# Patient Record
Sex: FEMALE | Race: WHITE | Employment: OTHER | URBAN - NONMETROPOLITAN AREA
[De-identification: names, ages, dates, MRNs, and addresses within clinical notes are randomized per-mention and may not be internally consistent; named-entity substitution may affect disease eponyms.]

---

## 2017-04-05 RX ORDER — CITALOPRAM 20 MG/1
TABLET ORAL
Qty: 30 TABLET | Refills: 0 | OUTPATIENT
Start: 2017-04-05

## 2017-04-27 ENCOUNTER — HOSPITAL ENCOUNTER (OUTPATIENT)
Dept: GENERAL RADIOLOGY | Age: 62
Discharge: HOME | End: 2017-04-27
Payer: MEDICAID

## 2017-04-27 ENCOUNTER — OFFICE VISIT (OUTPATIENT)
Dept: PRIMARY CARE CLINIC | Age: 62
End: 2017-04-27
Payer: MEDICAID

## 2017-04-27 VITALS
WEIGHT: 178 LBS | HEART RATE: 99 BPM | BODY MASS INDEX: 28.61 KG/M2 | TEMPERATURE: 97.6 F | RESPIRATION RATE: 18 BRPM | DIASTOLIC BLOOD PRESSURE: 80 MMHG | SYSTOLIC BLOOD PRESSURE: 122 MMHG | HEIGHT: 66 IN | OXYGEN SATURATION: 99 %

## 2017-04-27 DIAGNOSIS — E78.5 HYPERLIPIDEMIA, UNSPECIFIED HYPERLIPIDEMIA TYPE: ICD-10-CM

## 2017-04-27 DIAGNOSIS — E55.9 VITAMIN D DEFICIENCY: ICD-10-CM

## 2017-04-27 DIAGNOSIS — M25.552 BILATERAL HIP PAIN: ICD-10-CM

## 2017-04-27 DIAGNOSIS — M25.551 BILATERAL HIP PAIN: ICD-10-CM

## 2017-04-27 DIAGNOSIS — Z13.1 ENCOUNTER FOR SCREENING FOR DIABETES MELLITUS: ICD-10-CM

## 2017-04-27 DIAGNOSIS — Z13.9 SCREENING: ICD-10-CM

## 2017-04-27 DIAGNOSIS — R53.83 OTHER FATIGUE: Primary | ICD-10-CM

## 2017-04-27 DIAGNOSIS — Z11.59 NEED FOR HEPATITIS C SCREENING TEST: ICD-10-CM

## 2017-04-27 LAB — HBA1C MFR BLD: 5.2 %

## 2017-04-27 PROCEDURE — 83036 HEMOGLOBIN GLYCOSYLATED A1C: CPT | Performed by: NURSE PRACTITIONER

## 2017-04-27 PROCEDURE — 73521 X-RAY EXAM HIPS BI 2 VIEWS: CPT

## 2017-04-27 PROCEDURE — 99214 OFFICE O/P EST MOD 30 MIN: CPT | Performed by: NURSE PRACTITIONER

## 2017-04-27 ASSESSMENT — ENCOUNTER SYMPTOMS
ALLERGIC/IMMUNOLOGIC NEGATIVE: 1
RESPIRATORY NEGATIVE: 1
EYES NEGATIVE: 1
GASTROINTESTINAL NEGATIVE: 1

## 2017-04-28 ENCOUNTER — TELEPHONE (OUTPATIENT)
Dept: PRIMARY CARE CLINIC | Age: 62
End: 2017-04-28

## 2017-05-08 ENCOUNTER — TELEPHONE (OUTPATIENT)
Dept: PRIMARY CARE CLINIC | Age: 62
End: 2017-05-08

## 2017-05-27 DIAGNOSIS — J30.2 SEASONAL ALLERGIC RHINITIS, UNSPECIFIED ALLERGIC RHINITIS TRIGGER: ICD-10-CM

## 2017-05-28 RX ORDER — CETIRIZINE HYDROCHLORIDE 10 MG/1
TABLET ORAL
Qty: 30 TABLET | Refills: 11 | Status: SHIPPED | OUTPATIENT
Start: 2017-05-28 | End: 2018-04-25 | Stop reason: SDUPTHER

## 2017-07-19 RX ORDER — FLUTICASONE PROPIONATE 50 MCG
SPRAY, SUSPENSION (ML) NASAL
Qty: 1 BOTTLE | Refills: 3 | Status: SHIPPED | OUTPATIENT
Start: 2017-07-19 | End: 2018-02-25 | Stop reason: SDUPTHER

## 2017-09-08 DIAGNOSIS — Z12.39 SCREENING FOR BREAST CANCER: Primary | ICD-10-CM

## 2017-09-08 DIAGNOSIS — Z12.39 SCREENING BREAST EXAMINATION: ICD-10-CM

## 2017-09-14 ENCOUNTER — HOSPITAL ENCOUNTER (OUTPATIENT)
Dept: WOMENS IMAGING | Age: 62
Discharge: HOME | End: 2017-09-14
Payer: MEDICAID

## 2017-09-14 DIAGNOSIS — Z12.39 SCREENING FOR BREAST CANCER: ICD-10-CM

## 2017-09-14 DIAGNOSIS — Z12.39 SCREENING BREAST EXAMINATION: ICD-10-CM

## 2017-09-14 PROCEDURE — 77063 BREAST TOMOSYNTHESIS BI: CPT

## 2017-09-15 ENCOUNTER — TELEPHONE (OUTPATIENT)
Dept: INTERNAL MEDICINE | Age: 62
End: 2017-09-15

## 2017-09-21 ENCOUNTER — TELEPHONE (OUTPATIENT)
Dept: PRIMARY CARE CLINIC | Age: 62
End: 2017-09-21

## 2017-09-27 ENCOUNTER — OFFICE VISIT (OUTPATIENT)
Dept: PRIMARY CARE CLINIC | Age: 62
End: 2017-09-27
Payer: MEDICAID

## 2017-09-27 VITALS
OXYGEN SATURATION: 98 % | SYSTOLIC BLOOD PRESSURE: 110 MMHG | WEIGHT: 179 LBS | DIASTOLIC BLOOD PRESSURE: 78 MMHG | RESPIRATION RATE: 18 BRPM | HEIGHT: 67 IN | TEMPERATURE: 98.5 F | BODY MASS INDEX: 28.09 KG/M2 | HEART RATE: 104 BPM

## 2017-09-27 DIAGNOSIS — Z13.79 GENETIC TESTING: ICD-10-CM

## 2017-09-27 DIAGNOSIS — F32.A ANXIETY AND DEPRESSION: ICD-10-CM

## 2017-09-27 DIAGNOSIS — F41.9 ANXIETY AND DEPRESSION: ICD-10-CM

## 2017-09-27 DIAGNOSIS — M19.90 ARTHRITIS: Primary | ICD-10-CM

## 2017-09-27 PROCEDURE — 99214 OFFICE O/P EST MOD 30 MIN: CPT | Performed by: NURSE PRACTITIONER

## 2017-09-27 RX ORDER — MELOXICAM 7.5 MG/1
7.5 TABLET ORAL DAILY
Qty: 30 TABLET | Refills: 3 | Status: SHIPPED | OUTPATIENT
Start: 2017-09-27 | End: 2018-04-25

## 2017-09-27 ASSESSMENT — ENCOUNTER SYMPTOMS
GASTROINTESTINAL NEGATIVE: 1
EYES NEGATIVE: 1
RESPIRATORY NEGATIVE: 1
BACK PAIN: 1

## 2017-10-10 ENCOUNTER — PATIENT MESSAGE (OUTPATIENT)
Dept: PRIMARY CARE CLINIC | Age: 62
End: 2017-10-10

## 2017-10-26 ENCOUNTER — OFFICE VISIT (OUTPATIENT)
Dept: PRIMARY CARE CLINIC | Age: 62
End: 2017-10-26
Payer: MEDICAID

## 2017-10-26 VITALS
WEIGHT: 178 LBS | HEART RATE: 98 BPM | BODY MASS INDEX: 27.94 KG/M2 | HEIGHT: 67 IN | DIASTOLIC BLOOD PRESSURE: 82 MMHG | OXYGEN SATURATION: 100 % | RESPIRATION RATE: 20 BRPM | SYSTOLIC BLOOD PRESSURE: 122 MMHG

## 2017-10-26 DIAGNOSIS — F41.9 ANXIETY AND DEPRESSION: ICD-10-CM

## 2017-10-26 DIAGNOSIS — G89.29 CHRONIC RIGHT-SIDED LOW BACK PAIN WITH RIGHT-SIDED SCIATICA: Primary | ICD-10-CM

## 2017-10-26 DIAGNOSIS — M54.41 CHRONIC RIGHT-SIDED LOW BACK PAIN WITH RIGHT-SIDED SCIATICA: Primary | ICD-10-CM

## 2017-10-26 DIAGNOSIS — F32.A ANXIETY AND DEPRESSION: ICD-10-CM

## 2017-10-26 DIAGNOSIS — E66.3 OVERWEIGHT: ICD-10-CM

## 2017-10-26 PROCEDURE — 1036F TOBACCO NON-USER: CPT | Performed by: NURSE PRACTITIONER

## 2017-10-26 PROCEDURE — G8417 CALC BMI ABV UP PARAM F/U: HCPCS | Performed by: NURSE PRACTITIONER

## 2017-10-26 PROCEDURE — G8427 DOCREV CUR MEDS BY ELIG CLIN: HCPCS | Performed by: NURSE PRACTITIONER

## 2017-10-26 PROCEDURE — 99214 OFFICE O/P EST MOD 30 MIN: CPT | Performed by: NURSE PRACTITIONER

## 2017-10-26 PROCEDURE — 3014F SCREEN MAMMO DOC REV: CPT | Performed by: NURSE PRACTITIONER

## 2017-10-26 PROCEDURE — G8484 FLU IMMUNIZE NO ADMIN: HCPCS | Performed by: NURSE PRACTITIONER

## 2017-10-26 PROCEDURE — 3017F COLORECTAL CA SCREEN DOC REV: CPT | Performed by: NURSE PRACTITIONER

## 2017-10-26 RX ORDER — METHYLPREDNISOLONE ACETATE 40 MG/ML
40 INJECTION, SUSPENSION INTRA-ARTICULAR; INTRALESIONAL; INTRAMUSCULAR; SOFT TISSUE ONCE
Qty: 1 ML | Refills: 0 | Status: SHIPPED | OUTPATIENT
Start: 2017-10-26 | End: 2017-10-26

## 2017-10-26 RX ORDER — METHYLPREDNISOLONE 4 MG/1
TABLET ORAL
Qty: 1 KIT | Refills: 0 | Status: SHIPPED | OUTPATIENT
Start: 2017-10-26 | End: 2017-11-01

## 2017-10-26 ASSESSMENT — ENCOUNTER SYMPTOMS
RESPIRATORY NEGATIVE: 1
BACK PAIN: 1
GASTROINTESTINAL NEGATIVE: 1
EYES NEGATIVE: 1

## 2017-10-26 NOTE — PROGRESS NOTES
[facility] PRIMARY CARE  [facility] [facility] [address] [facility] [address]  Dept: [phone]  Dept Fax: [phone]  Loc: [phone]    Myesha Corrales is a 57 y.o. female who presents today for her medical conditions/complaints as noted below. Myesha Corrales is c/o of 1 Month Follow-Up (genetic testing results); Discuss Medications (Mobic doesnt work ); and Fatigue      Chief Complaint   Patient presents with    1 Month Follow-Up     genetic testing results    Discuss Medications     Mobic doesnt work     Fatigue       HPI:     HPI  Patient is here for follow up on arthritic pain and recent genetic testing. Patient reports that the mobic was not helping and she was having side effects of dizziness and headaches with the medication. She stopped the Mobic and symptoms did resolve. Patient reports coming off of the Celexa and is doing ok without it. She is having a few episodes of increased weeping, but not significant. Past Medical History:   Diagnosis Date    Allergic rhinitis     Anxiety     GERD (gastroesophageal reflux disease)         Past Surgical History:   Procedure Laterality Date    BREAST SURGERY Bilateral     Dr Shonna David BI Bilateral        Social History   Substance Use Topics    Smoking status: Former Smoker     Quit date: 10/3/2014    Smokeless tobacco: Not on file    Alcohol use No        Current Outpatient Prescriptions   Medication Sig Dispense Refill    methylPREDNISolone acetate (DEPO-MEDROL) 40 MG/ML injection Inject 1 mL into the muscle once for 1 dose 1 mL 0    methylPREDNISolone (MEDROL DOSEPACK) 4 MG tablet Take by mouth.  1 kit 0    metFORMIN (GLUCOPHAGE) 500 MG tablet Take 1 tablet by mouth 2 times daily (with meals) 60 tablet 3    fluticasone (FLONASE) 50 MCG/ACT nasal spray USE ONE SPRAY(S) IN EACH NOSTRIL ONCE DAILY 1 Bottle 3    cetirizine (ZYRTEC) 10 MG tablet TAKE ONE TABLET BY MOUTH alert and oriented to person, place, and time. She has normal strength. Skin: Skin is warm, dry and intact. Psychiatric: She has a normal mood and affect. Her speech is normal and behavior is normal. Judgment and thought content normal. Cognition and memory are normal.   Nursing note and vitals reviewed. /82   Pulse 98   Resp 20   Ht 5' 7\" (1.702 m)   Wt 178 lb (80.7 kg)   SpO2 100%   Breastfeeding? No   BMI 27.88 kg/m²     Assessment:     1. Chronic right-sided low back pain with right-sided sciatica  methylPREDNISolone acetate (DEPO-MEDROL) 40 MG/ML injection    methylPREDNISolone (MEDROL DOSEPACK) 4 MG tablet   2. Overweight  metFORMIN (GLUCOPHAGE) 500 MG tablet   3. Anxiety and depression         No results found for this visit on 10/26/17. Plan:     Steroid injection for sciatic pain. Metformin for weight loss  Not going to start any antidepressants at this time. Patient is doing well without medication. Return in about 4 weeks (around 11/23/2017) for weight loss. No orders of the defined types were placed in this encounter. Orders Placed This Encounter   Medications    methylPREDNISolone acetate (DEPO-MEDROL) 40 MG/ML injection     Sig: Inject 1 mL into the muscle once for 1 dose     Dispense:  1 mL     Refill:  0    methylPREDNISolone (MEDROL DOSEPACK) 4 MG tablet     Sig: Take by mouth. Dispense:  1 kit     Refill:  0    metFORMIN (GLUCOPHAGE) 500 MG tablet     Sig: Take 1 tablet by mouth 2 times daily (with meals)     Dispense:  60 tablet     Refill:  3        Patient given educational materials - see patient instructions. Discussed use, benefit, and side effects of prescribed medications. All patient questions answered. Pt voiced understanding. Reviewed health maintenance. Instructed to continue current medications, diet and exercise. Patient agreed with treatment plan. Follow up as directed.            Electronically signed by ROBER Kim on 10/26/2017 at 2:05 PM

## 2018-02-26 RX ORDER — FLUTICASONE PROPIONATE 50 MCG
SPRAY, SUSPENSION (ML) NASAL
Qty: 1 BOTTLE | Refills: 3 | Status: SHIPPED | OUTPATIENT
Start: 2018-02-26 | End: 2018-04-25 | Stop reason: SDUPTHER

## 2018-03-01 ENCOUNTER — PATIENT MESSAGE (OUTPATIENT)
Dept: PRIMARY CARE CLINIC | Age: 63
End: 2018-03-01

## 2018-03-01 NOTE — TELEPHONE ENCOUNTER
From: Kyaw Avalos  To: JoseROBER Medina  Sent: 3/1/2018 11:39 AM CST  Subject: Non-Urgent Medical Question    I saw on my records here that I was due for shingles shot. I don't know how often I should have one but I got a Zostavax [id number] INJ at [facility] on 7/21/2015. Just to let you know so you can put in my records. Thank you. Becky Aldana

## 2018-04-25 ENCOUNTER — PATIENT MESSAGE (OUTPATIENT)
Dept: PRIMARY CARE CLINIC | Age: 63
End: 2018-04-25

## 2018-04-25 RX ORDER — CITALOPRAM 20 MG/1
20 TABLET ORAL DAILY
Qty: 30 TABLET | Refills: 2 | Status: SHIPPED | OUTPATIENT
Start: 2018-04-25 | End: 2018-07-31 | Stop reason: SDUPTHER

## 2018-04-25 RX ORDER — FLUTICASONE PROPIONATE 50 MCG
1 SPRAY, SUSPENSION (ML) NASAL DAILY
Qty: 1 BOTTLE | Refills: 3 | Status: SHIPPED | OUTPATIENT
Start: 2018-04-25 | End: 2019-04-14 | Stop reason: SDUPTHER

## 2018-04-25 RX ORDER — CITALOPRAM 20 MG/1
TABLET ORAL
COMMUNITY
Start: 2018-04-03 | End: 2018-04-25 | Stop reason: SDUPTHER

## 2018-04-25 RX ORDER — CETIRIZINE HYDROCHLORIDE 10 MG/1
10 TABLET ORAL DAILY
Qty: 30 TABLET | Refills: 11 | Status: SHIPPED | OUTPATIENT
Start: 2018-04-25 | End: 2018-08-24 | Stop reason: SDUPTHER

## 2018-07-23 RX ORDER — CITALOPRAM 20 MG/1
TABLET ORAL
Qty: 30 TABLET | Refills: 2 | OUTPATIENT
Start: 2018-07-23

## 2018-07-31 ENCOUNTER — PATIENT MESSAGE (OUTPATIENT)
Dept: PRIMARY CARE CLINIC | Age: 63
End: 2018-07-31

## 2018-07-31 RX ORDER — CITALOPRAM 20 MG/1
20 TABLET ORAL DAILY
Qty: 30 TABLET | Refills: 0 | Status: SHIPPED | OUTPATIENT
Start: 2018-07-31 | End: 2018-08-24 | Stop reason: SDUPTHER

## 2018-07-31 NOTE — TELEPHONE ENCOUNTER
From: Olga Mccallum  To: ROBER Saini  Sent: 7/31/2018 1:35 PM CDT  Subject: Prescription Question    [facility] has not received a refill from your office for my [id number] mg.  Thank you  Brayan Rogers  [phone]

## 2018-08-24 ENCOUNTER — OFFICE VISIT (OUTPATIENT)
Dept: PRIMARY CARE CLINIC | Age: 63
End: 2018-08-24
Payer: MEDICARE

## 2018-08-24 VITALS
SYSTOLIC BLOOD PRESSURE: 124 MMHG | DIASTOLIC BLOOD PRESSURE: 72 MMHG | WEIGHT: 169.6 LBS | HEIGHT: 66 IN | BODY MASS INDEX: 27.26 KG/M2 | TEMPERATURE: 97.9 F | OXYGEN SATURATION: 97 % | HEART RATE: 98 BPM

## 2018-08-24 DIAGNOSIS — Z12.31 BREAST CANCER SCREENING BY MAMMOGRAM: ICD-10-CM

## 2018-08-24 DIAGNOSIS — E55.9 VITAMIN D DEFICIENCY: ICD-10-CM

## 2018-08-24 DIAGNOSIS — F41.9 ANXIETY AND DEPRESSION: ICD-10-CM

## 2018-08-24 DIAGNOSIS — Z13.1 DIABETES MELLITUS SCREENING: ICD-10-CM

## 2018-08-24 DIAGNOSIS — Z23 NEED FOR PROPHYLACTIC VACCINATION AND INOCULATION AGAINST VARICELLA: ICD-10-CM

## 2018-08-24 DIAGNOSIS — J01.40 ACUTE NON-RECURRENT PANSINUSITIS: ICD-10-CM

## 2018-08-24 DIAGNOSIS — F32.A ANXIETY AND DEPRESSION: ICD-10-CM

## 2018-08-24 DIAGNOSIS — Z12.11 COLON CANCER SCREENING: ICD-10-CM

## 2018-08-24 DIAGNOSIS — Z23 NEED FOR PROPHYLACTIC VACCINATION AGAINST STREPTOCOCCUS PNEUMONIAE (PNEUMOCOCCUS): ICD-10-CM

## 2018-08-24 DIAGNOSIS — K64.9 ACUTE HEMORRHOID: Primary | ICD-10-CM

## 2018-08-24 LAB
ALBUMIN SERPL-MCNC: 4.4 G/DL (ref 3.5–5.2)
ALP BLD-CCNC: 56 U/L (ref 35–104)
ALT SERPL-CCNC: 13 U/L (ref 5–33)
ANION GAP SERPL CALCULATED.3IONS-SCNC: 14 MMOL/L (ref 7–19)
AST SERPL-CCNC: 16 U/L (ref 5–32)
BILIRUB SERPL-MCNC: <0.2 MG/DL (ref 0.2–1.2)
BUN BLDV-MCNC: 15 MG/DL (ref 8–23)
CALCIUM SERPL-MCNC: 9.5 MG/DL (ref 8.8–10.2)
CHLORIDE BLD-SCNC: 101 MMOL/L (ref 98–111)
CO2: 25 MMOL/L (ref 22–29)
CREAT SERPL-MCNC: 0.9 MG/DL (ref 0.5–0.9)
GFR NON-AFRICAN AMERICAN: >60
GLUCOSE BLD-MCNC: 88 MG/DL (ref 74–109)
HBA1C MFR BLD: 5.3 % (ref 4–6)
HCT VFR BLD CALC: 40.9 % (ref 37–47)
HEMOGLOBIN: 12.6 G/DL (ref 12–16)
MCH RBC QN AUTO: 29.4 PG (ref 27–31)
MCHC RBC AUTO-ENTMCNC: 30.8 G/DL (ref 33–37)
MCV RBC AUTO: 95.3 FL (ref 81–99)
PDW BLD-RTO: 13 % (ref 11.5–14.5)
PLATELET # BLD: 257 K/UL (ref 130–400)
PMV BLD AUTO: 10 FL (ref 9.4–12.3)
POTASSIUM SERPL-SCNC: 4 MMOL/L (ref 3.5–5)
RBC # BLD: 4.29 M/UL (ref 4.2–5.4)
SODIUM BLD-SCNC: 140 MMOL/L (ref 136–145)
TOTAL PROTEIN: 7.3 G/DL (ref 6.6–8.7)
TSH SERPL DL<=0.05 MIU/L-ACNC: 2.72 UIU/ML (ref 0.27–4.2)
VITAMIN D 25-HYDROXY: 25.9 NG/ML
WBC # BLD: 6.2 K/UL (ref 4.8–10.8)

## 2018-08-24 PROCEDURE — 1036F TOBACCO NON-USER: CPT | Performed by: NURSE PRACTITIONER

## 2018-08-24 PROCEDURE — 4040F PNEUMOC VAC/ADMIN/RCVD: CPT | Performed by: NURSE PRACTITIONER

## 2018-08-24 PROCEDURE — G8417 CALC BMI ABV UP PARAM F/U: HCPCS | Performed by: NURSE PRACTITIONER

## 2018-08-24 PROCEDURE — 90670 PCV13 VACCINE IM: CPT | Performed by: NURSE PRACTITIONER

## 2018-08-24 PROCEDURE — 1090F PRES/ABSN URINE INCON ASSESS: CPT | Performed by: NURSE PRACTITIONER

## 2018-08-24 PROCEDURE — 3017F COLORECTAL CA SCREEN DOC REV: CPT | Performed by: NURSE PRACTITIONER

## 2018-08-24 PROCEDURE — G8427 DOCREV CUR MEDS BY ELIG CLIN: HCPCS | Performed by: NURSE PRACTITIONER

## 2018-08-24 PROCEDURE — 1101F PT FALLS ASSESS-DOCD LE1/YR: CPT | Performed by: NURSE PRACTITIONER

## 2018-08-24 PROCEDURE — G8400 PT W/DXA NO RESULTS DOC: HCPCS | Performed by: NURSE PRACTITIONER

## 2018-08-24 PROCEDURE — G0009 ADMIN PNEUMOCOCCAL VACCINE: HCPCS | Performed by: NURSE PRACTITIONER

## 2018-08-24 PROCEDURE — 1123F ACP DISCUSS/DSCN MKR DOCD: CPT | Performed by: NURSE PRACTITIONER

## 2018-08-24 PROCEDURE — 99214 OFFICE O/P EST MOD 30 MIN: CPT | Performed by: NURSE PRACTITIONER

## 2018-08-24 RX ORDER — AMOXICILLIN 500 MG/1
500 CAPSULE ORAL 2 TIMES DAILY
Qty: 20 CAPSULE | Refills: 0 | Status: SHIPPED | OUTPATIENT
Start: 2018-08-24 | End: 2018-09-03

## 2018-08-24 RX ORDER — CITALOPRAM 20 MG/1
20 TABLET ORAL DAILY
Qty: 30 TABLET | Refills: 2 | Status: SHIPPED | OUTPATIENT
Start: 2018-08-24 | End: 2018-11-21 | Stop reason: SDUPTHER

## 2018-08-24 RX ORDER — CETIRIZINE HYDROCHLORIDE 10 MG/1
10 TABLET ORAL DAILY
Qty: 30 TABLET | Refills: 11 | Status: SHIPPED | OUTPATIENT
Start: 2018-08-24 | End: 2019-07-11 | Stop reason: SDUPTHER

## 2018-08-24 ASSESSMENT — PATIENT HEALTH QUESTIONNAIRE - PHQ9
1. LITTLE INTEREST OR PLEASURE IN DOING THINGS: 0
SUM OF ALL RESPONSES TO PHQ QUESTIONS 1-9: 0
SUM OF ALL RESPONSES TO PHQ QUESTIONS 1-9: 0
SUM OF ALL RESPONSES TO PHQ9 QUESTIONS 1 & 2: 0
2. FEELING DOWN, DEPRESSED OR HOPELESS: 0

## 2018-08-24 ASSESSMENT — ENCOUNTER SYMPTOMS
EYES NEGATIVE: 1
RECTAL PAIN: 1
RESPIRATORY NEGATIVE: 1

## 2018-08-24 NOTE — PROGRESS NOTES
spray 1 spray by Nasal route daily 1 Bottle 3    ranitidine (ZANTAC) 150 MG capsule Take 1 capsule by mouth daily 30 capsule 6     No current facility-administered medications for this visit. No Known Allergies    History reviewed. No pertinent family history. Subjective:      Review of Systems   Constitutional: Negative. HENT: Negative. Eyes: Negative. Respiratory: Negative. Cardiovascular: Negative. Gastrointestinal: Positive for rectal pain (hemorrhoid). Endocrine: Negative. Genitourinary: Negative. Musculoskeletal: Negative. Skin: Negative. Neurological: Negative. Hematological: Negative. Psychiatric/Behavioral: Negative. Objective:     Physical Exam   Constitutional: She is oriented to person, place, and time. Vital signs are normal. She appears well-developed and well-nourished. HENT:   Head: Normocephalic and atraumatic. Right Ear: Hearing, external ear and ear canal normal. A middle ear effusion is present. Left Ear: Hearing, tympanic membrane, external ear and ear canal normal.   Nose: Nose normal.   Mouth/Throat: Uvula is midline, oropharynx is clear and moist and mucous membranes are normal.   Eyes: Pupils are equal, round, and reactive to light. Conjunctivae, EOM and lids are normal.   Neck: Trachea normal and normal range of motion. Neck supple. No thyroid mass and no thyromegaly present. Cardiovascular: Normal rate, regular rhythm, normal heart sounds and normal pulses. Pulmonary/Chest: Effort normal and breath sounds normal.   Abdominal: Soft. Normal appearance and bowel sounds are normal.   Genitourinary:   Genitourinary Comments: Deferred   Musculoskeletal: Normal range of motion. Cervical back: Normal. She exhibits normal range of motion and no tenderness. Thoracic back: Normal. She exhibits normal range of motion and no tenderness. Lumbar back: Normal. She exhibits normal range of motion and no tenderness. Neurological: She is alert and oriented to person, place, and time. She has normal strength. Skin: Skin is warm, dry and intact. Psychiatric: She has a normal mood and affect. Her speech is normal and behavior is normal. Judgment and thought content normal. Cognition and memory are normal.   Nursing note and vitals reviewed. /72   Pulse 98   Temp 97.9 °F (36.6 °C)   Ht 5' 6\" (1.676 m)   Wt 169 lb 9.6 oz (76.9 kg)   SpO2 97%   BMI 27.37 kg/m²     Assessment:      Diagnosis Orders   1. Acute hemorrhoid  hydrocortisone (ANUSOL-HC) 2.5 % rectal cream   2. Need for prophylactic vaccination against Streptococcus pneumoniae (pneumococcus)  Pneumococcal conjugate vaccine 13-valent PCV13   3. Need for prophylactic vaccination and inoculation against varicella  zoster recombinant adjuvanted vaccine (SHINGRIX) 50 MCG SUSR injection   4. Colon cancer screening     5. Anxiety and depression  citalopram (CELEXA) 20 MG tablet    CBC    Comprehensive Metabolic Panel    TSH without Reflex   6. Diabetes mellitus screening  Hemoglobin A1C   7. Vitamin D deficiency  Vitamin D 25 Hydroxy   8. Breast cancer screening by mammogram  OZZIE Screening Bilateral   9. Acute non-recurrent pansinusitis  amoxicillin (AMOXIL) 500 MG capsule       No results found for this visit on 08/24/18. Plan:     Medications into pharmacy for acute hemorrhoid. She is to use as directed. I am rechecking labs we'll call her with these results. I'm also having her schedule mammogram again we will call with this result as well. Fit test given in office she is to return once completed. Pneumonia vaccine given in office today. abx for sinusitis. Return in about 4 weeks (around 9/21/2018) for Medicare AWV.     Orders Placed This Encounter   Procedures    OZZIE Screening Bilateral     Standing Status:   Future     Standing Expiration Date:   10/24/2019     Order Specific Question:   Reason for exam:     Answer:   screening    Pneumococcal

## 2018-08-28 ENCOUNTER — TELEPHONE (OUTPATIENT)
Dept: PRIMARY CARE CLINIC | Age: 63
End: 2018-08-28

## 2018-08-28 RX ORDER — ERGOCALCIFEROL (VITAMIN D2) 1250 MCG
50000 CAPSULE ORAL WEEKLY
Qty: 4 CAPSULE | Refills: 3 | Status: SHIPPED | OUTPATIENT
Start: 2018-08-28 | End: 2018-09-28 | Stop reason: SDUPTHER

## 2018-09-24 ENCOUNTER — TELEPHONE (OUTPATIENT)
Dept: PRIMARY CARE CLINIC | Age: 63
End: 2018-09-24

## 2018-09-24 DIAGNOSIS — R19.5 POSITIVE FIT (FECAL IMMUNOCHEMICAL TEST): Primary | ICD-10-CM

## 2018-09-24 LAB
CONTROL: POSITIVE
HEMOCCULT STL QL: POSITIVE

## 2018-09-24 NOTE — TELEPHONE ENCOUNTER
Received Fit Test in the mail it was tested and returned with positive results. Pt was notified of positive results by phone. Pt will be referred to GI for colonoscopy. Pt is to expect a call with an appointment time and date. Any questions please feel free to call [facility].

## 2018-09-25 DIAGNOSIS — R19.5 POSITIVE FIT (FECAL IMMUNOCHEMICAL TEST): Primary | ICD-10-CM

## 2018-09-28 ENCOUNTER — OFFICE VISIT (OUTPATIENT)
Dept: PRIMARY CARE CLINIC | Age: 63
End: 2018-09-28
Payer: MEDICARE

## 2018-09-28 VITALS
WEIGHT: 169.2 LBS | SYSTOLIC BLOOD PRESSURE: 128 MMHG | DIASTOLIC BLOOD PRESSURE: 82 MMHG | HEIGHT: 66 IN | OXYGEN SATURATION: 96 % | TEMPERATURE: 97.3 F | BODY MASS INDEX: 27.19 KG/M2 | HEART RATE: 90 BPM

## 2018-09-28 DIAGNOSIS — F41.9 ANXIETY AND DEPRESSION: ICD-10-CM

## 2018-09-28 DIAGNOSIS — Z13.6 SCREENING FOR CARDIOVASCULAR CONDITION: ICD-10-CM

## 2018-09-28 DIAGNOSIS — Z78.0 ASYMPTOMATIC MENOPAUSAL STATE: ICD-10-CM

## 2018-09-28 DIAGNOSIS — Z00.00 ROUTINE GENERAL MEDICAL EXAMINATION AT A HEALTH CARE FACILITY: Primary | ICD-10-CM

## 2018-09-28 DIAGNOSIS — F32.A ANXIETY AND DEPRESSION: ICD-10-CM

## 2018-09-28 DIAGNOSIS — E55.9 VITAMIN D DEFICIENCY: ICD-10-CM

## 2018-09-28 PROCEDURE — 4040F PNEUMOC VAC/ADMIN/RCVD: CPT | Performed by: NURSE PRACTITIONER

## 2018-09-28 PROCEDURE — G0446 INTENS BEHAVE THER CARDIO DX: HCPCS | Performed by: NURSE PRACTITIONER

## 2018-09-28 PROCEDURE — G0402 INITIAL PREVENTIVE EXAM: HCPCS | Performed by: NURSE PRACTITIONER

## 2018-09-28 RX ORDER — ERGOCALCIFEROL (VITAMIN D2) 1250 MCG
50000 CAPSULE ORAL WEEKLY
Qty: 4 CAPSULE | Refills: 3 | Status: SHIPPED | OUTPATIENT
Start: 2018-09-28 | End: 2019-07-17

## 2018-09-28 ASSESSMENT — ANXIETY QUESTIONNAIRES: GAD7 TOTAL SCORE: 2

## 2018-09-28 ASSESSMENT — PATIENT HEALTH QUESTIONNAIRE - PHQ9
SUM OF ALL RESPONSES TO PHQ QUESTIONS 1-9: 0
SUM OF ALL RESPONSES TO PHQ QUESTIONS 1-9: 0

## 2018-09-28 NOTE — PROGRESS NOTES
Medicare Annual Wellness Visit  Name: Jen Mccauley Date: 2018   MRN: [id number] Sex: Female   Age: 70 y.o. Ethnicity: Non-/Non    : 1955 Race: Vinny Steele is here for Medicare AWV    Screenings for behavioral, psychosocial and functional/safety risks, and cognitive dysfunction are all negative except as indicated below. These results, as well as other patient data [facility] [address] [facility], are documented in Flowsheets linked to this Encounter. No Known Allergies    Prior to Visit Medications    Medication Sig Taking? Authorizing Provider   ergocalciferol (ERGOCALCIFEROL) 17676 units capsule Take 1 capsule by mouth once a week Yes ROBER Pleitez   citalopram (CELEXA) 20 MG tablet Take 1 tablet by mouth daily Yes ROBER Pleitez   cetirizine (ZYRTEC) 10 MG tablet Take 1 tablet by mouth daily Yes ROBER Pleitez   hydrocortisone (ANUSOL-HC) 2.5 % rectal cream Place rectally 2 times daily. Yes ROBER Pleitez   fluticasone (FLONASE) 50 MCG/ACT nasal spray 1 spray by Nasal route daily Yes Nicole Patten MD   ranitidine (ZANTAC) 150 MG capsule Take 1 capsule by mouth daily Yes ROBER Andersen       Past Medical History:   Diagnosis Date    Allergic rhinitis     Anxiety     GERD (gastroesophageal reflux disease)      Past Surgical History:   Procedure Laterality Date    BREAST SURGERY Bilateral     Dr Chester Choi BI Bilateral      History reviewed. No pertinent family history.     CareTeam (Including outside providers/suppliers regularly involved in providing care):   Patient Care Team:  Nicole Patten MD as PCP - General (Family Medicine)    Wt Readings from Last 3 Encounters:   18 169 lb 3.2 oz (76.7 kg)   18 169 lb 9.6 oz (76.9 kg)   10/26/17 178 lb (80.7 kg)     Vitals:    18 1340   BP: 128/82   Pulse: 90   Temp: 97.3 °F (36.3 °C)   SpO2: 96%   Weight: 169 lb 3.2 oz (76.7 kg)   Height: 5' 6\" (1.676 m)   General Appearance: alert and oriented to person, place and time, well developed and well- nourished, in no acute distress  Skin: warm and dry, no rash or erythema  Head: normocephalic and atraumatic  Eyes: pupils equal, round, and reactive to light, extraocular eye movements intact, conjunctivae normal  ENT: tympanic membrane, external ear and ear canal normal bilaterally, nose without deformity, nasal mucosa and turbinates normal without polyps  Neck: supple and non-tender without mass, no thyromegaly or thyroid nodules, no cervical lymphadenopathy  Pulmonary/Chest: clear to auscultation bilaterally- no wheezes, rales or rhonchi, normal air movement, no respiratory distress  Cardiovascular: normal rate, regular rhythm, normal S1 and S2, no murmurs, rubs, clicks, or gallops, distal pulses intact, no carotid bruits  Abdomen: soft, non-tender, non-distended, normal bowel sounds, no masses or organomegaly  Extremities: no cyanosis, clubbing or edema  Musculoskeletal: normal range of motion, no joint swelling, deformity or tenderness  Neurologic: reflexes normal and symmetric, no cranial nerve deficit, gait, coordination and speech normal  Body mass index is 27.31 kg/m². Patient's complete Health Risk Assessment and screening values have been reviewed and are found in Flowsheets. The following problems were reviewed today and where indicated follow up appointments were made and/or referrals ordered.     Positive Risk Factor Screenings with Interventions:     General Health:  General  In the past 7 days, have you experienced any of the following?: (P) None of These  Do you get the social and emotional support that you need?: (P) Yes  Do you have a Living Will?: (!) (P) No  General Health Risk Interventions:  · None indicated    Health Habits/Nutrition:  Health Habits/Nutrition  Do you exercise for at least 20 minutes 2-3 times per week?: (!) (P) No  Have you lost any weight

## 2018-10-25 ENCOUNTER — OFFICE VISIT (OUTPATIENT)
Dept: GASTROENTEROLOGY | Facility: CLINIC | Age: 63
End: 2018-10-25

## 2018-10-25 VITALS
HEART RATE: 96 BPM | SYSTOLIC BLOOD PRESSURE: 120 MMHG | WEIGHT: 168 LBS | DIASTOLIC BLOOD PRESSURE: 80 MMHG | OXYGEN SATURATION: 96 % | HEIGHT: 66 IN | BODY MASS INDEX: 27 KG/M2

## 2018-10-25 DIAGNOSIS — R19.5 POSITIVE FIT (FECAL IMMUNOCHEMICAL TEST): Primary | ICD-10-CM

## 2018-10-25 DIAGNOSIS — K59.01 SLOW TRANSIT CONSTIPATION: ICD-10-CM

## 2018-10-25 DIAGNOSIS — K62.5 BRBPR (BRIGHT RED BLOOD PER RECTUM): ICD-10-CM

## 2018-10-25 DIAGNOSIS — K64.9 HEMORRHOIDS, UNSPECIFIED HEMORRHOID TYPE: ICD-10-CM

## 2018-10-25 DIAGNOSIS — R19.4 CHANGE IN BOWEL HABITS: ICD-10-CM

## 2018-10-25 PROCEDURE — 99204 OFFICE O/P NEW MOD 45 MIN: CPT | Performed by: CLINICAL NURSE SPECIALIST

## 2018-10-25 RX ORDER — ERGOCALCIFEROL 1.25 MG/1
CAPSULE ORAL
COMMUNITY
Start: 2018-09-28 | End: 2019-10-16 | Stop reason: ALTCHOICE

## 2018-10-25 RX ORDER — CITALOPRAM 20 MG/1
40 TABLET ORAL DAILY
COMMUNITY
Start: 2018-08-24 | End: 2022-01-31 | Stop reason: ALTCHOICE

## 2018-10-25 RX ORDER — CETIRIZINE HYDROCHLORIDE 10 MG/1
10 TABLET ORAL DAILY
COMMUNITY
Start: 2018-08-24

## 2018-10-25 RX ORDER — RANITIDINE 150 MG/1
150 CAPSULE ORAL
COMMUNITY
Start: 2015-10-12 | End: 2019-10-16 | Stop reason: ALTCHOICE

## 2018-10-25 NOTE — PROGRESS NOTES
Damairs Hu  1955    10/25/2018  Chief Complaint   Patient presents with   • GI Problem     New patient ref by Dr. Bowers for positive Fit test     Subjective   HPI  Damaris Hu is a 63 y.o. female who presents with a complaint of positive FIT testing with her PCP incidental findings. She has a complaint of constipation as well that has been progressive ongoing for approx 6 months ongoing. It is moderate to severe at times. No certain triggers. No alleviating. She is going up to up to a few days without a BM unless she takes something or eats increased fiber. She has associated hemorrhoids that do bleed on occasion. This is a small amount on the tissue when she wipes. She has been provided Anusol HC suppositories and this did help her. She denies any associated abdominal pain. She does have some bloating and distention with her constipation. She has not ever had a colonoscopy that she recalls but may have years ago. Her mother had colon polyps no family hx for colon cancer.     Past Medical History:   Diagnosis Date   • Depression    • GERD (gastroesophageal reflux disease)    • Vitamin D deficiency      Past Surgical History:   Procedure Laterality Date   • BREAST SURGERY      Cyst removal   • HYSTERECTOMY       Outpatient Prescriptions Marked as Taking for the 10/25/18 encounter (Office Visit) with Millicent Stvee APRN   Medication Sig Dispense Refill   • cetirizine (zyrTEC) 10 MG tablet Take 10 mg by mouth.     • citalopram (CeleXA) 20 MG tablet Take 20 mg by mouth.     • ergocalciferol (ERGOCALCIFEROL) 41449 units capsule Take  by mouth.     • hydrocortisone (ANUSOL-HC) 2.5 % rectal cream Place rectally 2 times daily.     • ranitidine (ZANTAC) 150 MG capsule Take 150 mg by mouth.       No Known Allergies  Social History     Social History   • Marital status: Unknown     Spouse name: N/A   • Number of children: N/A   • Years of education: N/A     Occupational History   • Not on file.     Social History  Main Topics   • Smoking status: Former Smoker   • Smokeless tobacco: Never Used      Comment: Quit 4 years ago   • Alcohol use No   • Drug use: Unknown   • Sexual activity: Not on file     Other Topics Concern   • Not on file     Social History Narrative   • No narrative on file     Family History   Problem Relation Age of Onset   • Colon polyps Mother    • Colon cancer Neg Hx      Health Maintenance   Topic Date Due   • ANNUAL PHYSICAL  08/22/1958   • ZOSTER VACCINE (2 of 3) 09/15/2015   • INFLUENZA VACCINE  08/01/2018   • HEPATITIS C SCREENING  10/02/2018   • MAMMOGRAM  10/02/2018   • COLONOSCOPY  10/02/2018   • PNEUMOCOCCAL VACCINES (65+ LOW/MEDIUM RISK) (2 of 2 - PPSV23) 08/24/2019   • TDAP/TD VACCINES (2 - Td) 05/05/2025     Review of Systems   Constitutional: Negative for activity change, appetite change, chills, diaphoresis, fatigue, fever and unexpected weight change.   HENT: Negative for ear pain, hearing loss, mouth sores, sore throat, trouble swallowing and voice change.    Eyes: Negative.    Respiratory: Negative for cough, choking, shortness of breath and wheezing.    Cardiovascular: Negative for chest pain and palpitations.   Gastrointestinal: Negative for abdominal pain, blood in stool, constipation, diarrhea, nausea and vomiting.        Hemorrhoid   Endocrine: Negative for cold intolerance and heat intolerance.   Genitourinary: Negative for decreased urine volume, dysuria, frequency, hematuria and urgency.   Musculoskeletal: Negative for back pain, gait problem and myalgias.   Skin: Negative for color change, pallor and rash.   Allergic/Immunologic: Negative for food allergies and immunocompromised state.   Neurological: Negative for dizziness, tremors, seizures, syncope, weakness, light-headedness, numbness and headaches.   Hematological: Negative for adenopathy. Does not bruise/bleed easily.   Psychiatric/Behavioral: Negative for agitation and confusion. The patient is not nervous/anxious.    All  "other systems reviewed and are negative.    Objective   Vitals:    10/25/18 1309   BP: 120/80   Pulse: 96   SpO2: 96%   Weight: 76.2 kg (168 lb)   Height: 167.6 cm (66\")     Body mass index is 27.12 kg/m².  Physical Exam   Constitutional: She is oriented to person, place, and time. She appears well-developed and well-nourished.   HENT:   Head: Normocephalic and atraumatic.   Eyes: Pupils are equal, round, and reactive to light.   Neck: Normal range of motion. Neck supple. No tracheal deviation present.   Cardiovascular: Normal rate, regular rhythm and normal heart sounds.  Exam reveals no gallop and no friction rub.    No murmur heard.  Pulmonary/Chest: Effort normal and breath sounds normal. No respiratory distress. She has no wheezes. She has no rales. She exhibits no tenderness.   Abdominal: Soft. Bowel sounds are normal. She exhibits no distension. There is no hepatosplenomegaly. There is no tenderness. There is no rigidity, no rebound and no guarding.   Genitourinary:   Genitourinary Comments: Very small external hemorrhoid noted no internal masses appreciated.    Musculoskeletal: Normal range of motion. She exhibits no edema, tenderness or deformity.   Neurological: She is alert and oriented to person, place, and time. She has normal reflexes.   Skin: Skin is warm and dry. No rash noted. No pallor.   Psychiatric: She has a normal mood and affect. Her behavior is normal. Judgment and thought content normal.     Assessment/Plan   Damaris was seen today for gi problem.    Diagnoses and all orders for this visit:    Positive FIT (fecal immunochemical test)  -     Case Request; Standing  -     Follow Anesthesia Guidelines / Standing Orders; Future  -     Implement Anesthesia Orders Day of Procedure; Standing  -     Obtain Informed Consent; Standing  -     Verify bowel prep was successful; Standing  -     Case Request  -     polyethylene glycol (GoLYTELY) 236 g solution; Take as directed by office " instructions.    Change in bowel habits    Slow transit constipation    Hemorrhoids, unspecified hemorrhoid type    BRBPR (bright red blood per rectum)      Long discussion regarding her symptoms I have suggested Miralax 17 grams up to twice per day as needed. Increase water intake and fiber regimen. For her hemorrhoids to continue her Anusol HC suppositories, warm bath and manage her constipation. Her hemorrhoids on exam today are very small and nonbleeding.     COLONOSCOPY WITH ANESTHESIA (N/A)  EMR Dragon/transcription disclaimer: Much of this encounter note is electronic transcription/translation of spoken language to printed text. The electronic translation of spoken language may be erroneous, or at times, nonsensical words or phrases may be inadvertently transcribed. Although I have reviewed the note for such errors, some may still exist.  Body mass index is 27.12 kg/m².  No Follow-up on file.    Patient's Body mass index is 27.12 kg/m². BMI is above normal parameters. Recommendations include: nutrition counseling.      All risks, benefits, alternatives, and indications of colonoscopy and/or Endoscopy procedure have been discussed with the patient. Risks to include perforation of the colon requiring possible surgery or colostomy, risk of bleeding from biopsies or removal of colon tissue, possibility of missing a colon polyp or cancer, or adverse drug reaction.  Benefits to include the diagnosis and management of disease of the colon and rectum. Alternatives to include barium enema, radiographic evaluation, lab testing or no intervention. Pt verbalizes understanding and agrees.     Millicent Steve, APRN  10/25/2018  1:47 PM      Obesity, Adult  Obesity is the condition of having too much total body fat. Being overweight or obese means that your weight is greater than what is considered healthy for your body size. Obesity is determined by a measurement called BMI. BMI is an estimate of body fat and is  calculated from height and weight. For adults, a BMI of 30 or higher is considered obese.  Obesity can eventually lead to other health concerns and major illnesses, including:  · Stroke.  · Coronary artery disease (CAD).  · Type 2 diabetes.  · Some types of cancer, including cancers of the colon, breast, uterus, and gallbladder.  · Osteoarthritis.  · High blood pressure (hypertension).  · High cholesterol.  · Sleep apnea.  · Gallbladder stones.  · Infertility problems.  What are the causes?  The main cause of obesity is taking in (consuming) more calories than your body uses for energy. Other factors that contribute to this condition may include:  · Being born with genes that make you more likely to become obese.  · Having a medical condition that causes obesity. These conditions include:  ¨ Hypothyroidism.  ¨ Polycystic ovarian syndrome (PCOS).  ¨ Binge-eating disorder.  ¨ Cushing syndrome.  · Taking certain medicines, such as steroids, antidepressants, and seizure medicines.  · Not being physically active (sedentary lifestyle).  · Living where there are limited places to exercise safely or buy healthy foods.  · Not getting enough sleep.  What increases the risk?  The following factors may increase your risk of this condition:  · Having a family history of obesity.  · Being a woman of -American descent.  · Being a man of  descent.  What are the signs or symptoms?  Having excessive body fat is the main symptom of this condition.  How is this diagnosed?  This condition may be diagnosed based on:  · Your symptoms.  · Your medical history.  · A physical exam. Your health care provider may measure:  ¨ Your BMI. If you are an adult with a BMI between 25 and less than 30, you are considered overweight. If you are an adult with a BMI of 30 or higher, you are considered obese.  ¨ The distances around your hips and your waist (circumferences). These may be compared to each other to help diagnose your  condition.  ¨ Your skinfold thickness. Your health care provider may gently pinch a fold of your skin and measure it.  How is this treated?  Treatment for this condition often includes changing your lifestyle. Treatment may include some or all of the following:  · Dietary changes. Work with your health care provider and a dietitian to set a weight-loss goal that is healthy and reasonable for you. Dietary changes may include eating:  ¨ Smaller portions. A portion size is the amount of a particular food that is healthy for you to eat at one time. This varies from person to person.  ¨ Low-calorie or low-fat options.  ¨ More whole grains, fruits, and vegetables.  · Regular physical activity. This may include aerobic activity (cardio) and strength training.  · Medicine to help you lose weight. Your health care provider may prescribe medicine if you are unable to lose 1 pound a week after 6 weeks of eating more healthily and doing more physical activity.  · Surgery. Surgical options may include gastric banding and gastric bypass. Surgery may be done if:  ¨ Other treatments have not helped to improve your condition.  ¨ You have a BMI of 40 or higher.  ¨ You have life-threatening health problems related to obesity.  Follow these instructions at home:     Eating and drinking     · Follow recommendations from your health care provider about what you eat and drink. Your health care provider may advise you to:  ¨ Limit fast foods, sweets, and processed snack foods.  ¨ Choose low-fat options, such as low-fat milk instead of whole milk.  ¨ Eat 5 or more servings of fruits or vegetables every day.  ¨ Eat at home more often. This gives you more control over what you eat.  ¨ Choose healthy foods when you eat out.  ¨ Learn what a healthy portion size is.  ¨ Keep low-fat snacks on hand.  ¨ Avoid sugary drinks, such as soda, fruit juice, iced tea sweetened with sugar, and flavored milk.  ¨ Eat a healthy breakfast.  · Drink enough water  to keep your urine clear or pale yellow.  · Do not go without eating for long periods of time (do not fast) or follow a fad diet. Fasting and fad diets can be unhealthy and even dangerous.  Physical Activity   · Exercise regularly, as told by your health care provider. Ask your health care provider what types of exercise are safe for you and how often you should exercise.  · Warm up and stretch before being active.  · Cool down and stretch after being active.  · Rest between periods of activity.  Lifestyle   · Limit the time that you spend in front of your TV, computer, or video game system.  · Find ways to reward yourself that do not involve food.  · Limit alcohol intake to no more than 1 drink a day for nonpregnant women and 2 drinks a day for men. One drink equals 12 oz of beer, 5 oz of wine, or 1½ oz of hard liquor.  General instructions   · Keep a weight loss journal to keep track of the food you eat and how much you exercise you get.  · Take over-the-counter and prescription medicines only as told by your health care provider.  · Take vitamins and supplements only as told by your health care provider.  · Consider joining a support group. Your health care provider may be able to recommend a support group.  · Keep all follow-up visits as told by your health care provider. This is important.  Contact a health care provider if:  · You are unable to meet your weight loss goal after 6 weeks of dietary and lifestyle changes.  This information is not intended to replace advice given to you by your health care provider. Make sure you discuss any questions you have with your health care provider.  Document Released: 01/25/2006 Document Revised: 05/22/2017 Document Reviewed: 10/05/2016  [employer] Interactive Patient Education © 2017 [employer]      If you smoke or use tobacco, 4 minutes reading provided  Steps to Quit Smoking  Smoking tobacco can be harmful to your health and can affect almost every organ in your body.  Smoking puts you, and those around you, at risk for developing many serious chronic diseases. Quitting smoking is difficult, but it is one of the best things that you can do for your health. It is never too late to quit.  What are the benefits of quitting smoking?  When you quit smoking, you lower your risk of developing serious diseases and conditions, such as:  · Lung cancer or lung disease, such as COPD.  · Heart disease.  · Stroke.  · Heart attack.  · Infertility.  · Osteoporosis and bone fractures.  Additionally, symptoms such as coughing, wheezing, and shortness of breath may get better when you quit. You may also find that you get sick less often because your body is stronger at fighting off colds and infections. If you are pregnant, quitting smoking can help to reduce your chances of having a baby of low birth weight.  How do I get ready to quit?  When you decide to quit smoking, create a plan to make sure that you are successful. Before you quit:  · Pick a date to quit. Set a date within the next two weeks to give you time to prepare.  · Write down the reasons why you are quitting. Keep this list in places where you will see it often, such as on your bathroom mirror or in your car or wallet.  · Identify the people, places, things, and activities that make you want to smoke (triggers) and avoid them. Make sure to take these actions:  ¨ Throw away all cigarettes at home, at work, and in your car.  ¨ Throw away smoking accessories, such as ashtrays and lighters.  ¨ Clean your car and make sure to empty the ashtray.  ¨ Clean your home, including curtains and carpets.  · Tell your family, friends, and coworkers that you are quitting. Support from your loved ones can make quitting easier.  · Talk with your health care provider about your options for quitting smoking.  · Find out what treatment options are covered by your health insurance.  What strategies can I use to quit smoking?  Talk with your healthcare  provider about different strategies to quit smoking. Some strategies include:  · Quitting smoking altogether instead of gradually lessening how much you smoke over a period of time. Research shows that quitting “cold turkey” is more successful than gradually quitting.  · Attending in-person counseling to help you build problem-solving skills. You are more likely to have success in quitting if you attend several counseling sessions. Even short sessions of 10 minutes can be effective.  · Finding resources and support systems that can help you to quit smoking and remain smoke-free after you quit. These resources are most helpful when you use them often. They can include:  ¨ Online chats with a counselor.  ¨ Telephone quitlines.  ¨ Printed self-help materials.  ¨ Support groups or group counseling.  ¨ Text messaging programs.  ¨ Mobile phone applications.  · Taking medicines to help you quit smoking. (If you are pregnant or breastfeeding, talk with your health care provider first.) Some medicines contain nicotine and some do not. Both types of medicines help with cravings, but the medicines that include nicotine help to relieve withdrawal symptoms. Your health care provider may recommend:  ¨ Nicotine patches, gum, or lozenges.  ¨ Nicotine inhalers or sprays.  ¨ Non-nicotine medicine that is taken by mouth.  Talk with your health care provider about combining strategies, such as taking medicines while you are also receiving in-person counseling. Using these two strategies together makes you more likely to succeed in quitting than if you used either strategy on its own.  If you are pregnant or breastfeeding, talk with your health care provider about finding counseling or other support strategies to quit smoking. Do not take medicine to help you quit smoking unless told to do so by your health care provider.  What things can I do to make it easier to quit?  Quitting smoking might feel overwhelming at first, but there is a  lot that you can do to make it easier. Take these important actions:  · Reach out to your family and friends and ask that they support and encourage you during this time. Call telephone quitlines, reach out to support groups, or work with a counselor for support.  · Ask people who smoke to avoid smoking around you.  · Avoid places that trigger you to smoke, such as bars, parties, or smoke-break areas at work.  · Spend time around people who do not smoke.  · Lessen stress in your life, because stress can be a smoking trigger for some people. To lessen stress, try:  ¨ Exercising regularly.  ¨ Deep-breathing exercises.  ¨ Yoga.  ¨ Meditating.  ¨ Performing a body scan. This involves closing your eyes, scanning your body from head to toe, and noticing which parts of your body are particularly tense. Purposefully relax the muscles in those areas.  · Download or purchase mobile phone or tablet apps (applications) that can help you stick to your quit plan by providing reminders, tips, and encouragement. There are many free apps, such as QuitGuide from the CDC (Centers for Disease Control and Prevention). You can find other support for quitting smoking (smoking cessation) through smokefree.gov and other websites.  How will I feel when I quit smoking?  Within the first 24 hours of quitting smoking, you may start to feel some withdrawal symptoms. These symptoms are usually most noticeable 2-3 days after quitting, but they usually do not last beyond 2-3 weeks. Changes or symptoms that you might experience include:  · Mood swings.  · Restlessness, anxiety, or irritation.  · Difficulty concentrating.  · Dizziness.  · Strong cravings for sugary foods in addition to nicotine.  · Mild weight gain.  · Constipation.  · Nausea.  · Coughing or a sore throat.  · Changes in how your medicines work in your body.  · A depressed mood.  · Difficulty sleeping (insomnia).  After the first 2-3 weeks of quitting, you may start to notice more  positive results, such as:  · Improved sense of smell and taste.  · Decreased coughing and sore throat.  · Slower heart rate.  · Lower blood pressure.  · Clearer skin.  · The ability to breathe more easily.  · Fewer sick days.  Quitting smoking is very challenging for most people. Do not get discouraged if you are not successful the first time. Some people need to make many attempts to quit before they achieve long-term success. Do your best to stick to your quit plan, and talk with your health care provider if you have any questions or concerns.  This information is not intended to replace advice given to you by your health care provider. Make sure you discuss any questions you have with your health care provider.  Document Released: 12/12/2002 Document Revised: 08/15/2017 Document Reviewed: 05/03/2016  [employer] Interactive Patient Education © 2017 [employer]

## 2018-11-21 DIAGNOSIS — F41.9 ANXIETY AND DEPRESSION: ICD-10-CM

## 2018-11-21 DIAGNOSIS — F32.A ANXIETY AND DEPRESSION: ICD-10-CM

## 2018-11-23 RX ORDER — CITALOPRAM 20 MG/1
20 TABLET ORAL DAILY
Qty: 30 TABLET | Refills: 5 | Status: SHIPPED | OUTPATIENT
Start: 2018-11-23 | End: 2019-05-10 | Stop reason: SDUPTHER

## 2018-12-03 ENCOUNTER — HOSPITAL ENCOUNTER (OUTPATIENT)
Facility: HOSPITAL | Age: 63
Setting detail: HOSPITAL OUTPATIENT SURGERY
Discharge: HOME | End: 2018-12-03
Attending: INTERNAL MEDICINE | Admitting: INTERNAL MEDICINE

## 2018-12-03 ENCOUNTER — ANESTHESIA EVENT (OUTPATIENT)
Dept: GASTROENTEROLOGY | Facility: HOSPITAL | Age: 63
End: 2018-12-03

## 2018-12-03 ENCOUNTER — ANESTHESIA (OUTPATIENT)
Dept: GASTROENTEROLOGY | Facility: HOSPITAL | Age: 63
End: 2018-12-03

## 2018-12-03 VITALS
WEIGHT: 166 LBS | HEIGHT: 66 IN | BODY MASS INDEX: 26.68 KG/M2 | DIASTOLIC BLOOD PRESSURE: 70 MMHG | TEMPERATURE: 97.1 F | HEART RATE: 71 BPM | OXYGEN SATURATION: 100 % | RESPIRATION RATE: 15 BRPM | SYSTOLIC BLOOD PRESSURE: 112 MMHG

## 2018-12-03 DIAGNOSIS — R19.5 POSITIVE FIT (FECAL IMMUNOCHEMICAL TEST): ICD-10-CM

## 2018-12-03 PROCEDURE — 45385 COLONOSCOPY W/LESION REMOVAL: CPT | Performed by: INTERNAL MEDICINE

## 2018-12-03 PROCEDURE — 88305 TISSUE EXAM BY PATHOLOGIST: CPT | Performed by: INTERNAL MEDICINE

## 2018-12-03 PROCEDURE — 25010000002 PROPOFOL 10 MG/ML EMULSION: Performed by: NURSE ANESTHETIST, CERTIFIED REGISTERED

## 2018-12-03 DEVICE — DEV CLIP ENDO RESOLUTION360 CONTRL ROT 235CM: Type: IMPLANTABLE DEVICE | Site: TRANSVERSE COLON | Status: FUNCTIONAL

## 2018-12-03 RX ORDER — SODIUM CHLORIDE 9 MG/ML
100 INJECTION, SOLUTION INTRAVENOUS CONTINUOUS
Status: CANCELLED | OUTPATIENT
Start: 2018-12-03

## 2018-12-03 RX ORDER — SODIUM CHLORIDE 0.9 % (FLUSH) 0.9 %
3 SYRINGE (ML) INJECTION AS NEEDED
Status: DISCONTINUED | OUTPATIENT
Start: 2018-12-03 | End: 2018-12-03 | Stop reason: HOSPADM

## 2018-12-03 RX ORDER — SODIUM CHLORIDE 0.9 % (FLUSH) 0.9 %
3-10 SYRINGE (ML) INJECTION AS NEEDED
Status: CANCELLED | OUTPATIENT
Start: 2018-12-03

## 2018-12-03 RX ORDER — SODIUM CHLORIDE 9 MG/ML
500 INJECTION, SOLUTION INTRAVENOUS CONTINUOUS PRN
Status: DISCONTINUED | OUTPATIENT
Start: 2018-12-03 | End: 2018-12-03 | Stop reason: HOSPADM

## 2018-12-03 RX ORDER — LIDOCAINE HYDROCHLORIDE 20 MG/ML
INJECTION, SOLUTION INFILTRATION; PERINEURAL AS NEEDED
Status: DISCONTINUED | OUTPATIENT
Start: 2018-12-03 | End: 2018-12-03 | Stop reason: SURG

## 2018-12-03 RX ORDER — SODIUM CHLORIDE 0.9 % (FLUSH) 0.9 %
3 SYRINGE (ML) INJECTION EVERY 12 HOURS SCHEDULED
Status: CANCELLED | OUTPATIENT
Start: 2018-12-03

## 2018-12-03 RX ORDER — PROPOFOL 10 MG/ML
VIAL (ML) INTRAVENOUS AS NEEDED
Status: DISCONTINUED | OUTPATIENT
Start: 2018-12-03 | End: 2018-12-03 | Stop reason: SURG

## 2018-12-03 RX ADMIN — SODIUM CHLORIDE 500 ML: 9 INJECTION, SOLUTION INTRAVENOUS at 09:58

## 2018-12-03 RX ADMIN — PROPOFOL 50 MG: 10 INJECTION, EMULSION INTRAVENOUS at 11:21

## 2018-12-03 RX ADMIN — LIDOCAINE HYDROCHLORIDE 0.5 ML: 10 INJECTION, SOLUTION EPIDURAL; INFILTRATION; INTRACAUDAL; PERINEURAL at 09:58

## 2018-12-03 RX ADMIN — PROPOFOL 50 MG: 10 INJECTION, EMULSION INTRAVENOUS at 11:16

## 2018-12-03 RX ADMIN — PROPOFOL 50 MG: 10 INJECTION, EMULSION INTRAVENOUS at 11:25

## 2018-12-03 RX ADMIN — LIDOCAINE HYDROCHLORIDE 50 MG: 20 INJECTION, SOLUTION INFILTRATION; PERINEURAL at 11:02

## 2018-12-03 RX ADMIN — PROPOFOL 50 MG: 10 INJECTION, EMULSION INTRAVENOUS at 11:31

## 2018-12-03 RX ADMIN — PROPOFOL 50 MG: 10 INJECTION, EMULSION INTRAVENOUS at 11:06

## 2018-12-03 RX ADMIN — PROPOFOL 80 MG: 10 INJECTION, EMULSION INTRAVENOUS at 11:12

## 2018-12-03 RX ADMIN — PROPOFOL 70 MG: 10 INJECTION, EMULSION INTRAVENOUS at 11:03

## 2018-12-03 NOTE — ANESTHESIA POSTPROCEDURE EVALUATION
Patient: Damaris Hu    Procedure Summary     Date:  12/03/18 Room / Location:  [facility] ENDOSCOPY 4 / [facility]    Anesthesia Start:  1058 Anesthesia Stop:  1141    Procedure:  COLONOSCOPY WITH ANESTHESIA (N/A ) Diagnosis:       Positive FIT (fecal immunochemical test)      (Positive FIT (fecal immunochemical test) [R19.5])    Surgeon:  Myah Pool MD Provider:  Kvng Lofton CRNA    Anesthesia Type:  general ASA Status:  1          Anesthesia Type: general  Last vitals  BP   120/75 (12/03/18 0940)   Temp   97.1 °F (36.2 °C) (12/03/18 0940)   Pulse   89 (12/03/18 0940)   Resp   20 (12/03/18 0940)     SpO2   95 % (12/03/18 0940)     Post Anesthesia Care and Evaluation    Patient location during evaluation: PHASE II  Patient participation: complete - patient participated  Level of consciousness: awake  Pain score: 1  Pain management: adequate  Airway patency: patent  Anesthetic complications: No anesthetic complications  PONV Status: none  Cardiovascular status: acceptable  Respiratory status: acceptable  Hydration status: acceptable

## 2018-12-03 NOTE — ANESTHESIA PREPROCEDURE EVALUATION
Anesthesia Evaluation     no history of anesthetic complications:  NPO Solid Status: > 8 hours  NPO Liquid Status: > 2 hours           Airway   Mallampati: I  TM distance: >3 FB  Neck ROM: full  Dental - normal exam     Pulmonary - normal exam    breath sounds clear to auscultation  (+) a smoker (quit 4 yrs ago) Former,   (-) COPD, asthma, recent URI, sleep apnea  Cardiovascular - normal exam  Exercise tolerance: good (4-7 METS)    Rhythm: regular  Rate: normal    (-) pacemaker, hypertension, past MI, angina, cardiac stents, CABG      Neuro/Psych  (-) seizures, TIA, CVA  GI/Hepatic/Renal/Endo    (+)  GERD,    (-) liver disease, no renal disease, diabetes, hypothyroidism, hyperthyroidism    Musculoskeletal     Abdominal    Substance History      OB/GYN          Other                        Anesthesia Plan    ASA 1     general   total IV anesthesia  intravenous induction   Anesthetic plan, all risks, benefits, and alternatives have been provided, discussed and informed consent has been obtained with: patient.

## 2018-12-03 NOTE — H&P
"    Chief Complaint:   Positive FIT    Subjective     HPI:   She presents with sit positive stool.  She has noted bright red blood per rectum which she has attributed to hemorrhoids.  She has had no prior colorectal evaluations.  Mother had colon polyps less than 60 years old.    Past Medical History:   Past Medical History:   Diagnosis Date   • Depression    • GERD (gastroesophageal reflux disease)    • Vitamin D deficiency        Past Surgical History:  Past Surgical History:   Procedure Laterality Date   • BREAST SURGERY      Cyst removal   • HYSTERECTOMY          Family History:  Family History   Problem Relation Age of Onset   • Colon polyps Mother         < 60 years old   • Colon cancer Neg Hx        Social History:   reports that she has quit smoking. she has never used smokeless tobacco. She reports that she does not drink alcohol or use drugs.    Medications:   Medications Prior to Admission   Medication Sig Dispense Refill Last Dose   • cetirizine (zyrTEC) 10 MG tablet Take 10 mg by mouth.   12/2/2018 at Unknown time   • citalopram (CeleXA) 20 MG tablet Take 20 mg by mouth.   12/2/2018 at Unknown time   • hydrocortisone (ANUSOL-HC) 2.5 % rectal cream Place rectally 2 times daily.   12/2/2018 at Unknown time   • ranitidine (ZANTAC) 150 MG capsule Take 150 mg by mouth.   12/2/2018 at Unknown time   • ergocalciferol (ERGOCALCIFEROL) 46140 units capsule Take  by mouth.   11/29/2018       Allergies:  Patient has no known allergies.    ROS:    General: Weight stable  Resp: No SOA  Cardiovascular: No CP      Objective     /75 (BP Location: Right arm, Patient Position: Sitting)   Pulse 89   Temp 97.1 °F (36.2 °C) (Temporal)   Resp 20   Ht 167.6 cm (66\")   Wt 75.3 kg (166 lb)   SpO2 95%   BMI 26.79 kg/m²     Physical Exam   Constitutional: Pt is oriented to person, place, and in no distress.  Eyes: No icterus  ENMT: Unremarkable   Cardiovascular: Normal rate, regular rhythm.    Pulmonary/Chest: No " distress.  No audible wheezes  Abdominal: Soft.   Skin: Skin is warm and dry.   Psychiatric: Mood, memory, affect and judgment appear normal.     Assessment/Plan     Diagnosis:  Fit positive stool  Family history colon polyps in first-degree relative less than 60 years old    Anticipated Surgical Procedure:  Colonoscopy    The risks, benefits, and alternatives of colonoscopy were reviewed with the patient today.  Risks including perforation of the colon possibly requiring surgery or colostomy.  Additional risks include risk of bleeding from biopsies or removal of colon tissue.  There is also the risk of a drug reaction or problems with anesthesia.  This will be discussed with the further by the anesthesia team on the day of the procedure.  Lastly there is a possibility of missing a colon polyp or cancer.  The benefits include the diagnosis and management of disease of the colon and rectum.  Alternatives to colonoscopy include barium enema, laboratory testing, radiographic evaluation, or no intervention.  The patient verbalizes understanding and agrees.    Much of this encounter note is an electronic transcription/translation of spoken language to printed text. The electronic translation of spoken language may permit erroneous, or at times, nonsensical words or phrases to be inadvertently transcribed; although I have reviewed the note for such errors, some may still exist.

## 2018-12-05 LAB
CYTO UR: NORMAL
LAB AP CASE REPORT: NORMAL
PATH REPORT.FINAL DX SPEC: NORMAL
PATH REPORT.GROSS SPEC: NORMAL

## 2018-12-06 ENCOUNTER — TELEPHONE (OUTPATIENT)
Dept: GASTROENTEROLOGY | Facility: CLINIC | Age: 63
End: 2018-12-06

## 2018-12-06 NOTE — TELEPHONE ENCOUNTER
----- Message from Myah Pool MD sent at 12/6/2018  8:05 AM CST -----  I am writing to inform you that the polyps removed from the colon did not have any cancer. They no longer pose a threat to you since they were completely removed.  However, in the future, it is possible that additional polyps might grow.  For this reason, we will repeat colonoscopy in one year as planned.    If you have any questions, please call our office.    Sincerely,        Myah Pool MD

## 2018-12-31 DIAGNOSIS — F32.A ANXIETY AND DEPRESSION: ICD-10-CM

## 2018-12-31 DIAGNOSIS — E55.9 VITAMIN D DEFICIENCY: ICD-10-CM

## 2018-12-31 DIAGNOSIS — F41.9 ANXIETY AND DEPRESSION: ICD-10-CM

## 2018-12-31 LAB
ALBUMIN SERPL-MCNC: 4.1 G/DL (ref 3.5–5.2)
ALP BLD-CCNC: 52 U/L (ref 35–104)
ALT SERPL-CCNC: 11 U/L (ref 5–33)
ANION GAP SERPL CALCULATED.3IONS-SCNC: 16 MMOL/L (ref 7–19)
AST SERPL-CCNC: 13 U/L (ref 5–32)
BILIRUB SERPL-MCNC: <0.2 MG/DL (ref 0.2–1.2)
BUN BLDV-MCNC: 12 MG/DL (ref 8–23)
CALCIUM SERPL-MCNC: 9.3 MG/DL (ref 8.8–10.2)
CHLORIDE BLD-SCNC: 104 MMOL/L (ref 98–111)
CO2: 23 MMOL/L (ref 22–29)
CREAT SERPL-MCNC: 0.8 MG/DL (ref 0.5–0.9)
GFR NON-AFRICAN AMERICAN: >60
GLUCOSE BLD-MCNC: 93 MG/DL (ref 74–109)
HCT VFR BLD CALC: 38.5 % (ref 37–47)
HEMOGLOBIN: 11.5 G/DL (ref 12–16)
MCH RBC QN AUTO: 28.8 PG (ref 27–31)
MCHC RBC AUTO-ENTMCNC: 29.9 G/DL (ref 33–37)
MCV RBC AUTO: 96.5 FL (ref 81–99)
PDW BLD-RTO: 13 % (ref 11.5–14.5)
PLATELET # BLD: 245 K/UL (ref 130–400)
PMV BLD AUTO: 10.1 FL (ref 9.4–12.3)
POTASSIUM SERPL-SCNC: 3.9 MMOL/L (ref 3.5–5)
RBC # BLD: 3.99 M/UL (ref 4.2–5.4)
SODIUM BLD-SCNC: 143 MMOL/L (ref 136–145)
TOTAL PROTEIN: 7 G/DL (ref 6.6–8.7)
VITAMIN D 25-HYDROXY: 42.5 NG/ML
WBC # BLD: 5.9 K/UL (ref 4.8–10.8)

## 2019-01-02 ENCOUNTER — TELEPHONE (OUTPATIENT)
Dept: PRIMARY CARE CLINIC | Age: 64
End: 2019-01-02

## 2019-01-09 ENCOUNTER — OFFICE VISIT (OUTPATIENT)
Dept: PRIMARY CARE CLINIC | Age: 64
End: 2019-01-09
Payer: MEDICARE

## 2019-01-09 VITALS
HEIGHT: 66 IN | SYSTOLIC BLOOD PRESSURE: 126 MMHG | WEIGHT: 165.4 LBS | OXYGEN SATURATION: 98 % | DIASTOLIC BLOOD PRESSURE: 83 MMHG | BODY MASS INDEX: 26.58 KG/M2 | TEMPERATURE: 97.8 F | HEART RATE: 80 BPM | RESPIRATION RATE: 18 BRPM

## 2019-01-09 DIAGNOSIS — Z12.31 BREAST CANCER SCREENING BY MAMMOGRAM: ICD-10-CM

## 2019-01-09 DIAGNOSIS — F32.A ANXIETY AND DEPRESSION: Primary | ICD-10-CM

## 2019-01-09 DIAGNOSIS — Z13.820 OSTEOPOROSIS SCREENING: ICD-10-CM

## 2019-01-09 DIAGNOSIS — E78.5 HYPERLIPIDEMIA, UNSPECIFIED HYPERLIPIDEMIA TYPE: ICD-10-CM

## 2019-01-09 DIAGNOSIS — F41.9 ANXIETY AND DEPRESSION: Primary | ICD-10-CM

## 2019-01-09 PROCEDURE — 99213 OFFICE O/P EST LOW 20 MIN: CPT | Performed by: NURSE PRACTITIONER

## 2019-01-10 ASSESSMENT — ENCOUNTER SYMPTOMS
EYES NEGATIVE: 1
GASTROINTESTINAL NEGATIVE: 1
RESPIRATORY NEGATIVE: 1

## 2019-02-05 ENCOUNTER — HOSPITAL ENCOUNTER (OUTPATIENT)
Dept: WOMENS IMAGING | Age: 64
Discharge: HOME | End: 2019-02-05
Payer: MEDICARE

## 2019-02-05 DIAGNOSIS — Z12.31 BREAST CANCER SCREENING BY MAMMOGRAM: ICD-10-CM

## 2019-02-05 DIAGNOSIS — Z78.0 ASYMPTOMATIC MENOPAUSAL STATE: ICD-10-CM

## 2019-02-05 PROCEDURE — 77080 DXA BONE DENSITY AXIAL: CPT

## 2019-02-05 PROCEDURE — 77063 BREAST TOMOSYNTHESIS BI: CPT

## 2019-02-06 ENCOUNTER — TELEPHONE (OUTPATIENT)
Dept: PRIMARY CARE CLINIC | Age: 64
End: 2019-02-06

## 2019-02-06 DIAGNOSIS — M81.0 AGE-RELATED OSTEOPOROSIS WITHOUT CURRENT PATHOLOGICAL FRACTURE: Primary | ICD-10-CM

## 2019-02-06 RX ORDER — ALENDRONATE SODIUM 70 MG/1
70 TABLET ORAL
Qty: 4 TABLET | Refills: 3 | Status: SHIPPED | OUTPATIENT
Start: 2019-02-06 | End: 2019-07-17

## 2019-02-21 ENCOUNTER — OFFICE VISIT (OUTPATIENT)
Dept: RETAIL CLINIC | Facility: CLINIC | Age: 64
End: 2019-02-21

## 2019-02-21 VITALS
BODY MASS INDEX: 25.71 KG/M2 | OXYGEN SATURATION: 96 % | SYSTOLIC BLOOD PRESSURE: 102 MMHG | HEIGHT: 66 IN | HEART RATE: 94 BPM | DIASTOLIC BLOOD PRESSURE: 64 MMHG | RESPIRATION RATE: 16 BRPM | WEIGHT: 160 LBS | TEMPERATURE: 98.8 F

## 2019-02-21 DIAGNOSIS — J30.9 ALLERGIC RHINITIS, UNSPECIFIED SEASONALITY, UNSPECIFIED TRIGGER: ICD-10-CM

## 2019-02-21 DIAGNOSIS — R21 RASH: Primary | ICD-10-CM

## 2019-02-21 PROCEDURE — 99203 OFFICE O/P NEW LOW 30 MIN: CPT | Performed by: NURSE PRACTITIONER

## 2019-02-21 RX ORDER — METHYLPREDNISOLONE 4 MG/1
TABLET ORAL
Qty: 1 EACH | Refills: 0 | OUTPATIENT
Start: 2019-02-21 | End: 2019-10-28 | Stop reason: HOSPADM

## 2019-02-21 RX ORDER — CLOTRIMAZOLE AND BETAMETHASONE DIPROPIONATE 10; .64 MG/G; MG/G
CREAM TOPICAL 2 TIMES DAILY
Qty: 15 G | Refills: 0 | Status: SHIPPED | OUTPATIENT
Start: 2019-02-21 | End: 2019-11-15

## 2019-02-21 NOTE — PATIENT INSTRUCTIONS
Risks and benefits of steroids discussed. Take only as prescribed.   In summary:   · Steroids can increase blood pressure and blood sugar.  · Steroids can cause increased irritability, increased energy & appetite, and may disrupt sleep.   · They may cause sun sensitivity. You should avoid prolonged sun exposure and use sunscreen if outdoors.   · Do not take additional NSAIDS such as ibuprofen, Motrin, Advil, Aleve, Naproxen while taking this medicine.  It is okay to take Tylenol while taking steroids.  · There are additional long term risks of steroids if used long term/frequently.    Keep skin clean and dry.  Apply ointment as prescribed until rash resolves.  Only use on the areas that are most bothersome to you.  Monitor for signs/symptoms of infection as discussed.     Try Eucerin lotion to moisturize skin.     If symptoms are not improving a week or if they worsen please see your primary care provider.        Rash  A rash is a change in the color of the skin. A rash can also change the way your skin feels. There are many different conditions and factors that can cause a rash.  Follow these instructions at home:  Pay attention to any changes in your symptoms. Follow these instructions to help with your condition:  Medicine  Take or apply over-the-counter and prescription medicines only as told by your health care provider. These may include:  · Corticosteroid cream.  · Anti-itch lotions.  · Oral antihistamines.    Skin Care  · Apply cool compresses to the affected areas.  · Try taking a bath with:  ? Epsom salts. Follow the instructions on the packaging. You can get these at your local pharmacy or grocery store.  ? Baking soda. Pour a small amount into the bath as told by your health care provider.  ? Colloidal oatmeal. Follow the instructions on the packaging. You can get this at your local pharmacy or grocery store.  · Try applying baking soda paste to your skin. Stir water into baking soda until it reaches a  paste-like consistency.  · Do not scratch or rub your skin.  · Avoid covering the rash. Make sure the rash is exposed to air as much as possible.  General instructions  · Avoid hot showers or baths, which can make itching worse. A cold shower may help.  · Avoid scented soaps, detergents, and perfumes. Use gentle soaps, detergents, perfumes, and other cosmetic products.  · Avoid any substance that causes your rash. Keep a journal to help track what causes your rash. Write down:  ? What you eat.  ? What cosmetic products you use.  ? What you drink.  ? What you wear. This includes jewelry.  · Keep all follow-up visits as told by your health care provider. This is important.  Contact a health care provider if:  · You sweat at night.  · You lose weight.  · You urinate more than normal.  · You feel weak.  · You vomit.  · Your skin or the whites of your eyes look yellow (jaundice).  · Your skin:  ? Tingles.  ? Is numb.  · Your rash:  ? Does not go away after several days.  ? Gets worse.  · You are:  ? Unusually thirsty.  ? More tired than normal.  · You have:  ? New symptoms.  ? Pain in your abdomen.  ? A fever.  ? Diarrhea.  Get help right away if:  · You develop a rash that covers all or most of your body. The rash may or may not be painful.  · You develop blisters that:  ? Are on top of the rash.  ? Grow larger or grow together.  ? Are painful.  ? Are inside your nose or mouth.  · You develop a rash that:  ? Looks like purple pinprick-sized spots all over your body.  ? Has a “bull's eye” or looks like a target.  ? Is not related to sun exposure, is red and painful, and causes your skin to peel.  This information is not intended to replace advice given to you by your health care provider. Make sure you discuss any questions you have with your health care provider.  Document Released: 12/08/2003 Document Revised: 05/23/2017 Document Reviewed: 05/04/2016  Elsevier Interactive Patient Education © 2018 [employer]    Allergic Rhinitis, Adult  Allergic rhinitis is an allergic reaction that affects the mucous membrane inside the nose. It causes sneezing, a runny or stuffy nose, and the feeling of mucus going down the back of the throat (postnasal drip). Allergic rhinitis can be mild to severe.  There are two types of allergic rhinitis:  · Seasonal. This type is also called hay fever. It happens only during certain seasons.  · Perennial. This type can happen at any time of the year.    What are the causes?  This condition happens when the body's defense system (immune system) responds to certain harmless substances called allergens as though they were germs.   Seasonal allergic rhinitis is triggered by pollen, which can come from grasses, trees, and weeds. Perennial allergic rhinitis may be caused by:  · House dust mites.  · Pet dander.  · Mold spores.    What are the signs or symptoms?  Symptoms of this condition include:  · Sneezing.  · Runny or stuffy nose (nasal congestion).  · Postnasal drip.  · Itchy nose.  · Tearing of the eyes.  · Trouble sleeping.  · Daytime sleepiness.    How is this diagnosed?  This condition may be diagnosed based on:  · Your medical history.  · A physical exam.  · Tests to check for related conditions, such as:  ? Asthma.  ? Pink eye.  ? Ear infection.  ? Upper respiratory infection.  · Tests to find out which allergens trigger your symptoms. These may include skin or blood tests.    How is this treated?  There is no cure for this condition, but treatment can help control symptoms. Treatment may include:  · Taking medicines that block allergy symptoms, such as antihistamines. Medicine may be given as a shot, nasal spray, or pill.  · Avoiding the allergen.  · Desensitization. This treatment involves getting ongoing shots until your body becomes less sensitive to the allergen. This treatment may be done if other treatments do not help.  · If taking medicine and avoiding the allergen does not  work, new, stronger medicines may be prescribed.    Follow these instructions at home:  · Find out what you are allergic to. Common allergens include smoke, dust, and pollen.  · Avoid the things you are allergic to. These are some things you can do to help avoid allergens:  ? Replace carpet with wood, tile, or vinyl tori. Carpet can trap dander and dust.  ? Do not smoke. Do not allow smoking in your home.  ? Change your heating and air conditioning filter at least once a month.  ? During allergy season:  § Keep windows closed as much as possible.  § Plan outdoor activities when pollen counts are lowest. This is usually during the evening hours.  § When coming indoors, change clothing and shower before sitting on furniture or bedding.  · Take over-the-counter and prescription medicines only as told by your health care provider.  · Keep all follow-up visits as told by your health care provider. This is important.  Contact a health care provider if:  · You have a fever.  · You develop a persistent cough.  · You make whistling sounds when you breathe (you wheeze).  · Your symptoms interfere with your normal daily activities.  Get help right away if:  · You have shortness of breath.  Summary  · This condition can be managed by taking medicines as directed and avoiding allergens.  · Contact your health care provider if you develop a persistent cough or fever.  · During allergy season, keep windows closed as much as possible.  This information is not intended to replace advice given to you by your health care provider. Make sure you discuss any questions you have with your health care provider.  Document Released: 09/12/2002 Document Revised: 01/25/2018 Document Reviewed: 01/25/2018  Elsevier Interactive Patient Education © 2018 [employer]vier Inc.

## 2019-02-22 NOTE — PROGRESS NOTES
"  Chief Complaint   Patient presents with   • Rash   • Sinus Problem     Subjective   Damaris Hu is a 63 y.o. female who presents to the clinic today with complaints of rash. She also complains of allergy sinus symptoms. She requests a steroid pack. She thinks the rash may be due to where she has contact with a dog's paws.  Rash   This is a new problem. Episode onset: 1-2 weeks ago. The affected locations include the left arm, left lower leg, left upper leg, right arm, right upper leg and right lower leg. Rash characteristics: red, some dryness, mild itching. Associated with: has been taking care of her daughters dog, denies new clothes, soaps, lotions, detergents, foods, meds, plant contact.  No one has similar rash, no recent overnight stays away from home. Pertinent negatives include no cough, fatigue, fever, shortness of breath or sore throat. Past treatments include nothing. Her past medical history is significant for allergies.   Sinus Problem   This is a new problem. Episode onset: \"off and on for years\" Progression since onset: worse the last week or so. There has been no fever. Associated symptoms include sinus pressure. Pertinent negatives include no chills, coughing, ear pain, headaches, shortness of breath, sneezing or sore throat. Treatments tried: antihistamine.     Current Outpatient Medications:   •  cetirizine (zyrTEC) 10 MG tablet, Take 10 mg by mouth., Disp: , Rfl:   •  citalopram (CeleXA) 20 MG tablet, Take 20 mg by mouth., Disp: , Rfl:   •  ranitidine (ZANTAC) 150 MG capsule, Take 150 mg by mouth., Disp: , Rfl:     Allergies:  Patient has no known allergies.    Past Medical History:   Diagnosis Date   • Depression    • GERD (gastroesophageal reflux disease)    • Vitamin D deficiency      Past Surgical History:   Procedure Laterality Date   • BREAST SURGERY      Cyst removal   • COLONOSCOPY N/A 12/3/2018    Procedure: COLONOSCOPY WITH ANESTHESIA;  Surgeon: Myah Pool MD;  Location: [facility]" "ENDOSCOPY;  Service: Gastroenterology   • HYSTERECTOMY       Family History   Problem Relation Age of Onset   • Colon polyps Mother         < 60 years old   • Colon cancer Neg Hx      Social History     Tobacco Use   • Smoking status: Former Smoker   • Smokeless tobacco: Never Used   • Tobacco comment: Quit 4 years ago   Substance Use Topics   • Alcohol use: No   • Drug use: No       Review of Systems  Review of Systems   Constitutional: Negative for chills, fatigue and fever.   HENT: Positive for postnasal drip and sinus pressure. Negative for ear pain, sinus pain, sneezing, sore throat and trouble swallowing.    Respiratory: Negative for cough, shortness of breath and wheezing.    Skin: Positive for rash.   Allergic/Immunologic: Positive for environmental allergies.   Neurological: Negative for headaches.       Objective   /64   Pulse 94   Temp 98.8 °F (37.1 °C)   Resp 16   Ht 167.6 cm (66\")   Wt 72.6 kg (160 lb)   SpO2 96%   BMI 25.82 kg/m²       Physical Exam   Constitutional: She is oriented to person, place, and time. She appears well-developed and well-nourished. She is cooperative.  Non-toxic appearance. No distress.   HENT:   Head: Normocephalic and atraumatic.   Right Ear: Tympanic membrane, external ear and ear canal normal.   Left Ear: Tympanic membrane, external ear and ear canal normal.   Nose: Nose normal. No sinus tenderness.   Mouth/Throat: Uvula is midline, oropharynx is clear and moist and mucous membranes are normal.   Mild cobblestoning, clear PND posterior wall   Eyes: Conjunctivae, EOM and lids are normal. Pupils are equal, round, and reactive to light.   Neck: Trachea normal and normal range of motion. Neck supple.   Cardiovascular: Normal rate, regular rhythm, S1 normal, S2 normal and normal heart sounds.   Pulmonary/Chest: Effort normal and breath sounds normal. She has no wheezes. She has no rhonchi. She has no rales.   Lymphadenopathy:     She has no cervical adenopathy. "   Neurological: She is alert and oriented to person, place, and time. Coordination and gait normal.   Skin: Skin is warm, dry and intact.   Scattered erythemic approx 3-5mm lesions some flat some slightly raised noted to both forearms (mostly dorsal) and both legs (mostly anterior).  Some lesions are almost healed, while others appear new, no evidence of infection.  Skin is dry.   Psychiatric: She has a normal mood and affect. Her speech is normal and behavior is normal.   Vitals reviewed.      Assessment/Plan     Damaris was seen today for rash and sinus problem.    Diagnoses and all orders for this visit:    Rash    Allergic rhinitis, unspecified seasonality, unspecified trigger    Other orders  -     methylPREDNISolone (MEDROL, KAYLAH,) 4 MG tablet; Take as directed on package instructions.  -     clotrimazole-betamethasone (LOTRISONE) 1-0.05 % cream; Apply  topically to the appropriate area as directed 2 (Two) Times a Day.    We discussed the unclear etiology of her rash/skin lesions.  She declined treatment for possible scabies/mites.     Keep skin clean and dry.  Apply ointment as prescribed until rash resolves.  Only use on the areas that are most bothersome to you.  Monitor for signs/symptoms of infection as discussed.     Try Eucerin lotion to moisturize skin.     If symptoms are not improving a week or if they worsen please see your primary care provider.

## 2019-03-12 ENCOUNTER — OFFICE VISIT (OUTPATIENT)
Dept: PRIMARY CARE CLINIC | Age: 64
End: 2019-03-12
Payer: MEDICARE

## 2019-03-12 VITALS
SYSTOLIC BLOOD PRESSURE: 134 MMHG | DIASTOLIC BLOOD PRESSURE: 76 MMHG | WEIGHT: 163.4 LBS | TEMPERATURE: 97 F | OXYGEN SATURATION: 95 % | HEIGHT: 66 IN | BODY MASS INDEX: 26.26 KG/M2 | HEART RATE: 89 BPM

## 2019-03-12 DIAGNOSIS — R21 RASH: Primary | ICD-10-CM

## 2019-03-12 DIAGNOSIS — R21 RASH: ICD-10-CM

## 2019-03-12 LAB
C-REACTIVE PROTEIN: 0.57 MG/DL (ref 0–0.5)
HCT VFR BLD CALC: 41.8 % (ref 37–47)
HEMOGLOBIN: 12.8 G/DL (ref 12–16)
MCH RBC QN AUTO: 29.6 PG (ref 27–31)
MCHC RBC AUTO-ENTMCNC: 30.6 G/DL (ref 33–37)
MCV RBC AUTO: 96.8 FL (ref 81–99)
PDW BLD-RTO: 13.3 % (ref 11.5–14.5)
PLATELET # BLD: 270 K/UL (ref 130–400)
PMV BLD AUTO: 10.8 FL (ref 9.4–12.3)
RBC # BLD: 4.32 M/UL (ref 4.2–5.4)
SEDIMENTATION RATE, ERYTHROCYTE: 14 MM/HR (ref 0–25)
WBC # BLD: 7.7 K/UL (ref 4.8–10.8)

## 2019-03-12 PROCEDURE — 99213 OFFICE O/P EST LOW 20 MIN: CPT | Performed by: NURSE PRACTITIONER

## 2019-03-12 ASSESSMENT — PATIENT HEALTH QUESTIONNAIRE - PHQ9
2. FEELING DOWN, DEPRESSED OR HOPELESS: 0
SUM OF ALL RESPONSES TO PHQ QUESTIONS 1-9: 0
SUM OF ALL RESPONSES TO PHQ QUESTIONS 1-9: 0
SUM OF ALL RESPONSES TO PHQ9 QUESTIONS 1 & 2: 0
1. LITTLE INTEREST OR PLEASURE IN DOING THINGS: 0

## 2019-03-15 ENCOUNTER — TELEPHONE (OUTPATIENT)
Dept: PRIMARY CARE CLINIC | Age: 64
End: 2019-03-15

## 2019-03-26 ENCOUNTER — TELEPHONE (OUTPATIENT)
Dept: PRIMARY CARE CLINIC | Age: 64
End: 2019-03-26

## 2019-03-26 RX ORDER — ZOLMITRIPTAN 2.5 MG/1
2.5 TABLET, FILM COATED ORAL PRN
Qty: 6 TABLET | Refills: 3 | Status: SHIPPED | OUTPATIENT
Start: 2019-03-26 | End: 2019-05-28 | Stop reason: SDUPTHER

## 2019-04-03 PROBLEM — R19.5 POSITIVE FIT (FECAL IMMUNOCHEMICAL TEST): Status: ACTIVE | Noted: 2018-10-25

## 2019-04-03 PROBLEM — R19.4 CHANGE IN BOWEL HABITS: Status: ACTIVE | Noted: 2018-10-25

## 2019-04-03 PROBLEM — K59.01 SLOW TRANSIT CONSTIPATION: Status: ACTIVE | Noted: 2018-10-25

## 2019-04-03 PROBLEM — K64.9 HEMORRHOIDS: Status: ACTIVE | Noted: 2018-10-25

## 2019-04-05 ENCOUNTER — TELEPHONE (OUTPATIENT)
Dept: PRIMARY CARE CLINIC | Age: 64
End: 2019-04-05

## 2019-04-13 LAB
ANA IGG, ELISA: DETECTED
DOUBLE STRANDED DNA AB, IGG: NORMAL
HISTONE ANTIBODY IGG: 0.4 UNITS (ref 0–0.9)

## 2019-04-18 LAB
ENA TO SSB (LA) ANTIBODY: 9 AU/ML (ref 0–40)
MISCELLANEOUS LAB TEST ORDER: NORMAL
SCLERODERMA (SCL-70) AB: 0 AU/ML (ref 0–40)
SSA 52 (RO) (ENA) AB, IGG: 66 AU/ML (ref 0–40)
SSA 60 (RO) (ENA) AB, IGG: 79 AU/ML (ref 0–40)

## 2019-05-02 LAB — ENA TO RNP ANTIBODY: NORMAL AU/ML (ref 0–40)

## 2019-05-10 DIAGNOSIS — F41.9 ANXIETY AND DEPRESSION: ICD-10-CM

## 2019-05-10 DIAGNOSIS — F32.A ANXIETY AND DEPRESSION: ICD-10-CM

## 2019-05-10 RX ORDER — CITALOPRAM 20 MG/1
TABLET ORAL
Qty: 30 TABLET | Refills: 2 | Status: SHIPPED | OUTPATIENT
Start: 2019-05-10 | End: 2019-08-13 | Stop reason: SDUPTHER

## 2019-05-23 ENCOUNTER — PATIENT MESSAGE (OUTPATIENT)
Dept: PRIMARY CARE CLINIC | Age: 64
End: 2019-05-23

## 2019-05-28 RX ORDER — ZOLMITRIPTAN 2.5 MG/1
2.5 TABLET, FILM COATED ORAL PRN
Qty: 6 TABLET | Refills: 3 | Status: SHIPPED | OUTPATIENT
Start: 2019-05-28 | End: 2020-05-08

## 2019-05-28 RX ORDER — SUMATRIPTAN 100 MG/1
100 TABLET, FILM COATED ORAL
Qty: 9 TABLET | Refills: 5 | Status: SHIPPED | OUTPATIENT
Start: 2019-05-28 | End: 2020-02-06

## 2019-07-11 ENCOUNTER — OFFICE VISIT (OUTPATIENT)
Dept: PRIMARY CARE CLINIC | Age: 64
End: 2019-07-11
Payer: MEDICARE

## 2019-07-11 VITALS
SYSTOLIC BLOOD PRESSURE: 120 MMHG | OXYGEN SATURATION: 98 % | BODY MASS INDEX: 26.36 KG/M2 | HEIGHT: 66 IN | TEMPERATURE: 97.4 F | HEART RATE: 101 BPM | WEIGHT: 164 LBS | DIASTOLIC BLOOD PRESSURE: 78 MMHG

## 2019-07-11 DIAGNOSIS — R89.9 ABNORMAL LABORATORY TEST: Primary | ICD-10-CM

## 2019-07-11 DIAGNOSIS — J30.2 SEASONAL ALLERGIES: ICD-10-CM

## 2019-07-11 DIAGNOSIS — F32.A ANXIETY AND DEPRESSION: ICD-10-CM

## 2019-07-11 DIAGNOSIS — R76.8 POSITIVE ANA (ANTINUCLEAR ANTIBODY): ICD-10-CM

## 2019-07-11 DIAGNOSIS — F41.9 ANXIETY AND DEPRESSION: ICD-10-CM

## 2019-07-11 PROCEDURE — 99214 OFFICE O/P EST MOD 30 MIN: CPT | Performed by: NURSE PRACTITIONER

## 2019-07-11 RX ORDER — PREDNISONE 2.5 MG
2.5 TABLET ORAL DAILY
Qty: 7 TABLET | Refills: 0 | Status: SHIPPED | OUTPATIENT
Start: 2019-07-11 | End: 2020-02-06

## 2019-07-11 RX ORDER — CETIRIZINE HYDROCHLORIDE 10 MG/1
10 TABLET ORAL DAILY
Qty: 30 TABLET | Refills: 11 | Status: SHIPPED | OUTPATIENT
Start: 2019-07-11 | End: 2020-06-16 | Stop reason: SDUPTHER

## 2019-07-11 NOTE — PROGRESS NOTES
[facility]  [address]  [address] [facility] [address]  Dept: [phone]  Dept Fax: [phone]  Loc: [phone]    Jesenia Purvis is a 70 y.o. female who presents today for her medical conditions/complaints as noted below. Jesenia Purvis is c/o of Anxiety (6 month follow up -no concerns); Depression (6 month follow up -no concerns ); and Results (She would like to discuss some test Bloxrs ran on her with you. )      Chief Complaint   Patient presents with    Anxiety     6 month follow up -no concerns    Depression     6 month follow up -no concerns     Results     She would like to discuss some test Bloxrs ran on her with you. HPI:     HPI  Patient here for follow up on chronic conditions including anxiety and depression. She reports doing well without any concerns on current medication regimen. Patient reports having derm issues. She was diagnosed with pityrasis lichens. She also had labs completed and had a positive MARIAM an was diagnosed with possible sjogrens.      Past Medical History:   Diagnosis Date    Allergic rhinitis     Anxiety     GERD (gastroesophageal reflux disease)         Past Surgical History:   Procedure Laterality Date    BREAST SURGERY Bilateral     Dr Dominica Dakins Bilateral     SKIN BIOPSY      2 mole removed by [facility]        Social History     Tobacco Use    Smoking status: Former Smoker     Packs/day: 1.00     Years: 20.00     Pack years: 20.00     Last attempt to quit: 10/3/2014     Years since quittin.7    Smokeless tobacco: Never Used   Substance Use Topics    Alcohol use: No        Current Outpatient Medications   Medication Sig Dispense Refill    predniSONE (DELTASONE) 2.5 MG tablet Take 1 tablet by mouth daily 7 tablet 0    cetirizine (ZYRTEC) 10 MG tablet Take 1 tablet by mouth daily 30 tablet 11    ZOLMitriptan (ZOMIG) 2.5 MG tablet Take 1 range of motion and no tenderness. Thoracic back: Normal. She exhibits normal range of motion and no tenderness. Lumbar back: Normal. She exhibits normal range of motion and no tenderness. Neurological: She is alert and oriented to person, place, and time. She has normal strength. Skin: Skin is warm, dry and intact. Psychiatric: She has a normal mood and affect. Her speech is normal and behavior is normal. Judgment and thought content normal. Cognition and memory are normal.   Nursing note and vitals reviewed. /78   Pulse 101   Temp 97.4 °F (36.3 °C) (Temporal)   Ht 5' 6\" (1.676 m)   Wt 164 lb (74.4 kg)   SpO2 98%   BMI 26.47 kg/m²     Assessment:      Diagnosis Orders   1. Abnormal laboratory test     2. Positive MARIAM (antinuclear antibody)  predniSONE (DELTASONE) 2.5 MG tablet   3. Seasonal allergies  cetirizine (ZYRTEC) 10 MG tablet   4. Anxiety and depression         No results found for this visit on 07/11/19. Plan:     Patient was taking prednisone at home from a friend. Patient is wanting to wean off the prednisone, but has been taking for the past month. She reports since taking that med it has helped overall. I am sending in 2.5 mg prednisone and educated on how to wean off. Patient is to keep appointment with Dr Cordero in regards to positive MARIAM. Return in about 2 months (around 9/11/2019) for positive MARIAM. No orders of the defined types were placed in this encounter. Orders Placed This Encounter   Medications    predniSONE (DELTASONE) 2.5 MG tablet     Sig: Take 1 tablet by mouth daily     Dispense:  7 tablet     Refill:  0    cetirizine (ZYRTEC) 10 MG tablet     Sig: Take 1 tablet by mouth daily     Dispense:  30 tablet     Refill:  11        Patient offered educational handouts and has had all questions answered. Patient voices understanding and agrees to plans along with risks and benefits of plan.  Patient is instructed to continue prior meds, diet, and exercise plans as instructed. Patient agrees to follow up as instructed and sooner if needed. Patient agrees to go to ER if condition becomes emergent. EMR Dragon/transcription disclaimer: Some of this encounter note is an electronic transcription/translation of spoken language to printed text. The electronic translation of spoken language may permit erroneous, or at times, nonsensical words or phrases to be inadvertently transcribed.  Although I have reviewed the note for such errors, some may still exist.    Electronically signed by ROBER Ferguson Mai on 7/17/2019 at 4:49 PM

## 2019-07-17 ASSESSMENT — ENCOUNTER SYMPTOMS
GASTROINTESTINAL NEGATIVE: 1
EYES NEGATIVE: 1
RESPIRATORY NEGATIVE: 1

## 2019-08-13 DIAGNOSIS — F41.9 ANXIETY AND DEPRESSION: ICD-10-CM

## 2019-08-13 DIAGNOSIS — F32.A ANXIETY AND DEPRESSION: ICD-10-CM

## 2019-08-13 RX ORDER — CITALOPRAM 20 MG/1
TABLET ORAL
Qty: 30 TABLET | Refills: 5 | Status: SHIPPED | OUTPATIENT
Start: 2019-08-13 | End: 2020-03-18

## 2019-09-23 ENCOUNTER — PATIENT MESSAGE (OUTPATIENT)
Dept: PRIMARY CARE CLINIC | Age: 64
End: 2019-09-23

## 2019-09-24 RX ORDER — OMEPRAZOLE 20 MG/1
20 CAPSULE, DELAYED RELEASE ORAL
Qty: 30 CAPSULE | Refills: 0 | Status: SHIPPED | OUTPATIENT
Start: 2019-09-24 | End: 2020-03-17 | Stop reason: SDUPTHER

## 2019-09-24 NOTE — TELEPHONE ENCOUNTER
From: Liza Gaines  To: ROBER Morales  Sent: 9/23/2019 2:54 PM CDT  Subject: Prescription Question    Luisana been taking OTC Ranitidine once a day for awhile. Kalyan Felt something about cancer deal. Jaylene Schwartzgarrett rather switch to something else. Whats otc that I could take once in the AM & is about the same without cancer warnings. Thank you.  Bossman Crouch

## 2019-10-15 ENCOUNTER — PATIENT MESSAGE (OUTPATIENT)
Dept: PRIMARY CARE CLINIC | Age: 64
End: 2019-10-15

## 2019-10-16 ENCOUNTER — OFFICE VISIT (OUTPATIENT)
Dept: RETAIL CLINIC | Facility: CLINIC | Age: 64
End: 2019-10-16

## 2019-10-16 VITALS
SYSTOLIC BLOOD PRESSURE: 125 MMHG | RESPIRATION RATE: 18 BRPM | TEMPERATURE: 98.5 F | OXYGEN SATURATION: 97 % | BODY MASS INDEX: 25.82 KG/M2 | DIASTOLIC BLOOD PRESSURE: 91 MMHG | WEIGHT: 160 LBS | HEART RATE: 94 BPM

## 2019-10-16 DIAGNOSIS — N39.0 URINARY TRACT INFECTION WITHOUT HEMATURIA, SITE UNSPECIFIED: ICD-10-CM

## 2019-10-16 DIAGNOSIS — R30.0 DYSURIA: Primary | ICD-10-CM

## 2019-10-16 LAB
BILIRUB BLD-MCNC: ABNORMAL MG/DL
CLARITY, POC: CLEAR
COLOR UR: ABNORMAL
GLUCOSE UR STRIP-MCNC: ABNORMAL MG/DL
KETONES UR QL: ABNORMAL
LEUKOCYTE EST, POC: NEGATIVE
NITRITE UR-MCNC: POSITIVE MG/ML
PH UR: 5 [PH] (ref 5–8)
PROT UR STRIP-MCNC: ABNORMAL MG/DL
RBC # UR STRIP: NEGATIVE /UL
SP GR UR: 1.02 (ref 1–1.03)
UROBILINOGEN UR QL: NORMAL

## 2019-10-16 PROCEDURE — 99213 OFFICE O/P EST LOW 20 MIN: CPT | Performed by: NURSE PRACTITIONER

## 2019-10-16 RX ORDER — SULFAMETHOXAZOLE AND TRIMETHOPRIM 800; 160 MG/1; MG/1
1 TABLET ORAL 2 TIMES DAILY
Qty: 10 TABLET | Refills: 0 | Status: SHIPPED | OUTPATIENT
Start: 2019-10-16 | End: 2019-10-21

## 2019-10-16 RX ORDER — PREDNISONE 1 MG/1
TABLET ORAL
Refills: 2 | COMMUNITY
Start: 2019-09-27 | End: 2019-11-19

## 2019-10-16 RX ORDER — FLUTICASONE PROPIONATE 50 MCG
1 SPRAY, SUSPENSION (ML) NASAL DAILY PRN
Refills: 5 | COMMUNITY
Start: 2019-09-16

## 2019-10-16 RX ORDER — OMEPRAZOLE 20 MG/1
20 CAPSULE, DELAYED RELEASE ORAL
Refills: 0 | COMMUNITY
Start: 2019-09-24 | End: 2023-02-09

## 2019-10-16 RX ORDER — ZOLMITRIPTAN 2.5 MG/1
2.5 TABLET, FILM COATED ORAL AS NEEDED
COMMUNITY
Start: 2019-05-28 | End: 2021-08-12 | Stop reason: ALTCHOICE

## 2019-10-16 NOTE — PATIENT INSTRUCTIONS
An antibiotic has been prescribed for you that needs to be taken as directed for the full length of treatment. It is recommended that you take a probiotic when taking an antibiotic. Probiotics are found over the counter in pill form and in some brands of yogurt.      You have been diagnosed with a UTI or bladder infection.You have been counseled with the results of your urinalysis.  An antibiotic is prescribed for you today that needs to be taken until gone, whether or not you feel better. It is recommended you take a probiotic when taking an antibiotic. Probiotics are found over the counter in pill form and in some yogurt brands, both are recommended.    Increase fluid intake such as water and low sugar cranberry juice. Avoid caffeine as it irritates the bladder wall. Take over the counter AZO Standard as directed for no more than 2 days, for bladder pain. This medication will turn your urine orange or red.  Use Motrin or Tylenol for fever/pain.     For recurrent infections it is best to avoid bathing in a tub, always wipe front to back, urinate before and after intercourse, avoid tight fitting pants, and wear cotton underwear.    Go to your primary care provider, urgent care center, or emergency room if no improvement or you have increase in symptoms such as fever, nausea, vomiting, flank pain or other concerns.     A urine culture is being completed. We will call you with results in a few days.  If you have not heard from us after three days please call for results.

## 2019-10-17 NOTE — PROGRESS NOTES
Chief Complaint   Patient presents with   • Urinary Tract Infection     Subjective   Damaris Hu is a 64 y.o. female who presents to the clinic today with complaints of:  Urinary Tract Infection    This is a new problem. Episode onset: about two weeks ago. Progression since onset: improved while taking Macrobid, then worse again after completion. The quality of the pain is described as burning. There has been no fever. There is no history of pyelonephritis. Associated symptoms include frequency and urgency. Pertinent negatives include no chills, discharge, flank pain, hematuria, nausea or vomiting. She has tried antibiotics and increased fluids (10 days of Macrobid completed 10/15 prescribed by telehealth doctor with her insurance.  As taken two doses of AZO in the last 24 hours) for the symptoms. There is no history of kidney stones, recurrent UTIs or a urological procedure.     Current Outpatient Medications:   •  ZOLMitriptan (ZOMIG) 2.5 MG tablet, Take 2.5 mg by mouth., Disp: , Rfl:   •  cetirizine (zyrTEC) 10 MG tablet, Take 10 mg by mouth., Disp: , Rfl:   •  citalopram (CeleXA) 20 MG tablet, Take 20 mg by mouth., Disp: , Rfl:   •  fluticasone (FLONASE) 50 MCG/ACT nasal spray, 1 spray by Each Nare route Daily., Disp: , Rfl: 5  •  omeprazole (priLOSEC) 20 MG capsule, Take 20 mg by mouth Every Morning Before Breakfast., Disp: , Rfl: 0  •  predniSONE (DELTASONE) 5 MG tablet, TAKE 1 TABLET BY MOUTH EVERY 1 3 DAYS, Disp: , Rfl: 2      Allergies:  Patient has no known allergies.    Past Medical History:   Diagnosis Date   • Depression    • GERD (gastroesophageal reflux disease)    • Vitamin D deficiency      Past Surgical History:   Procedure Laterality Date   • BREAST SURGERY      Cyst removal   • COLONOSCOPY N/A 12/3/2018    Procedure: COLONOSCOPY WITH ANESTHESIA;  Surgeon: Myah Pool MD;  Location: [facility] ENDOSCOPY;  Service: Gastroenterology   • HYSTERECTOMY       Family History   Problem Relation Age of  Onset   • Colon polyps Mother         < 60 years old   • Colon cancer Neg Hx      Social History     Tobacco Use   • Smoking status: Former Smoker   • Smokeless tobacco: Never Used   • Tobacco comment: Quit 4 years ago   Substance Use Topics   • Alcohol use: No   • Drug use: No       Review of Systems  Review of Systems   Constitutional: Negative for chills, fatigue and fever.   Gastrointestinal: Negative for abdominal pain, constipation, diarrhea, nausea and vomiting.   Genitourinary: Positive for dysuria, frequency and urgency. Negative for flank pain, hematuria, vaginal bleeding, vaginal discharge and vaginal pain.       Objective   /91   Pulse 94   Temp 98.5 °F (36.9 °C)   Resp 18   Wt 72.6 kg (160 lb)   SpO2 97%   BMI 25.82 kg/m²       Physical Exam   Constitutional: She is oriented to person, place, and time. She appears well-developed and well-nourished. No distress.   Cardiovascular: Normal rate, regular rhythm and normal heart sounds.   Pulmonary/Chest: Effort normal and breath sounds normal. No respiratory distress.   Abdominal: Soft. Normal appearance and bowel sounds are normal. There is tenderness (pressure) in the suprapubic area. There is no rigidity, no rebound, no guarding and no CVA tenderness.   Neurological: She is alert and oriented to person, place, and time.   Skin: Skin is warm and dry.   Psychiatric: She has a normal mood and affect. Her behavior is normal.   Vitals reviewed.      Assessment/Plan     Damaris was seen today for urinary tract infection.    Diagnoses and all orders for this visit:    Dysuria  -     POC Urinalysis Dipstick, Automated    Urinary tract infection without hematuria, site unspecified  -     Urine Culture - Urine, Urine, Clean Catch    Other orders  -     sulfamethoxazole-trimethoprim (BACTRIM DS,SEPTRA DS) 800-160 MG per tablet; Take 1 tablet by mouth 2 (Two) Times a Day for 5 days.        Go to your primary care provider, urgent care center, or emergency  room if no improvement or you have increase in symptoms such as fever, nausea, vomiting, flank pain or other concerns.     A urine culture is being completed. We will call you with results in a few days.  If you have not heard from us after three days please call for results.     See AVS.

## 2019-10-20 LAB
BACTERIA UR CULT: NORMAL
BACTERIA UR CULT: NORMAL

## 2019-10-21 ENCOUNTER — TELEPHONE (OUTPATIENT)
Dept: RETAIL CLINIC | Facility: CLINIC | Age: 64
End: 2019-10-21

## 2019-10-21 NOTE — TELEPHONE ENCOUNTER
Patient called asking about urine culture results.  Results discussed. She continues to have some bladder pressure.  She denies burning, urgency or frequency. She denies having vaginal symptoms.  She finished taking the antibiotic today.  She has taken AZO off and on which has helped. She is trying to avoid diet coke and is drinking more water.  I have recommended she continue good fluid intake. Stop AZO for a couple of days and monitor symptoms.  If no improvement or if worse follow up with PCP. She verbalized understanding and agrees with plan.

## 2019-10-22 ENCOUNTER — TELEPHONE (OUTPATIENT)
Dept: RETAIL CLINIC | Facility: CLINIC | Age: 64
End: 2019-10-22

## 2019-10-22 RX ORDER — ZOLMITRIPTAN 2.5 MG/1
TABLET, FILM COATED ORAL
Qty: 6 TABLET | Refills: 3 | Status: SHIPPED | OUTPATIENT
Start: 2019-10-22 | End: 2020-09-04

## 2019-10-22 NOTE — TELEPHONE ENCOUNTER
Patient called and I returned her call. She wanted clarification regarding her urinalysis and urine culture.  Result of each discussed including how her urine being orange (due to AZO) could have skewed the results of the urinalysis.  She verbalized understanding and denies having any other questions. I have asked her to call back with any further concerns.

## 2019-10-28 ENCOUNTER — APPOINTMENT (OUTPATIENT)
Dept: GENERAL RADIOLOGY | Facility: HOSPITAL | Age: 64
End: 2019-10-28

## 2019-10-28 ENCOUNTER — APPOINTMENT (OUTPATIENT)
Dept: CT IMAGING | Facility: HOSPITAL | Age: 64
End: 2019-10-28

## 2019-10-28 ENCOUNTER — HOSPITAL ENCOUNTER (EMERGENCY)
Facility: HOSPITAL | Age: 64
Discharge: HOME | End: 2019-10-28
Admitting: INTERNAL MEDICINE

## 2019-10-28 VITALS
HEART RATE: 94 BPM | TEMPERATURE: 98 F | BODY MASS INDEX: 26.36 KG/M2 | HEIGHT: 66 IN | OXYGEN SATURATION: 94 % | WEIGHT: 164 LBS | DIASTOLIC BLOOD PRESSURE: 94 MMHG | SYSTOLIC BLOOD PRESSURE: 116 MMHG | RESPIRATION RATE: 18 BRPM

## 2019-10-28 DIAGNOSIS — R07.9 CHEST PAIN, UNSPECIFIED TYPE: Primary | ICD-10-CM

## 2019-10-28 DIAGNOSIS — R91.8 MASS OF UPPER LOBE OF RIGHT LUNG: ICD-10-CM

## 2019-10-28 LAB
ALBUMIN SERPL-MCNC: 4.3 G/DL (ref 3.5–5.2)
ALBUMIN/GLOB SERPL: 1.5 G/DL
ALP SERPL-CCNC: 49 U/L (ref 39–117)
ALT SERPL W P-5'-P-CCNC: 9 U/L (ref 1–33)
ANION GAP SERPL CALCULATED.3IONS-SCNC: 12 MMOL/L (ref 5–15)
ARTERIAL PATENCY WRIST A: POSITIVE
AST SERPL-CCNC: 14 U/L (ref 1–32)
ATMOSPHERIC PRESS: 752 MMHG
BASE EXCESS BLDA CALC-SCNC: 1.5 MMOL/L (ref 0–2)
BASOPHILS # BLD AUTO: 0.03 10*3/MM3 (ref 0–0.2)
BASOPHILS NFR BLD AUTO: 0.4 % (ref 0–1.5)
BDY SITE: ABNORMAL
BILIRUB SERPL-MCNC: <0.2 MG/DL (ref 0.2–1.2)
BODY TEMPERATURE: 37 C
BUN BLD-MCNC: 14 MG/DL (ref 8–23)
BUN/CREAT SERPL: 15.4 (ref 7–25)
CALCIUM SPEC-SCNC: 10.2 MG/DL (ref 8.6–10.5)
CHLORIDE SERPL-SCNC: 103 MMOL/L (ref 98–107)
CO2 SERPL-SCNC: 27 MMOL/L (ref 22–29)
CREAT BLD-MCNC: 0.91 MG/DL (ref 0.57–1)
CRP SERPL-MCNC: 2.02 MG/DL (ref 0–0.5)
D DIMER PPP FEU-MCNC: 0.27 MG/L (FEU) (ref 0–0.5)
DEPRECATED RDW RBC AUTO: 45.1 FL (ref 37–54)
EOSINOPHIL # BLD AUTO: 0.02 10*3/MM3 (ref 0–0.4)
EOSINOPHIL NFR BLD AUTO: 0.3 % (ref 0.3–6.2)
ERYTHROCYTE [DISTWIDTH] IN BLOOD BY AUTOMATED COUNT: 13.3 % (ref 12.3–15.4)
ERYTHROCYTE [SEDIMENTATION RATE] IN BLOOD: 10 MM/HR (ref 0–20)
GFR SERPL CREATININE-BSD FRML MDRD: 62 ML/MIN/1.73
GLOBULIN UR ELPH-MCNC: 2.8 GM/DL
GLUCOSE BLD-MCNC: 112 MG/DL (ref 65–99)
HCO3 BLDA-SCNC: 24.8 MMOL/L (ref 20–26)
HCT VFR BLD AUTO: 35.6 % (ref 34–46.6)
HGB BLD-MCNC: 11.3 G/DL (ref 12–15.9)
IMM GRANULOCYTES # BLD AUTO: 0.02 10*3/MM3 (ref 0–0.05)
IMM GRANULOCYTES NFR BLD AUTO: 0.3 % (ref 0–0.5)
LYMPHOCYTES # BLD AUTO: 1.12 10*3/MM3 (ref 0.7–3.1)
LYMPHOCYTES NFR BLD AUTO: 15.2 % (ref 19.6–45.3)
Lab: ABNORMAL
MCH RBC QN AUTO: 29.4 PG (ref 26.6–33)
MCHC RBC AUTO-ENTMCNC: 31.7 G/DL (ref 31.5–35.7)
MCV RBC AUTO: 92.5 FL (ref 79–97)
MODALITY: ABNORMAL
MONOCYTES # BLD AUTO: 0.57 10*3/MM3 (ref 0.1–0.9)
MONOCYTES NFR BLD AUTO: 7.7 % (ref 5–12)
NEUTROPHILS # BLD AUTO: 5.61 10*3/MM3 (ref 1.7–7)
NEUTROPHILS NFR BLD AUTO: 76.1 % (ref 42.7–76)
NRBC BLD AUTO-RTO: 0 /100 WBC (ref 0–0.2)
NT-PROBNP SERPL-MCNC: 97.4 PG/ML (ref 5–900)
PCO2 BLDA: 33.7 MM HG (ref 35–45)
PH BLDA: 7.47 PH UNITS (ref 7.35–7.45)
PLATELET # BLD AUTO: 306 10*3/MM3 (ref 140–450)
PMV BLD AUTO: 9.5 FL (ref 6–12)
PO2 BLDA: 85.6 MM HG (ref 83–108)
POTASSIUM BLD-SCNC: 4 MMOL/L (ref 3.5–5.2)
PROT SERPL-MCNC: 7.1 G/DL (ref 6–8.5)
RBC # BLD AUTO: 3.85 10*6/MM3 (ref 3.77–5.28)
SAO2 % BLDCOA: 96.8 % (ref 94–99)
SODIUM BLD-SCNC: 142 MMOL/L (ref 136–145)
TROPONIN T SERPL-MCNC: <0.01 NG/ML (ref 0–0.03)
TROPONIN T SERPL-MCNC: <0.01 NG/ML (ref 0–0.03)
VENTILATOR MODE: ABNORMAL
WBC NRBC COR # BLD: 7.37 10*3/MM3 (ref 3.4–10.8)

## 2019-10-28 PROCEDURE — 36415 COLL VENOUS BLD VENIPUNCTURE: CPT

## 2019-10-28 PROCEDURE — 93005 ELECTROCARDIOGRAM TRACING: CPT | Performed by: PHYSICIAN ASSISTANT

## 2019-10-28 PROCEDURE — 85379 FIBRIN DEGRADATION QUANT: CPT | Performed by: PHYSICIAN ASSISTANT

## 2019-10-28 PROCEDURE — 70450 CT HEAD/BRAIN W/O DYE: CPT

## 2019-10-28 PROCEDURE — 71260 CT THORAX DX C+: CPT

## 2019-10-28 PROCEDURE — 84484 ASSAY OF TROPONIN QUANT: CPT | Performed by: PHYSICIAN ASSISTANT

## 2019-10-28 PROCEDURE — 85025 COMPLETE CBC W/AUTO DIFF WBC: CPT | Performed by: PHYSICIAN ASSISTANT

## 2019-10-28 PROCEDURE — 71045 X-RAY EXAM CHEST 1 VIEW: CPT

## 2019-10-28 PROCEDURE — 93010 ELECTROCARDIOGRAM REPORT: CPT | Performed by: INTERNAL MEDICINE

## 2019-10-28 PROCEDURE — 36600 WITHDRAWAL OF ARTERIAL BLOOD: CPT

## 2019-10-28 PROCEDURE — 25010000002 IOPAMIDOL 61 % SOLUTION: Performed by: PHYSICIAN ASSISTANT

## 2019-10-28 PROCEDURE — 86140 C-REACTIVE PROTEIN: CPT | Performed by: PHYSICIAN ASSISTANT

## 2019-10-28 PROCEDURE — 82803 BLOOD GASES ANY COMBINATION: CPT

## 2019-10-28 PROCEDURE — 83880 ASSAY OF NATRIURETIC PEPTIDE: CPT | Performed by: PHYSICIAN ASSISTANT

## 2019-10-28 PROCEDURE — 80053 COMPREHEN METABOLIC PANEL: CPT | Performed by: PHYSICIAN ASSISTANT

## 2019-10-28 PROCEDURE — 85651 RBC SED RATE NONAUTOMATED: CPT | Performed by: PHYSICIAN ASSISTANT

## 2019-10-28 PROCEDURE — 93005 ELECTROCARDIOGRAM TRACING: CPT | Performed by: EMERGENCY MEDICINE

## 2019-10-28 PROCEDURE — 99283 EMERGENCY DEPT VISIT LOW MDM: CPT

## 2019-10-28 RX ORDER — ACETAMINOPHEN 500 MG
1000 TABLET ORAL ONCE
Status: DISCONTINUED | OUTPATIENT
Start: 2019-10-28 | End: 2019-10-28 | Stop reason: HOSPADM

## 2019-10-28 RX ORDER — SODIUM CHLORIDE 0.9 % (FLUSH) 0.9 %
10 SYRINGE (ML) INJECTION AS NEEDED
Status: DISCONTINUED | OUTPATIENT
Start: 2019-10-28 | End: 2019-10-28 | Stop reason: HOSPADM

## 2019-10-28 RX ADMIN — IOPAMIDOL 100 ML: 612 INJECTION, SOLUTION INTRAVENOUS at 18:30

## 2019-10-29 ENCOUNTER — TELEPHONE (OUTPATIENT)
Dept: PRIMARY CARE CLINIC | Age: 64
End: 2019-10-29

## 2019-10-29 DIAGNOSIS — R91.8 MASS OF UPPER LOBE OF RIGHT LUNG: Primary | ICD-10-CM

## 2019-11-01 ENCOUNTER — TELEPHONE (OUTPATIENT)
Dept: PRIMARY CARE CLINIC | Age: 64
End: 2019-11-01

## 2019-11-13 NOTE — PROGRESS NOTES
[facility]AT[facility]Y  [address]  [place] [address] [phone]    Patient Name: Damaris Hu  Encounter Date: 11/15/2019  YOB: 1955  Patient Number: [id number]    Initial Note    REASON FOR CONSULTATION: Newly diagnosed squamous cell lung carcinoma    HISTORY OF PRESENT ILLNESS: Damaris Hu is a 64-year-old female whose medical history includes depression, GERD, vitamin D deficiency, former tobacco use (1 ppd x 40 years - quit about 4 years ago) chronic fatigue syndrome.    On 10/28/2019 patient presented to the emergency room with chest pain that has been ongoing for the past year but that recently had gotten more noticeable, more frequent, and has been associated with shortness of breath.  On the day prior to her hospital admission she apparently had a syncopal episode.  An evaluation ensued:    Labs: Glucose 112 otherwise CMP was normal with a BUN of 14 creatinine 0.91 (GFR 62) calcium 10.2, total protein 7.1 and normal liver enzymes.  Troponin T was less than 0.010, proBNP normal at 97.4, d-dimer was normal at 0.27, sed rate 10, CRP 2.02 (slightly elevated), hemoglobin 11.3, hematocrit 35.6, MCV 92.5, platelets 306,000, WBC 7.37 with 76 point 1 segs 15.2 lymphs.    The head without contrast, 10/28/2019.  Impression: No acute intracranial disease.    CT chest with contrast, 10/28/2019.  Impression: Primary lung mass centered in the right upper lobe and abuts the posterior mediastinal pleura (5.4 x 5.3 x 4.5 cm).  This also partially encases the right bronchus intermedius and right upper lobe bronchus.  This is consistent with pulmonary malignancy.  Surgical sampling recommended with bronchoscopy.  Suspected metastatic lymph node in the pretracheal region measuring 1.3 cm in short axis.    Subsequently the patient was seen by Dr. Delonte Burns, [facility] on 11/04/2019 for further  assessment of the lung mass.  Spirometry on that date revealed severe COPD with positive bronchodilator response.  Postbronchodilator FEV1 revealed significant improvement to moderate/severe COPD.  Plan: Navigational bronchoscopy and possible EBUS for tissue diagnosis.    11/05/2019- PET scan, skull vertex to mid thighs.  Impression: Markedly hypermetabolic right perihilar tumor (49 x 37 mm) with right supraclavicular and mediastinal kyler metastasis.    11/07/2019- CT chest without contrast at [facility].  Comparison, PET CT 11/5/2019.  Impression: Large, spiculated right infrahilar mass, crossing the major fissure to involve the right upper and right lower lobes with direct extension into the subcarinal space.  Mildly enlarged mediastinal lymph nodes and normal-sized right supraclavicular lymph node corresponding to the area of PET positivity.    11/07/2019-bronchoscopy and EBUS guided FNA biopsy of right upper lobe lung: Positive for malignant cells consistent with squamous cell carcinoma.  EBUS guided FNA, station 7: Lymph node elements present.  No malignant cells identified.  EBUS guided FNA, station 4R: Positive for malignant cells consistent with squamous cell carcinoma.  Immunohistochemical stains performed on cell block #3.  Tumor cells positive for squamous squamous markers p63 and CK 5/6, negative for CK7, TTF-1, and CD 56.  Immunoprofile supports the above diagnosis.  Addendum: Subsequent biopsy showed metastatic disease in a supraclavicular lymph node, finding consistent with advanced stage disease.  Per protocol will proceed with tumor genomic testing on this specimen.  Due to the relatively small amount of tumor cellularity available, the entire panel of testing may not be able to be completed.  Results to be reported separately.    11/07/2019-navigational bronchoscopy and forceps biopsy of posterior right upper lobe lung mass.  Diagnosis: Fragments of bronchial mucosa.  No evidence of  granuloma or malignancy.    11/08/2019- ultrasound-guided biopsy right supraclavicular lymph node (fine-needle aspirate).  Cytopathology diagnosis: Positive for malignant cells consistent with previously established diagnosis of metastatic non-small cell carcinoma.  Comment: Insufficient tumor cellularity for further testing on the specimen.      With this background patient is now seen for subsequent management considerations.    LABS    Lab Results - Last 18 Months   Lab Units 11/15/19  1332 10/28/19  1624 03/12/19  1615 12/31/18  1438 08/24/18  1420   HEMOGLOBIN g/dL 11.0* 11.3* 12.8 11.5* 12.6   HEMATOCRIT % 35.5 35.6 41.8 38.5 40.9   MCV fL 92.9 92.5 96.8 96.5 95.3   WBC 10*3/mm3 6.51 7.37 7.7 5.9 6.2   RDW % 13.4 13.3 13.3 13.0 13.0   MPV fL 10.1 9.5 10.8 10.1 10.0   PLATELETS 10*3/mm3 372 306 270 245 257   IMM GRAN % % 0.3 0.3  --   --   --    NEUTROS ABS 10*3/mm3 4.39 5.61  --   --   --    LYMPHS ABS 10*3/mm3 1.33 1.12  --   --   --    MONOS ABS 10*3/mm3 0.67 0.57  --   --   --    EOS ABS 10*3/mm3 0.07 0.02  --   --   --    BASOS ABS 10*3/mm3 0.03 0.03  --   --   --    IMMATURE GRANS (ABS) 10*3/mm3 0.02 0.02  --   --   --    NRBC /100 WBC 0.0 0.0  --   --   --        Lab Results - Last 18 Months   Lab Units 10/28/19  1624 12/31/18  1438 08/24/18  1420   GLUCOSE mg/dL 112* 93 88   SODIUM mmol/L 142 143 140   POTASSIUM mmol/L 4.0 3.9 4.0   TOTAL CO2 mmol/L  --  23 25   CO2 mmol/L 27.0  --   --    CHLORIDE mmol/L 103 104 101   ANION GAP mmol/L 12.0 16 14   CREATININE mg/dL 0.91 0.8 0.9   BUN mg/dL 14 12 15   BUN / CREAT RATIO  15.4  --   --    CALCIUM mg/dL 10.2 9.3 9.5   EGFR IF NONAFRICN AM mL/min/1.73 62 >60 >60   ALK PHOS U/L 49 52 56   TOTAL PROTEIN g/dL 7.1 7.0 7.3   ALT (SGPT) U/L 9 11 13   AST (SGOT) U/L 14 13 16   BILIRUBIN mg/dL <0.2* <0.2 <0.2   ALBUMIN g/dL 4.30 4.1 4.4   GLOBULIN gm/dL 2.8  --   --        No results for input(s): MSPIKE, KAPPALAMB, IGLFLC, URICACID, FREEKAPPAL, CEA, LDH, REFLABREPO  in the last 88577 hours.    Lab Results - Last 18 Months   Lab Units 08/24/18  1420   TSH uIU/mL 2.720         PAST MEDICAL HISTORY:  ALLERGIES:  Allergies   Allergen Reactions   • Amoxicillin GI Intolerance     CURRENT MEDICATIONS:  Outpatient Encounter Medications as of 11/15/2019   Medication Sig Dispense Refill   • cetirizine (zyrTEC) 10 MG tablet Take 10 mg by mouth.     • citalopram (CeleXA) 20 MG tablet Take 20 mg by mouth.     • fluticasone (FLONASE) 50 MCG/ACT nasal spray 1 spray by Each Nare route Daily.  5   • Fluticasone-Umeclidin-Vilant (TRELEGY ELLIPTA) 100-62.5-25 MCG/INH aerosol powder  Inhale.     • omeprazole (priLOSEC) 20 MG capsule Take 20 mg by mouth Every Morning Before Breakfast.  0   • ZOLMitriptan (ZOMIG) 2.5 MG tablet Take 2.5 mg by mouth.     • HYDROcodone-acetaminophen (NORCO) 5-325 MG per tablet Take 1 tablet by mouth Every 6 (Six) Hours As Needed for Moderate Pain . 40 tablet 0   • ondansetron (ZOFRAN) 8 MG tablet Take 1 tablet by mouth Every 8 (Eight) Hours As Needed for Nausea or Vomiting. 30 tablet 0   • predniSONE (DELTASONE) 5 MG tablet TAKE 1 TABLET BY MOUTH EVERY 1 3 DAYS  2   • SPIRIVA RESPIMAT 2.5 MCG/ACT aerosol solution inhaler INHALE 2 SPRAY(S) BY MOUTH ONCE DAILY  5   • SYMBICORT 80-4.5 MCG/ACT inhaler Inhale 2 puffs 2 (Two) Times a Day.  5   • VENTOLIN  (90 Base) MCG/ACT inhaler INHALE 1 TO 2 PUFFS BY MOUTH 4 TIMES DAILY AS NEEDED  12   • [DISCONTINUED] clotrimazole-betamethasone (LOTRISONE) 1-0.05 % cream Apply  topically to the appropriate area as directed 2 (Two) Times a Day. 15 g 0   • [DISCONTINUED] hydrocortisone (ANUSOL-HC) 2.5 % rectal cream Place rectally 2 times daily.       No facility-administered encounter medications on file as of 11/15/2019.      Adult illnesses:  Depression  GERD  Vitamin D deficiency  Chronic fatigue syndrome  Former smoker, quit 4 years ago  Hemorrhoids with history of bright red blood per rectum.  Chronic back pain  Chronic neck  pain  Bilateral shoulder and arm radiculopathy, right > left  Degenerative disc disease, C5-C7  Anxiety    Past surgeries:  Breast surgery for cyst removal  Colonoscopy, 12/3/2018  Hysterectomy    ADULT ILLNESSES:  Patient Active Problem List   Diagnosis Code   • Age-related osteoporosis without current pathological fracture M81.0   • Anxiety and depression F41.9, F32.9   • Cervical disc disease M50.90   • Hyperlipidemia E78.5   • Insomnia G47.00   • Lumbar degenerative disc disease M51.36   • Malignant neoplasm of overlapping sites of right lung (CMS/HCC) C34.81   • Pulmonary emphysema (CMS/HCC) J43.9     SURGERIES:  Past Surgical History:   Procedure Laterality Date   • BREAST SURGERY      Cyst removal   • COLONOSCOPY N/A 12/3/2018    Procedure: COLONOSCOPY WITH ANESTHESIA;  Surgeon: Myah Pool MD;  Location: [facility] ENDOSCOPY;  Service: Gastroenterology   • HYSTERECTOMY       HEALTH MAINTENANCE ITEMS:  Health Maintenance Due   Topic Date Due   • ZOSTER VACCINE (2 of 3) 09/15/2015   • HEPATITIS C SCREENING  10/02/2018   • MEDICARE ANNUAL WELLNESS  10/02/2018   • INFLUENZA VACCINE  08/01/2019   • PNEUMOCOCCAL VACCINES (65+ LOW/MEDIUM RISK) (2 of 2 - PPSV23) 08/24/2019   • LIPID PANEL  11/15/2019       <no information>  Last Completed Colonoscopy       Status Date      COLONOSCOPY Done 12/3/2018 COLONOSCOPY     Patient has more history with this topic...          There is no immunization history on file for this patient.  Last Completed Mammogram       Status Date      MAMMOGRAM Done 2/5/2019 Ext Proc: [facility] SCREENING DIGITAL BREAST TOMOSYNTHESIS BI            FAMILY HISTORY:  Family History   Problem Relation Age of Onset   • Colon polyps Mother         < 60 years old   • Colon cancer Neg Hx      SOCIAL HISTORY:  Social History     Socioeconomic History   • Marital status:      Spouse name: Not on file   • Number of children: Not on file   • Years of education: Not on file   • Highest education level: Not  "on file   Tobacco Use   • Smoking status: Former Smoker   • Smokeless tobacco: Never Used   • Tobacco comment: Quit 4 years ago   Substance and Sexual Activity   • Alcohol use: No   • Drug use: No   • Sexual activity: Defer       REVIEW OF SYSTEMS:  Review of Systems   Constitutional: Negative.         Here with her sister, Ashley and daughter, Tawana.  Manages her personal ADLs, chores, running errands and lives by herself   Eyes: Negative.    Respiratory: Positive for cough and shortness of breath.    Gastrointestinal: Negative.    Genitourinary: Negative.    Musculoskeletal: Positive for arthralgias, back pain and neck pain.   Allergic/Immunologic: Positive for environmental allergies.   Neurological: Positive for syncope (prior to her ER visit).   Hematological: Negative for adenopathy. Does not bruise/bleed easily.   Psychiatric/Behavioral: Positive for depressed mood. The patient is nervous/anxious.        /78   Pulse 96   Temp 97.4 °F (36.3 °C)   Resp 16   Ht 167.6 cm (66\")   Wt 73 kg (160 lb 14.4 oz)   SpO2 93%   Breastfeeding? No   BMI 25.97 kg/m²  Body surface area is 1.82 meters squared.  Pain Score    11/15/19 1125   PainSc:   8       Physical Exam:  Physical Exam   Constitutional: She is oriented to person, place, and time. She appears well-developed and well-nourished. No distress.   ECOG 2   HENT:   Head: Normocephalic.   Mouth/Throat: No oropharyngeal exudate.   Eyes: Conjunctivae and EOM are normal. Pupils are equal, round, and reactive to light. No scleral icterus.   Neck: Normal range of motion. Neck supple. No JVD present. No tracheal deviation present. No thyromegaly present.   Cardiovascular: Normal rate, regular rhythm and normal heart sounds. Exam reveals no friction rub.   No murmur heard.  Pulmonary/Chest: Effort normal. No stridor. No respiratory distress. She has no wheezes (Soft expiratory wheezes diffusely). She has no rales. She exhibits no tenderness.   Barrel shaped " "chest with diminished breath sounds globally.   Abdominal: Soft. Bowel sounds are normal. She exhibits no distension and no mass. There is no tenderness. There is no guarding.   Musculoskeletal: Normal range of motion. She exhibits no edema or tenderness.   Neurological: She is alert and oriented to person, place, and time. No cranial nerve deficit.   She is anxious and a bit tremulous as a result.   Skin: Skin is warm and dry. No erythema. There is pallor.   Psychiatric: Her behavior is normal. Judgment and thought content normal.   Visibly anxious, \"panic attacks.\"   Vitals reviewed.      Assessment:  1.  Squamous cell carcinoma of the right lung            Tumor stage: IIIC (cT4, cN3, M0)            Bone tumor burden: 5.4 x 5.3 x 4.5 cm mass along the right major fissure that abuts the medial mediastinal pleura and partially encases the right upper lobe bronchus and right bronchus intermedius.  Pathologic pretracheal lymph node measures 1.3 cm.  Right supraclavicular metastasis.            Complications of tumor: Chest pain, dyspnea, syncope?            Tumor status: Untreated  2.  Chest pain dyspnea and syncope, likely symptoms related to #1.  3.  Mild anemia, contribution from anemia of malignancy/anemia of chronic disease.  Hgb 11.3, 10/28/2019  4.  Chronic fatigue syndrome  5.  COPD  6.  Former smoker 40 pack years  7.  Depression  8.  Hemorrhoids with history of hematochezia  9.  Chronic neck/back pains with DDD, C5-C7.  Followed by Dr. Grady.  Rx for Norco 5 prior to anterior cervical discectomy and fusion C5 in 2015 - never had surgery    RECOMMENDATIONS:   1.  Re: The patient is apprised of the available diagnostic information.   2. Draw baseline CBC with differential, CMP, CEA, serum iron, iron saturation, ferritin, B12, folate.   3. Obtain molecular studies from lung biopsy done on 11/07/2019 (eGFR, ALK/ROS, BRAF, and PD-L1.   4.  Review extensive records from Dr. Delonte Burns and [facility], to include radiographs, and biopsy results.  5. Schedule MRI of brain with and without contrast  6. Teaching sheets for Keytruda, Taxol and carboplatin. Potential toxicities of same discussed (to included but not limited to: Myelosuppression, alopecia, neuropathy, arthralgias/myalgias, nausea/vomiting, hypersensitivity reactions, diarrhea, mucositis, risks for infection, abnormal liver enzymes, asthenia, fever, low blood pressure, bleeding, renal insufficiency, edema, bradycardia, anaphylaxis, arrhythmias, thromboembolism, myocardial infarction, pulmonary toxicity, gastrointestinal (GI) obstruction/perforation, paralytic ileus, ischemic colitis, pancreatitis, severe skin reactions; electrolyte disorders, ototoxicity, nephrotoxicity, vision loss). Questions answered. She will agree to a trial of therapy.   7. Schedule chemotherapy beginning week 1 on 11/25/2019, week 2 on 12/02/2019, week 3 on 12/09/2019, week 4 on 12/16/2019, week 5 on 12/23/2019, week 6 on 12/30/2019:             Taxol 45 mg/m2 (BSA = 1.82; TD = 81 mg) per administration guidelines weekly x6 weeks with radiation.             Carboplatin AUC 2 (BSA = 1.82; TD = dose pending) per administration guidelines weekly x6 weeks with radiation.   8. Premedicate with:             Aloxi 0.25 mg IV before chemotherapy            Decadron 10 mg IV before chemo            Pepcid 20 mg IV             Benadryl 50 mg IV   9. CMP, Mg++ and CBC with differential weekly with Procrit 40,000 units subcutaneous every week if Hgb less than 10 and Hct less than 30; Zarxio 480 mcg subcutaneously daily x3 if ANC less than 1 -[facility]  10. eRx:              Zofran 8 mg p.o. 3 times daily as needed, #30 - Rx                    Norco 5 po q 6 hour prn # 40 - eRx    11. Continue other current medicines.   12. Appoint to Dr. Dudley ASAP Re: Assess for concurrent radiation (care actually discussed with Dr. Dudley over the telephone.  He agrees with need for concurrent  radiation).  13. Appoint to Dr. Gibson ASAP Re: Mediport placement.   14. Continue currently identified medications.   15. Return to the office in 4 weeks with pre-office serum iron, Fe sat, ferritin, CBC with differential, CMP, and CEA.   16. Further recommendations pending.       TIME SPENT: Face-to-face time on this encounter, as defined by the American Medical Association in the 2019 Current Procedural Terminology codebook; assessment, record review, lab/imaging review, planning and education - at least 75 minutes (greater than 50% facetime).

## 2019-11-15 ENCOUNTER — LAB (OUTPATIENT)
Dept: LAB | Facility: HOSPITAL | Age: 64
End: 2019-11-15

## 2019-11-15 ENCOUNTER — TELEPHONE (OUTPATIENT)
Dept: ONCOLOGY | Facility: CLINIC | Age: 64
End: 2019-11-15

## 2019-11-15 ENCOUNTER — CONSULT (OUTPATIENT)
Dept: ONCOLOGY | Facility: CLINIC | Age: 64
End: 2019-11-15

## 2019-11-15 VITALS
WEIGHT: 160.9 LBS | HEART RATE: 96 BPM | DIASTOLIC BLOOD PRESSURE: 78 MMHG | SYSTOLIC BLOOD PRESSURE: 118 MMHG | TEMPERATURE: 97.4 F | RESPIRATION RATE: 16 BRPM | BODY MASS INDEX: 25.86 KG/M2 | OXYGEN SATURATION: 93 % | HEIGHT: 66 IN

## 2019-11-15 DIAGNOSIS — F41.9 ANXIETY AND DEPRESSION: ICD-10-CM

## 2019-11-15 DIAGNOSIS — J43.9 PULMONARY EMPHYSEMA, UNSPECIFIED EMPHYSEMA TYPE (HCC): ICD-10-CM

## 2019-11-15 DIAGNOSIS — C34.81 MALIGNANT NEOPLASM OF OVERLAPPING SITES OF RIGHT LUNG (HCC): ICD-10-CM

## 2019-11-15 DIAGNOSIS — F32.A ANXIETY AND DEPRESSION: ICD-10-CM

## 2019-11-15 DIAGNOSIS — C34.81 MALIGNANT NEOPLASM OF OVERLAPPING SITES OF RIGHT LUNG (HCC): Primary | ICD-10-CM

## 2019-11-15 PROBLEM — R19.4 CHANGE IN BOWEL HABITS: Status: RESOLVED | Noted: 2018-10-25 | Resolved: 2019-11-15

## 2019-11-15 PROBLEM — K64.9 HEMORRHOIDS: Status: RESOLVED | Noted: 2018-10-25 | Resolved: 2019-11-15

## 2019-11-15 PROBLEM — M81.0 AGE-RELATED OSTEOPOROSIS WITHOUT CURRENT PATHOLOGICAL FRACTURE: Status: ACTIVE | Noted: 2019-02-06

## 2019-11-15 PROBLEM — R19.5 POSITIVE FIT (FECAL IMMUNOCHEMICAL TEST): Status: RESOLVED | Noted: 2018-10-25 | Resolved: 2019-11-15

## 2019-11-15 PROBLEM — K59.01 SLOW TRANSIT CONSTIPATION: Status: RESOLVED | Noted: 2018-10-25 | Resolved: 2019-11-15

## 2019-11-15 PROBLEM — K62.5 BRBPR (BRIGHT RED BLOOD PER RECTUM): Status: RESOLVED | Noted: 2018-10-25 | Resolved: 2019-11-15

## 2019-11-15 LAB
BASOPHILS # BLD AUTO: 0.03 10*3/MM3 (ref 0–0.2)
BASOPHILS NFR BLD AUTO: 0.5 % (ref 0–1.5)
DEPRECATED RDW RBC AUTO: 45.4 FL (ref 37–54)
EOSINOPHIL # BLD AUTO: 0.07 10*3/MM3 (ref 0–0.4)
EOSINOPHIL NFR BLD AUTO: 1.1 % (ref 0.3–6.2)
ERYTHROCYTE [DISTWIDTH] IN BLOOD BY AUTOMATED COUNT: 13.4 % (ref 12.3–15.4)
FERRITIN SERPL-MCNC: 65.15 NG/ML (ref 13–150)
HCT VFR BLD AUTO: 35.5 % (ref 34–46.6)
HGB BLD-MCNC: 11 G/DL (ref 12–15.9)
IMM GRANULOCYTES # BLD AUTO: 0.02 10*3/MM3 (ref 0–0.05)
IMM GRANULOCYTES NFR BLD AUTO: 0.3 % (ref 0–0.5)
IRON 24H UR-MRATE: 19 MCG/DL (ref 37–145)
IRON SATN MFR SERPL: 7 % (ref 20–50)
LYMPHOCYTES # BLD AUTO: 1.33 10*3/MM3 (ref 0.7–3.1)
LYMPHOCYTES NFR BLD AUTO: 20.4 % (ref 19.6–45.3)
MCH RBC QN AUTO: 28.8 PG (ref 26.6–33)
MCHC RBC AUTO-ENTMCNC: 31 G/DL (ref 31.5–35.7)
MCV RBC AUTO: 92.9 FL (ref 79–97)
MONOCYTES # BLD AUTO: 0.67 10*3/MM3 (ref 0.1–0.9)
MONOCYTES NFR BLD AUTO: 10.3 % (ref 5–12)
NEUTROPHILS # BLD AUTO: 4.39 10*3/MM3 (ref 1.7–7)
NEUTROPHILS NFR BLD AUTO: 67.4 % (ref 42.7–76)
NRBC BLD AUTO-RTO: 0 /100 WBC (ref 0–0.2)
PLATELET # BLD AUTO: 372 10*3/MM3 (ref 140–450)
PMV BLD AUTO: 10.1 FL (ref 6–12)
RBC # BLD AUTO: 3.82 10*6/MM3 (ref 3.77–5.28)
TIBC SERPL-MCNC: 289 MCG/DL (ref 298–536)
TRANSFERRIN SERPL-MCNC: 194 MG/DL (ref 200–360)
WBC NRBC COR # BLD: 6.51 10*3/MM3 (ref 3.4–10.8)

## 2019-11-15 PROCEDURE — 84466 ASSAY OF TRANSFERRIN: CPT | Performed by: INTERNAL MEDICINE

## 2019-11-15 PROCEDURE — 82607 VITAMIN B-12: CPT | Performed by: INTERNAL MEDICINE

## 2019-11-15 PROCEDURE — 83540 ASSAY OF IRON: CPT | Performed by: INTERNAL MEDICINE

## 2019-11-15 PROCEDURE — 85025 COMPLETE CBC W/AUTO DIFF WBC: CPT | Performed by: INTERNAL MEDICINE

## 2019-11-15 PROCEDURE — 82728 ASSAY OF FERRITIN: CPT | Performed by: INTERNAL MEDICINE

## 2019-11-15 PROCEDURE — 82378 CARCINOEMBRYONIC ANTIGEN: CPT | Performed by: INTERNAL MEDICINE

## 2019-11-15 PROCEDURE — 99205 OFFICE O/P NEW HI 60 MIN: CPT | Performed by: INTERNAL MEDICINE

## 2019-11-15 PROCEDURE — 82746 ASSAY OF FOLIC ACID SERUM: CPT | Performed by: INTERNAL MEDICINE

## 2019-11-15 PROCEDURE — 36415 COLL VENOUS BLD VENIPUNCTURE: CPT

## 2019-11-15 RX ORDER — ONDANSETRON HYDROCHLORIDE 8 MG/1
8 TABLET, FILM COATED ORAL EVERY 8 HOURS PRN
Qty: 30 TABLET | Refills: 0 | Status: SHIPPED | OUTPATIENT
Start: 2019-11-15 | End: 2019-11-19

## 2019-11-15 RX ORDER — ALBUTEROL SULFATE 90 UG/1
AEROSOL, METERED RESPIRATORY (INHALATION)
Refills: 12 | COMMUNITY
Start: 2019-11-06 | End: 2019-11-19

## 2019-11-15 RX ORDER — TIOTROPIUM BROMIDE INHALATION SPRAY 3.12 UG/1
SPRAY, METERED RESPIRATORY (INHALATION)
Refills: 5 | COMMUNITY
Start: 2019-11-11 | End: 2019-11-19

## 2019-11-15 RX ORDER — HYDROCODONE BITARTRATE AND ACETAMINOPHEN 5; 325 MG/1; MG/1
1 TABLET ORAL EVERY 6 HOURS PRN
Qty: 40 TABLET | Refills: 0 | Status: SHIPPED | OUTPATIENT
Start: 2019-11-15 | End: 2019-11-19

## 2019-11-15 RX ORDER — DILTIAZEM HYDROCHLORIDE 60 MG/1
2 TABLET, FILM COATED ORAL 2 TIMES DAILY
Refills: 5 | COMMUNITY
Start: 2019-11-06 | End: 2019-11-19

## 2019-11-15 NOTE — TELEPHONE ENCOUNTER
Spoke with patient and let her know about her low iron. I let her know that she will need an iron infusion next week. She would like to have it the same day as she is seen with Chloé. I also let her know that a referral would be made to Dr. ely

## 2019-11-15 NOTE — TELEPHONE ENCOUNTER
----- Message from Kade Russell MD sent at 11/15/2019  2:07 PM CST -----  Labs 11/15/2019.  Serum iron 19, iron saturation 7%, TIBC 289 (depressed), ferritin 65.15.  Please schedule Injectafer 750 mg IV x1 at [facility] next week

## 2019-11-16 LAB
CEA SERPL-MCNC: 3.38 NG/ML
FOLATE SERPL-MCNC: >20 NG/ML (ref 4.78–24.2)
VIT B12 BLD-MCNC: 909 PG/ML (ref 211–946)

## 2019-11-18 ENCOUNTER — TELEPHONE (OUTPATIENT)
Dept: ONCOLOGY | Facility: CLINIC | Age: 64
End: 2019-11-18

## 2019-11-18 DIAGNOSIS — D50.9 IRON DEFICIENCY ANEMIA, UNSPECIFIED IRON DEFICIENCY ANEMIA TYPE: ICD-10-CM

## 2019-11-18 RX ORDER — DIPHENHYDRAMINE HYDROCHLORIDE 50 MG/ML
50 INJECTION INTRAMUSCULAR; INTRAVENOUS AS NEEDED
Status: CANCELLED | OUTPATIENT
Start: 2019-11-19

## 2019-11-18 RX ORDER — FAMOTIDINE 10 MG/ML
20 INJECTION, SOLUTION INTRAVENOUS AS NEEDED
Status: CANCELLED | OUTPATIENT
Start: 2019-11-19

## 2019-11-18 RX ORDER — SODIUM CHLORIDE 9 MG/ML
250 INJECTION, SOLUTION INTRAVENOUS ONCE
Status: CANCELLED | OUTPATIENT
Start: 2019-11-19

## 2019-11-18 NOTE — TELEPHONE ENCOUNTER
Spoke with patient and let her know that she is scheduled for Injectafer tomorrow at 11:45 following her appt with Chloé. She verbalized understanding.

## 2019-11-18 NOTE — TELEPHONE ENCOUNTER
----- Message from Kade Russell MD sent at 11/15/2019  2:07 PM CST -----  Labs 11/15/2019.  Serum iron 19, iron saturation 7%, TIBC 289 (depressed), ferritin 65.15.  Please schedule Injectafer 750 mg IV x1 at [facility] next week

## 2019-11-19 ENCOUNTER — OFFICE VISIT (OUTPATIENT)
Dept: ONCOLOGY | Facility: CLINIC | Age: 64
End: 2019-11-19

## 2019-11-19 ENCOUNTER — TELEPHONE (OUTPATIENT)
Dept: ONCOLOGY | Facility: CLINIC | Age: 64
End: 2019-11-19

## 2019-11-19 ENCOUNTER — INFUSION (OUTPATIENT)
Dept: ONCOLOGY | Facility: HOSPITAL | Age: 64
End: 2019-11-19

## 2019-11-19 VITALS
SYSTOLIC BLOOD PRESSURE: 133 MMHG | OXYGEN SATURATION: 100 % | BODY MASS INDEX: 26.82 KG/M2 | HEART RATE: 84 BPM | DIASTOLIC BLOOD PRESSURE: 63 MMHG | RESPIRATION RATE: 15 BRPM | WEIGHT: 161 LBS | HEIGHT: 65 IN | TEMPERATURE: 98 F

## 2019-11-19 VITALS
BODY MASS INDEX: 25.97 KG/M2 | TEMPERATURE: 99.2 F | DIASTOLIC BLOOD PRESSURE: 72 MMHG | WEIGHT: 161.6 LBS | SYSTOLIC BLOOD PRESSURE: 118 MMHG | RESPIRATION RATE: 16 BRPM | HEIGHT: 66 IN | OXYGEN SATURATION: 96 % | HEART RATE: 76 BPM

## 2019-11-19 DIAGNOSIS — C34.81 MALIGNANT NEOPLASM OF OVERLAPPING SITES OF RIGHT LUNG (HCC): Primary | ICD-10-CM

## 2019-11-19 DIAGNOSIS — D50.9 IRON DEFICIENCY ANEMIA, UNSPECIFIED IRON DEFICIENCY ANEMIA TYPE: Primary | ICD-10-CM

## 2019-11-19 PROCEDURE — 25010000002 FERRIC CARBOXYMALTOSE 750 MG/15ML SOLUTION 15 ML VIAL: Performed by: INTERNAL MEDICINE

## 2019-11-19 PROCEDURE — 99214 OFFICE O/P EST MOD 30 MIN: CPT | Performed by: NURSE PRACTITIONER

## 2019-11-19 PROCEDURE — 96365 THER/PROPH/DIAG IV INF INIT: CPT

## 2019-11-19 RX ORDER — SODIUM CHLORIDE 9 MG/ML
250 INJECTION, SOLUTION INTRAVENOUS ONCE
Status: COMPLETED | OUTPATIENT
Start: 2019-11-19 | End: 2019-11-19

## 2019-11-19 RX ORDER — FAMOTIDINE 10 MG/ML
20 INJECTION, SOLUTION INTRAVENOUS AS NEEDED
Status: DISCONTINUED | OUTPATIENT
Start: 2019-11-19 | End: 2019-11-19 | Stop reason: HOSPADM

## 2019-11-19 RX ORDER — DIPHENHYDRAMINE HYDROCHLORIDE 50 MG/ML
50 INJECTION INTRAMUSCULAR; INTRAVENOUS AS NEEDED
Status: DISCONTINUED | OUTPATIENT
Start: 2019-11-19 | End: 2019-11-19 | Stop reason: HOSPADM

## 2019-11-19 RX ADMIN — SODIUM CHLORIDE 250 ML: 9 INJECTION, SOLUTION INTRAVENOUS at 13:10

## 2019-11-19 RX ADMIN — FERRIC CARBOXYMALTOSE INJECTION 750 MG: 50 INJECTION, SOLUTION INTRAVENOUS at 13:10

## 2019-11-19 NOTE — PROGRESS NOTES
[facility] ON[facility] HEMATOLOGY AND ONCOLOGY  [address]  [facility] [address] [phone]    Patient Name: Damaris Hu  Encounter Date: 11/15/2019  YOB: 1955  Patient Number: [id number]    REASON FOR VISIT: Ms. Damaris Hu is a 65yo female with a newly diagnosed squamous cell lung carcinoma. On 10/28/2019 patient presented to the emergency room with chest pain that has been ongoing for the past year but that recently had gotten more noticeable, more frequent, and has been associated with shortness of breath.  On the day prior to her hospital admission she apparently had a syncopal episode.  An evaluation ensued:    Labs: Glucose 112 otherwise CMP was normal with a BUN of 14 creatinine 0.91 (GFR 62) calcium 10.2, total protein 7.1 and normal liver enzymes.  Troponin T was less than 0.010, proBNP normal at 97.4, d-dimer was normal at 0.27, sed rate 10, CRP 2.02 (slightly elevated), hemoglobin 11.3, hematocrit 35.6, MCV 92.5, platelets 306,000, WBC 7.37 with 76 point 1 segs 15.2 lymphs.    The head without contrast, 10/28/2019.  Impression: No acute intracranial disease.    CT chest with contrast, 10/28/2019.  Impression: Primary lung mass centered in the right upper lobe and abuts the posterior mediastinal pleura (5.4 x 5.3 x 4.5 cm).  This also partially encases the right bronchus intermedius and right upper lobe bronchus.  This is consistent with pulmonary malignancy.  Surgical sampling recommended with bronchoscopy.  Suspected metastatic lymph node in the pretracheal region measuring 1.3 cm in short axis.    Subsequently the patient was seen by Dr. Delonte Burns, [facility] on 11/04/2019 for further assessment of the lung mass.  Spirometry on that date revealed severe COPD with positive bronchodilator response.  Postbronchodilator FEV1 revealed significant improvement to moderate/severe COPD.  Plan:  Navigational bronchoscopy and possible EBUS for tissue diagnosis.    11/05/2019- PET scan, skull vertex to mid thighs.  Impression: Markedly hypermetabolic right perihilar tumor (49 x 37 mm) with right supraclavicular and mediastinal kyler metastasis.    11/07/2019- CT chest without contrast at [facility], [place].  Comparison, PET CT 11/5/2019.  Impression: Large, spiculated right infrahilar mass, crossing the major fissure to involve the right upper and right lower lobes with direct extension into the subcarinal space.  Mildly enlarged mediastinal lymph nodes and normal-sized right supraclavicular lymph node corresponding to the area of PET positivity.    11/07/2019-bronchoscopy and EBUS guided FNA biopsy of right upper lobe lung: Positive for malignant cells consistent with squamous cell carcinoma.  EBUS guided FNA, station 7: Lymph node elements present.  No malignant cells identified.  EBUS guided FNA, station 4R: Positive for malignant cells consistent with squamous cell carcinoma.  Immunohistochemical stains performed on cell block #3.  Tumor cells positive for squamous squamous markers p63 and CK 5/6, negative for CK7, TTF-1, and CD 56.  Immunoprofile supports the above diagnosis.  Addendum: Subsequent biopsy showed metastatic disease in a supraclavicular lymph node, finding consistent with advanced stage disease.  Per protocol will proceed with tumor genomic testing on this specimen.  Due to the relatively small amount of tumor cellularity available, the entire panel of testing may not be able to be completed.  Results to be reported separately.    11/07/2019-navigational bronchoscopy and forceps biopsy of posterior right upper lobe lung mass.  Diagnosis: Fragments of bronchial mucosa.  No evidence of granuloma or malignancy.    11/08/2019- ultrasound-guided biopsy right supraclavicular lymph node (fine-needle aspirate).  Cytopathology diagnosis: Positive for malignant cells consistent with previously  established diagnosis of metastatic non-small cell carcinoma.  Comment: Insufficient tumor cellularity for further testing on the specimen.      She has seen Dr. Russell and he has recommended Carboplatin Taxol along with radiation.  She is here today for education on the chemotherapy agents.    LABS    Lab Results - Last 18 Months   Lab Units 11/15/19  1332 10/28/19  1624 03/12/19  1615 12/31/18  1438 08/24/18  1420   HEMOGLOBIN g/dL 11.0* 11.3* 12.8 11.5* 12.6   HEMATOCRIT % 35.5 35.6 41.8 38.5 40.9   MCV fL 92.9 92.5 96.8 96.5 95.3   WBC 10*3/mm3 6.51 7.37 7.7 5.9 6.2   RDW % 13.4 13.3 13.3 13.0 13.0   MPV fL 10.1 9.5 10.8 10.1 10.0   PLATELETS 10*3/mm3 372 306 270 245 257   IMM GRAN % % 0.3 0.3  --   --   --    NEUTROS ABS 10*3/mm3 4.39 5.61  --   --   --    LYMPHS ABS 10*3/mm3 1.33 1.12  --   --   --    MONOS ABS 10*3/mm3 0.67 0.57  --   --   --    EOS ABS 10*3/mm3 0.07 0.02  --   --   --    BASOS ABS 10*3/mm3 0.03 0.03  --   --   --    IMMATURE GRANS (ABS) 10*3/mm3 0.02 0.02  --   --   --    NRBC /100 WBC 0.0 0.0  --   --   --        Lab Results - Last 18 Months   Lab Units 10/28/19  1624 12/31/18  1438 08/24/18  1420   GLUCOSE mg/dL 112* 93 88   SODIUM mmol/L 142 143 140   POTASSIUM mmol/L 4.0 3.9 4.0   TOTAL CO2 mmol/L  --  23 25   CO2 mmol/L 27.0  --   --    CHLORIDE mmol/L 103 104 101   ANION GAP mmol/L 12.0 16 14   CREATININE mg/dL 0.91 0.8 0.9   BUN mg/dL 14 12 15   BUN / CREAT RATIO  15.4  --   --    CALCIUM mg/dL 10.2 9.3 9.5   EGFR IF NONAFRICN AM mL/min/1.73 62 >60 >60   ALK PHOS U/L 49 52 56   TOTAL PROTEIN g/dL 7.1 7.0 7.3   ALT (SGPT) U/L 9 11 13   AST (SGOT) U/L 14 13 16   BILIRUBIN mg/dL <0.2* <0.2 <0.2   ALBUMIN g/dL 4.30 4.1 4.4   GLOBULIN gm/dL 2.8  --   --        Lab Results - Last 18 Months   Lab Units 11/15/19  1332   CEA ng/mL 3.38       Lab Results - Last 18 Months   Lab Units 11/15/19  1332 08/24/18  1420   IRON mcg/dL 19*  --    TIBC mcg/dL 289*  --    IRON SATURATION % 7*  --     FERRITIN ng/mL 65.15  --    TSH uIU/mL  --  2.720   FOLATE ng/mL >20.00  --          PAST MEDICAL HISTORY:  ALLERGIES:  Allergies   Allergen Reactions   • Amoxicillin GI Intolerance     CURRENT MEDICATIONS:  Outpatient Encounter Medications as of 11/19/2019   Medication Sig Dispense Refill   • cetirizine (zyrTEC) 10 MG tablet Take 10 mg by mouth.     • citalopram (CeleXA) 20 MG tablet Take 20 mg by mouth.     • fluticasone (FLONASE) 50 MCG/ACT nasal spray 1 spray by Each Nare route Daily.  5   • Fluticasone-Umeclidin-Vilant (TRELEGY ELLIPTA) 100-62.5-25 MCG/INH aerosol powder  Inhale.     • omeprazole (priLOSEC) 20 MG capsule Take 20 mg by mouth Every Morning Before Breakfast.  0   • ZOLMitriptan (ZOMIG) 2.5 MG tablet Take 2.5 mg by mouth.     • [DISCONTINUED] HYDROcodone-acetaminophen (NORCO) 5-325 MG per tablet Take 1 tablet by mouth Every 6 (Six) Hours As Needed for Moderate Pain . 40 tablet 0   • [DISCONTINUED] ondansetron (ZOFRAN) 8 MG tablet Take 1 tablet by mouth Every 8 (Eight) Hours As Needed for Nausea or Vomiting. 30 tablet 0   • [DISCONTINUED] predniSONE (DELTASONE) 5 MG tablet TAKE 1 TABLET BY MOUTH EVERY 1 3 DAYS  2   • [DISCONTINUED] SPIRIVA RESPIMAT 2.5 MCG/ACT aerosol solution inhaler INHALE 2 SPRAY(S) BY MOUTH ONCE DAILY  5   • [DISCONTINUED] SYMBICORT 80-4.5 MCG/ACT inhaler Inhale 2 puffs 2 (Two) Times a Day.  5   • [DISCONTINUED] VENTOLIN  (90 Base) MCG/ACT inhaler INHALE 1 TO 2 PUFFS BY MOUTH 4 TIMES DAILY AS NEEDED  12     No facility-administered encounter medications on file as of 11/19/2019.      Adult illnesses:  Depression  GERD  Vitamin D deficiency  Chronic fatigue syndrome  Former smoker, quit 4 years ago  Hemorrhoids with history of bright red blood per rectum.  Chronic back pain  Chronic neck pain  Bilateral shoulder and arm radiculopathy, right > left  Degenerative disc disease, C5-C7  Anxiety    Past surgeries:  Breast surgery for cyst removal  Colonoscopy,  12/3/2018  Hysterectomy    ADULT ILLNESSES:  Patient Active Problem List   Diagnosis Code   • Age-related osteoporosis without current pathological fracture M81.0   • Anxiety and depression F41.9, F32.9   • Cervical disc disease M50.90   • Hyperlipidemia E78.5   • Lumbar degenerative disc disease M51.36   • Malignant neoplasm of overlapping sites of right lung (CMS/HCC) C34.81   • Pulmonary emphysema (CMS/HCC) J43.9   • Iron deficiency anemia D50.9     SURGERIES:  Past Surgical History:   Procedure Laterality Date   • BREAST SURGERY      Cyst removal   • COLONOSCOPY N/A 12/3/2018    Procedure: COLONOSCOPY WITH ANESTHESIA;  Surgeon: Myah Pool MD;  Location: [facility] ENDOSCOPY;  Service: Gastroenterology   • HYSTERECTOMY       HEALTH MAINTENANCE ITEMS:  Health Maintenance Due   Topic Date Due   • ZOSTER VACCINE (2 of 3) 09/15/2015   • HEPATITIS C SCREENING  10/02/2018   • MEDICARE ANNUAL WELLNESS  10/02/2018   • PNEUMOCOCCAL VACCINES (65+ LOW/MEDIUM RISK) (2 of 2 - PPSV23) 08/24/2019   • LIPID PANEL  11/15/2019     Last Completed Colonoscopy       Status Date      COLONOSCOPY Done 12/3/2018 COLONOSCOPY     Patient has more history with this topic...        Last Completed Mammogram       Status Date      MAMMOGRAM Done 2/5/2019 Ext Proc: CHG SCREENING DIGITAL BREAST TOMOSYNTHESIS BI        FAMILY HISTORY:  Family History   Problem Relation Age of Onset   • Colon polyps Mother         < 60 years old   • Colon cancer Neg Hx      SOCIAL HISTORY:  Social History     Socioeconomic History   • Marital status:      Spouse name: Not on file   • Number of children: Not on file   • Years of education: Not on file   • Highest education level: Not on file   Tobacco Use   • Smoking status: Former Smoker   • Smokeless tobacco: Never Used   • Tobacco comment: Quit 4 years ago   Substance and Sexual Activity   • Alcohol use: No   • Drug use: No   • Sexual activity: Defer       REVIEW OF SYSTEMS:  Review of Systems  "  Constitutional: Negative.         Here with her sister, Ashley and daughter, Tawana.  Manages her personal ADLs, chores, running errands and lives by herself   Eyes: Negative.    Respiratory: Positive for cough and shortness of breath.    Gastrointestinal: Negative.    Genitourinary: Negative.    Musculoskeletal: Positive for arthralgias, back pain and neck pain.   Allergic/Immunologic: Positive for environmental allergies.   Neurological: Negative for syncope (prior to her ER visit).   Hematological: Negative for adenopathy. Does not bruise/bleed easily.   Psychiatric/Behavioral: Positive for depressed mood. The patient is nervous/anxious.      /72   Pulse 76   Temp 99.2 °F (37.3 °C) (Temporal)   Resp 16   Ht 167.6 cm (66\")   Wt 73.3 kg (161 lb 9.6 oz)   SpO2 96%   Breastfeeding? No   BMI 26.08 kg/m²  Body surface area is 1.83 meters squared.  Pain Score    11/19/19 1113   PainSc: 0-No pain       Physical Exam:  Physical Exam   Constitutional: She is oriented to person, place, and time. She appears well-developed and well-nourished. No distress.   ECOG 2   HENT:   Head: Normocephalic.   Mouth/Throat: No oropharyngeal exudate.   Eyes: Conjunctivae and EOM are normal. Pupils are equal, round, and reactive to light. No scleral icterus.   Neck: Normal range of motion. Neck supple. No JVD present. No tracheal deviation present. No thyromegaly present.   Cardiovascular: Normal rate, regular rhythm and normal heart sounds. Exam reveals no friction rub.   No murmur heard.  Pulmonary/Chest: Effort normal. No stridor. No respiratory distress. She has no wheezes (Soft expiratory wheezes diffusely). She has no rales. She exhibits no tenderness.   Barrel shaped chest with diminished breath sounds globally.   Abdominal: Soft. Bowel sounds are normal. She exhibits no distension and no mass. There is no tenderness. There is no guarding.   Musculoskeletal: Normal range of motion. She exhibits no edema or " "tenderness.   Neurological: She is alert and oriented to person, place, and time. No cranial nerve deficit.   She is anxious and a bit tremulous as a result.   Skin: Skin is warm and dry. No erythema. There is pallor.   Psychiatric: Her behavior is normal. Judgment and thought content normal.   Visibly anxious, \"panic attacks.\"   Vitals reviewed.    Assessment:  1.  Squamous cell carcinoma of the right lung            Tumor stage: IIIC (cT4, cN3, M0)            Bone tumor burden: 5.4 x 5.3 x 4.5 cm mass along the right major fissure that abuts the medial mediastinal pleura and partially encases the right upper lobe bronchus and right bronchus intermedius.  Pathologic pretracheal lymph node measures 1.3 cm.  Right supraclavicular metastasis.            Complications of tumor: Chest pain, dyspnea, syncope?            Tumor status: Untreated  2.  Chest pain dyspnea and syncope, likely symptoms related to #1.  3.  Mild anemia, contribution from anemia of malignancy/anemia of chronic disease.  Hgb 11.3, 10/28/2019  4.  Chronic fatigue syndrome  5.  COPD  6.  Former smoker 40 pack years  7.  Depression  8.  Hemorrhoids with history of hematochezia  9.  Chronic neck/back pains with DDD, C5-C7.  Followed by Dr. Grady.  Rx for Norco 5 prior to anterior cervical discectomy and fusion C5 in 2015 - never had surgery    RECOMMENDATIONS:   1.  Re: The patient is apprised of the available diagnostic information.   2. Draw baseline CBC with differential, CMP, CEA, serum iron, iron saturation, ferritin, B12, folate.   3. Obtain molecular studies from lung biopsy done on 11/07/2019 (eGFR, ALK/ROS, BRAF, and PD-L1.   4.  Review extensive records from Dr. Delonte Burns and [facility], to include radiographs, and biopsy results.  5. Schedule MRI of brain with and without contrast  6. Schedule chemotherapy beginning week 1 on 11/25/2019, week 2 on 12/02/2019, week 3 on 12/09/2019, week 4 on 12/16/2019, week 5 on " 12/23/2019, week 6 on 12/30/2019:             Taxol 45 mg/m2 (BSA = 1.82; TD = 81 mg) per administration guidelines weekly x6 weeks with radiation.             Carboplatin AUC 2 (BSA = 1.82; TD = dose pending) per administration guidelines weekly x6 weeks with radiation.   7. Premedicate with:             Aloxi 0.25 mg IV before chemotherapy            Decadron 10 mg IV before chemo            Pepcid 20 mg IV             Benadryl 50 mg IV   8. CMP, Mg++ and CBC with differential weekly with Procrit 40,000 units subcutaneous every week if Hgb less than 10 and Hct less than 30; Zarxio 480 mcg subcutaneously daily x3 if ANC less than 1 -BHP  9. Explained eRx:              Zofran 8 mg p.o. 3 times daily as needed, #30 - Rx                    Norco 5 po q 6 hour prn # 40 - eRx  11. Continue other current medicines.   12. Appoint to Dr. Luis Enrique GEORGE Re: Assess for concurrent radiation (care actually discussed with Dr. Dudley over the telephone.  He agrees with need for concurrent radiation). - Will see him 11/25/19  13. Appoint to Dr. Arthur GEORGE Re: Mediport placement. - Office calling for date  14. Continue currently identified medications.   15. Return to the office in 4 weeks with pre-office serum iron, Fe sat, ferritin, CBC with differential, CMP, and CEA.         16. Education provided to the patient and his friend/POA today of Carboplatin               Taxol and Keytruda. Handouts were given and discussed thoroughly and       questions were answered. The following information was discussed:     Important things to remember about the side effects of Chemotherapy:  Most people do not experience all of the side effects listed.  Side effects are often predictable in terms of their onset and duration.  Side effects are almost always reversible and will go away after treatment is complete.  There are many options to help minimize or prevent side effects.  There is no relationship between the presence or severity of side  effects and the effectiveness of Chemotherapy.  The side effects of Chemotherapy and their severity depend on how much of it is given.  In other words, high doses may produce more severe side effects).     The following side effects are common (occurring in greater than 30%) for patients taking Carboplatin:  Low blood counts (including red blood cells, white blood cells and platelets)  Enrico: Meaning low point, enrico is the point in time between chemotherapy cycles in which you experience low blood counts.  Onset: None reported  Enrico: 21 days  Recovery: 28 days  Nausea and vomiting usually occurring within 24 hours of treatment  Taste changes  Hair loss  Weakness  Blood test abnormalities: Abnormal magnesium level     The following Taxol side effects are common (occurring in greater than 30%) for patients taking Taxol:   Low blood counts.  Your white and red blood cells and platelets may temporarily decrease.  This can put you at increased risk for infection, anemia and/or bleeding.  Hair loss  Arthralgias and myalgias, pain in the joints and muscles. Usually temporary occurring 2 to 3 days after Taxol, and resolve within a few days.    Peripheral neuropathy (numbness and tingling of the hands and feet)  Nausea and vomiting (usually mild)  Diarrhea  Mouth sores  Hypersensitivity reaction - fever, facial flushing, chills, shortness of breath, or hives after Taxol is given. The majority of these reactions occur within the first 10 minutes of an infusion.  Notify your healthcare provider immediately (premedication regimen has significantly decreased the incidence of this reaction).         The following side effects are common (occurring in greater than 30%) for patients taking Keytruda®:  Anemia  Fatigue  Hyperglycemia  Hyponatremia  Hypoalbuminemia  Itching  Cough  Nausea     A serious, but uncommon side effect of Keytruda® may be an immune-mediated reaction. When this side effect occurs, it affects primarily the  bowels, liver, skin, nerves and the endocrine system. This immune reaction can occur during treatment but can also be seen weeks or months after discontinuation of treatment. Symptoms of this reaction will be monitored throughout treatment (diarrhea, rash, and neuropathy). Lab work will check for elevated liver enzymes and thyroid function.     Contact your health care provider right away if you have any new or worsening symptoms.     This list includes common and less common side effects for individuals taking Carboplatin andTaxol.  Side effects that are very rare, occurring in less than 10% of patients, are not listed here.  However, you should always inform your health care provider if you experience any unusual symptoms     When To Contact Your Doctor or Health Care Provider:  Contact your health care provider immediately, day or night, if you should experience any of the following symptoms:  Fever of 100.4(F (38(C) or higher, or chills (possible signs of infection).  Difficulty breathing or shortness of breath.  Chest pain.  The following symptoms require medical attention, but are not an emergency.  Contact your health care provider within 24 hours of noticing any of the following:  Unusual bleeding or bruising  Black or tarry stools, or blood in your stools or urine  Diarrhea (4-6 episodes in a 24-hour period)  Nausea (interferes with ability to eat and unrelieved with prescribed medications).  Vomiting (vomiting more than 4-5 times in a 24-hour period)  Severe abdominal pain  Lip or mouth sores (painful redness, swelling or ulcers)  Extreme fatigue (unable to carry on self-care activities)  Muscle cramps or twitching  Change in hearing  Dizziness, confusion or visual changes  Always inform your health care provider if you experience any unusual symptoms.    TIME SPENT: Face-to-face time on this encounter, as defined by the American Medical Association in the 2019 Current Procedural Terminology codebook;  assessment, record review, lab/imaging review, planning and education - at least 55 minutes (greater than 50% facetime).    Sarai Williamson, RONALD   11/19/2019  4:05 PM

## 2019-11-19 NOTE — TELEPHONE ENCOUNTER
Regarding: FW: Prescription Question  Contact: [phone]      ----- Message -----  From: Damaris Hu  Sent: 11/18/2019  10:01 AM  To: [facility]  Subject: Prescription Question                            ----- Message from Mychart, Generic sent at 11/18/2019 11:01 AM EST -----    [facility] on [address] filled the new Hydrocod/acetaminophen 5-325mg on 11/15/19. They were unable to fill it the way it was written because my insurance would not cover it that way. They were able to fill Qty 28 and not 40. I just wanted you to be aware I did not get full prescription. If someone could contact pharmacist & get this straightened out I would appreciate it.  Thank you Damaris Hu. [phone]

## 2019-11-19 NOTE — TELEPHONE ENCOUNTER
Spoke with patient and she stated that it when the medication was picked up they could only do a 7 day instead of a 10 day.     Call made to [facility] pharmacy. The patient's insurance will only cover 28 tablets at a time. They will need a new script each time.     I caled the patient back and let her know that she will need to call us when she is running low so that we can send in to scripts for her. She verbalized understanding.

## 2019-11-20 DIAGNOSIS — C34.81 MALIGNANT NEOPLASM OF OVERLAPPING SITES OF RIGHT LUNG (HCC): Primary | ICD-10-CM

## 2019-11-20 RX ORDER — HYDROCODONE BITARTRATE AND ACETAMINOPHEN 5; 325 MG/1; MG/1
1 TABLET ORAL EVERY 6 HOURS PRN
COMMUNITY
End: 2019-11-21 | Stop reason: SDUPTHER

## 2019-11-20 RX ORDER — ONDANSETRON HYDROCHLORIDE 8 MG/1
8 TABLET, FILM COATED ORAL 3 TIMES DAILY PRN
Qty: 30 TABLET | Refills: 5 | Status: SHIPPED | OUTPATIENT
Start: 2019-11-20 | End: 2021-03-04 | Stop reason: ALTCHOICE

## 2019-11-21 ENCOUNTER — HOSPITAL ENCOUNTER (OUTPATIENT)
Dept: RADIATION ONCOLOGY | Facility: HOSPITAL | Age: 64
Setting detail: RADIATION/ONCOLOGY SERIES
End: 2019-11-21

## 2019-11-21 PROBLEM — D64.81 ANTINEOPLASTIC CHEMOTHERAPY INDUCED ANEMIA: Status: ACTIVE | Noted: 2019-11-21

## 2019-11-21 PROBLEM — T45.1X5A CHEMOTHERAPY INDUCED NEUTROPENIA (HCC): Status: ACTIVE | Noted: 2019-11-21

## 2019-11-21 PROBLEM — T45.1X5A ANTINEOPLASTIC CHEMOTHERAPY INDUCED ANEMIA: Status: ACTIVE | Noted: 2019-11-21

## 2019-11-21 PROBLEM — D70.1 CHEMOTHERAPY INDUCED NEUTROPENIA (HCC): Status: ACTIVE | Noted: 2019-11-21

## 2019-11-21 RX ORDER — HYDROCODONE BITARTRATE AND ACETAMINOPHEN 5; 325 MG/1; MG/1
1 TABLET ORAL EVERY 6 HOURS PRN
Qty: 28 TABLET | Refills: 0 | Status: SHIPPED | OUTPATIENT
Start: 2019-11-21 | End: 2019-11-27 | Stop reason: SDUPTHER

## 2019-11-21 NOTE — TELEPHONE ENCOUNTER
Called and spoke with patient. She is needing a refill of her pain medication. She was not able to fill the whole prescription last time due to her insurance only covering a 7 day supply.

## 2019-11-21 NOTE — TELEPHONE ENCOUNTER
----- Message from Ivelisse Benjamin sent at 11/21/2019  1:14 PM CST -----  This patient is needing you to call her back regarding her medication please [phone]. Thank you

## 2019-11-22 ENCOUNTER — APPOINTMENT (OUTPATIENT)
Dept: GENERAL RADIOLOGY | Facility: HOSPITAL | Age: 64
End: 2019-11-22

## 2019-11-22 ENCOUNTER — ANESTHESIA EVENT (OUTPATIENT)
Dept: PERIOP | Facility: HOSPITAL | Age: 64
End: 2019-11-22

## 2019-11-22 ENCOUNTER — HOSPITAL ENCOUNTER (OUTPATIENT)
Facility: HOSPITAL | Age: 64
Setting detail: HOSPITAL OUTPATIENT SURGERY
Discharge: HOME | End: 2019-11-22
Attending: SPECIALIST | Admitting: SPECIALIST

## 2019-11-22 ENCOUNTER — ANESTHESIA (OUTPATIENT)
Dept: PERIOP | Facility: HOSPITAL | Age: 64
End: 2019-11-22

## 2019-11-22 VITALS
BODY MASS INDEX: 25.83 KG/M2 | HEIGHT: 66 IN | DIASTOLIC BLOOD PRESSURE: 63 MMHG | OXYGEN SATURATION: 95 % | SYSTOLIC BLOOD PRESSURE: 116 MMHG | WEIGHT: 160.72 LBS | RESPIRATION RATE: 16 BRPM | TEMPERATURE: 97.6 F | HEART RATE: 77 BPM

## 2019-11-22 PROCEDURE — 25010000002 MIDAZOLAM PER 1 MG: Performed by: ANESTHESIOLOGY

## 2019-11-22 PROCEDURE — 25010000002 FENTANYL CITRATE (PF) 100 MCG/2ML SOLUTION: Performed by: NURSE ANESTHETIST, CERTIFIED REGISTERED

## 2019-11-22 PROCEDURE — 76000 FLUOROSCOPY <1 HR PHYS/QHP: CPT

## 2019-11-22 PROCEDURE — C1788 PORT, INDWELLING, IMP: HCPCS | Performed by: SPECIALIST

## 2019-11-22 PROCEDURE — 25010000002 FENTANYL CITRATE (PF) 100 MCG/2ML SOLUTION: Performed by: ANESTHESIOLOGY

## 2019-11-22 DEVICE — POWERPORT M.R.I. IMPLANTABLE PORT WITH ATTACHABLE 9.6F OPEN-ENDED SINGLE-LUMEN VENOUS CATHETER INTERMEDIATE KIT (WITH SUTURE PLUGS)
Type: IMPLANTABLE DEVICE | Status: FUNCTIONAL
Brand: POWERPORT M.R.I.

## 2019-11-22 RX ORDER — HEPARIN SODIUM,PORCINE 10 UNIT/ML
VIAL (ML) INTRAVENOUS AS NEEDED
Status: DISCONTINUED | OUTPATIENT
Start: 2019-11-22 | End: 2019-11-22 | Stop reason: HOSPADM

## 2019-11-22 RX ORDER — LABETALOL HYDROCHLORIDE 5 MG/ML
5 INJECTION, SOLUTION INTRAVENOUS
Status: CANCELLED | OUTPATIENT
Start: 2019-11-22

## 2019-11-22 RX ORDER — MIDAZOLAM HYDROCHLORIDE 1 MG/ML
2 INJECTION INTRAMUSCULAR; INTRAVENOUS
Status: DISCONTINUED | OUTPATIENT
Start: 2019-11-22 | End: 2019-11-22 | Stop reason: HOSPADM

## 2019-11-22 RX ORDER — SODIUM CHLORIDE 0.9 % (FLUSH) 0.9 %
3 SYRINGE (ML) INJECTION EVERY 12 HOURS SCHEDULED
Status: DISCONTINUED | OUTPATIENT
Start: 2019-11-22 | End: 2019-11-22 | Stop reason: HOSPADM

## 2019-11-22 RX ORDER — OXYCODONE HYDROCHLORIDE AND ACETAMINOPHEN 5; 325 MG/1; MG/1
1 TABLET ORAL ONCE AS NEEDED
Status: DISCONTINUED | OUTPATIENT
Start: 2019-11-22 | End: 2019-11-22 | Stop reason: HOSPADM

## 2019-11-22 RX ORDER — SODIUM CHLORIDE 0.9 % (FLUSH) 0.9 %
3-10 SYRINGE (ML) INJECTION AS NEEDED
Status: DISCONTINUED | OUTPATIENT
Start: 2019-11-22 | End: 2019-11-22 | Stop reason: HOSPADM

## 2019-11-22 RX ORDER — OXYCODONE AND ACETAMINOPHEN 7.5; 325 MG/1; MG/1
2 TABLET ORAL EVERY 4 HOURS PRN
Status: CANCELLED | OUTPATIENT
Start: 2019-11-22 | End: 2019-12-02

## 2019-11-22 RX ORDER — FENTANYL CITRATE 50 UG/ML
25 INJECTION, SOLUTION INTRAMUSCULAR; INTRAVENOUS
Status: DISCONTINUED | OUTPATIENT
Start: 2019-11-22 | End: 2019-11-22 | Stop reason: HOSPADM

## 2019-11-22 RX ORDER — SODIUM CHLORIDE 0.9 % (FLUSH) 0.9 %
3 SYRINGE (ML) INJECTION AS NEEDED
Status: DISCONTINUED | OUTPATIENT
Start: 2019-11-22 | End: 2019-11-22 | Stop reason: HOSPADM

## 2019-11-22 RX ORDER — LIDOCAINE HYDROCHLORIDE AND EPINEPHRINE 10; 10 MG/ML; UG/ML
INJECTION, SOLUTION INFILTRATION; PERINEURAL AS NEEDED
Status: DISCONTINUED | OUTPATIENT
Start: 2019-11-22 | End: 2019-11-22 | Stop reason: HOSPADM

## 2019-11-22 RX ORDER — SODIUM CHLORIDE, SODIUM LACTATE, POTASSIUM CHLORIDE, CALCIUM CHLORIDE 600; 310; 30; 20 MG/100ML; MG/100ML; MG/100ML; MG/100ML
1000 INJECTION, SOLUTION INTRAVENOUS CONTINUOUS
Status: DISCONTINUED | OUTPATIENT
Start: 2019-11-22 | End: 2019-11-22 | Stop reason: HOSPADM

## 2019-11-22 RX ORDER — NALOXONE HCL 0.4 MG/ML
0.4 VIAL (ML) INJECTION AS NEEDED
Status: CANCELLED | OUTPATIENT
Start: 2019-11-22

## 2019-11-22 RX ORDER — FENTANYL CITRATE 50 UG/ML
25 INJECTION, SOLUTION INTRAMUSCULAR; INTRAVENOUS AS NEEDED
Status: CANCELLED | OUTPATIENT
Start: 2019-11-22

## 2019-11-22 RX ORDER — METOCLOPRAMIDE HYDROCHLORIDE 5 MG/ML
5 INJECTION INTRAMUSCULAR; INTRAVENOUS
Status: CANCELLED | OUTPATIENT
Start: 2019-11-22

## 2019-11-22 RX ORDER — OXYCODONE HYDROCHLORIDE AND ACETAMINOPHEN 5; 325 MG/1; MG/1
1-2 TABLET ORAL EVERY 4 HOURS PRN
Qty: 20 TABLET | Refills: 0 | Status: SHIPPED | OUTPATIENT
Start: 2019-11-22 | End: 2019-12-09 | Stop reason: SDUPTHER

## 2019-11-22 RX ORDER — OXYCODONE AND ACETAMINOPHEN 10; 325 MG/1; MG/1
1 TABLET ORAL ONCE AS NEEDED
Status: CANCELLED | OUTPATIENT
Start: 2019-11-22

## 2019-11-22 RX ORDER — ONDANSETRON 2 MG/ML
4 INJECTION INTRAMUSCULAR; INTRAVENOUS ONCE AS NEEDED
Status: CANCELLED | OUTPATIENT
Start: 2019-11-22

## 2019-11-22 RX ORDER — IPRATROPIUM BROMIDE AND ALBUTEROL SULFATE 2.5; .5 MG/3ML; MG/3ML
3 SOLUTION RESPIRATORY (INHALATION) ONCE AS NEEDED
Status: CANCELLED | OUTPATIENT
Start: 2019-11-22

## 2019-11-22 RX ORDER — CLINDAMYCIN PHOSPHATE 600 MG/50ML
INJECTION INTRAVENOUS AS NEEDED
Status: DISCONTINUED | OUTPATIENT
Start: 2019-11-22 | End: 2019-11-22 | Stop reason: SURG

## 2019-11-22 RX ORDER — SODIUM CHLORIDE, SODIUM LACTATE, POTASSIUM CHLORIDE, CALCIUM CHLORIDE 600; 310; 30; 20 MG/100ML; MG/100ML; MG/100ML; MG/100ML
100 INJECTION, SOLUTION INTRAVENOUS CONTINUOUS
Status: DISCONTINUED | OUTPATIENT
Start: 2019-11-22 | End: 2019-11-22 | Stop reason: HOSPADM

## 2019-11-22 RX ORDER — MIDAZOLAM HYDROCHLORIDE 1 MG/ML
1 INJECTION INTRAMUSCULAR; INTRAVENOUS
Status: DISCONTINUED | OUTPATIENT
Start: 2019-11-22 | End: 2019-11-22 | Stop reason: HOSPADM

## 2019-11-22 RX ORDER — ACETAMINOPHEN 500 MG
1000 TABLET ORAL ONCE
Status: COMPLETED | OUTPATIENT
Start: 2019-11-22 | End: 2019-11-22

## 2019-11-22 RX ORDER — FENTANYL CITRATE 50 UG/ML
INJECTION, SOLUTION INTRAMUSCULAR; INTRAVENOUS AS NEEDED
Status: DISCONTINUED | OUTPATIENT
Start: 2019-11-22 | End: 2019-11-22 | Stop reason: SURG

## 2019-11-22 RX ADMIN — MIDAZOLAM 2 MG: 1 INJECTION INTRAMUSCULAR; INTRAVENOUS at 12:20

## 2019-11-22 RX ADMIN — FENTANYL CITRATE 50 MCG: 50 INJECTION, SOLUTION INTRAMUSCULAR; INTRAVENOUS at 13:45

## 2019-11-22 RX ADMIN — SODIUM CHLORIDE, POTASSIUM CHLORIDE, SODIUM LACTATE AND CALCIUM CHLORIDE 1000 ML: 600; 310; 30; 20 INJECTION, SOLUTION INTRAVENOUS at 10:26

## 2019-11-22 RX ADMIN — ACETAMINOPHEN 1000 MG: 500 TABLET, FILM COATED ORAL at 12:19

## 2019-11-22 RX ADMIN — FENTANYL CITRATE 25 MCG: 50 INJECTION INTRAMUSCULAR; INTRAVENOUS at 12:20

## 2019-11-22 RX ADMIN — FENTANYL CITRATE 50 MCG: 50 INJECTION, SOLUTION INTRAMUSCULAR; INTRAVENOUS at 13:34

## 2019-11-22 RX ADMIN — CLINDAMYCIN PHOSPHATE 600 MG: 600 INJECTION, SOLUTION INTRAVENOUS at 13:43

## 2019-11-22 RX ADMIN — OXYCODONE HYDROCHLORIDE AND ACETAMINOPHEN 1 TABLET: 5; 325 TABLET ORAL at 14:17

## 2019-11-22 NOTE — ANESTHESIA PREPROCEDURE EVALUATION
Anesthesia Evaluation     Patient summary reviewed and Nursing notes reviewed   NPO Solid Status: > 8 hours  NPO Liquid Status: > 8 hours           Airway   Mallampati: I  TM distance: >3 FB  Neck ROM: full  No difficulty expected  Dental      Pulmonary    (+) a smoker (quit 2014) Former, lung cancer, COPD,     ROS comment: Recent ED visit for pain, found to have right lung mass  1. Primary lung mass is centered in the RIGHT upper lobe and abuts the  posterior mediastinal pleura. This also partially encases the RIGHT  bronchus intermedius and RIGHT upper lobe bronchus. This is consistent  with pulmonary malignancy. Surgical sampling is recommended. This could  be obtained with bronchoscopy.  2. Suspected metastatic lymph node in the pretracheal region measuring  1.3 cm in short axis.    Had f/u bronch and EBUS in [place] that diagnosed her with Squamous cell carcinoma  Cardiovascular   Exercise tolerance: poor (<4 METS)    (+) hyperlipidemia,   (-) past MI, CAD      Neuro/Psych  (+) headaches, psychiatric history Anxiety and Depression,     (-) seizures, TIA, CVA  GI/Hepatic/Renal/Endo    (+)  GERD,    (-) liver disease, no renal disease, diabetes    Musculoskeletal     Abdominal    Substance History      OB/GYN          Other   arthritis,    history of cancer active                    Anesthesia Plan    ASA 3     MAC     intravenous induction     Anesthetic plan, all risks, benefits, and alternatives have been provided, discussed and informed consent has been obtained with: patient.

## 2019-11-22 NOTE — OP NOTE
Preoperative diagnosis;  Lung cancer  Postoperative diagnosis:  Same  Procedure;  Placement single lumen power port via left subclavian vein  Surgeon:  Mona Gibson MD  Anesthesia;  Mac loc  Ebl:  Minimal  Ivf:  See anesthesia  Indications: the patient is a 64-year-old female who presents for port placement.  The risks, benefits, complications, and possible alternatives were discussed with the patient who agreed to proceed.  Description of procedure: the patient was laid supine. The chest was prepped and draped.  Venous blood was aspirated from the left subclavian vein.  A 0.035J wire was placed into the vein thru the needle.  The needle was removed.  Under fluoro, the wire was seen in the superior vena cava.  A pocket was created for placement of the port.  The skin at the wire exit site was slightly enlarged. The tubing was tunneled from the pocket to the wire exit site.  It was anchored to the port with the locking device.  The port was anchored to the subcutaneous tissues with 3-0 prolene. A dilator thru a breakaway sheath was placed over the wire into the vein.  The wire and dilator were removed. The tubing was placed into the vein thru the sheath as it was broken away.  Under fluoro, the tip of the tubing was seen in the superior vena cava.  Venous blood was aspirated from the port and the port was then flushed with heparinized saline.  The skin was closed with 3-0 and 4-0 vicryl.  The sponge, needle, and instrument counts were correct.  Complications;None  Disposition :good to pacu  Findings;  Tip svc

## 2019-11-22 NOTE — ANESTHESIA POSTPROCEDURE EVALUATION
Patient: Damaris Hu    Procedure Summary     Date:  11/22/19 Room / Location:   [facility] /  [facility]    Anesthesia Start:  1330 Anesthesia Stop:  1400    Procedure:  PLACEMENT OF SINGLE LUMEN PORT (N/A Chin to Nipples) Diagnosis:  (CHEMOTHERAPY TREATMENT)    Surgeon:  Mona Gibson MD Provider:  Dayday Luke CRNA    Anesthesia Type:  MAC ASA Status:  3          Anesthesia Type: MAC  Last vitals  BP   101/61 (11/22/19 1002)   Temp   97.2 °F (36.2 °C) (11/22/19 1002)   Pulse   69 (11/22/19 1209)   Resp   18 (11/22/19 1002)     SpO2   100 % (11/22/19 1209)     Post Anesthesia Care and Evaluation    Patient location during evaluation: PHASE II  Patient participation: complete - patient participated  Level of consciousness: awake  Pain score: 0  Pain management: adequate  Airway patency: patent  Anesthetic complications: No anesthetic complications  PONV Status: none  Cardiovascular status: acceptable  Respiratory status: acceptable  Hydration status: acceptable

## 2019-11-22 NOTE — DISCHARGE INSTRUCTIONS
YOUR NEXT PAIN MEDICATION IS DUE AT______________        Moderate Conscious Sedation, Adult, Care After  Refer to this sheet in the next few weeks. These instructions provide you with information on caring for yourself after your procedure. Your health care provider may also give you more specific instructions. Your treatment has been planned according to current medical practices, but problems sometimes occur. Call your health care provider if you have any problems or questions after your procedure.  WHAT TO EXPECT AFTER THE PROCEDURE    After your procedure:  · You may feel sleepy, clumsy, and have poor balance for several hours.  · Vomiting may occur if you eat too soon after the procedure.  HOME CARE INSTRUCTIONS  · Do not participate in any activities where you could become injured for at least 24 hours. Do not:  ¨ Drive.  ¨ Swim.  ¨ Ride a bicycle.  ¨ Operate heavy machinery.  ¨ Cook.  ¨ Use power tools.  ¨ Climb ladders.  ¨ Work from a high place.  · Do not make important decisions or sign legal documents until you are improved.  · If you vomit, drink water, juice, or soup when you can drink without vomiting. Make sure you have little or no nausea before eating solid foods.  · Only take over-the-counter or prescription medicines for pain, discomfort, or fever as directed by your health care provider.  · Make sure you and your family fully understand everything about the medicines given to you, including what side effects may occur.  · You should not drink alcohol, take sleeping pills, or take medicines that cause drowsiness for at least 24 hours.  · If you smoke, do not smoke without supervision.  · If you are feeling better, you may resume normal activities 24 hours after you were sedated.  · Keep all appointments with your health care provider.  SEEK MEDICAL CARE IF:  · Your skin is pale or bluish in color.  · You continue to feel nauseous or vomit.  · Your pain is getting worse and is not helped by  medicine.  · You have bleeding or swelling.  · You are still sleepy or feeling clumsy after 24 hours.  SEEK IMMEDIATE MEDICAL CARE IF:  · You develop a rash.  · You have difficulty breathing.  · You develop any type of allergic problem.  · You have a fever.  MAKE SURE YOU:  · Understand these instructions.  · Will watch your condition.  · Will get help right away if you are not doing well or get worse.     This information is not intended to replace advice given to you by your health care provider. Make sure you discuss any questions you have with your health care provider.     Document Released: 10/08/2014 Document Revised: 01/08/2016 Document Reviewed: 10/08/2014  [employer] Interactive Patient Education ©2016 [employer]         CALL YOUR PHYSICIAN IF YOU EXPERIENCE  INCREASED PAIN NOT HELPED BY YOUR PAIN MEDICATION.        Fall Prevention in the Home      Falls can cause injuries. They can happen to people of all ages. There are many things you can do to make your home safe and to help prevent falls.    WHAT CAN I DO ON THE OUTSIDE OF MY HOME?  · Regularly fix the edges of walkways and driveways and fix any cracks.  · Remove anything that might make you trip as you walk through a door, such as a raised step or threshold.  · Trim any bushes or trees on the path to your home.  · Use bright outdoor lighting.  · Clear any walking paths of anything that might make someone trip, such as rocks or tools.  · Regularly check to see if handrails are loose or broken. Make sure that both sides of any steps have handrails.  · Any raised decks and porches should have guardrails on the edges.  · Have any leaves, snow, or ice cleared regularly.  · Use sand or salt on walking paths during winter.  · Clean up any spills in your garage right away. This includes oil or grease spills.  WHAT CAN I DO IN THE BATHROOM?    · Use night lights.  · Install grab bars by the toilet and in the tub and shower. Do not use towel bars as grab  bars.  · Use non-skid mats or decals in the tub or shower.  · If you need to sit down in the shower, use a plastic, non-slip stool.  · Keep the floor dry. Clean up any water that spills on the floor as soon as it happens.  · Remove soap buildup in the tub or shower regularly.  · Attach bath mats securely with double-sided non-slip rug tape.  · Do not have throw rugs and other things on the floor that can make you trip.  WHAT CAN I DO IN THE BEDROOM?  · Use night lights.  · Make sure that you have a light by your bed that is easy to reach.  · Do not use any sheets or blankets that are too big for your bed. They should not hang down onto the floor.  · Have a firm chair that has side arms. You can use this for support while you get dressed.  · Do not have throw rugs and other things on the floor that can make you trip.  WHAT CAN I DO IN THE KITCHEN?  · Clean up any spills right away.  · Avoid walking on wet floors.  · Keep items that you use a lot in easy-to-reach places.  · If you need to reach something above you, use a strong step stool that has a grab bar.  · Keep electrical cords out of the way.  · Do not use floor polish or wax that makes floors slippery. If you must use wax, use non-skid floor wax.  · Do not have throw rugs and other things on the floor that can make you trip.  WHAT CAN I DO WITH MY STAIRS?  · Do not leave any items on the stairs.  · Make sure that there are handrails on both sides of the stairs and use them. Fix handrails that are broken or loose. Make sure that handrails are as long as the stairways.  · Check any carpeting to make sure that it is firmly attached to the stairs. Fix any carpet that is loose or worn.  · Avoid having throw rugs at the top or bottom of the stairs. If you do have throw rugs, attach them to the floor with carpet tape.  · Make sure that you have a light switch at the top of the stairs and the bottom of the stairs. If you do not have them, ask someone to add them for  you.  WHAT ELSE CAN I DO TO HELP PREVENT FALLS?  · Wear shoes that:  ¨ Do not have high heels.  ¨ Have rubber bottoms.  ¨ Are comfortable and fit you well.  ¨ Are closed at the toe. Do not wear sandals.  · If you use a stepladder:  ¨ Make sure that it is fully opened. Do not climb a closed stepladder.  ¨ Make sure that both sides of the stepladder are locked into place.  ¨ Ask someone to hold it for you, if possible.  · Clearly ritu and make sure that you can see:  ¨ Any grab bars or handrails.  ¨ First and last steps.  ¨ Where the edge of each step is.  · Use tools that help you move around (mobility aids) if they are needed. These include:  ¨ Canes.  ¨ Walkers.  ¨ Scooters.  ¨ Crutches.  · Turn on the lights when you go into a dark area. Replace any light bulbs as soon as they burn out.  · Set up your furniture so you have a clear path. Avoid moving your furniture around.  · If any of your floors are uneven, fix them.  · If there are any pets around you, be aware of where they are.  · Review your medicines with your doctor. Some medicines can make you feel dizzy. This can increase your chance of falling.  Ask your doctor what other things that you can do to help prevent falls.     This information is not intended to replace advice given to you by your health care provider. Make sure you discuss any questions you have with your health care provider.     Document Released: 10/14/2010 Document Revised: 05/03/2016 Document Reviewed: 01/22/2016  [employer] Interactive Patient Education ©2016 [employer]     PATIENT/FAMILY/RESPONSIBLE PARTY VERBALIZES UNDERSTANDING OF ABOVE EDUCATION.  COPY OF PAIN SCALE GIVEN AND REVIEWED WITH VERBALIZED UNDERSTANDING.

## 2019-11-24 PROBLEM — Z71.9 ENCOUNTER FOR CONSULTATION: Status: ACTIVE | Noted: 2019-11-24

## 2019-11-24 PROBLEM — Z87.891 FORMER SMOKER: Status: ACTIVE | Noted: 2019-11-24

## 2019-11-24 NOTE — PROGRESS NOTES
[facility]  MD Lucila Hdez BSN, PA-C  ____________________________________________________________               [facility]  Department of Radiation Oncology  [address] [place] [id number]  Office:  [phone]  Fax: [phone]    DATE:  11/25/2019    PATIENT:   Damaris Hu 1955                                   MEDICAL RECORD #:  [id number]                                                       REASON FOR CONSULTATION:  Damaris Hu is a very pleasant 64 y.o. female that has been referred to our clinic by Kade Russell MD to discuss radiotherapy recommendations. Reports fatigue, SOB with exertion, chronic back pain, and chronic neck pain. Denies appetite change, unexpected weight change, cough, constipation, nausea/vomiting, dizziness, light-headedness, weakness,and headaches. She follows .            HISTORY OF PRESENT ILLNESS  Diagnosed in November 2019 with Stage IIIC (cT4, cN3, M0)Squamous cell carcinoma of the right lung, perihilar 5.4 x 5.3 x 4.5 cm mass at right major fissure, involves both the right upper and right lower lobes with direct extension into the subcarinal space, abuts medial mediastinal pleura and partially encases RUL bronchus and bronchus intermedius with right supraclavicular and mediastinal kyler metastasis. Follows Dr. Russell who plans for concurrent chemoradiation with Carbo/Taxol.         10/28/2019 - Patient presented to the [facility] emergency room with chest pain that had been ongoing for the past year but that recently had gotten more noticeable, more frequent, and has been associated with shortness of breath.  On the day prior to her hospital admission she apparently had a syncopal episode. Further evaluation was completed.     10/28/2019 - CT Head Without Contrast:  • No acute intracranial process.       10/28/2019 - Chest x-ray:  • RIGHT lung mass is highly concerning for neoplasm.  Further evaluation with CT of the chest with contrast is recommended.     10/28/2019 - CT Chest With Contrast:  • Primary lung mass is centered in the RIGHT upper lobe and abuts the posterior mediastinal pleura. This also partially encases the RIGHT bronchus intermedius and RIGHT upper lobe bronchus. This is consistent with pulmonary malignancy. Surgical sampling is recommended. This could be obtained with bronchoscopy.   • Suspected metastatic lymph node in the pretracheal region measuring 1.3 cm in short axis.       11/04/2019 - Appointment with Dr. Delonte Burns, in [place] ([facility]):  • Spirometry on that date revealed severe COPD with positive bronchodilator response.    • Postbronchodilator FEV1 revealed significant improvement to moderate/severe COPD.    • Plan: Navigational bronchoscopy and possible EBUS for tissue diagnosis.    11/05/2019 - PET Scan:  • Markedly hypermetabolic RIGHT perihilar tumor ( 49 x 37 mm) with right supraclavicular and mediastinal kyler metastasis.    11/07/2019 - CT Chest without contrast at [facility]:  • Large right spiculated infrahilar mass, crossing the major fissure to involve both the right upper and right lower lobes with direct extension into the subcarinal space  • Mildly enlarged mediastinal lymph nodes and normal sized right supraclavicular lymph node, corresponding to areas of PET positivity    11/07/2019 - Bronchoscopy and EBUS guided FNA biopsy of right upper lobe of the lung and lymph nodes Dr. Delonte Burns:  • Bronchoscopy and EBUS guided FNA biopsy of right upper lobe lung:  o Positive for malignant cells consistent with squamous cell carcinoma.    • EBUS guided FNA, station 7:   o Lymph node elements present.   o No malignant cells identified.    • EBUS guided FNA, station 4R:   o Positive for malignant cells consistent with squamous cell carcinoma.    • Immunohistochemical stains performed on cell block #3.    • Tumor cells  positive for squamous squamous markers p63 and CK 5/6, negative for CK7, TTF-1, and CD 56.    • Addendum: Subsequent biopsy showed metastatic disease in a supraclavicular lymph node, finding consistent with advanced stage disease.  Per protocol will proceed with tumor genomic testing on this specimen.  Due to the relatively small amount of tumor cellularity available, the entire panel of testing may not be able to be completed.  Results to be reported separately.    11/07/2019-Navigational bronchoscopy and forceps biopsy of posterior right upper lobe lung mass Dr. Delonte Burns:  • Fragments of bronchial mucosa.    • No evidence of granuloma or malignancy.    11/08/2019- Ultrasound-guided biopsy right supraclavicular lymph node (fine-needle aspirate) Dr. Delonte Burns:    • Cytopathology diagnosis: Positive for malignant cells consistent with previously established diagnosis of metastatic non-small cell carcinoma.    • Comment: Insufficient tumor cellularity for further testing on the specimen.      11/15/2019 - Consult with :  Squamous cell carcinoma of the right lung.      Tumor stage: IIIC (cT4, cN3, M0)            Bone tumor burden: 5.4 x 5.3 x 4.5 cm mass along the right major fissure that abuts the medial mediastinal pleura and partially encases the right     upper lobe bronchus and right bronchus intermedius.  Pathologic pretracheal lymph node measures 1.3 cm.  Right supraclavicular metastasis.            Complications of tumor: Chest pain, dyspnea, syncope?            Tumor status: Untreated  • Draw baseline CBC with differential, CMP, CEA, serum iron, iron saturation, ferritin, B12, folate.   • Obtain molecular studies from lung biopsy done on 11/07/2019 (eGFR, ALK/ROS, BRAF, and PD-L1.   • Schedule MRI of brain with and without contrast  • Schedule chemotherapy beginning week 1 on 11/25/2019, week 2 on 12/02/2019, week 3 on 12/09/2019, week 4 on 12/16/2019, week 5 on 12/23/2019, week 6  on 12/30/2019:   o Taxol 45 mg/m2 (BSA = 1.82; TD = 81 mg) per administration guidelines weekly x6 weeks with radiation.   o Carboplatin AUC 2 (BSA = 1.82; TD = dose pending) per administration guidelines weekly x6 weeks with radiation.   • Appoint to Dr. Luis Enrique GEORGE Re: Assess for concurrent radiation (care actually discussed with Dr. Dudley over the telephone.  He agrees with need for concurrent radiation).  • Appoint to Dr. Arthur GEORGE Re: Mediport placement.   • Return to the office in 4 weeks with pre-office serum iron, Fe sat, ferritin, CBC with differential, CMP, and CEA.   • Further recommendations pending.      11/21/2019 - MRI Brain with and without contrast:  • Cerebral white matter lesions are consistent with small vessel ischemic disease. There is also empty sella syndrome  • No evidence of metastatic disease in the head.     11/22/2019 - Port placement per .            History obtained from  PATIENT, FAMILY and CHART    PAST MEDICAL HISTORY   Past Medical History:   Diagnosis Date   • Anemia    • Arthritis    • Cancer (CMS/HCC)     Right lung   • Cervical disc disease    • Depression    • Emphysema lung (CMS/HCC)    • GERD (gastroesophageal reflux disease)    • Migraines    • Osteoporosis    • Vitamin D deficiency       PAST SURGICAL HISTORY   Past Surgical History:   Procedure Laterality Date   • BREAST SURGERY      Cyst removal   • COLONOSCOPY N/A 12/3/2018    Procedure: COLONOSCOPY WITH ANESTHESIA;  Surgeon: Myah Pool MD;  Location: [facility] ENDOSCOPY;  Service: Gastroenterology   • HYSTERECTOMY     • VENOUS ACCESS DEVICE (PORT) INSERTION N/A 11/22/2019    Procedure: PLACEMENT OF SINGLE LUMEN PORT;  Surgeon: Mona Gibson MD;  Location: [facility] OR;  Service: General      FAMILY HISTORY  family history includes Colon polyps in her mother; Lung cancer in her mother.     SOCIAL HISTORY   Social History     Tobacco Use   • Smoking status: Former Smoker     Types: Cigarettes   •  Smokeless tobacco: Never Used   • Tobacco comment: Quit 4 years ago   Substance Use Topics   • Alcohol use: No   • Drug use: No      ALLERGIES  Amoxicillin     MEDICATIONS   Current Outpatient Medications   Medication Sig Dispense Refill   • cetirizine (zyrTEC) 10 MG tablet Take 10 mg by mouth Daily.     • citalopram (CeleXA) 20 MG tablet Take 20 mg by mouth Daily.     • fluticasone (FLONASE) 50 MCG/ACT nasal spray 1 spray by Each Nare route Daily As Needed.  5   • Fluticasone-Umeclidin-Vilant (TRELEGY ELLIPTA) 100-62.5-25 MCG/INH aerosol powder  Inhale Every Morning.     • HYDROcodone-acetaminophen (LORTAB) 5-325 MG per tablet Take 1 tablet by mouth Every 6 (Six) Hours As Needed for Moderate Pain . 28 tablet 0   • omeprazole (priLOSEC) 20 MG capsule Take 20 mg by mouth Every Morning Before Breakfast.  0   • ondansetron (ZOFRAN) 8 MG tablet Take 1 tablet by mouth 3 (Three) Times a Day As Needed for Nausea or Vomiting. 30 tablet 5   • ZOLMitriptan (ZOMIG) 2.5 MG tablet Take 2.5 mg by mouth As Needed.     • oxyCODONE-acetaminophen (PERCOCET) 5-325 MG per tablet Take 1-2 tablets by mouth Every 4 (Four) Hours As Needed (Pain). 20 tablet 0     No current facility-administered medications for this visit.       The following portions of the patient's history were reviewed and updated as appropriate: allergies, current medications, past family history, past medical history, past social history, past surgical history and problem list.    REVIEW OF SYSTEMS  Review of Systems   Constitutional: Positive for fatigue (hx: chronic fatigue syndrome). Negative for appetite change and unexpected weight change.   HENT: Negative.    Eyes:        Glasses   Respiratory: Positive for shortness of breath (with exertion). Negative for cough.         COPD - started on inhalers     Cardiovascular: Negative for chest pain.   Gastrointestinal: Negative.  Negative for constipation, nausea and vomiting.   Endocrine: Negative.    Genitourinary:  "Negative.    Musculoskeletal: Positive for back pain (chronic) and neck pain (chronic).   Skin: Negative.    Allergic/Immunologic: Negative.    Neurological: Negative for dizziness, light-headedness and headaches.   Hematological: Negative.    Psychiatric/Behavioral: Negative.      PHYSICAL EXAM  VITAL SIGNS:   Vitals:    11/25/19 1100   BP: 114/66   Weight: 73.5 kg (162 lb)   Height: 167.6 cm (66\")   PainSc: 0-No pain   PainLoc: Back     General Appearance:  awake, alert, oriented, in no acute distress, cooperative. Appears stated age.   Head: Normocephalic  Eyes: Conjunctiva pink, pupils equal and reactive.   Ears:  Normal externally.  Hearing- normal to conversation  Nose/Sinuses:  Mucosa normal. No drainage or sinus tenderness.  Mouth/Throat:  Mucosa moist, no lesions; pharynx without erythema, edema or exudate.  Neck: Supple, no mass, non-tender  Back:  Symmetric, no curvature, ROM normal, no CVA tenderness   Lungs:  Normal expansion.  Clear to auscultation.  No rales, rhonchi, or wheezing.  Heart:  Heart sounds are normal.  Regular rate and rhythm without murmur, gallop or rub.  Abdomen:  Soft, non-tender, normal bowel sounds; no bruits, organomegaly or masses.  Extremities: Warm to touch, pink, with no edema. Pulses 2+ bilaterally  Musculoskeletal: strength and sensation grossly normal  Neurologic:  Alert and oriented, gait normal  Psych exam: normal situational behavior   Skin:  Warm and moist. No suspicious lesions or rashes of concern     Performance Status: ECOG (1) Restricted in physically strenuous activity, ambulatory and able to do work of light nature    Clinical Quality Measures  Damaris Hu reports a pain score of 0. Given her pain assessment as noted, treatment options were discussed and the following options were decided upon as a follow-up plan to address the patient's pain: continuation of current treatment plan for pain. Follow up with prescribing MD as directed, Dr. Yvonne Wan " Medications             citalopram (CeleXA) 20 MG tablet Take 20 mg by mouth Daily.    HYDROcodone-acetaminophen (LORTAB) 5-325 MG per tablet Take 1 tablet by mouth Every 6 (Six) Hours As Needed for Moderate Pain .    oxyCODONE-acetaminophen (PERCOCET) 5-325 MG per tablet Take 1-2 tablets by mouth Every 4 (Four) Hours As Needed (Pain).        -Advanced Care Planning Care plan discussed, no care plan provided  -Body Mass Index Screening and Follow-Up Plan Patient's Body mass index is 26.15 kg/m². BMI is above normal parameters. Recommendations include: educational material.  -Tobacco Use: Screening and Cessation Intervention Social History    Tobacco Use      Smoking status: Former Smoker        Types: Cigarettes      Smokeless tobacco: Never Used      Tobacco comment: Quit 4 years ago    ASSESSMENT AND PLAN  1. Malignant neoplasm of overlapping sites of right lung (CMS/HCC)    2. Regional lymph node metastasis present (CMS/HCC)    3. 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 Pulmonary emphysema, unspecified emphysema type (CMS/HCC)    5. Former smoker    6. Encounter for consultation      Orders Placed This Encounter   Procedures   • Ambulatory Referral to Social Work     Referral Priority:   Routine     Referral Type:   Consultation     Referral Reason:   Specialty Services Required     Referred to Provider:   Lillian Hogan LCSW     Requested Specialty:        Number of Visits Requested:   1     RECOMMENDATIONS:Damaris Hu has been referred to our office today to discuss radiotherapy recommendations.  Diagnosed in November 2019 with Stage IIIC (cT4, cN3, M0)Squamous cell carcinoma of the right lung, perihilar 5.4 x 5.3 x 4.5 cm mass at right major fissure, involves both the right upper and right lower lobes with direct extension into the subcarinal space, abuts medial mediastinal pleura and partially encases RUL bronchus and bronchus intermedius with right  supraclavicular and mediastinal kyler metastasis. Follows Dr. Russell who plans for concurrent chemoradiation with Carbo/Taxol.    The indications and rationale of radiation therapy according to the NCCN Guidelines to the lung has been discussed today. I have extensively reviewed the risks, benefits and alternatives of therapy with this diagnosis. The risks of radiation therapy includes but is not limited to radiation induced pulmonary fibrosis, progression of disease in spite of therapy with either local or systemic failure.     I have seen, examined and reviewed this patient's medication list, appropriate labs and imaging studies as well as other physician notes.  Pulmonary function tests have been completed in [place], per his note, the postbronchodilator FEV1 revealed significant improvement to moderate/severe COPD.  We will call to obtain those records.     We discussed the goals and plans of care with the patient and family and answered all questions. Following this discussion and in consideration of the diagnostic data/evaluation of the patient, I am recommending definitive course of concurrent chemotherapy under the direction of Dr. Russell and radiation therapy to the right lung and nodes.     We will simulation treatment fields today to begin the planning process, final course to be determined with planning although I anticipate a fractionated course of 4860-3599 cGy at 180cGy per day.     Thank you for allowing me to assist in this patients care.     Todays appointment time was spent in counseling and coordination of care as follows: diagnosis, intent of treatment discussing radiation therapy specifics: logistics, possible and probable side effects and after effects, staging of cancer, standard of care in for this stage of this cancer and treatment options  Anastacio Dudley III, MD  11/25/2019

## 2019-11-25 ENCOUNTER — CONSULT (OUTPATIENT)
Dept: RADIATION ONCOLOGY | Facility: HOSPITAL | Age: 64
End: 2019-11-25

## 2019-11-25 ENCOUNTER — HOSPITAL ENCOUNTER (OUTPATIENT)
Dept: RADIATION ONCOLOGY | Facility: HOSPITAL | Age: 64
Discharge: HOME | End: 2019-11-25

## 2019-11-25 VITALS
DIASTOLIC BLOOD PRESSURE: 66 MMHG | HEIGHT: 66 IN | SYSTOLIC BLOOD PRESSURE: 114 MMHG | WEIGHT: 162 LBS | BODY MASS INDEX: 26.03 KG/M2

## 2019-11-25 DIAGNOSIS — C34.81 MALIGNANT NEOPLASM OF OVERLAPPING SITES OF RIGHT LUNG (HCC): Primary | ICD-10-CM

## 2019-11-25 DIAGNOSIS — J43.9 PULMONARY EMPHYSEMA, UNSPECIFIED EMPHYSEMA TYPE (HCC): ICD-10-CM

## 2019-11-25 DIAGNOSIS — Z71.9 ENCOUNTER FOR CONSULTATION: ICD-10-CM

## 2019-11-25 DIAGNOSIS — C77.0 SECONDARY MALIGNANT NEOPLASM OF SUPRACLAVICULAR LYMPH NODE (HCC): ICD-10-CM

## 2019-11-25 DIAGNOSIS — Z87.891 FORMER SMOKER: ICD-10-CM

## 2019-11-25 DIAGNOSIS — C77.9 REGIONAL LYMPH NODE METASTASIS PRESENT (HCC): ICD-10-CM

## 2019-11-25 PROCEDURE — 77334 RADIATION TREATMENT AID(S): CPT | Performed by: RADIOLOGY

## 2019-11-25 PROCEDURE — 77290 THER RAD SIMULAJ FIELD CPLX: CPT | Performed by: RADIOLOGY

## 2019-11-25 NOTE — PATIENT INSTRUCTIONS
Lung Cancer  Lung cancer is an abnormal growth of cancerous cells that forms a mass (malignant tumor) in a lung. There are several types of lung cancer. The types are based on the appearance of the tumor cells. The two most common types are:  · Non-small cell lung cancer. This type of lung cancer is the most common type. Non-small cell lung cancers include squamous cell carcinoma, adenocarcinoma, and large cell carcinoma.  · Small cell lung cancer. In this type of lung cancer, abnormal cells are smaller than those of non-small cell lung cancer. Small cell lung cancer gets worse (progresses) faster than non-small cell lung cancer.  What are the causes?  The most common cause of lung cancer is smoking tobacco. The second most common cause is exposure to a chemical called radon.  What increases the risk?  You are more likely to develop this condition if:  · You smoke tobacco.  · You have been exposed to:  ? Secondhand tobacco smoke.  ? Radon gas.  ? Uranium.  ? Asbestos.  ? Arsenic in drinking water.  ? Air pollution.  · You have a family or personal history of lung cancer.  · You have had lung radiation therapy in the past.  · You are older than age 65.  What are the signs or symptoms?  In the early stages, you may not have any symptoms. As the cancer progresses, symptoms may include:  · A lasting cough, possibly with blood.  · Fatigue.  · Unexplained weight loss.  · Shortness of breath.  · Loud breathing (wheezing).  · Chest pain.  · Loss of appetite.  Symptoms of advanced lung cancer include:  · Hoarseness.  · Bone or joint pain.  · Weakness.  · Change in the structure of the fingernails (clubbing), so that the nail looks like an upside-down spoon.  · Swelling of the face or arms.  · Inability to move the face (paralysis).  · Drooping eyelids.  How is this diagnosed?  This condition may be diagnosed based on:  · Your symptoms and medical history.  · A physical exam.  · A chest X-ray.  · A CT scan.  · Blood  tests.  · Sputum tests.  · Removal of a sample of lung tissue (lung biopsy) for testing.  Your cancer will be assessed (staged) to determine how severe it is and how much it has spread (metastasized).  How is this treated?  Treatment depends on the type and stage of your cancer. Treatment may include one or more of the following:  · Surgery to remove as much of the cancer as possible. Lymph nodes in the area may be removed and tested for cancer as well.  · Medicines that kill cancer cells (chemotherapy).  · High-energy rays that kill cancer cells (radiation therapy).  · Chemotherapy. This treatment uses medicines to destroy cancer cells.  · Targeted therapy. This targets specific parts of cancer cells and the area around them to block the growth and spread of the cancer. Targeted therapy can help limit the damage to healthy cells.  Follow these instructions at home:  Eating and drinking  · Some of your treatments might affect your appetite. If you are having problems eating, or if you do not have an appetite, meet with a dietitian.  · If you have side effects that affect your appetite, it may help to:  ? Eat smaller meals and snacks often.  ? Drink high-nutrition and high-calorie shakes or supplements.  ? Eat bland and soft foods that are easy to eat.  ? Avoid eating foods that are hot, spicy, or hard to swallow.  General instructions    · Do not use any products that contain nicotine or tobacco, such as cigarettes and e-cigarettes. If you need help quitting, ask your health care provider.  · Do not drink alcohol.  · If you are admitted to the hospital, make sure your cancer specialist (oncologist) is aware. Your cancer may affect your treatment for other conditions.  · Take over-the-counter and prescription medicines only as told by your health care provider.  · Consider joining a support group for people who have been diagnosed with lung cancer.  · Work with your health care provider to manage any side effects of  treatment.  · Keep all follow-up visits as told by your health care provider. This is important.  Where to find more information  · American Cancer Society: https://www.cancer.org  · National Cancer [place] (NCI): https://www.cancer.gov  Contact a health care provider if you:  · Lose weight without trying.  · Have a persistent cough and wheezing.  · Feel short of breath.  · Get tired easily.  · Have bone or joint pain.  · Have difficulty swallowing.  · Notice that your voice is changing or getting hoarse.  · Have pain that does not get better with medicine.  Get help right away if you:  · Cough up blood.  · Have new breathing problems.  · Have chest pain.  · Have a fever.  · Have swelling in an ankle, leg, or arm, or the face or neck.  · Have paralysis in your face.  · Are very confused.  · Have a drooping eyelid.  Summary  · Lung cancer is an abnormal growth of cancerous cells that forms a mass (malignant tumor) in a lung.  · There are several types of lung cancer. The types are based on the appearance of the tumor cells. The two most common types are non-small cell and small cell.  · The most common cause of lung cancer is smoking tobacco.  · Early symptoms include a lasting cough, possibly with blood, and fatigue, unexplained weight loss, and shortness of breath.  · After diagnosis, treatment depends on the type and stage of your cancer.  This information is not intended to replace advice given to you by your health care provider. Make sure you discuss any questions you have with your health care provider.  Document Released: 03/26/2002 Document Revised: 10/25/2018 Document Reviewed: 10/25/2018  [employer] Patient Education © 2019 [employer]

## 2019-11-27 PROCEDURE — 77301 RADIOTHERAPY DOSE PLAN IMRT: CPT | Performed by: RADIOLOGY

## 2019-11-27 PROCEDURE — 77338 DESIGN MLC DEVICE FOR IMRT: CPT | Performed by: RADIOLOGY

## 2019-11-27 PROCEDURE — 77300 RADIATION THERAPY DOSE PLAN: CPT | Performed by: RADIOLOGY

## 2019-11-27 RX ORDER — HYDROCODONE BITARTRATE AND ACETAMINOPHEN 5; 325 MG/1; MG/1
1 TABLET ORAL EVERY 6 HOURS PRN
Qty: 28 TABLET | Refills: 0 | Status: SHIPPED | OUTPATIENT
Start: 2019-11-27 | End: 2019-12-09 | Stop reason: SDUPTHER

## 2019-11-27 NOTE — TELEPHONE ENCOUNTER
Called and spoke with patient and let her know that I would call Dr. Dudley's office and see when they plan to start. Patient request a refill of norco because she will be running out this Friday and we will not be in the office.     I Called and left a message for Rand at Dr. dudley's office to call me back.

## 2019-12-02 ENCOUNTER — HOSPITAL ENCOUNTER (OUTPATIENT)
Dept: RADIATION ONCOLOGY | Facility: HOSPITAL | Age: 64
Setting detail: RADIATION/ONCOLOGY SERIES
End: 2019-12-02

## 2019-12-03 ENCOUNTER — TELEPHONE (OUTPATIENT)
Dept: ONCOLOGY | Facility: CLINIC | Age: 64
End: 2019-12-03

## 2019-12-03 NOTE — TELEPHONE ENCOUNTER
Call from Rand at Dr. Dudley's office. She reports that the patient will begin radiation on 12/05/2019. I discussed with Dr. Russell and it is okay to set up chemo for 12/09/2019. I called and notified the patient.

## 2019-12-05 ENCOUNTER — HOSPITAL ENCOUNTER (OUTPATIENT)
Dept: RADIATION ONCOLOGY | Facility: HOSPITAL | Age: 64
Discharge: HOME | End: 2019-12-05

## 2019-12-05 PROCEDURE — 77386: CPT | Performed by: RADIOLOGY

## 2019-12-06 ENCOUNTER — APPOINTMENT (OUTPATIENT)
Dept: LAB | Facility: HOSPITAL | Age: 64
End: 2019-12-06

## 2019-12-06 ENCOUNTER — HOSPITAL ENCOUNTER (OUTPATIENT)
Dept: RADIATION ONCOLOGY | Facility: HOSPITAL | Age: 64
Discharge: HOME | End: 2019-12-06

## 2019-12-06 PROCEDURE — 77386: CPT | Performed by: RADIOLOGY

## 2019-12-09 ENCOUNTER — INFUSION (OUTPATIENT)
Dept: ONCOLOGY | Facility: HOSPITAL | Age: 64
End: 2019-12-09

## 2019-12-09 ENCOUNTER — HOSPITAL ENCOUNTER (OUTPATIENT)
Dept: RADIATION ONCOLOGY | Facility: HOSPITAL | Age: 64
Setting detail: RADIATION/ONCOLOGY SERIES
Discharge: HOME | End: 2019-12-09

## 2019-12-09 ENCOUNTER — LAB (OUTPATIENT)
Dept: LAB | Facility: HOSPITAL | Age: 64
End: 2019-12-09

## 2019-12-09 VITALS
WEIGHT: 161.8 LBS | HEIGHT: 66 IN | OXYGEN SATURATION: 96 % | TEMPERATURE: 98.1 F | RESPIRATION RATE: 18 BRPM | BODY MASS INDEX: 26 KG/M2 | SYSTOLIC BLOOD PRESSURE: 124 MMHG | DIASTOLIC BLOOD PRESSURE: 67 MMHG | HEART RATE: 87 BPM

## 2019-12-09 DIAGNOSIS — C34.81 MALIGNANT NEOPLASM OF OVERLAPPING SITES OF RIGHT LUNG (HCC): Primary | ICD-10-CM

## 2019-12-09 DIAGNOSIS — C34.81 MALIGNANT NEOPLASM OF OVERLAPPING SITES OF RIGHT LUNG (HCC): ICD-10-CM

## 2019-12-09 LAB
ALBUMIN SERPL-MCNC: 4.1 G/DL (ref 3.5–5.2)
ALBUMIN/GLOB SERPL: 1.4 G/DL
ALP SERPL-CCNC: 55 U/L (ref 39–117)
ALT SERPL W P-5'-P-CCNC: 7 U/L (ref 1–33)
ANION GAP SERPL CALCULATED.3IONS-SCNC: 10 MMOL/L (ref 5–15)
AST SERPL-CCNC: 12 U/L (ref 1–32)
BASOPHILS # BLD AUTO: 0.03 10*3/MM3 (ref 0–0.2)
BASOPHILS NFR BLD AUTO: 0.4 % (ref 0–1.5)
BILIRUB SERPL-MCNC: 0.2 MG/DL (ref 0.2–1.2)
BUN BLD-MCNC: 16 MG/DL (ref 8–23)
BUN/CREAT SERPL: 20.3 (ref 7–25)
CALCIUM SPEC-SCNC: 9.8 MG/DL (ref 8.6–10.5)
CHLORIDE SERPL-SCNC: 103 MMOL/L (ref 98–107)
CO2 SERPL-SCNC: 26 MMOL/L (ref 22–29)
CREAT BLD-MCNC: 0.79 MG/DL (ref 0.57–1)
DEPRECATED RDW RBC AUTO: 46.5 FL (ref 37–54)
EOSINOPHIL # BLD AUTO: 0.16 10*3/MM3 (ref 0–0.4)
EOSINOPHIL NFR BLD AUTO: 2.2 % (ref 0.3–6.2)
ERYTHROCYTE [DISTWIDTH] IN BLOOD BY AUTOMATED COUNT: 13.7 % (ref 12.3–15.4)
GFR SERPL CREATININE-BSD FRML MDRD: 73 ML/MIN/1.73
GLOBULIN UR ELPH-MCNC: 3 GM/DL
GLUCOSE BLD-MCNC: 94 MG/DL (ref 65–99)
HCT VFR BLD AUTO: 34.3 % (ref 34–46.6)
HGB BLD-MCNC: 10.8 G/DL (ref 12–15.9)
IMM GRANULOCYTES # BLD AUTO: 0.03 10*3/MM3 (ref 0–0.05)
IMM GRANULOCYTES NFR BLD AUTO: 0.4 % (ref 0–0.5)
LYMPHOCYTES # BLD AUTO: 0.76 10*3/MM3 (ref 0.7–3.1)
LYMPHOCYTES NFR BLD AUTO: 10.4 % (ref 19.6–45.3)
MAGNESIUM SERPL-MCNC: 1.9 MG/DL (ref 1.6–2.4)
MCH RBC QN AUTO: 29 PG (ref 26.6–33)
MCHC RBC AUTO-ENTMCNC: 31.5 G/DL (ref 31.5–35.7)
MCV RBC AUTO: 92 FL (ref 79–97)
MONOCYTES # BLD AUTO: 0.68 10*3/MM3 (ref 0.1–0.9)
MONOCYTES NFR BLD AUTO: 9.3 % (ref 5–12)
NEUTROPHILS # BLD AUTO: 5.62 10*3/MM3 (ref 1.7–7)
NEUTROPHILS NFR BLD AUTO: 77.3 % (ref 42.7–76)
NRBC BLD AUTO-RTO: 0 /100 WBC (ref 0–0.2)
PLATELET # BLD AUTO: 274 10*3/MM3 (ref 140–450)
PMV BLD AUTO: 10.5 FL (ref 6–12)
POTASSIUM BLD-SCNC: 4 MMOL/L (ref 3.5–5.2)
PROT SERPL-MCNC: 7.1 G/DL (ref 6–8.5)
RBC # BLD AUTO: 3.73 10*6/MM3 (ref 3.77–5.28)
SODIUM BLD-SCNC: 139 MMOL/L (ref 136–145)
WBC NRBC COR # BLD: 7.28 10*3/MM3 (ref 3.4–10.8)

## 2019-12-09 PROCEDURE — 25010000002 PALONOSETRON PER 25 MCG: Performed by: NURSE PRACTITIONER

## 2019-12-09 PROCEDURE — 96413 CHEMO IV INFUSION 1 HR: CPT

## 2019-12-09 PROCEDURE — 77386: CPT | Performed by: RADIOLOGY

## 2019-12-09 PROCEDURE — 96367 TX/PROPH/DG ADDL SEQ IV INF: CPT

## 2019-12-09 PROCEDURE — 83735 ASSAY OF MAGNESIUM: CPT

## 2019-12-09 PROCEDURE — 25010000002 DEXAMETHASONE SODIUM PHOSPHATE 100 MG/10ML SOLUTION: Performed by: NURSE PRACTITIONER

## 2019-12-09 PROCEDURE — 80053 COMPREHEN METABOLIC PANEL: CPT

## 2019-12-09 PROCEDURE — 25010000002 PACLITAXEL PER 30 MG: Performed by: NURSE PRACTITIONER

## 2019-12-09 PROCEDURE — 25010000002 DIPHENHYDRAMINE PER 50 MG: Performed by: NURSE PRACTITIONER

## 2019-12-09 PROCEDURE — 96375 TX/PRO/DX INJ NEW DRUG ADDON: CPT

## 2019-12-09 PROCEDURE — 25010000002 CARBOPLATIN PER 50 MG: Performed by: NURSE PRACTITIONER

## 2019-12-09 PROCEDURE — 96417 CHEMO IV INFUS EACH ADDL SEQ: CPT

## 2019-12-09 PROCEDURE — 85025 COMPLETE CBC W/AUTO DIFF WBC: CPT

## 2019-12-09 RX ORDER — FAMOTIDINE 10 MG/ML
20 INJECTION, SOLUTION INTRAVENOUS ONCE
Status: CANCELLED | OUTPATIENT
Start: 2019-12-09

## 2019-12-09 RX ORDER — PALONOSETRON 0.05 MG/ML
0.25 INJECTION, SOLUTION INTRAVENOUS ONCE
Status: COMPLETED | OUTPATIENT
Start: 2019-12-09 | End: 2019-12-09

## 2019-12-09 RX ORDER — SODIUM CHLORIDE 9 MG/ML
250 INJECTION, SOLUTION INTRAVENOUS ONCE
Status: COMPLETED | OUTPATIENT
Start: 2019-12-09 | End: 2019-12-09

## 2019-12-09 RX ORDER — HYDROCODONE BITARTRATE AND ACETAMINOPHEN 5; 325 MG/1; MG/1
1 TABLET ORAL EVERY 6 HOURS PRN
Qty: 28 TABLET | Refills: 0 | Status: SHIPPED | OUTPATIENT
Start: 2019-12-09 | End: 2020-01-13 | Stop reason: ALTCHOICE

## 2019-12-09 RX ORDER — OXYCODONE HYDROCHLORIDE AND ACETAMINOPHEN 5; 325 MG/1; MG/1
1-2 TABLET ORAL EVERY 4 HOURS PRN
Qty: 20 TABLET | Refills: 0 | Status: SHIPPED | OUTPATIENT
Start: 2019-12-09 | End: 2019-12-09

## 2019-12-09 RX ORDER — LIDOCAINE AND PRILOCAINE 25; 25 MG/G; MG/G
CREAM TOPICAL
Qty: 1 EACH | Refills: 0 | Status: SHIPPED | OUTPATIENT
Start: 2019-12-09 | End: 2021-01-27

## 2019-12-09 RX ORDER — FAMOTIDINE 10 MG/ML
20 INJECTION, SOLUTION INTRAVENOUS ONCE
Status: COMPLETED | OUTPATIENT
Start: 2019-12-09 | End: 2019-12-09

## 2019-12-09 RX ORDER — OXYCODONE HYDROCHLORIDE AND ACETAMINOPHEN 5; 325 MG/1; MG/1
1-2 TABLET ORAL EVERY 4 HOURS PRN
Qty: 20 TABLET | Refills: 0 | Status: SHIPPED | OUTPATIENT
Start: 2019-12-09 | End: 2019-12-12

## 2019-12-09 RX ORDER — DIPHENHYDRAMINE HYDROCHLORIDE 50 MG/ML
50 INJECTION INTRAMUSCULAR; INTRAVENOUS AS NEEDED
Status: CANCELLED | OUTPATIENT
Start: 2019-12-09

## 2019-12-09 RX ORDER — SODIUM CHLORIDE 0.9 % (FLUSH) 0.9 %
10 SYRINGE (ML) INJECTION AS NEEDED
Status: DISCONTINUED | OUTPATIENT
Start: 2019-12-09 | End: 2019-12-09 | Stop reason: HOSPADM

## 2019-12-09 RX ORDER — FAMOTIDINE 10 MG/ML
20 INJECTION, SOLUTION INTRAVENOUS AS NEEDED
Status: DISCONTINUED | OUTPATIENT
Start: 2019-12-09 | End: 2019-12-09 | Stop reason: HOSPADM

## 2019-12-09 RX ORDER — DIPHENHYDRAMINE HYDROCHLORIDE 50 MG/ML
50 INJECTION INTRAMUSCULAR; INTRAVENOUS AS NEEDED
Status: DISCONTINUED | OUTPATIENT
Start: 2019-12-09 | End: 2019-12-09 | Stop reason: HOSPADM

## 2019-12-09 RX ORDER — SODIUM CHLORIDE 9 MG/ML
250 INJECTION, SOLUTION INTRAVENOUS ONCE
Status: CANCELLED | OUTPATIENT
Start: 2019-12-09

## 2019-12-09 RX ORDER — SODIUM CHLORIDE 0.9 % (FLUSH) 0.9 %
10 SYRINGE (ML) INJECTION AS NEEDED
Status: CANCELLED | OUTPATIENT
Start: 2019-12-09

## 2019-12-09 RX ORDER — PALONOSETRON 0.05 MG/ML
0.25 INJECTION, SOLUTION INTRAVENOUS ONCE
Status: CANCELLED | OUTPATIENT
Start: 2019-12-09

## 2019-12-09 RX ORDER — FAMOTIDINE 10 MG/ML
20 INJECTION, SOLUTION INTRAVENOUS AS NEEDED
Status: CANCELLED | OUTPATIENT
Start: 2019-12-09

## 2019-12-09 RX ORDER — OXYCODONE HYDROCHLORIDE AND ACETAMINOPHEN 5; 325 MG/1; MG/1
1-2 TABLET ORAL EVERY 4 HOURS PRN
Qty: 20 TABLET | Refills: 0 | Status: SHIPPED | OUTPATIENT
Start: 2019-12-09 | End: 2019-12-09 | Stop reason: SDUPTHER

## 2019-12-09 RX ADMIN — PACLITAXEL 80 MG: 6 INJECTION, SOLUTION INTRAVENOUS at 11:42

## 2019-12-09 RX ADMIN — Medication 500 UNITS: at 13:30

## 2019-12-09 RX ADMIN — DIPHENHYDRAMINE HYDROCHLORIDE 50 MG: 50 INJECTION, SOLUTION INTRAMUSCULAR; INTRAVENOUS at 11:20

## 2019-12-09 RX ADMIN — SODIUM CHLORIDE 250 ML: 9 INJECTION, SOLUTION INTRAVENOUS at 10:54

## 2019-12-09 RX ADMIN — SODIUM CHLORIDE, PRESERVATIVE FREE 10 ML: 5 INJECTION INTRAVENOUS at 13:30

## 2019-12-09 RX ADMIN — PALONOSETRON HYDROCHLORIDE 0.25 MG: 0.25 INJECTION, SOLUTION INTRAVENOUS at 10:55

## 2019-12-09 RX ADMIN — CARBOPLATIN 210 MG: 10 INJECTION INTRAVENOUS at 12:50

## 2019-12-09 RX ADMIN — FAMOTIDINE 20 MG: 10 INJECTION INTRAVENOUS at 11:17

## 2019-12-09 RX ADMIN — DEXAMETHASONE SODIUM PHOSPHATE 12 MG: 10 INJECTION, SOLUTION INTRAMUSCULAR; INTRAVENOUS at 10:57

## 2019-12-09 NOTE — PROGRESS NOTES
[facility] ONC [facility] GROUP HEMATOLOGY AND ONCOLOGY  [address]  [facility] [address] [phone]    Patient Name: Damaris Hu  Encounter Date: 11/15/2019  YOB: 1955  Patient Number: [id number]    REASON FOR VISIT: Damaris Hu is a 64-year-old female who returns in follow-up of recently diagnosed stage III squamous cell lung carcinoma.  She is seen to discuss the diagnostic information gathered since her initial visit and for subsequent management planning.  She was started on definitive radiation (12/5/2019) concurrent with weekly chemotherapy (12/9/2019).  She is here with her Sister Ashley, and brother-in-law, Will (previously with her daughter Tawana).    DIAGNOSTIC ABNORMALITIES:          1.   10/28/2019 patient presented to the emergency room with chest pain that has been ongoing for the past year but that recently had gotten more noticeable, more frequent, and has been associated with shortness of breath.  On the day prior to her hospital admission she apparently had a syncopal episode.  An evaluation ensued:  Labs: Glucose 112 otherwise CMP was normal with a BUN of 14 creatinine 0.91 (GFR 62) calcium 10.2, total protein 7.1 and normal liver enzymes.  Troponin T was less than 0.010, proBNP normal at 97.4, d-dimer was normal at 0.27, sed rate 10, CRP 2.02 (slightly elevated), hemoglobin 11.3, hematocrit 35.6, MCV 92.5, platelets 306,000, WBC 7.37 with 76 point 1 segs 15.2 lymphs.          2.   10/28/2019 - head without contrast.  Impression: No acute intracranial disease.          3.   10/28/2019-CT chest with contrast.  Impression: Primary lung mass centered in the right upper lobe and abuts the posterior mediastinal pleura (5.4 x 5.3 x 4.5 cm).  This also partially encases the right bronchus intermedius and right upper lobe bronchus.  This is consistent with pulmonary malignancy.  Surgical sampling recommended with bronchoscopy.   Suspected metastatic lymph node in the pretracheal region measuring 1.3 cm in short axis.          4.   11/04/2019 - was seen by Dr. Delonte Burns, [facility] for further assessment of the lung mass.  Spirometry on that date revealed severe COPD with positive bronchodilator response.  Postbronchodilator FEV1 revealed significant improvement to moderate/severe COPD.  Plan: Navigational bronchoscopy and possible EBUS for tissue diagnosis.          5.   11/05/2019- PET scan, skull vertex to mid thighs.  Impression: Markedly hypermetabolic right perihilar tumor (49 x 37 mm) with right supraclavicular and mediastinal kyler metastasis.          6.   11/07/2019- CT chest without contrast at [facility].  Comparison, PET CT 11/5/2019.  Impression: Large, spiculated right infrahilar mass, crossing the major fissure to involve the right upper and right lower lobes with direct extension into the subcarinal space.  Mildly enlarged mediastinal lymph nodes and normal-sized right supraclavicular lymph node corresponding to the area of PET positivity.          7.   11/07/2019-bronchoscopy and EBUS guided FNA biopsy of right upper lobe lung: Positive for malignant cells consistent with squamous cell carcinoma.  EBUS guided FNA, station 7: Lymph node elements present.  No malignant cells identified.  EBUS guided FNA, station 4R: Positive for malignant cells consistent with squamous cell carcinoma.  Immunohistochemical stains performed on cell block #3.  Tumor cells positive for squamous squamous markers p63 and CK 5/6, negative for CK7, TTF-1, and CD 56.  Immunoprofile supports the above diagnosis.  Addendum: Subsequent biopsy showed metastatic disease in a supraclavicular lymph node, finding consistent with advanced stage disease.  Per protocol will proceed with tumor genomic testing on this specimen.  Due to the relatively small amount of tumor cellularity available, the entire panel of  testing may not be able to be completed.  BRAF mutation not detected, K-mayco mutation not detected, EGFR mutation not detected.          8.   11/07/2019-navigational bronchoscopy and forceps biopsy of posterior right upper lobe lung mass.  Diagnosis: Fragments of bronchial mucosa.  No evidence of granuloma or malignancy.          9.   11/08/2019- ultrasound-guided biopsy right supraclavicular lymph node (fine-needle aspirate).  Cytopathology diagnosis: Positive for malignant cells consistent with previously established diagnosis of metastatic non-small cell carcinoma.  Comment: Insufficient tumor cellularity for further testing on the specimen.         10.  11/15/2019-- labs, 11/15/2019 with stable anemia otherwise normal CBC with differential, slightly depressed ferritin (65), iron saturation 7%, serum iron 19, TIBC 289.  Repleted B12 and folate.  Received Injectafer.  CMP, CEA, serum iron, iron saturation, ferritin, B12, folate.  Slightly elevated CEA (3.38).         11.   11/21/2019- brain MRI at [facility].  White matter changes.  Empty sella syndrome.  No metastatic lesions.      PREVIOUS INTERVENTIONS:          1.   11/19/2019-Injectafer 750 mg IV x1          2.   Definitive course of radiation to the chest (concurrent with chemotherapy)-radiation initiated 12/5/2019 with anticipated fractionated course of 6000 to 6600 cGy at 180 cGy/day          3.   Weekly concurrent chemotherapy with carboplatin and Taxol beginning 12/09/2019    LABS    Lab Results - Last 18 Months   Lab Units 12/09/19  1005 11/15/19  1332 10/28/19  1624 03/12/19  1615 12/31/18  1438 08/24/18  1420   HEMOGLOBIN g/dL 10.8* 11.0* 11.3* 12.8 11.5* 12.6   HEMATOCRIT % 34.3 35.5 35.6 41.8 38.5 40.9   MCV fL 92.0 92.9 92.5 96.8 96.5 95.3   WBC 10*3/mm3 7.28 6.51 7.37 7.7 5.9 6.2   RDW % 13.7 13.4 13.3 13.3 13.0 13.0   MPV fL 10.5 10.1 9.5 10.8 10.1 10.0   PLATELETS 10*3/mm3 274 372 306 270 245 257   IMM GRAN % % 0.4 0.3 0.3  --   --   --    NEUTROS  ABS 10*3/mm3 5.62 4.39 5.61  --   --   --    LYMPHS ABS 10*3/mm3 0.76 1.33 1.12  --   --   --    MONOS ABS 10*3/mm3 0.68 0.67 0.57  --   --   --    EOS ABS 10*3/mm3 0.16 0.07 0.02  --   --   --    BASOS ABS 10*3/mm3 0.03 0.03 0.03  --   --   --    IMMATURE GRANS (ABS) 10*3/mm3 0.03 0.02 0.02  --   --   --    NRBC /100 WBC 0.0 0.0 0.0  --   --   --        Lab Results - Last 18 Months   Lab Units 12/09/19  1005 10/28/19  1624 12/31/18  1438 08/24/18  1420   GLUCOSE mg/dL 94 112* 93 88   SODIUM mmol/L 139 142 143 140   POTASSIUM mmol/L 4.0 4.0 3.9 4.0   TOTAL CO2 mmol/L  --   --  23 25   CO2 mmol/L 26.0 27.0  --   --    CHLORIDE mmol/L 103 103 104 101   ANION GAP mmol/L 10.0 12.0 16 14   CREATININE mg/dL 0.79 0.91 0.8 0.9   BUN mg/dL 16 14 12 15   BUN / CREAT RATIO  20.3 15.4  --   --    CALCIUM mg/dL 9.8 10.2 9.3 9.5   EGFR IF NONAFRICN AM mL/min/1.73 73 62 >60 >60   ALK PHOS U/L 55 49 52 56   TOTAL PROTEIN g/dL 7.1 7.1 7.0 7.3   ALT (SGPT) U/L 7 9 11 13   AST (SGOT) U/L 12 14 13 16   BILIRUBIN mg/dL 0.2 <0.2* <0.2 <0.2   ALBUMIN g/dL 4.10 4.30 4.1 4.4   GLOBULIN gm/dL 3.0 2.8  --   --        Lab Results - Last 18 Months   Lab Units 11/15/19  1332   CEA ng/mL 3.38       Lab Results - Last 18 Months   Lab Units 11/15/19  1332 08/24/18  1420   IRON mcg/dL 19*  --    TIBC mcg/dL 289*  --    IRON SATURATION % 7*  --    FERRITIN ng/mL 65.15  --    TSH uIU/mL  --  2.720   FOLATE ng/mL >20.00  --          PAST MEDICAL HISTORY:  ALLERGIES:  Allergies   Allergen Reactions   • Amoxicillin GI Intolerance     CURRENT MEDICATIONS:  Outpatient Encounter Medications as of 12/13/2019   Medication Sig Dispense Refill   • cetirizine (zyrTEC) 10 MG tablet Take 10 mg by mouth Daily.     • citalopram (CeleXA) 20 MG tablet Take 20 mg by mouth Daily.     • fluticasone (FLONASE) 50 MCG/ACT nasal spray 1 spray by Each Nare route Daily As Needed.  5   • Fluticasone-Umeclidin-Vilant (TRELEGY ELLIPTA) 100-62.5-25 MCG/INH aerosol powder  Inhale  Every Morning.     • HYDROcodone-acetaminophen (LORTAB) 5-325 MG per tablet Take 1 tablet by mouth Every 6 (Six) Hours As Needed for Moderate Pain . 28 tablet 0   • lidocaine-prilocaine (EMLA) 2.5-2.5 % cream Apply to port 30 minutes before access 1 each 0   • omeprazole (priLOSEC) 20 MG capsule Take 20 mg by mouth Every Morning Before Breakfast.  0   • ondansetron (ZOFRAN) 8 MG tablet Take 1 tablet by mouth 3 (Three) Times a Day As Needed for Nausea or Vomiting. 30 tablet 5   • ZOLMitriptan (ZOMIG) 2.5 MG tablet Take 2.5 mg by mouth As Needed.     • [DISCONTINUED] HYDROcodone-acetaminophen (LORTAB) 5-325 MG per tablet Take 1 tablet by mouth Every 6 (Six) Hours As Needed for Moderate Pain . 28 tablet 0   • [DISCONTINUED] oxyCODONE-acetaminophen (PERCOCET) 5-325 MG per tablet Take 1-2 tablets by mouth Every 4 (Four) Hours As Needed (Pain). 20 tablet 0   • [DISCONTINUED] oxyCODONE-acetaminophen (PERCOCET) 5-325 MG per tablet Take 1-2 tablets by mouth Every 4 (Four) Hours As Needed (Pain). 20 tablet 0     No facility-administered encounter medications on file as of 12/13/2019.      Adult illnesses:  Depression  GERD  Vitamin D deficiency  Chronic fatigue syndrome  Former smoker, quit 4 years ago  Hemorrhoids with history of bright red blood per rectum.  Chronic back pain  Chronic neck pain  Bilateral shoulder and arm radiculopathy, right > left  Degenerative disc disease, C5-C7  Anxiety    Past surgeries:  Breast surgery for cyst removal  Colonoscopy, 12/3/2018  Hysterectomy  Left subclavian Mediport placement, 11/22/2019.  Dr. Gibson    ADULT ILLNESSES:  Patient Active Problem List   Diagnosis Code   • Age-related osteoporosis without current pathological fracture M81.0   • Anxiety and depression F41.9, F32.9   • Cervical disc disease M50.90   • Hyperlipidemia E78.5   • Lumbar degenerative disc disease M51.36   • Malignant neoplasm of overlapping sites of right lung (CMS/HCC) C34.81   • Emphysema lung (CMS/HCC) J43.9    • Iron deficiency anemia D50.9   • Chemotherapy induced neutropenia (CMS/HCC) D70.1, T45.1X5A   • Antineoplastic chemotherapy induced anemia D64.81, T45.1X5A   • Encounter for consultation Z71.9   • Former smoker Z87.891   • Regional lymph node metastasis present (CMS/HCC) C77.9   • 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     SURGERIES:  Past Surgical History:   Procedure Laterality Date   • BREAST SURGERY      Cyst removal   • COLONOSCOPY N/A 12/3/2018    Procedure: COLONOSCOPY WITH ANESTHESIA;  Surgeon: Myah Pool MD;  Location: [facility] ENDOSCOPY;  Service: Gastroenterology   • HYSTERECTOMY     • VENOUS ACCESS DEVICE (PORT) INSERTION N/A 11/22/2019    Procedure: PLACEMENT OF SINGLE LUMEN PORT;  Surgeon: Mona Gibson MD;  Location: [facility] OR;  Service: General     HEALTH MAINTENANCE ITEMS:  Health Maintenance Due   Topic Date Due   • ZOSTER VACCINE (2 of 3) 09/15/2015   • HEPATITIS C SCREENING  10/02/2018   • MEDICARE ANNUAL WELLNESS  10/02/2018   • PNEUMOCOCCAL VACCINE (65+ HIGH RISK) (2 of 2 - PPSV23) 10/19/2018   • LIPID PANEL  11/15/2019   • COLONOSCOPY  12/03/2019       <no information>  Last Completed Colonoscopy       Status Date      COLONOSCOPY Done 12/3/2018 COLONOSCOPY     Patient has more history with this topic...          There is no immunization history on file for this patient.  Last Completed Mammogram       Status Date      MAMMOGRAM Done 2/5/2019 Ext Proc: CHG SCREENING DIGITAL BREAST TOMOSYNTHESIS BI            FAMILY HISTORY:  Family History   Problem Relation Age of Onset   • Colon polyps Mother         < 60 years old   • Lung cancer Mother    • Colon cancer Neg Hx      SOCIAL HISTORY:  Social History     Socioeconomic History   • Marital status:      Spouse name: Not on file   • Number of children: Not on file   • Years of education: Not on file   • Highest education level: Not on file   Tobacco Use   • Smoking status: Former Smoker      "Types: Cigarettes   • Smokeless tobacco: Never Used   • Tobacco comment: Quit 4 years ago   Substance and Sexual Activity   • Alcohol use: No   • Drug use: No   • Sexual activity: Defer       REVIEW OF SYSTEMS:  Review of Systems   Constitutional: Positive for fatigue. Negative for activity change (Manages her personal ADLs and some chores.  Has not been driving while on chemo), appetite change (eating well), chills, diaphoresis, fever, unexpected weight gain and unexpected weight loss.        Here with her sister, Ashley and son-in-law, Will.  Manages her personal ADLs, chores, running errands and lives by herself.    Chemo tolerance discussed.  Fatigue and nausea for 2 days after, Zofran x 1 helped.  No mouth sores, no nausea, no vomiting, no skin changes, no neuropathy, no diarrhea.   Eyes: Negative.    Respiratory: Positive for shortness of breath (Improved on inhalers). Negative for cough.    Gastrointestinal: Negative.    Genitourinary: Negative.    Musculoskeletal: Positive for arthralgias, back pain and neck pain.   Allergic/Immunologic: Positive for environmental allergies.   Neurological: Positive for syncope (prior to her ER visit).   Hematological: Negative for adenopathy. Does not bruise/bleed easily.   Psychiatric/Behavioral: Positive for depressed mood. The patient is nervous/anxious.        /62   Pulse 98   Temp 97.2 °F (36.2 °C)   Resp 16   Ht 167.6 cm (66\")   Wt 73.1 kg (161 lb 3.2 oz)   LMP  (LMP Unknown)   SpO2 98%   Breastfeeding No   BMI 26.02 kg/m²  Body surface area is 1.82 meters squared.  Pain Score    12/13/19 1048   PainSc: 0-No pain       Physical Exam:  Physical Exam   Constitutional: She is oriented to person, place, and time. She appears well-developed and well-nourished. No distress.   Pleasant, cooperative, heavy-set, modestly kept elderly female.  Ambulatory.  ECOG 2.  Has lost 1 pound since her initial visit.   HENT:   Head: Normocephalic.   Mouth/Throat: No " oropharyngeal exudate.   Eyes: Pupils are equal, round, and reactive to light. Conjunctivae and EOM are normal. No scleral icterus.   Neck: Normal range of motion. Neck supple. No JVD present. No tracheal deviation present. No thyromegaly present.   Cardiovascular: Normal rate, regular rhythm and normal heart sounds. Exam reveals no friction rub.   No murmur heard.  Pulmonary/Chest: Effort normal. No stridor. No respiratory distress. She has no wheezes (Soft expiratory wheezes diffusely). She has no rales. She exhibits no tenderness.   Barrel shaped chest with diminished breath sounds globally.  Port in the left upper chest is noted.  Is well seated.   Abdominal: Soft. Bowel sounds are normal. She exhibits no distension and no mass. There is no tenderness. There is no guarding.   Musculoskeletal: Normal range of motion. She exhibits no edema or tenderness.   Neurological: She is alert and oriented to person, place, and time. No cranial nerve deficit.   She is anxious but not tremulous today   Skin: Skin is warm and dry. No erythema. There is pallor.   Psychiatric: Her behavior is normal. Judgment and thought content normal.   Visibly less anxious   Vitals reviewed.      Assessment:  1.  Squamous cell carcinoma of the right lung            Tumor stage: IIIC (cT4, cN3, M0)            Bone tumor burden: 5.4 x 5.3 x 4.5 cm mass along the right major fissure that abuts the medial mediastinal pleura and partially encases the right upper lobe bronchus and right bronchus intermedius.  Pathologic pretracheal lymph node measures 1.3 cm.  Right supraclavicular metastasis.            Complications of tumor: Chest pain, dyspnea, syncope?            BRAF mutation not detected            KRAS mutation not detected            EGFR mutation not detected.            PD-L1 and ALK: Not not reported apparently due to lack of specimen            Tumor status: Definitive radiation concurrent with chemotherapy in progress  2.  Chest pain  dyspnea and syncope, likely symptoms related to #1.  3.  Mild anemia, contribution from anemia of malignancy/anemia of chronic disease.  Hgb 110.8, 12/09/2019 (prior range: 11.1 - 11.3, 10/28/2019)  4.  Chronic fatigue syndrome  5.  COPD  6.  Former smoker 40 pack years  7.  Depression  8.  Hemorrhoids with history of hematochezia  9.  Chronic neck/back pains with DDD, C5-C7.  Followed by Dr. Grady.  Rx for Norco 5 prior to anterior cervical discectomy and fusion C5 in 2015 - never had surgery    RECOMMENDATIONS:   1.  Re: The patient is apprised of the labs, 12/09/2019 and 11/15/2019 with normal CMP, stable anemia otherwise normal CBC with differential, slightly depressed ferritin (65), iron saturation 7%, serum iron 19, TIBC 289.  Repleted B12 and folate.  Received Injectafer. Slightly elevated CEA (3.38).  Chemo tolerance discussed.  Grade 1 nausea and fatigue.  Otherwise doing well.  2.  Reviewed available molecular studies from lung biopsy done on 11/07/2019 (above) noting lack of mutation for EGFR, BRAF and KRAS.  ALK and PD-L1 not reported presumably due to lack of specimen.   4.  Review extensive records from Dr. Delonte Burns and [facility], to include radiographs, and biopsy results.  5.  Apprised of brain MRI findings.  No metastatic disease.  6.  Keytruda, Taxol and carboplatin. Potential toxicities of same previously discussed (to included but not limited to: Myelosuppression, alopecia, neuropathy, arthralgias/myalgias, nausea/vomiting, hypersensitivity reactions, diarrhea, mucositis, risks for infection, abnormal liver enzymes, asthenia, fever, low blood pressure, bleeding, renal insufficiency, edema, bradycardia, anaphylaxis, arrhythmias, thromboembolism, myocardial infarction, pulmonary toxicity, gastrointestinal (GI) obstruction/perforation, paralytic ileus, ischemic colitis, pancreatitis, severe skin reactions; electrolyte disorders, ototoxicity, nephrotoxicity, vision  loss). Questions answered. She agrees to press on with therapy.   7. Schedule chemotherapy - week 2 on 12/16/2019, week 3 on 12/23/2019, week 4 on 12/30/2019, week 5 on 01/06/2020, week 6 on 01/13/2020:             Taxol 45 mg/m2 (BSA = 1.82; TD = 81 mg) per administration guidelines weekly x6 weeks with radiation.             Carboplatin AUC 2 (BSA = 1.82; TD = dose pending) per administration guidelines weekly x6 weeks with radiation.   8. Premedicate with:             Aloxi 0.25 mg IV before chemotherapy            Decadron 10 mg IV before chemo            Pepcid 20 mg IV             Benadryl 50 mg IV   9. CMP, Mg++ and CBC with differential weekly with Procrit 40,000 units subcutaneous every week if Hgb less than 10 and Hct less than 30; Zarxio 480 mcg subcutaneously daily x3 if ANC less than 1 -[facility]  10. eRx:              Zofran 8 mg p.o. 3 times daily as needed, #30 - does not need today                       11. Continue other current medicines.   12.  Review office encounter from Dr. Dudley, 11/25/2019.  Recommended definitive course of concurrent chemotherapy and radiation.  Radiation anticipated: 6000-60 600 centigrade 180 cGy/day.  13.  Return to the office on 01/10/2020 with pre-office serum iron, Fe sat, ferritin, CBC with differential, CMP, and CEA.     TIME SPENT: Face-to-face time on this encounter, as defined by the American Medical Association in the 2019 Current Procedural Terminology codebook; assessment, record review, lab/imaging review, planning and education - at least 50 minutes (greater than 50% facetime).

## 2019-12-09 NOTE — PATIENT INSTRUCTIONS
Carboplatin injection  What is this medicine?  CARBOPLATIN (MORENITA phoebe vinny tin) is a chemotherapy drug. It targets fast dividing cells, like cancer cells, and causes these cells to die. This medicine is used to treat ovarian cancer and many other cancers.  This medicine may be used for other purposes; ask your health care provider or pharmacist if you have questions.  COMMON BRAND NAME(S): Krislatin  What should I tell my health care provider before I take this medicine?  They need to know if you have any of these conditions:  -blood disorders  -hearing problems  -kidney disease  -recent or ongoing radiation therapy  -an unusual or allergic reaction to carboplatin, cisplatin, other chemotherapy, other medicines, foods, dyes, or preservatives  -pregnant or trying to get pregnant  -breast-feeding  How should I use this medicine?  This drug is usually given as an infusion into a vein. It is administered in a hospital or clinic by a specially trained health care professional.  Talk to your pediatrician regarding the use of this medicine in children. Special care may be needed.  Overdosage: If you think you have taken too much of this medicine contact a poison control center or emergency room at once.  NOTE: This medicine is only for you. Do not share this medicine with others.  What if I miss a dose?  It is important not to miss a dose. Call your doctor or health care professional if you are unable to keep an appointment.  What may interact with this medicine?  -medicines for seizures  -medicines to increase blood counts like filgrastim, pegfilgrastim, sargramostim  -some antibiotics like amikacin, gentamicin, neomycin, streptomycin, tobramycin  -vaccines  Talk to your doctor or health care professional before taking any of these medicines:  -acetaminophen  -aspirin  -ibuprofen  -ketoprofen  -naproxen  This list may not describe all possible interactions. Give your health care provider a list of all the medicines, herbs,  non-prescription drugs, or dietary supplements you use. Also tell them if you smoke, drink alcohol, or use illegal drugs. Some items may interact with your medicine.  What should I watch for while using this medicine?  Your condition will be monitored carefully while you are receiving this medicine. You will need important blood work done while you are taking this medicine.  This drug may make you feel generally unwell. This is not uncommon, as chemotherapy can affect healthy cells as well as cancer cells. Report any side effects. Continue your course of treatment even though you feel ill unless your doctor tells you to stop.  In some cases, you may be given additional medicines to help with side effects. Follow all directions for their use.  Call your doctor or health care professional for advice if you get a fever, chills or sore throat, or other symptoms of a cold or flu. Do not treat yourself. This drug decreases your body's ability to fight infections. Try to avoid being around people who are sick.  This medicine may increase your risk to bruise or bleed. Call your doctor or health care professional if you notice any unusual bleeding.  Be careful brushing and flossing your teeth or using a toothpick because you may get an infection or bleed more easily. If you have any dental work done, tell your dentist you are receiving this medicine.  Avoid taking products that contain aspirin, acetaminophen, ibuprofen, naproxen, or ketoprofen unless instructed by your doctor. These medicines may hide a fever.  Do not become pregnant while taking this medicine. Women should inform their doctor if they wish to become pregnant or think they might be pregnant. There is a potential for serious side effects to an unborn child. Talk to your health care professional or pharmacist for more information. Do not breast-feed an infant while taking this medicine.  What side effects may I notice from receiving this medicine?  Side effects  that you should report to your doctor or health care professional as soon as possible:  -allergic reactions like skin rash, itching or hives, swelling of the face, lips, or tongue  -signs of infection - fever or chills, cough, sore throat, pain or difficulty passing urine  -signs of decreased platelets or bleeding - bruising, pinpoint red spots on the skin, black, tarry stools, nosebleeds  -signs of decreased red blood cells - unusually weak or tired, fainting spells, lightheadedness  -breathing problems  -changes in hearing  -changes in vision  -chest pain  -high blood pressure  -low blood counts - This drug may decrease the number of white blood cells, red blood cells and platelets. You may be at increased risk for infections and bleeding.  -nausea and vomiting  -pain, swelling, redness or irritation at the injection site  -pain, tingling, numbness in the hands or feet  -problems with balance, talking, walking  -trouble passing urine or change in the amount of urine  Side effects that usually do not require medical attention (report to your doctor or health care professional if they continue or are bothersome):  -hair loss  -loss of appetite  -metallic taste in the mouth or changes in taste  This list may not describe all possible side effects. Call your doctor for medical advice about side effects. You may report side effects to FDA at 8-526-FDA-1238.  Where should I keep my medicine?  This drug is given in a hospital or clinic and will not be stored at home.  NOTE: This sheet is a summary. It may not cover all possible information. If you have questions about this medicine, talk to your doctor, pharmacist, or health care provider.  © 2019 [employer] (2009-03-24 14:38:05)  Paclitaxel injection  What is this medicine?  PACLITAXEL (KAYLAH li TAX el) is a chemotherapy drug. It targets fast dividing cells, like cancer cells, and causes these cells to die. This medicine is used to treat ovarian cancer, breast  cancer, lung cancer, Kaposi's sarcoma, and other cancers.  This medicine may be used for other purposes; ask your health care provider or pharmacist if you have questions.  COMMON BRAND NAME(S): Onxol, Taxol  What should I tell my health care provider before I take this medicine?  They need to know if you have any of these conditions:  -history of irregular heartbeat  -liver disease  -low blood counts, like low white cell, platelet, or red cell counts  -lung or breathing disease, like asthma  -tingling of the fingers or toes, or other nerve disorder  -an unusual or allergic reaction to paclitaxel, alcohol, polyoxyethylated castor oil, other chemotherapy, other medicines, foods, dyes, or preservatives  -pregnant or trying to get pregnant  -breast-feeding  How should I use this medicine?  This drug is given as an infusion into a vein. It is administered in a hospital or clinic by a specially trained health care professional.  Talk to your pediatrician regarding the use of this medicine in children. Special care may be needed.  Overdosage: If you think you have taken too much of this medicine contact a poison control center or emergency room at once.  NOTE: This medicine is only for you. Do not share this medicine with others.  What if I miss a dose?  It is important not to miss your dose. Call your doctor or health care professional if you are unable to keep an appointment.  What may interact with this medicine?  Do not take this medicine with any of the following medications:  -disulfiram  -metronidazole  This medicine may also interact with the following medications:  -antiviral medicines for hepatitis, HIV or AIDS  -certain antibiotics like erythromycin and clarithromycin  -certain medicines for fungal infections like ketoconazole and itraconazole  -certain medicines for seizures like carbamazepine, phenobarbital, phenytoin  -gemfibrozil  -nefazodone  -rifampin  -Iris's wort  This list may not describe all  possible interactions. Give your health care provider a list of all the medicines, herbs, non-prescription drugs, or dietary supplements you use. Also tell them if you smoke, drink alcohol, or use illegal drugs. Some items may interact with your medicine.  What should I watch for while using this medicine?  Your condition will be monitored carefully while you are receiving this medicine. You will need important blood work done while you are taking this medicine.  This medicine can cause serious allergic reactions. To reduce your risk you will need to take other medicine(s) before treatment with this medicine. If you experience allergic reactions like skin rash, itching or hives, swelling of the face, lips, or tongue, tell your doctor or health care professional right away.  In some cases, you may be given additional medicines to help with side effects. Follow all directions for their use.  This drug may make you feel generally unwell. This is not uncommon, as chemotherapy can affect healthy cells as well as cancer cells. Report any side effects. Continue your course of treatment even though you feel ill unless your doctor tells you to stop.  Call your doctor or health care professional for advice if you get a fever, chills or sore throat, or other symptoms of a cold or flu. Do not treat yourself. This drug decreases your body's ability to fight infections. Try to avoid being around people who are sick.  This medicine may increase your risk to bruise or bleed. Call your doctor or health care professional if you notice any unusual bleeding.  Be careful brushing and flossing your teeth or using a toothpick because you may get an infection or bleed more easily. If you have any dental work done, tell your dentist you are receiving this medicine.  Avoid taking products that contain aspirin, acetaminophen, ibuprofen, naproxen, or ketoprofen unless instructed by your doctor. These medicines may hide a fever.  Do not become  pregnant while taking this medicine. Women should inform their doctor if they wish to become pregnant or think they might be pregnant. There is a potential for serious side effects to an unborn child. Talk to your health care professional or pharmacist for more information. Do not breast-feed an infant while taking this medicine.  Men are advised not to father a child while receiving this medicine.  This product may contain alcohol. Ask your pharmacist or healthcare provider if this medicine contains alcohol. Be sure to tell all healthcare providers you are taking this medicine. Certain medicines, like metronidazole and disulfiram, can cause an unpleasant reaction when taken with alcohol. The reaction includes flushing, headache, nausea, vomiting, sweating, and increased thirst. The reaction can last from 30 minutes to several hours.  What side effects may I notice from receiving this medicine?  Side effects that you should report to your doctor or health care professional as soon as possible:  -allergic reactions like skin rash, itching or hives, swelling of the face, lips, or tongue  -breathing problems  -changes in vision  -fast, irregular heartbeat  -high or low blood pressure  -mouth sores  -pain, tingling, numbness in the hands or feet  -signs of decreased platelets or bleeding - bruising, pinpoint red spots on the skin, black, tarry stools, blood in the urine  -signs of decreased red blood cells - unusually weak or tired, feeling faint or lightheaded, falls  -signs of infection - fever or chills, cough, sore throat, pain or difficulty passing urine  -signs and symptoms of liver injury like dark yellow or brown urine; general ill feeling or flu-like symptoms; light-colored stools; loss of appetite; nausea; right upper belly pain; unusually weak or tired; yellowing of the eyes or skin  -swelling of the ankles, feet, hands  -unusually slow heartbeat  Side effects that usually do not require medical attention  (report to your doctor or health care professional if they continue or are bothersome):  -diarrhea  -hair loss  -loss of appetite  -muscle or joint pain  -nausea, vomiting  -pain, redness, or irritation at site where injected  -tiredness  This list may not describe all possible side effects. Call your doctor for medical advice about side effects. You may report side effects to FDA at 6-711-KKM-5215.  Where should I keep my medicine?  This drug is given in a hospital or clinic and will not be stored at home.  NOTE: This sheet is a summary. It may not cover all possible information. If you have questions about this medicine, talk to your doctor, pharmacist, or health care provider.  © 2019 [employer] (2018-08-21 13:14:55)

## 2019-12-10 ENCOUNTER — HOSPITAL ENCOUNTER (OUTPATIENT)
Dept: RADIATION ONCOLOGY | Facility: HOSPITAL | Age: 64
Setting detail: RADIATION/ONCOLOGY SERIES
Discharge: HOME | End: 2019-12-10

## 2019-12-10 PROCEDURE — 77386: CPT | Performed by: RADIOLOGY

## 2019-12-11 ENCOUNTER — HOSPITAL ENCOUNTER (OUTPATIENT)
Dept: RADIATION ONCOLOGY | Facility: HOSPITAL | Age: 64
Setting detail: RADIATION/ONCOLOGY SERIES
Discharge: HOME | End: 2019-12-11

## 2019-12-11 PROCEDURE — 77386: CPT | Performed by: RADIOLOGY

## 2019-12-11 PROCEDURE — 77336 RADIATION PHYSICS CONSULT: CPT | Performed by: RADIOLOGY

## 2019-12-12 ENCOUNTER — HOSPITAL ENCOUNTER (OUTPATIENT)
Dept: RADIATION ONCOLOGY | Facility: HOSPITAL | Age: 64
Setting detail: RADIATION/ONCOLOGY SERIES
Discharge: HOME | End: 2019-12-12

## 2019-12-12 PROCEDURE — 77386: CPT | Performed by: RADIOLOGY

## 2019-12-12 RX ORDER — ONDANSETRON HYDROCHLORIDE 8 MG/1
8 TABLET, FILM COATED ORAL 3 TIMES DAILY PRN
Qty: 30 TABLET | Refills: 5 | Status: CANCELLED | OUTPATIENT
Start: 2019-12-12

## 2019-12-13 ENCOUNTER — HOSPITAL ENCOUNTER (OUTPATIENT)
Dept: RADIATION ONCOLOGY | Facility: HOSPITAL | Age: 64
Setting detail: RADIATION/ONCOLOGY SERIES
Discharge: HOME | End: 2019-12-13

## 2019-12-13 ENCOUNTER — OFFICE VISIT (OUTPATIENT)
Dept: ONCOLOGY | Facility: CLINIC | Age: 64
End: 2019-12-13

## 2019-12-13 VITALS
RESPIRATION RATE: 16 BRPM | SYSTOLIC BLOOD PRESSURE: 112 MMHG | HEIGHT: 66 IN | TEMPERATURE: 97.2 F | DIASTOLIC BLOOD PRESSURE: 62 MMHG | WEIGHT: 161.2 LBS | OXYGEN SATURATION: 98 % | BODY MASS INDEX: 25.91 KG/M2 | HEART RATE: 98 BPM

## 2019-12-13 DIAGNOSIS — C77.0 SECONDARY MALIGNANT NEOPLASM OF SUPRACLAVICULAR LYMPH NODE (HCC): ICD-10-CM

## 2019-12-13 DIAGNOSIS — F41.9 ANXIETY AND DEPRESSION: ICD-10-CM

## 2019-12-13 DIAGNOSIS — C34.81 MALIGNANT NEOPLASM OF OVERLAPPING SITES OF RIGHT LUNG (HCC): Primary | ICD-10-CM

## 2019-12-13 DIAGNOSIS — F32.A ANXIETY AND DEPRESSION: ICD-10-CM

## 2019-12-13 DIAGNOSIS — J43.9 PULMONARY EMPHYSEMA, UNSPECIFIED EMPHYSEMA TYPE (HCC): ICD-10-CM

## 2019-12-13 PROCEDURE — 77386: CPT | Performed by: RADIOLOGY

## 2019-12-13 PROCEDURE — 99215 OFFICE O/P EST HI 40 MIN: CPT | Performed by: INTERNAL MEDICINE

## 2019-12-16 ENCOUNTER — TELEPHONE (OUTPATIENT)
Dept: ONCOLOGY | Facility: CLINIC | Age: 64
End: 2019-12-16

## 2019-12-16 ENCOUNTER — INFUSION (OUTPATIENT)
Dept: ONCOLOGY | Facility: HOSPITAL | Age: 64
End: 2019-12-16

## 2019-12-16 ENCOUNTER — HOSPITAL ENCOUNTER (OUTPATIENT)
Dept: RADIATION ONCOLOGY | Facility: HOSPITAL | Age: 64
Setting detail: RADIATION/ONCOLOGY SERIES
Discharge: HOME | End: 2019-12-16

## 2019-12-16 ENCOUNTER — LAB (OUTPATIENT)
Dept: LAB | Facility: HOSPITAL | Age: 64
End: 2019-12-16

## 2019-12-16 VITALS
TEMPERATURE: 98.7 F | HEART RATE: 88 BPM | BODY MASS INDEX: 25.88 KG/M2 | OXYGEN SATURATION: 99 % | RESPIRATION RATE: 16 BRPM | WEIGHT: 161 LBS | HEIGHT: 66 IN | SYSTOLIC BLOOD PRESSURE: 108 MMHG | DIASTOLIC BLOOD PRESSURE: 59 MMHG

## 2019-12-16 DIAGNOSIS — J43.9 PULMONARY EMPHYSEMA, UNSPECIFIED EMPHYSEMA TYPE (HCC): ICD-10-CM

## 2019-12-16 DIAGNOSIS — C77.0 SECONDARY MALIGNANT NEOPLASM OF SUPRACLAVICULAR LYMPH NODE (HCC): ICD-10-CM

## 2019-12-16 DIAGNOSIS — C34.81 MALIGNANT NEOPLASM OF OVERLAPPING SITES OF RIGHT LUNG (HCC): ICD-10-CM

## 2019-12-16 DIAGNOSIS — F32.A ANXIETY AND DEPRESSION: ICD-10-CM

## 2019-12-16 DIAGNOSIS — F41.9 ANXIETY AND DEPRESSION: ICD-10-CM

## 2019-12-16 DIAGNOSIS — C34.81 MALIGNANT NEOPLASM OF OVERLAPPING SITES OF RIGHT LUNG (HCC): Primary | ICD-10-CM

## 2019-12-16 LAB
ALBUMIN SERPL-MCNC: 3.9 G/DL (ref 3.5–5.2)
ALBUMIN/GLOB SERPL: 1.3 G/DL
ALP SERPL-CCNC: 53 U/L (ref 39–117)
ALT SERPL W P-5'-P-CCNC: 13 U/L (ref 1–33)
ANION GAP SERPL CALCULATED.3IONS-SCNC: 10 MMOL/L (ref 5–15)
AST SERPL-CCNC: 17 U/L (ref 1–32)
BASOPHILS # BLD AUTO: 0.04 10*3/MM3 (ref 0–0.2)
BASOPHILS NFR BLD AUTO: 0.8 % (ref 0–1.5)
BILIRUB SERPL-MCNC: 0.2 MG/DL (ref 0.2–1.2)
BUN BLD-MCNC: 18 MG/DL (ref 8–23)
BUN/CREAT SERPL: 24.7 (ref 7–25)
CALCIUM SPEC-SCNC: 9.5 MG/DL (ref 8.6–10.5)
CHLORIDE SERPL-SCNC: 103 MMOL/L (ref 98–107)
CO2 SERPL-SCNC: 25 MMOL/L (ref 22–29)
CREAT BLD-MCNC: 0.73 MG/DL (ref 0.57–1)
DEPRECATED RDW RBC AUTO: 44.6 FL (ref 37–54)
EOSINOPHIL # BLD AUTO: 0.08 10*3/MM3 (ref 0–0.4)
EOSINOPHIL NFR BLD AUTO: 1.6 % (ref 0.3–6.2)
ERYTHROCYTE [DISTWIDTH] IN BLOOD BY AUTOMATED COUNT: 13.5 % (ref 12.3–15.4)
GFR SERPL CREATININE-BSD FRML MDRD: 80 ML/MIN/1.73
GLOBULIN UR ELPH-MCNC: 3.1 GM/DL
GLUCOSE BLD-MCNC: 107 MG/DL (ref 65–99)
HCT VFR BLD AUTO: 32.7 % (ref 34–46.6)
HGB BLD-MCNC: 10.3 G/DL (ref 12–15.9)
IMM GRANULOCYTES # BLD AUTO: 0.04 10*3/MM3 (ref 0–0.05)
IMM GRANULOCYTES NFR BLD AUTO: 0.8 % (ref 0–0.5)
LYMPHOCYTES # BLD AUTO: 0.49 10*3/MM3 (ref 0.7–3.1)
LYMPHOCYTES NFR BLD AUTO: 9.9 % (ref 19.6–45.3)
MAGNESIUM SERPL-MCNC: 1.9 MG/DL (ref 1.6–2.4)
MCH RBC QN AUTO: 28.8 PG (ref 26.6–33)
MCHC RBC AUTO-ENTMCNC: 31.5 G/DL (ref 31.5–35.7)
MCV RBC AUTO: 91.3 FL (ref 79–97)
MONOCYTES # BLD AUTO: 0.5 10*3/MM3 (ref 0.1–0.9)
MONOCYTES NFR BLD AUTO: 10.1 % (ref 5–12)
NEUTROPHILS # BLD AUTO: 3.8 10*3/MM3 (ref 1.7–7)
NEUTROPHILS NFR BLD AUTO: 76.8 % (ref 42.7–76)
NRBC BLD AUTO-RTO: 0 /100 WBC (ref 0–0.2)
PLATELET # BLD AUTO: 245 10*3/MM3 (ref 140–450)
PMV BLD AUTO: 10.2 FL (ref 6–12)
POTASSIUM BLD-SCNC: 4.4 MMOL/L (ref 3.5–5.2)
PROT SERPL-MCNC: 7 G/DL (ref 6–8.5)
RBC # BLD AUTO: 3.58 10*6/MM3 (ref 3.77–5.28)
SODIUM BLD-SCNC: 138 MMOL/L (ref 136–145)
WBC NRBC COR # BLD: 4.95 10*3/MM3 (ref 3.4–10.8)

## 2019-12-16 PROCEDURE — 96413 CHEMO IV INFUSION 1 HR: CPT

## 2019-12-16 PROCEDURE — 96367 TX/PROPH/DG ADDL SEQ IV INF: CPT

## 2019-12-16 PROCEDURE — 96417 CHEMO IV INFUS EACH ADDL SEQ: CPT

## 2019-12-16 PROCEDURE — 25010000002 CARBOPLATIN PER 50 MG: Performed by: NURSE PRACTITIONER

## 2019-12-16 PROCEDURE — 36415 COLL VENOUS BLD VENIPUNCTURE: CPT

## 2019-12-16 PROCEDURE — 80053 COMPREHEN METABOLIC PANEL: CPT

## 2019-12-16 PROCEDURE — 85025 COMPLETE CBC W/AUTO DIFF WBC: CPT

## 2019-12-16 PROCEDURE — 25010000002 PACLITAXEL PER 30 MG: Performed by: NURSE PRACTITIONER

## 2019-12-16 PROCEDURE — 77386: CPT | Performed by: RADIOLOGY

## 2019-12-16 PROCEDURE — 96375 TX/PRO/DX INJ NEW DRUG ADDON: CPT

## 2019-12-16 PROCEDURE — 83735 ASSAY OF MAGNESIUM: CPT

## 2019-12-16 PROCEDURE — 25010000002 DIPHENHYDRAMINE PER 50 MG: Performed by: NURSE PRACTITIONER

## 2019-12-16 PROCEDURE — 25010000002 DEXAMETHASONE SODIUM PHOSPHATE 100 MG/10ML SOLUTION: Performed by: NURSE PRACTITIONER

## 2019-12-16 PROCEDURE — 25010000002 PALONOSETRON PER 25 MCG: Performed by: NURSE PRACTITIONER

## 2019-12-16 RX ORDER — FAMOTIDINE 10 MG/ML
20 INJECTION, SOLUTION INTRAVENOUS AS NEEDED
Status: CANCELLED | OUTPATIENT
Start: 2019-12-16

## 2019-12-16 RX ORDER — SODIUM CHLORIDE 0.9 % (FLUSH) 0.9 %
10 SYRINGE (ML) INJECTION AS NEEDED
Status: CANCELLED | OUTPATIENT
Start: 2019-12-16

## 2019-12-16 RX ORDER — PALONOSETRON 0.05 MG/ML
0.25 INJECTION, SOLUTION INTRAVENOUS ONCE
Status: CANCELLED | OUTPATIENT
Start: 2019-12-16

## 2019-12-16 RX ORDER — PALONOSETRON 0.05 MG/ML
0.25 INJECTION, SOLUTION INTRAVENOUS ONCE
Status: COMPLETED | OUTPATIENT
Start: 2019-12-16 | End: 2019-12-16

## 2019-12-16 RX ORDER — FAMOTIDINE 10 MG/ML
20 INJECTION, SOLUTION INTRAVENOUS ONCE
Status: CANCELLED | OUTPATIENT
Start: 2019-12-16

## 2019-12-16 RX ORDER — SODIUM CHLORIDE 9 MG/ML
250 INJECTION, SOLUTION INTRAVENOUS ONCE
Status: COMPLETED | OUTPATIENT
Start: 2019-12-16 | End: 2019-12-16

## 2019-12-16 RX ORDER — FAMOTIDINE 10 MG/ML
20 INJECTION, SOLUTION INTRAVENOUS ONCE
Status: COMPLETED | OUTPATIENT
Start: 2019-12-16 | End: 2019-12-16

## 2019-12-16 RX ORDER — DIPHENHYDRAMINE HYDROCHLORIDE 50 MG/ML
50 INJECTION INTRAMUSCULAR; INTRAVENOUS AS NEEDED
Status: DISCONTINUED | OUTPATIENT
Start: 2019-12-16 | End: 2019-12-16 | Stop reason: HOSPADM

## 2019-12-16 RX ORDER — SODIUM CHLORIDE 0.9 % (FLUSH) 0.9 %
10 SYRINGE (ML) INJECTION AS NEEDED
Status: DISCONTINUED | OUTPATIENT
Start: 2019-12-16 | End: 2019-12-16 | Stop reason: HOSPADM

## 2019-12-16 RX ORDER — SODIUM CHLORIDE 9 MG/ML
250 INJECTION, SOLUTION INTRAVENOUS ONCE
Status: CANCELLED | OUTPATIENT
Start: 2019-12-16

## 2019-12-16 RX ORDER — FAMOTIDINE 10 MG/ML
20 INJECTION, SOLUTION INTRAVENOUS AS NEEDED
Status: DISCONTINUED | OUTPATIENT
Start: 2019-12-16 | End: 2019-12-16 | Stop reason: HOSPADM

## 2019-12-16 RX ORDER — DIPHENHYDRAMINE HYDROCHLORIDE 50 MG/ML
50 INJECTION INTRAMUSCULAR; INTRAVENOUS AS NEEDED
Status: CANCELLED | OUTPATIENT
Start: 2019-12-16

## 2019-12-16 RX ADMIN — FAMOTIDINE 20 MG: 10 INJECTION, SOLUTION INTRAVENOUS at 11:09

## 2019-12-16 RX ADMIN — SODIUM CHLORIDE 250 ML: 9 INJECTION, SOLUTION INTRAVENOUS at 11:08

## 2019-12-16 RX ADMIN — Medication 500 UNITS: at 13:47

## 2019-12-16 RX ADMIN — SODIUM CHLORIDE, PRESERVATIVE FREE 10 ML: 5 INJECTION INTRAVENOUS at 13:47

## 2019-12-16 RX ADMIN — PACLITAXEL 80 MG: 6 INJECTION, SOLUTION INTRAVENOUS at 12:01

## 2019-12-16 RX ADMIN — PALONOSETRON HYDROCHLORIDE 0.25 MG: 0.05 INJECTION, SOLUTION INTRAVENOUS at 11:09

## 2019-12-16 RX ADMIN — DEXAMETHASONE SODIUM PHOSPHATE 12 MG: 10 INJECTION, SOLUTION INTRAMUSCULAR; INTRAVENOUS at 11:16

## 2019-12-16 RX ADMIN — DIPHENHYDRAMINE HYDROCHLORIDE 25 MG: 50 INJECTION, SOLUTION INTRAMUSCULAR; INTRAVENOUS at 11:38

## 2019-12-16 RX ADMIN — CARBOPLATIN 220 MG: 10 INJECTION, SOLUTION INTRAVENOUS at 13:08

## 2019-12-16 NOTE — TELEPHONE ENCOUNTER
Call from Danette DOAN earlier requesting refill of pain medication.     I discussed with Dr. Russell and he would like for me to find out where the pain is coming from. The patient has a history od back and neck problems that are watched by Dr. Grady.     I spoke with patient and confirmed that her pain is mostly from the back and nexk pain. She was supposed to have surgery but has put off surgery due to treatment. She reports that she is in more pain now due to having to go to treatments daily. She reports that she has aches and pains through the night. I let her know that I would discuss this with Dr. Russell and call her back tomorrow.

## 2019-12-17 ENCOUNTER — HOSPITAL ENCOUNTER (OUTPATIENT)
Dept: RADIATION ONCOLOGY | Facility: HOSPITAL | Age: 64
Setting detail: RADIATION/ONCOLOGY SERIES
Discharge: HOME | End: 2019-12-17

## 2019-12-17 PROCEDURE — 77386: CPT | Performed by: RADIOLOGY

## 2019-12-17 NOTE — TELEPHONE ENCOUNTER
Called and discussed with the patient. She declines the referral to pain management at this time. She reports that she has been avoiding pain management for a long time. She is going to try to get by with Tylenol.

## 2019-12-18 ENCOUNTER — HOSPITAL ENCOUNTER (OUTPATIENT)
Dept: RADIATION ONCOLOGY | Facility: HOSPITAL | Age: 64
Setting detail: RADIATION/ONCOLOGY SERIES
Discharge: HOME | End: 2019-12-18

## 2019-12-18 PROCEDURE — 77386: CPT | Performed by: RADIOLOGY

## 2019-12-18 PROCEDURE — 77336 RADIATION PHYSICS CONSULT: CPT | Performed by: RADIOLOGY

## 2019-12-19 ENCOUNTER — HOSPITAL ENCOUNTER (OUTPATIENT)
Dept: RADIATION ONCOLOGY | Facility: HOSPITAL | Age: 64
Setting detail: RADIATION/ONCOLOGY SERIES
Discharge: HOME | End: 2019-12-19

## 2019-12-19 PROCEDURE — 77386: CPT | Performed by: RADIOLOGY

## 2019-12-20 ENCOUNTER — HOSPITAL ENCOUNTER (OUTPATIENT)
Dept: RADIATION ONCOLOGY | Facility: HOSPITAL | Age: 64
Setting detail: RADIATION/ONCOLOGY SERIES
Discharge: HOME | End: 2019-12-20

## 2019-12-20 DIAGNOSIS — C34.81 MALIGNANT NEOPLASM OF OVERLAPPING SITES OF RIGHT LUNG (HCC): ICD-10-CM

## 2019-12-20 PROCEDURE — 77386: CPT | Performed by: RADIOLOGY

## 2019-12-20 RX ORDER — DIPHENHYDRAMINE HYDROCHLORIDE 50 MG/ML
50 INJECTION INTRAMUSCULAR; INTRAVENOUS AS NEEDED
Status: CANCELLED | OUTPATIENT
Start: 2019-12-23

## 2019-12-20 RX ORDER — SODIUM CHLORIDE 9 MG/ML
250 INJECTION, SOLUTION INTRAVENOUS ONCE
Status: CANCELLED | OUTPATIENT
Start: 2019-12-23

## 2019-12-20 RX ORDER — FAMOTIDINE 10 MG/ML
20 INJECTION, SOLUTION INTRAVENOUS ONCE
Status: CANCELLED | OUTPATIENT
Start: 2019-12-23

## 2019-12-20 RX ORDER — PALONOSETRON 0.05 MG/ML
0.25 INJECTION, SOLUTION INTRAVENOUS ONCE
Status: CANCELLED | OUTPATIENT
Start: 2019-12-23

## 2019-12-20 RX ORDER — FAMOTIDINE 10 MG/ML
20 INJECTION, SOLUTION INTRAVENOUS AS NEEDED
Status: CANCELLED | OUTPATIENT
Start: 2019-12-23

## 2019-12-23 ENCOUNTER — LAB (OUTPATIENT)
Dept: LAB | Facility: HOSPITAL | Age: 64
End: 2019-12-23

## 2019-12-23 ENCOUNTER — INFUSION (OUTPATIENT)
Dept: ONCOLOGY | Facility: HOSPITAL | Age: 64
End: 2019-12-23

## 2019-12-23 ENCOUNTER — HOSPITAL ENCOUNTER (OUTPATIENT)
Dept: RADIATION ONCOLOGY | Facility: HOSPITAL | Age: 64
Setting detail: RADIATION/ONCOLOGY SERIES
Discharge: HOME | End: 2019-12-23

## 2019-12-23 VITALS
HEART RATE: 86 BPM | DIASTOLIC BLOOD PRESSURE: 69 MMHG | WEIGHT: 161.8 LBS | TEMPERATURE: 97.9 F | OXYGEN SATURATION: 100 % | BODY MASS INDEX: 26 KG/M2 | RESPIRATION RATE: 18 BRPM | HEIGHT: 66 IN | SYSTOLIC BLOOD PRESSURE: 118 MMHG

## 2019-12-23 DIAGNOSIS — C34.81 MALIGNANT NEOPLASM OF OVERLAPPING SITES OF RIGHT LUNG (HCC): Primary | ICD-10-CM

## 2019-12-23 DIAGNOSIS — F32.A ANXIETY AND DEPRESSION: ICD-10-CM

## 2019-12-23 DIAGNOSIS — J43.9 PULMONARY EMPHYSEMA, UNSPECIFIED EMPHYSEMA TYPE (HCC): ICD-10-CM

## 2019-12-23 DIAGNOSIS — C34.81 MALIGNANT NEOPLASM OF OVERLAPPING SITES OF RIGHT LUNG (HCC): ICD-10-CM

## 2019-12-23 DIAGNOSIS — F41.9 ANXIETY AND DEPRESSION: ICD-10-CM

## 2019-12-23 DIAGNOSIS — C77.0 SECONDARY MALIGNANT NEOPLASM OF SUPRACLAVICULAR LYMPH NODE (HCC): ICD-10-CM

## 2019-12-23 LAB
ALBUMIN SERPL-MCNC: 4.1 G/DL (ref 3.5–5.2)
ALBUMIN/GLOB SERPL: 1.5 G/DL
ALP SERPL-CCNC: 53 U/L (ref 39–117)
ALT SERPL W P-5'-P-CCNC: 11 U/L (ref 1–33)
ANION GAP SERPL CALCULATED.3IONS-SCNC: 10 MMOL/L (ref 5–15)
AST SERPL-CCNC: 12 U/L (ref 1–32)
BASOPHILS # BLD AUTO: 0.02 10*3/MM3 (ref 0–0.2)
BASOPHILS NFR BLD AUTO: 0.6 % (ref 0–1.5)
BILIRUB SERPL-MCNC: 0.2 MG/DL (ref 0.2–1.2)
BUN BLD-MCNC: 17 MG/DL (ref 8–23)
BUN/CREAT SERPL: 23.3 (ref 7–25)
CALCIUM SPEC-SCNC: 9.9 MG/DL (ref 8.6–10.5)
CHLORIDE SERPL-SCNC: 104 MMOL/L (ref 98–107)
CO2 SERPL-SCNC: 25 MMOL/L (ref 22–29)
CREAT BLD-MCNC: 0.73 MG/DL (ref 0.57–1)
DEPRECATED RDW RBC AUTO: 46.1 FL (ref 37–54)
EOSINOPHIL # BLD AUTO: 0.06 10*3/MM3 (ref 0–0.4)
EOSINOPHIL NFR BLD AUTO: 1.8 % (ref 0.3–6.2)
ERYTHROCYTE [DISTWIDTH] IN BLOOD BY AUTOMATED COUNT: 14 % (ref 12.3–15.4)
GFR SERPL CREATININE-BSD FRML MDRD: 80 ML/MIN/1.73
GLOBULIN UR ELPH-MCNC: 2.7 GM/DL
GLUCOSE BLD-MCNC: 100 MG/DL (ref 65–99)
HCT VFR BLD AUTO: 31.9 % (ref 34–46.6)
HGB BLD-MCNC: 10.1 G/DL (ref 12–15.9)
IMM GRANULOCYTES # BLD AUTO: 0.02 10*3/MM3 (ref 0–0.05)
IMM GRANULOCYTES NFR BLD AUTO: 0.6 % (ref 0–0.5)
LYMPHOCYTES # BLD AUTO: 0.38 10*3/MM3 (ref 0.7–3.1)
LYMPHOCYTES NFR BLD AUTO: 11.1 % (ref 19.6–45.3)
MAGNESIUM SERPL-MCNC: 1.7 MG/DL (ref 1.6–2.4)
MCH RBC QN AUTO: 29.2 PG (ref 26.6–33)
MCHC RBC AUTO-ENTMCNC: 31.7 G/DL (ref 31.5–35.7)
MCV RBC AUTO: 92.2 FL (ref 79–97)
MONOCYTES # BLD AUTO: 0.41 10*3/MM3 (ref 0.1–0.9)
MONOCYTES NFR BLD AUTO: 12 % (ref 5–12)
NEUTROPHILS # BLD AUTO: 2.53 10*3/MM3 (ref 1.7–7)
NEUTROPHILS NFR BLD AUTO: 73.9 % (ref 42.7–76)
NRBC BLD AUTO-RTO: 0 /100 WBC (ref 0–0.2)
PLATELET # BLD AUTO: 211 10*3/MM3 (ref 140–450)
PMV BLD AUTO: 9.9 FL (ref 6–12)
POTASSIUM BLD-SCNC: 3.8 MMOL/L (ref 3.5–5.2)
PROT SERPL-MCNC: 6.8 G/DL (ref 6–8.5)
RBC # BLD AUTO: 3.46 10*6/MM3 (ref 3.77–5.28)
SODIUM BLD-SCNC: 139 MMOL/L (ref 136–145)
WBC NRBC COR # BLD: 3.42 10*3/MM3 (ref 3.4–10.8)

## 2019-12-23 PROCEDURE — 25010000002 CARBOPLATIN PER 50 MG: Performed by: NURSE PRACTITIONER

## 2019-12-23 PROCEDURE — 25010000002 PALONOSETRON PER 25 MCG: Performed by: NURSE PRACTITIONER

## 2019-12-23 PROCEDURE — 85025 COMPLETE CBC W/AUTO DIFF WBC: CPT

## 2019-12-23 PROCEDURE — 96413 CHEMO IV INFUSION 1 HR: CPT

## 2019-12-23 PROCEDURE — 83735 ASSAY OF MAGNESIUM: CPT

## 2019-12-23 PROCEDURE — 25010000002 DEXAMETHASONE SODIUM PHOSPHATE 100 MG/10ML SOLUTION: Performed by: NURSE PRACTITIONER

## 2019-12-23 PROCEDURE — 25010000002 DIPHENHYDRAMINE PER 50 MG: Performed by: NURSE PRACTITIONER

## 2019-12-23 PROCEDURE — 96417 CHEMO IV INFUS EACH ADDL SEQ: CPT

## 2019-12-23 PROCEDURE — 96375 TX/PRO/DX INJ NEW DRUG ADDON: CPT

## 2019-12-23 PROCEDURE — 25010000002 PACLITAXEL PER 30 MG: Performed by: NURSE PRACTITIONER

## 2019-12-23 PROCEDURE — 96367 TX/PROPH/DG ADDL SEQ IV INF: CPT

## 2019-12-23 PROCEDURE — 77386: CPT | Performed by: RADIOLOGY

## 2019-12-23 PROCEDURE — 80053 COMPREHEN METABOLIC PANEL: CPT

## 2019-12-23 RX ORDER — DIPHENHYDRAMINE HYDROCHLORIDE 50 MG/ML
50 INJECTION INTRAMUSCULAR; INTRAVENOUS AS NEEDED
Status: DISCONTINUED | OUTPATIENT
Start: 2019-12-23 | End: 2019-12-23 | Stop reason: HOSPADM

## 2019-12-23 RX ORDER — PALONOSETRON 0.05 MG/ML
0.25 INJECTION, SOLUTION INTRAVENOUS ONCE
Status: COMPLETED | OUTPATIENT
Start: 2019-12-23 | End: 2019-12-23

## 2019-12-23 RX ORDER — SODIUM CHLORIDE 9 MG/ML
250 INJECTION, SOLUTION INTRAVENOUS ONCE
Status: COMPLETED | OUTPATIENT
Start: 2019-12-23 | End: 2019-12-23

## 2019-12-23 RX ORDER — FAMOTIDINE 10 MG/ML
20 INJECTION, SOLUTION INTRAVENOUS AS NEEDED
Status: DISCONTINUED | OUTPATIENT
Start: 2019-12-23 | End: 2019-12-23 | Stop reason: HOSPADM

## 2019-12-23 RX ORDER — SODIUM CHLORIDE 0.9 % (FLUSH) 0.9 %
10 SYRINGE (ML) INJECTION AS NEEDED
Status: DISCONTINUED | OUTPATIENT
Start: 2019-12-23 | End: 2019-12-23 | Stop reason: HOSPADM

## 2019-12-23 RX ORDER — FAMOTIDINE 10 MG/ML
20 INJECTION, SOLUTION INTRAVENOUS ONCE
Status: COMPLETED | OUTPATIENT
Start: 2019-12-23 | End: 2019-12-23

## 2019-12-23 RX ORDER — SODIUM CHLORIDE 0.9 % (FLUSH) 0.9 %
10 SYRINGE (ML) INJECTION AS NEEDED
Status: CANCELLED | OUTPATIENT
Start: 2019-12-23

## 2019-12-23 RX ADMIN — PALONOSETRON HYDROCHLORIDE 0.25 MG: 0.25 INJECTION, SOLUTION INTRAVENOUS at 10:42

## 2019-12-23 RX ADMIN — SODIUM CHLORIDE, PRESERVATIVE FREE 10 ML: 5 INJECTION INTRAVENOUS at 13:13

## 2019-12-23 RX ADMIN — PACLITAXEL 80 MG: 6 INJECTION, SOLUTION INTRAVENOUS at 11:24

## 2019-12-23 RX ADMIN — DEXAMETHASONE SODIUM PHOSPHATE 12 MG: 10 INJECTION, SOLUTION INTRAMUSCULAR; INTRAVENOUS at 10:44

## 2019-12-23 RX ADMIN — Medication 500 UNITS: at 13:13

## 2019-12-23 RX ADMIN — DIPHENHYDRAMINE HYDROCHLORIDE 25 MG: 50 INJECTION, SOLUTION INTRAMUSCULAR; INTRAVENOUS at 11:04

## 2019-12-23 RX ADMIN — SODIUM CHLORIDE 250 ML: 9 INJECTION, SOLUTION INTRAVENOUS at 10:28

## 2019-12-23 RX ADMIN — FAMOTIDINE 20 MG: 10 INJECTION, SOLUTION INTRAVENOUS at 10:43

## 2019-12-23 RX ADMIN — CARBOPLATIN 230 MG: 10 INJECTION, SOLUTION INTRAVENOUS at 12:32

## 2019-12-24 ENCOUNTER — HOSPITAL ENCOUNTER (OUTPATIENT)
Dept: RADIATION ONCOLOGY | Facility: HOSPITAL | Age: 64
Setting detail: RADIATION/ONCOLOGY SERIES
Discharge: HOME | End: 2019-12-24

## 2019-12-24 PROCEDURE — 77386: CPT | Performed by: RADIOLOGY

## 2019-12-24 PROCEDURE — 77300 RADIATION THERAPY DOSE PLAN: CPT | Performed by: RADIOLOGY

## 2019-12-26 ENCOUNTER — HOSPITAL ENCOUNTER (OUTPATIENT)
Dept: RADIATION ONCOLOGY | Facility: HOSPITAL | Age: 64
Setting detail: RADIATION/ONCOLOGY SERIES
Discharge: HOME | End: 2019-12-26

## 2019-12-26 PROCEDURE — 77386: CPT | Performed by: RADIOLOGY

## 2019-12-27 ENCOUNTER — HOSPITAL ENCOUNTER (OUTPATIENT)
Dept: RADIATION ONCOLOGY | Facility: HOSPITAL | Age: 64
Setting detail: RADIATION/ONCOLOGY SERIES
Discharge: HOME | End: 2019-12-27

## 2019-12-27 DIAGNOSIS — C34.81 MALIGNANT NEOPLASM OF OVERLAPPING SITES OF RIGHT LUNG (HCC): ICD-10-CM

## 2019-12-27 PROCEDURE — 77336 RADIATION PHYSICS CONSULT: CPT | Performed by: RADIOLOGY

## 2019-12-27 PROCEDURE — 77386: CPT | Performed by: RADIOLOGY

## 2019-12-27 RX ORDER — DIPHENHYDRAMINE HYDROCHLORIDE 50 MG/ML
50 INJECTION INTRAMUSCULAR; INTRAVENOUS AS NEEDED
Status: CANCELLED | OUTPATIENT
Start: 2019-12-30

## 2019-12-27 RX ORDER — FAMOTIDINE 10 MG/ML
20 INJECTION, SOLUTION INTRAVENOUS ONCE
Status: CANCELLED | OUTPATIENT
Start: 2019-12-30

## 2019-12-27 RX ORDER — PALONOSETRON 0.05 MG/ML
0.25 INJECTION, SOLUTION INTRAVENOUS ONCE
Status: CANCELLED | OUTPATIENT
Start: 2019-12-30

## 2019-12-27 RX ORDER — SODIUM CHLORIDE 9 MG/ML
250 INJECTION, SOLUTION INTRAVENOUS ONCE
Status: CANCELLED | OUTPATIENT
Start: 2019-12-30

## 2019-12-27 RX ORDER — FAMOTIDINE 10 MG/ML
20 INJECTION, SOLUTION INTRAVENOUS AS NEEDED
Status: CANCELLED | OUTPATIENT
Start: 2019-12-30

## 2019-12-30 ENCOUNTER — LAB (OUTPATIENT)
Dept: LAB | Facility: HOSPITAL | Age: 64
End: 2019-12-30

## 2019-12-30 ENCOUNTER — INFUSION (OUTPATIENT)
Dept: ONCOLOGY | Facility: HOSPITAL | Age: 64
End: 2019-12-30

## 2019-12-30 ENCOUNTER — HOSPITAL ENCOUNTER (OUTPATIENT)
Dept: RADIATION ONCOLOGY | Facility: HOSPITAL | Age: 64
Discharge: HOME | End: 2019-12-30

## 2019-12-30 VITALS
TEMPERATURE: 98.9 F | DIASTOLIC BLOOD PRESSURE: 59 MMHG | RESPIRATION RATE: 18 BRPM | HEIGHT: 66 IN | SYSTOLIC BLOOD PRESSURE: 114 MMHG | BODY MASS INDEX: 25.75 KG/M2 | HEART RATE: 93 BPM | WEIGHT: 160.2 LBS | OXYGEN SATURATION: 97 %

## 2019-12-30 DIAGNOSIS — J43.9 PULMONARY EMPHYSEMA, UNSPECIFIED EMPHYSEMA TYPE (HCC): ICD-10-CM

## 2019-12-30 DIAGNOSIS — E83.42 HYPOMAGNESEMIA: ICD-10-CM

## 2019-12-30 DIAGNOSIS — F41.9 ANXIETY AND DEPRESSION: ICD-10-CM

## 2019-12-30 DIAGNOSIS — F32.A ANXIETY AND DEPRESSION: ICD-10-CM

## 2019-12-30 DIAGNOSIS — C34.81 MALIGNANT NEOPLASM OF OVERLAPPING SITES OF RIGHT LUNG (HCC): Primary | ICD-10-CM

## 2019-12-30 DIAGNOSIS — C34.81 MALIGNANT NEOPLASM OF OVERLAPPING SITES OF RIGHT LUNG (HCC): ICD-10-CM

## 2019-12-30 DIAGNOSIS — C77.0 SECONDARY MALIGNANT NEOPLASM OF SUPRACLAVICULAR LYMPH NODE (HCC): ICD-10-CM

## 2019-12-30 LAB
ALBUMIN SERPL-MCNC: 4.1 G/DL (ref 3.5–5.2)
ALBUMIN/GLOB SERPL: 1.5 G/DL
ALP SERPL-CCNC: 53 U/L (ref 39–117)
ALT SERPL W P-5'-P-CCNC: 12 U/L (ref 1–33)
ANION GAP SERPL CALCULATED.3IONS-SCNC: 11 MMOL/L (ref 5–15)
AST SERPL-CCNC: 14 U/L (ref 1–32)
BASOPHILS # BLD AUTO: 0.01 10*3/MM3 (ref 0–0.2)
BASOPHILS NFR BLD AUTO: 0.4 % (ref 0–1.5)
BILIRUB SERPL-MCNC: 0.2 MG/DL (ref 0.2–1.2)
BUN BLD-MCNC: 16 MG/DL (ref 8–23)
BUN/CREAT SERPL: 24.2 (ref 7–25)
CALCIUM SPEC-SCNC: 9.6 MG/DL (ref 8.6–10.5)
CHLORIDE SERPL-SCNC: 103 MMOL/L (ref 98–107)
CO2 SERPL-SCNC: 26 MMOL/L (ref 22–29)
CREAT BLD-MCNC: 0.66 MG/DL (ref 0.57–1)
DEPRECATED RDW RBC AUTO: 46.3 FL (ref 37–54)
EOSINOPHIL # BLD AUTO: 0.03 10*3/MM3 (ref 0–0.4)
EOSINOPHIL NFR BLD AUTO: 1.2 % (ref 0.3–6.2)
ERYTHROCYTE [DISTWIDTH] IN BLOOD BY AUTOMATED COUNT: 14.6 % (ref 12.3–15.4)
GFR SERPL CREATININE-BSD FRML MDRD: 90 ML/MIN/1.73
GLOBULIN UR ELPH-MCNC: 2.7 GM/DL
GLUCOSE BLD-MCNC: 96 MG/DL (ref 65–99)
HCT VFR BLD AUTO: 30.9 % (ref 34–46.6)
HGB BLD-MCNC: 10 G/DL (ref 12–15.9)
IMM GRANULOCYTES # BLD AUTO: 0.01 10*3/MM3 (ref 0–0.05)
IMM GRANULOCYTES NFR BLD AUTO: 0.4 % (ref 0–0.5)
LYMPHOCYTES # BLD AUTO: 0.28 10*3/MM3 (ref 0.7–3.1)
LYMPHOCYTES NFR BLD AUTO: 10.9 % (ref 19.6–45.3)
MAGNESIUM SERPL-MCNC: 1.5 MG/DL (ref 1.6–2.4)
MCH RBC QN AUTO: 29.6 PG (ref 26.6–33)
MCHC RBC AUTO-ENTMCNC: 32.4 G/DL (ref 31.5–35.7)
MCV RBC AUTO: 91.4 FL (ref 79–97)
MONOCYTES # BLD AUTO: 0.32 10*3/MM3 (ref 0.1–0.9)
MONOCYTES NFR BLD AUTO: 12.5 % (ref 5–12)
NEUTROPHILS # BLD AUTO: 1.92 10*3/MM3 (ref 1.7–7)
NEUTROPHILS NFR BLD AUTO: 74.6 % (ref 42.7–76)
NRBC BLD AUTO-RTO: 0 /100 WBC (ref 0–0.2)
PLATELET # BLD AUTO: 167 10*3/MM3 (ref 140–450)
PMV BLD AUTO: 9.7 FL (ref 6–12)
POTASSIUM BLD-SCNC: 3.9 MMOL/L (ref 3.5–5.2)
PROT SERPL-MCNC: 6.8 G/DL (ref 6–8.5)
RBC # BLD AUTO: 3.38 10*6/MM3 (ref 3.77–5.28)
SODIUM BLD-SCNC: 140 MMOL/L (ref 136–145)
WBC NRBC COR # BLD: 2.57 10*3/MM3 (ref 3.4–10.8)

## 2019-12-30 PROCEDURE — 25010000002 DEXAMETHASONE SODIUM PHOSPHATE 100 MG/10ML SOLUTION: Performed by: NURSE PRACTITIONER

## 2019-12-30 PROCEDURE — 77386: CPT | Performed by: RADIOLOGY

## 2019-12-30 PROCEDURE — 25010000002 CARBOPLATIN PER 50 MG: Performed by: NURSE PRACTITIONER

## 2019-12-30 PROCEDURE — 96413 CHEMO IV INFUSION 1 HR: CPT

## 2019-12-30 PROCEDURE — 25010000002 PACLITAXEL PER 30 MG: Performed by: NURSE PRACTITIONER

## 2019-12-30 PROCEDURE — 96367 TX/PROPH/DG ADDL SEQ IV INF: CPT

## 2019-12-30 PROCEDURE — 96417 CHEMO IV INFUS EACH ADDL SEQ: CPT

## 2019-12-30 PROCEDURE — 96375 TX/PRO/DX INJ NEW DRUG ADDON: CPT

## 2019-12-30 PROCEDURE — 80053 COMPREHEN METABOLIC PANEL: CPT

## 2019-12-30 PROCEDURE — 25010000002 MAGNESIUM SULFATE 2 GM/50ML SOLUTION: Performed by: INTERNAL MEDICINE

## 2019-12-30 PROCEDURE — 85025 COMPLETE CBC W/AUTO DIFF WBC: CPT

## 2019-12-30 PROCEDURE — 96368 THER/DIAG CONCURRENT INF: CPT

## 2019-12-30 PROCEDURE — 25010000002 DIPHENHYDRAMINE PER 50 MG: Performed by: NURSE PRACTITIONER

## 2019-12-30 PROCEDURE — 25010000002 PALONOSETRON PER 25 MCG: Performed by: NURSE PRACTITIONER

## 2019-12-30 PROCEDURE — 83735 ASSAY OF MAGNESIUM: CPT

## 2019-12-30 RX ORDER — DIPHENHYDRAMINE HYDROCHLORIDE 50 MG/ML
50 INJECTION INTRAMUSCULAR; INTRAVENOUS AS NEEDED
Status: DISCONTINUED | OUTPATIENT
Start: 2019-12-30 | End: 2019-12-30 | Stop reason: HOSPADM

## 2019-12-30 RX ORDER — MAGNESIUM SULFATE IN 0.9% NACL 2 G/50 ML
2 INTRAVENOUS SOLUTION, PIGGYBACK (ML) INTRAVENOUS ONCE
Status: CANCELLED
Start: 2019-12-30

## 2019-12-30 RX ORDER — FAMOTIDINE 10 MG/ML
20 INJECTION, SOLUTION INTRAVENOUS ONCE
Status: COMPLETED | OUTPATIENT
Start: 2019-12-30 | End: 2019-12-30

## 2019-12-30 RX ORDER — SODIUM CHLORIDE 0.9 % (FLUSH) 0.9 %
10 SYRINGE (ML) INJECTION AS NEEDED
Status: CANCELLED | OUTPATIENT
Start: 2019-12-30

## 2019-12-30 RX ORDER — SODIUM CHLORIDE 0.9 % (FLUSH) 0.9 %
10 SYRINGE (ML) INJECTION AS NEEDED
Status: DISCONTINUED | OUTPATIENT
Start: 2019-12-30 | End: 2019-12-30 | Stop reason: HOSPADM

## 2019-12-30 RX ORDER — FAMOTIDINE 10 MG/ML
20 INJECTION, SOLUTION INTRAVENOUS AS NEEDED
Status: DISCONTINUED | OUTPATIENT
Start: 2019-12-30 | End: 2019-12-30 | Stop reason: HOSPADM

## 2019-12-30 RX ORDER — PALONOSETRON 0.05 MG/ML
0.25 INJECTION, SOLUTION INTRAVENOUS ONCE
Status: COMPLETED | OUTPATIENT
Start: 2019-12-30 | End: 2019-12-30

## 2019-12-30 RX ORDER — SODIUM CHLORIDE 9 MG/ML
250 INJECTION, SOLUTION INTRAVENOUS ONCE
Status: COMPLETED | OUTPATIENT
Start: 2019-12-30 | End: 2019-12-30

## 2019-12-30 RX ORDER — MAGNESIUM SULFATE HEPTAHYDRATE 40 MG/ML
2 INJECTION, SOLUTION INTRAVENOUS ONCE
Status: COMPLETED | OUTPATIENT
Start: 2019-12-30 | End: 2019-12-30

## 2019-12-30 RX ADMIN — SODIUM CHLORIDE, PRESERVATIVE FREE 10 ML: 5 INJECTION INTRAVENOUS at 13:51

## 2019-12-30 RX ADMIN — CARBOPLATIN 240 MG: 10 INJECTION, SOLUTION INTRAVENOUS at 12:51

## 2019-12-30 RX ADMIN — PALONOSETRON HYDROCHLORIDE 0.25 MG: 0.25 INJECTION, SOLUTION INTRAVENOUS at 10:38

## 2019-12-30 RX ADMIN — FAMOTIDINE 20 MG: 10 INJECTION, SOLUTION INTRAVENOUS at 10:40

## 2019-12-30 RX ADMIN — Medication 500 UNITS: at 13:51

## 2019-12-30 RX ADMIN — SODIUM CHLORIDE 250 ML: 9 INJECTION, SOLUTION INTRAVENOUS at 10:35

## 2019-12-30 RX ADMIN — PACLITAXEL 80 MG: 6 INJECTION, SOLUTION INTRAVENOUS at 11:47

## 2019-12-30 RX ADMIN — DEXAMETHASONE SODIUM PHOSPHATE 12 MG: 10 INJECTION, SOLUTION INTRAMUSCULAR; INTRAVENOUS at 11:25

## 2019-12-30 RX ADMIN — MAGNESIUM SULFATE HEPTAHYDRATE 2 G: 40 INJECTION, SOLUTION INTRAVENOUS at 11:46

## 2019-12-30 RX ADMIN — DIPHENHYDRAMINE HYDROCHLORIDE 25 MG: 50 INJECTION, SOLUTION INTRAMUSCULAR; INTRAVENOUS at 10:42

## 2019-12-30 NOTE — PROGRESS NOTES
1030 Labs called to Yadiel Mcneal RN with Dr. Dixon. OK for treatment per Dr. Dixon but ordered Magnesium 2 grams IV today.-Livier DOAN

## 2019-12-31 ENCOUNTER — HOSPITAL ENCOUNTER (OUTPATIENT)
Dept: RADIATION ONCOLOGY | Facility: HOSPITAL | Age: 64
Setting detail: RADIATION/ONCOLOGY SERIES
Discharge: HOME | End: 2019-12-31

## 2019-12-31 ENCOUNTER — TELEPHONE (OUTPATIENT)
Dept: ONCOLOGY | Facility: CLINIC | Age: 64
End: 2019-12-31

## 2019-12-31 PROCEDURE — 77300 RADIATION THERAPY DOSE PLAN: CPT | Performed by: RADIOLOGY

## 2019-12-31 PROCEDURE — 77386: CPT | Performed by: RADIOLOGY

## 2019-12-31 NOTE — TELEPHONE ENCOUNTER
----- Message from Kade Russell MD sent at 12/30/2019  4:53 PM CST -----  Labs, 12/30/2019.  Magnesium 1.5 (depressed).  Please schedule magnesium sulfate 2 g IV.  Recheck magnesium level post infusion

## 2020-01-02 ENCOUNTER — HOSPITAL ENCOUNTER (OUTPATIENT)
Dept: RADIATION ONCOLOGY | Facility: HOSPITAL | Age: 65
Setting detail: RADIATION/ONCOLOGY SERIES
Discharge: HOME | End: 2020-01-02

## 2020-01-02 ENCOUNTER — HOSPITAL ENCOUNTER (OUTPATIENT)
Dept: RADIATION ONCOLOGY | Facility: HOSPITAL | Age: 65
Setting detail: RADIATION/ONCOLOGY SERIES
End: 2020-01-02

## 2020-01-02 PROCEDURE — 77386: CPT | Performed by: RADIOLOGY

## 2020-01-03 ENCOUNTER — HOSPITAL ENCOUNTER (OUTPATIENT)
Dept: RADIATION ONCOLOGY | Facility: HOSPITAL | Age: 65
Setting detail: RADIATION/ONCOLOGY SERIES
Discharge: HOME | End: 2020-01-03

## 2020-01-03 ENCOUNTER — TELEPHONE (OUTPATIENT)
Dept: ONCOLOGY | Facility: CLINIC | Age: 65
End: 2020-01-03

## 2020-01-03 DIAGNOSIS — C34.81 MALIGNANT NEOPLASM OF OVERLAPPING SITES OF RIGHT LUNG (HCC): ICD-10-CM

## 2020-01-03 DIAGNOSIS — E83.42 HYPOMAGNESEMIA: ICD-10-CM

## 2020-01-03 PROCEDURE — 77386: CPT | Performed by: RADIOLOGY

## 2020-01-03 PROCEDURE — 77336 RADIATION PHYSICS CONSULT: CPT | Performed by: RADIOLOGY

## 2020-01-03 RX ORDER — FAMOTIDINE 10 MG/ML
20 INJECTION, SOLUTION INTRAVENOUS ONCE
Status: CANCELLED | OUTPATIENT
Start: 2020-01-20

## 2020-01-03 RX ORDER — FAMOTIDINE 10 MG/ML
20 INJECTION, SOLUTION INTRAVENOUS AS NEEDED
Status: CANCELLED | OUTPATIENT
Start: 2020-01-20

## 2020-01-03 RX ORDER — DIPHENHYDRAMINE HYDROCHLORIDE 50 MG/ML
50 INJECTION INTRAMUSCULAR; INTRAVENOUS AS NEEDED
Status: CANCELLED | OUTPATIENT
Start: 2020-01-20

## 2020-01-03 RX ORDER — PALONOSETRON 0.05 MG/ML
0.25 INJECTION, SOLUTION INTRAVENOUS ONCE
Status: CANCELLED | OUTPATIENT
Start: 2020-01-20

## 2020-01-03 RX ORDER — SODIUM CHLORIDE 9 MG/ML
250 INJECTION, SOLUTION INTRAVENOUS ONCE
Status: CANCELLED | OUTPATIENT
Start: 2020-01-20

## 2020-01-03 NOTE — TELEPHONE ENCOUNTER
Call from patient and she is requesting to have her iron profile drawn with her lab appt on Monday. I let her know that would be fine. I checked her orders and she already has orders for the lab.

## 2020-01-06 ENCOUNTER — LAB (OUTPATIENT)
Dept: LAB | Facility: HOSPITAL | Age: 65
End: 2020-01-06

## 2020-01-06 ENCOUNTER — HOSPITAL ENCOUNTER (OUTPATIENT)
Dept: RADIATION ONCOLOGY | Facility: HOSPITAL | Age: 65
Setting detail: RADIATION/ONCOLOGY SERIES
Discharge: HOME | End: 2020-01-06

## 2020-01-06 ENCOUNTER — INFUSION (OUTPATIENT)
Dept: ONCOLOGY | Facility: HOSPITAL | Age: 65
End: 2020-01-06

## 2020-01-06 VITALS
TEMPERATURE: 97.9 F | BODY MASS INDEX: 27.97 KG/M2 | WEIGHT: 152 LBS | DIASTOLIC BLOOD PRESSURE: 62 MMHG | HEART RATE: 80 BPM | SYSTOLIC BLOOD PRESSURE: 111 MMHG | HEIGHT: 62 IN | OXYGEN SATURATION: 100 % | RESPIRATION RATE: 14 BRPM

## 2020-01-06 DIAGNOSIS — C34.81 MALIGNANT NEOPLASM OF OVERLAPPING SITES OF RIGHT LUNG (HCC): ICD-10-CM

## 2020-01-06 DIAGNOSIS — F41.9 ANXIETY AND DEPRESSION: ICD-10-CM

## 2020-01-06 DIAGNOSIS — C77.0 SECONDARY MALIGNANT NEOPLASM OF SUPRACLAVICULAR LYMPH NODE (HCC): ICD-10-CM

## 2020-01-06 DIAGNOSIS — F32.A ANXIETY AND DEPRESSION: ICD-10-CM

## 2020-01-06 DIAGNOSIS — J43.9 PULMONARY EMPHYSEMA, UNSPECIFIED EMPHYSEMA TYPE (HCC): ICD-10-CM

## 2020-01-06 DIAGNOSIS — E83.42 HYPOMAGNESEMIA: ICD-10-CM

## 2020-01-06 DIAGNOSIS — C34.81 MALIGNANT NEOPLASM OF OVERLAPPING SITES OF RIGHT LUNG (HCC): Primary | ICD-10-CM

## 2020-01-06 LAB
ALBUMIN SERPL-MCNC: 4.3 G/DL (ref 3.5–5.2)
ALBUMIN/GLOB SERPL: 1.6 G/DL
ALP SERPL-CCNC: 55 U/L (ref 39–117)
ALT SERPL W P-5'-P-CCNC: 10 U/L (ref 1–33)
ANION GAP SERPL CALCULATED.3IONS-SCNC: 11 MMOL/L (ref 5–15)
AST SERPL-CCNC: 11 U/L (ref 1–32)
BASOPHILS # BLD AUTO: 0.01 10*3/MM3 (ref 0–0.2)
BASOPHILS NFR BLD AUTO: 0.5 % (ref 0–1.5)
BILIRUB SERPL-MCNC: 0.3 MG/DL (ref 0.2–1.2)
BUN BLD-MCNC: 16 MG/DL (ref 8–23)
BUN/CREAT SERPL: 22.5 (ref 7–25)
CALCIUM SPEC-SCNC: 10.2 MG/DL (ref 8.6–10.5)
CEA SERPL-MCNC: 4.12 NG/ML
CHLORIDE SERPL-SCNC: 102 MMOL/L (ref 98–107)
CO2 SERPL-SCNC: 27 MMOL/L (ref 22–29)
CREAT BLD-MCNC: 0.71 MG/DL (ref 0.57–1)
DEPRECATED RDW RBC AUTO: 48.2 FL (ref 37–54)
EOSINOPHIL # BLD AUTO: 0.02 10*3/MM3 (ref 0–0.4)
EOSINOPHIL NFR BLD AUTO: 0.9 % (ref 0.3–6.2)
ERYTHROCYTE [DISTWIDTH] IN BLOOD BY AUTOMATED COUNT: 15 % (ref 12.3–15.4)
FERRITIN SERPL-MCNC: 591.5 NG/ML (ref 13–150)
GFR SERPL CREATININE-BSD FRML MDRD: 82 ML/MIN/1.73
GLOBULIN UR ELPH-MCNC: 2.7 GM/DL
GLUCOSE BLD-MCNC: 94 MG/DL (ref 65–99)
HCT VFR BLD AUTO: 30.9 % (ref 34–46.6)
HGB BLD-MCNC: 9.8 G/DL (ref 12–15.9)
IMM GRANULOCYTES # BLD AUTO: 0.01 10*3/MM3 (ref 0–0.05)
IMM GRANULOCYTES NFR BLD AUTO: 0.5 % (ref 0–0.5)
IRON 24H UR-MRATE: 95 MCG/DL (ref 37–145)
IRON SATN MFR SERPL: 35 % (ref 20–50)
LYMPHOCYTES # BLD AUTO: 0.3 10*3/MM3 (ref 0.7–3.1)
LYMPHOCYTES NFR BLD AUTO: 13.8 % (ref 19.6–45.3)
MAGNESIUM SERPL-MCNC: 1.8 MG/DL (ref 1.6–2.4)
MCH RBC QN AUTO: 29.4 PG (ref 26.6–33)
MCHC RBC AUTO-ENTMCNC: 31.7 G/DL (ref 31.5–35.7)
MCV RBC AUTO: 92.8 FL (ref 79–97)
MONOCYTES # BLD AUTO: 0.22 10*3/MM3 (ref 0.1–0.9)
MONOCYTES NFR BLD AUTO: 10.1 % (ref 5–12)
NEUTROPHILS # BLD AUTO: 1.61 10*3/MM3 (ref 1.7–7)
NEUTROPHILS NFR BLD AUTO: 74.2 % (ref 42.7–76)
NRBC BLD AUTO-RTO: 0 /100 WBC (ref 0–0.2)
PLATELET # BLD AUTO: 97 10*3/MM3 (ref 140–450)
PMV BLD AUTO: 10 FL (ref 6–12)
POTASSIUM BLD-SCNC: 3.8 MMOL/L (ref 3.5–5.2)
PROT SERPL-MCNC: 7 G/DL (ref 6–8.5)
RBC # BLD AUTO: 3.33 10*6/MM3 (ref 3.77–5.28)
SODIUM BLD-SCNC: 140 MMOL/L (ref 136–145)
TIBC SERPL-MCNC: 270 MCG/DL (ref 298–536)
TRANSFERRIN SERPL-MCNC: 181 MG/DL (ref 200–360)
WBC NRBC COR # BLD: 2.17 10*3/MM3 (ref 3.4–10.8)

## 2020-01-06 PROCEDURE — 96523 IRRIG DRUG DELIVERY DEVICE: CPT

## 2020-01-06 PROCEDURE — 84466 ASSAY OF TRANSFERRIN: CPT

## 2020-01-06 PROCEDURE — 83735 ASSAY OF MAGNESIUM: CPT

## 2020-01-06 PROCEDURE — 36415 COLL VENOUS BLD VENIPUNCTURE: CPT

## 2020-01-06 PROCEDURE — 85025 COMPLETE CBC W/AUTO DIFF WBC: CPT

## 2020-01-06 PROCEDURE — 80053 COMPREHEN METABOLIC PANEL: CPT

## 2020-01-06 PROCEDURE — 83540 ASSAY OF IRON: CPT

## 2020-01-06 PROCEDURE — 82728 ASSAY OF FERRITIN: CPT

## 2020-01-06 PROCEDURE — 77386: CPT | Performed by: RADIOLOGY

## 2020-01-06 PROCEDURE — 82378 CARCINOEMBRYONIC ANTIGEN: CPT

## 2020-01-06 RX ORDER — SODIUM CHLORIDE 0.9 % (FLUSH) 0.9 %
10 SYRINGE (ML) INJECTION AS NEEDED
Status: CANCELLED | OUTPATIENT
Start: 2020-01-06

## 2020-01-06 RX ORDER — SODIUM CHLORIDE 0.9 % (FLUSH) 0.9 %
10 SYRINGE (ML) INJECTION AS NEEDED
Status: DISCONTINUED | OUTPATIENT
Start: 2020-01-06 | End: 2020-01-06 | Stop reason: HOSPADM

## 2020-01-06 RX ADMIN — Medication 500 UNITS: at 11:02

## 2020-01-06 RX ADMIN — SODIUM CHLORIDE, PRESERVATIVE FREE 10 ML: 5 INJECTION INTRAVENOUS at 11:02

## 2020-01-06 NOTE — PROGRESS NOTES
Labs called to md and return call md wants to hold tx today and retry next week, spoke with Joi with dr HURLEY

## 2020-01-07 ENCOUNTER — HOSPITAL ENCOUNTER (OUTPATIENT)
Dept: RADIATION ONCOLOGY | Facility: HOSPITAL | Age: 65
Setting detail: RADIATION/ONCOLOGY SERIES
Discharge: HOME | End: 2020-01-07

## 2020-01-07 PROCEDURE — 77386: CPT | Performed by: RADIOLOGY

## 2020-01-08 ENCOUNTER — HOSPITAL ENCOUNTER (OUTPATIENT)
Dept: RADIATION ONCOLOGY | Facility: HOSPITAL | Age: 65
Setting detail: RADIATION/ONCOLOGY SERIES
Discharge: HOME | End: 2020-01-08

## 2020-01-08 PROCEDURE — 77336 RADIATION PHYSICS CONSULT: CPT | Performed by: RADIOLOGY

## 2020-01-08 PROCEDURE — 77386: CPT | Performed by: RADIOLOGY

## 2020-01-09 ENCOUNTER — HOSPITAL ENCOUNTER (OUTPATIENT)
Dept: RADIATION ONCOLOGY | Facility: HOSPITAL | Age: 65
Setting detail: RADIATION/ONCOLOGY SERIES
Discharge: HOME | End: 2020-01-09

## 2020-01-09 PROCEDURE — 77386: CPT | Performed by: RADIOLOGY

## 2020-01-10 ENCOUNTER — HOSPITAL ENCOUNTER (OUTPATIENT)
Dept: RADIATION ONCOLOGY | Facility: HOSPITAL | Age: 65
Setting detail: RADIATION/ONCOLOGY SERIES
Discharge: HOME | End: 2020-01-10

## 2020-01-10 PROCEDURE — 77386: CPT | Performed by: RADIOLOGY

## 2020-01-11 NOTE — PROGRESS NOTES
[facility] GROUP HEMATOLOGY AND ONCOLOGY  [address]  [place] [address] [phone]    Patient Name: Damaris Hu  Encounter Date: 01/13/2020  YOB: 1955  Patient Number: [id number]    REASON FOR VISIT: Damaris Hu is a 64-year-old female who returns in follow-up of stage III squamous cell lung carcinoma.  She was started on definitive radiation (12/05/2019) concurrent with weekly chemotherapy, week 4 on 12/30/2019.  Weeks 5 and 6 held due to thrombocytopenia and leukopenia.  She is here with her Sister Ashley, and with her daughter Tawana.    DIAGNOSTIC ABNORMALITIES:          1.   10/28/2019 patient presented to the emergency room with chest pain that has been ongoing for the past year but that recently had gotten more noticeable, more frequent, and has been associated with shortness of breath.  On the day prior to her hospital admission she apparently had a syncopal episode.  An evaluation ensued:  Labs: Glucose 112 otherwise CMP was normal with a BUN of 14 creatinine 0.91 (GFR 62) calcium 10.2, total protein 7.1 and normal liver enzymes.  Troponin T was less than 0.010, proBNP normal at 97.4, d-dimer was normal at 0.27, sed rate 10, CRP 2.02 (slightly elevated), hemoglobin 11.3, hematocrit 35.6, MCV 92.5, platelets 306,000, WBC 7.37 with 76 point 1 segs 15.2 lymphs.          2.   10/28/2019 - head without contrast.  Impression: No acute intracranial disease.          3.   10/28/2019-CT chest with contrast.  Impression: Primary lung mass centered in the right upper lobe and abuts the posterior mediastinal pleura (5.4 x 5.3 x 4.5 cm).  This also partially encases the right bronchus intermedius and right upper lobe bronchus.  This is consistent with pulmonary malignancy.  Surgical sampling recommended with bronchoscopy.  Suspected metastatic lymph node in the pretracheal region measuring 1.3 cm in short axis.          4.    11/04/2019 - was seen by Dr. Delonte Burns, [facility] for further assessment of the lung mass.  Spirometry on that date revealed severe COPD with positive bronchodilator response.  Postbronchodilator FEV1 revealed significant improvement to moderate/severe COPD.  Plan: Navigational bronchoscopy and possible EBUS for tissue diagnosis.          5.   11/05/2019- PET scan, skull vertex to mid thighs.  Impression: Markedly hypermetabolic right perihilar tumor (49 x 37 mm) with right supraclavicular and mediastinal kyler metastasis.          6.   11/07/2019- CT chest without contrast at [facility].  Comparison, PET CT 11/5/2019.  Impression: Large, spiculated right infrahilar mass, crossing the major fissure to involve the right upper and right lower lobes with direct extension into the subcarinal space.  Mildly enlarged mediastinal lymph nodes and normal-sized right supraclavicular lymph node corresponding to the area of PET positivity.          7.   11/07/2019-bronchoscopy and EBUS guided FNA biopsy of right upper lobe lung: Positive for malignant cells consistent with squamous cell carcinoma.  EBUS guided FNA, station 7: Lymph node elements present.  No malignant cells identified.  EBUS guided FNA, station 4R: Positive for malignant cells consistent with squamous cell carcinoma.  Immunohistochemical stains performed on cell block #3.  Tumor cells positive for squamous squamous markers p63 and CK 5/6, negative for CK7, TTF-1, and CD 56.  Immunoprofile supports the above diagnosis.  Addendum: Subsequent biopsy showed metastatic disease in a supraclavicular lymph node, finding consistent with advanced stage disease.  Per protocol will proceed with tumor genomic testing on this specimen.  Due to the relatively small amount of tumor cellularity available, the entire panel of testing may not be able to be completed.  BRAF mutation not detected, K-mayco mutation not detected, EGFR  mutation not detected.          8.   11/07/2019-navigational bronchoscopy and forceps biopsy of posterior right upper lobe lung mass.  Diagnosis: Fragments of bronchial mucosa.  No evidence of granuloma or malignancy.          9.   11/08/2019- ultrasound-guided biopsy right supraclavicular lymph node (fine-needle aspirate).  Cytopathology diagnosis: Positive for malignant cells consistent with previously established diagnosis of metastatic non-small cell carcinoma.  Comment: Insufficient tumor cellularity for further testing on the specimen.         10.  11/15/2019-- labs, 11/15/2019 with stable anemia otherwise normal CBC with differential, slightly depressed ferritin (65), iron saturation 7%, serum iron 19, TIBC 289.  Repleted B12 and folate.  Received Injectafer.  CMP, CEA, serum iron, iron saturation, ferritin, B12, folate.  Slightly elevated CEA (3.38).         11.   11/21/2019- brain [facility] at [facility].  White matter changes.  Empty sella syndrome.  No metastatic lesions.      PREVIOUS INTERVENTIONS:          1.   11/19/2019-Injectafer 750 mg IV x1          2.   Definitive course of radiation to the chest (concurrent with chemotherapy)-radiation initiated 12/5/2019 with anticipated fractionated course of 6000 to 6600 cGy at 180 cGy/day          3.   Weekly concurrent chemotherapy with carboplatin and Taxol beginning 12/09/2019 through 12/30/2019 (4 weeks)          4.   Maintenance Durvalumab    LABS    Lab Results - Last 18 Months   Lab Units 01/13/20  1001 01/06/20  0952 12/30/19  0947 12/23/19  0956 12/16/19  1018 12/09/19  1005   HEMOGLOBIN g/dL 9.9* 9.8* 10.0* 10.1* 10.3* 10.8*   HEMATOCRIT % 31.0* 30.9* 30.9* 31.9* 32.7* 34.3   MCV fL 93.7 92.8 91.4 92.2 91.3 92.0   WBC 10*3/mm3 1.57* 2.17* 2.57* 3.42 4.95 7.28   RDW % 16.9* 15.0 14.6 14.0 13.5 13.7   MPV fL 9.8 10.0 9.7 9.9 10.2 10.5   PLATELETS 10*3/mm3 97* 97* 167 211 245 274   IMM GRAN % % 0.6* 0.5 0.4 0.6* 0.8* 0.4   NEUTROS ABS 10*3/mm3 1.06* 1.61*  1.92 2.53 3.80 5.62   LYMPHS ABS 10*3/mm3 0.22* 0.30* 0.28* 0.38* 0.49* 0.76   MONOS ABS 10*3/mm3 0.22 0.22 0.32 0.41 0.50 0.68   EOS ABS 10*3/mm3 0.05 0.02 0.03 0.06 0.08 0.16   BASOS ABS 10*3/mm3 0.01 0.01 0.01 0.02 0.04 0.03   IMMATURE GRANS (ABS) 10*3/mm3 0.01 0.01 0.01 0.02 0.04 0.03   NRBC /100 WBC 0.0 0.0 0.0 0.0 0.0 0.0       Lab Results - Last 18 Months   Lab Units 01/13/20  1001 01/06/20  0952 12/30/19  0947 12/23/19  0956 12/16/19  1018 12/09/19  1005   GLUCOSE mg/dL 93 94 96 100* 107* 94   SODIUM mmol/L 140 140 140 139 138 139   POTASSIUM mmol/L 3.7 3.8 3.9 3.8 4.4 4.0   CO2 mmol/L 27.0 27.0 26.0 25.0 25.0 26.0   CHLORIDE mmol/L 105 102 103 104 103 103   ANION GAP mmol/L 8.0 11.0 11.0 10.0 10.0 10.0   CREATININE mg/dL 0.73 0.71 0.66 0.73 0.73 0.79   BUN mg/dL 15 16 16 17 18 16   BUN / CREAT RATIO  20.5 22.5 24.2 23.3 24.7 20.3   CALCIUM mg/dL 10.0 10.2 9.6 9.9 9.5 9.8   EGFR IF NONAFRICN AM mL/min/1.73 80 82 90 80 80 73   ALK PHOS U/L 57 55 53 53 53 55   TOTAL PROTEIN g/dL 7.0 7.0 6.8 6.8 7.0 7.1   ALT (SGPT) U/L 8 10 12 11 13 7   AST (SGOT) U/L 12 11 14 12 17 12   BILIRUBIN mg/dL 0.3 0.3 0.2 0.2 0.2 0.2   ALBUMIN g/dL 4.20 4.30 4.10 4.10 3.90 4.10   GLOBULIN gm/dL 2.8 2.7 2.7 2.7 3.1 3.0       Lab Results - Last 18 Months   Lab Units 01/06/20  0952 11/15/19  1332   CEA ng/mL 4.12 3.38       Lab Results - Last 18 Months   Lab Units 01/06/20  0952 11/15/19  1332 08/24/18  1420   IRON mcg/dL 95 19*  --    TIBC mcg/dL 270* 289*  --    IRON SATURATION % 35 7*  --    FERRITIN ng/mL 591.50* 65.15  --    TSH uIU/mL  --   --  2.720   FOLATE ng/mL  --  >20.00  --          PAST MEDICAL HISTORY:  ALLERGIES:  Allergies   Allergen Reactions   • Amoxicillin GI Intolerance     CURRENT MEDICATIONS:  Outpatient Encounter Medications as of 1/13/2020   Medication Sig Dispense Refill   • cetirizine (zyrTEC) 10 MG tablet Take 10 mg by mouth Daily.     • citalopram (CeleXA) 20 MG tablet Take 20 mg by mouth Daily.     •  fluticasone (FLONASE) 50 MCG/ACT nasal spray 1 spray by Each Nare route Daily As Needed.  5   • Fluticasone-Umeclidin-Vilant (TRELEGY ELLIPTA) 100-62.5-25 MCG/INH aerosol powder  Inhale Every Morning.     • lidocaine-prilocaine (EMLA) 2.5-2.5 % cream Apply to port 30 minutes before access 1 each 0   • omeprazole (priLOSEC) 20 MG capsule Take 20 mg by mouth Every Morning Before Breakfast.  0   • ondansetron (ZOFRAN) 8 MG tablet Take 1 tablet by mouth 3 (Three) Times a Day As Needed for Nausea or Vomiting. 30 tablet 5   • ZOLMitriptan (ZOMIG) 2.5 MG tablet Take 2.5 mg by mouth As Needed.     • [DISCONTINUED] HYDROcodone-acetaminophen (LORTAB) 5-325 MG per tablet Take 1 tablet by mouth Every 6 (Six) Hours As Needed for Moderate Pain . 28 tablet 0     Facility-Administered Encounter Medications as of 1/13/2020   Medication Dose Route Frequency Provider Last Rate Last Dose   • [COMPLETED] Filgrastim (NEUPOGEN) injection 480 mcg  480 mcg Subcutaneous Once Kade Russell MD   480 mcg at 01/13/20 1137     Adult illnesses:  Depression  GERD  Vitamin D deficiency  Chronic fatigue syndrome  Former smoker, quit 4 years ago  Hemorrhoids with history of bright red blood per rectum.  Chronic back pain  Chronic neck pain  Bilateral shoulder and arm radiculopathy, right > left  Degenerative disc disease, C5-C7  Anxiety    Past surgeries:  Breast surgery for cyst removal  Colonoscopy, 12/3/2018  Hysterectomy  Left subclavian Mediport placement, 11/22/2019.  Dr. Gibson    ADULT ILLNESSES:  Patient Active Problem List   Diagnosis Code   • Age-related osteoporosis without current pathological fracture M81.0   • Anxiety and depression F41.9, F32.9   • Cervical disc disease M50.90   • Hyperlipidemia E78.5   • Lumbar degenerative disc disease M51.36   • Malignant neoplasm of overlapping sites of right lung (CMS/HCC) C34.81   • Emphysema lung (CMS/HCC) J43.9   • Iron deficiency anemia D50.9   • Chemotherapy induced neutropenia  (CMS/HCC) D70.1, T45.1X5A   • Antineoplastic chemotherapy induced anemia D64.81, T45.1X5A   • Encounter for consultation Z71.9   • Former smoker Z87.891   • Regional lymph node metastasis present (CMS/HCC) C77.9   • 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   • Hypomagnesemia E83.42     SURGERIES:  Past Surgical History:   Procedure Laterality Date   • BREAST SURGERY      Cyst removal   • COLONOSCOPY N/A 12/3/2018    Procedure: COLONOSCOPY WITH ANESTHESIA;  Surgeon: Myah Pool MD;  Location: [facility] ENDOSCOPY;  Service: Gastroenterology   • HYSTERECTOMY     • VENOUS ACCESS DEVICE (PORT) INSERTION N/A 11/22/2019    Procedure: PLACEMENT OF SINGLE LUMEN PORT;  Surgeon: Mona Gibson MD;  Location: [facility] OR;  Service: General     HEALTH MAINTENANCE ITEMS:  Health Maintenance Due   Topic Date Due   • ZOSTER VACCINE (2 of 3) 09/15/2015   • HEPATITIS C SCREENING  10/02/2018   • MEDICARE ANNUAL WELLNESS  10/02/2018   • PNEUMOCOCCAL VACCINE (65+ HIGH RISK) (2 of 2 - PPSV23) 10/19/2018   • LIPID PANEL  11/15/2019   • COLONOSCOPY  12/03/2019       <no information>  Last Completed Colonoscopy       Status Date      COLONOSCOPY Done 12/3/2018 COLONOSCOPY     Patient has more history with this topic...          There is no immunization history on file for this patient.  Last Completed Mammogram       Status Date      MAMMOGRAM Done 2/5/2019 Ext Proc: CHG SCREENING DIGITAL BREAST TOMOSYNTHESIS BI            FAMILY HISTORY:  Family History   Problem Relation Age of Onset   • Colon polyps Mother         < 60 years old   • Lung cancer Mother    • Colon cancer Neg Hx      SOCIAL HISTORY:  Social History     Socioeconomic History   • Marital status:      Spouse name: Not on file   • Number of children: Not on file   • Years of education: Not on file   • Highest education level: Not on file   Tobacco Use   • Smoking status: Former Smoker     Types: Cigarettes   • Smokeless tobacco: Never  "Used   • Tobacco comment: Quit 4 years ago   Substance and Sexual Activity   • Alcohol use: No   • Drug use: No   • Sexual activity: Defer       REVIEW OF SYSTEMS:  Review of Systems   Constitutional: Positive for fatigue. Negative for activity change (Manages her personal ADLs and some chores.  Has not been driving while on chemo), appetite change (eating well), chills, diaphoresis, fever and unexpected weight loss. Weight gain: has lost 2 pounds.        Here with her sister, Ashley and daughter, Tawana.  Manages her personal ADLs, chores, running errands and lives by herself.    Chemo tolerance discussed.  Fatigue and nausea for 2 days after, Zofran x 1 resolved the symptoms.  No mouth sores, no vomiting, no skin changes, no neuropathy, no diarrhea.   Eyes: Negative.    Respiratory: Positive for shortness of breath (Improved on inhalers). Negative for cough.    Gastrointestinal: Negative.    Genitourinary: Negative.    Musculoskeletal: Positive for arthralgias, back pain and neck pain.   Allergic/Immunologic: Positive for environmental allergies.   Neurological: Negative for syncope (prior to her ER visit).   Hematological: Negative for adenopathy. Does not bruise/bleed easily.   Psychiatric/Behavioral: Positive for depressed mood. The patient is nervous/anxious.      /72   Pulse 97   Temp 98.1 °F (36.7 °C)   Resp 16   Ht 167.6 cm (66\")   Wt 72.3 kg (159 lb 8 oz)   LMP  (LMP Unknown)   SpO2 98%   Breastfeeding No   BMI 25.74 kg/m²  Body surface area is 1.82 meters squared.  Pain Score    01/13/20 1442   PainSc:   8       Physical Exam:  Physical Exam   Constitutional: She is oriented to person, place, and time. She appears well-developed and well-nourished. No distress.   Pleasant, cooperative, heavy-set, modestly kept elderly female.  Ambulatory.  ECOG 1 (previously - 2).     HENT:   Head: Normocephalic.   Mouth/Throat: No oropharyngeal exudate.   Eyes: Pupils are equal, round, and reactive to " light. EOM are normal. No scleral icterus.   Medial subconjunctival hemorrhage, OS   Neck: Normal range of motion. Neck supple. No JVD present. No tracheal deviation present. No thyromegaly present.   Cardiovascular: Normal rate, regular rhythm and normal heart sounds. Exam reveals no friction rub.   No murmur heard.  Pulmonary/Chest: Effort normal. No stridor. No respiratory distress. She has no wheezes (Soft expiratory wheezes diffusely). She has no rales. She exhibits no tenderness.   Barrel shaped chest with diminished breath sounds globally.  Port in the left upper chest is noted.  Is well seated.   Abdominal: Soft. Bowel sounds are normal. She exhibits no distension and no mass. There is no tenderness. There is no guarding.   Musculoskeletal: Normal range of motion. She exhibits no edema or tenderness.   Neurological: She is alert and oriented to person, place, and time. No cranial nerve deficit.   She is anxious but not tremulous today   Skin: Skin is warm and dry. No erythema. There is pallor.   Psychiatric: Her behavior is normal. Judgment and thought content normal.   Visibly less anxious   Vitals reviewed.      Assessment:  1.  Squamous cell carcinoma of the right lung            Tumor stage: IIIC (cT4, cN3, M0)            Bone tumor burden: 5.4 x 5.3 x 4.5 cm mass along the right major fissure that abuts the medial mediastinal pleura and partially encases the right upper lobe bronchus and right bronchus intermedius.  Pathologic pretracheal lymph node measures 1.3 cm.  Right supraclavicular metastasis.            Complications of tumor: Chest pain, dyspnea, syncope?            BRAF mutation not detected            KRAS mutation not detected            EGFR mutation not detected.            PD-L1 and ALK: Not reported apparently due to lack of specimen            Tumor status: Definitive radiation concurrent with chemotherapy in progress  2.  Chest pain dyspnea and syncope, likely symptoms related to #1.   None since initial visit.  3.  Mild anemia, contribution from anemia of malignancy/anemia of chronic disease and chemo.  Hgb 9.9, 01/13/2020 (prior range: 9.8 - 11.3, 10/28/2019)  4.  Leukopenia and thrombocytopenia, chemo associated.  5.  Chronic fatigue syndrome  6.  COPD  7.  Former smoker 40 pack years  8.  Depression  9.  Hemorrhoids with history of hematochezia  10. Chronic neck/back pains with DDD, C5-C7.  Followed by Dr. Grady.  Rx for Norco 5 prior to anterior cervical discectomy and fusion C5 in 2015 - never had surgery  11. Mild subconjunctival hemorrhage, OS.  Now using artificial tears.  No visual symptoms.    RECOMMENDATIONS:   1.  Re: The patient is apprised of the labs, 01/13/2020 and 01/06/2020 with pancytopenia (chemo), normal CMP, repleted ferritin (591; from 65), iron saturation 35% (from 7%), serum iron 95 (from 19), TIBC 289.  Repleted B12 and folate.  Received Injectafer. Slightly elevated CEA (4.12; from 3.38).  Chemo tolerance discussed.  Grade 1 nausea and fatigue.  Otherwise doing well.  2.  Neupogen 480 mcg sc daily x 3  3.  Repeat CBC w diff on 01/01/16/2020 - call rad onc to resume radiation if ANC is > 1.5.  4.  Previously reviewed available molecular studies from lung biopsy done on 11/07/2019 (above) noting lack of mutation for EGFR, BRAF and KRAS.  ALK and PD-L1 not reported presumably due to lack of specimen.   5.  Previously reviewed extensive records from Dr. Delonte Burns and [facility], to include radiographs, and biopsy results.  6.  Taxol and carboplatin. Potential toxicities of same previously discussed (to included but not limited to: Myelosuppression, alopecia, neuropathy, arthralgias/myalgias, nausea/vomiting, hypersensitivity reactions, diarrhea, mucositis, risks for infection, abnormal liver enzymes, asthenia, fever, low blood pressure, bleeding, renal insufficiency, edema, bradycardia, anaphylaxis, arrhythmias, thromboembolism, myocardial  infarction, pulmonary toxicity, gastrointestinal (GI) obstruction/perforation, paralytic ileus, ischemic colitis, pancreatitis, severe skin reactions; electrolyte disorders, ototoxicity, nephrotoxicity, vision loss). Questions answered. She agrees to press on with therapy.   7. HOLD chemo today - Reschedule chemotherapy - week 5 on 01/20/2020; week 6 on 01/27/2020:             Taxol 45 mg/m2 (BSA = 1.82; TD = 81 mg) per administration guidelines weekly x6 weeks with radiation.             Carboplatin AUC 2 (BSA = 1.82; TD = dose pending) per administration guidelines weekly x6 weeks with radiation.   8. Premedicate with:             Aloxi 0.25 mg IV before chemotherapy            Decadron 10 mg IV before chemo            Pepcid 20 mg IV             Benadryl 50 mg IV           9.  NCCN Guidelines Version 5.2019 for stage III squamous cell cancer management previously reviewed. For T3-4N1 disease after definitive chemo + radiation recommendation is Durvalumab (category 1) x 1 year.   10.  Durvalumab. Discussed the potential toxicities to include but not limited to (immune mediated reaction, pneumonitis interstitial lung disease, hepatitis, colitis, severe diarrhea, hypothyroidism, hyperthyroidism, thyroiditis, type 1 diabetes, hypopituitarism, thrombocytopenic purpura, nephritis, aseptic meningitis, hemolytic anemia, myocarditis, severe infection, uveitis, infusion reaction, electrolyte abnormalities, lymphopenia, anemia, skin reaction, fatigue, upper respiratory infection, musculoskeletal pain, constipation, decreased appetite, nausea, edema, urinary tract infection, abdominal pain, pyrexia, dyspnea, rash, cough). Questions answered. She agrees with a trial of therapy.      10.  Schedule C1 on 02/17/2020; C2 on 03/02/2020, C3 on 03/16/2020 - durvalumab (plan: every 2 weeks x 24 cycles - 12 months, progression or toxicity) per administration guidelines - at [facility]                 durvalumab 10 mg/kg (WT = 85 kg; TD = 850  mg)      11.  Premeds:              Decadron 10 mg IV             Benadryl 25 mg IV             Pepcid 10 mg IV        12. CMP, Mg++ and CBC with differential weekly with Procrit 40,000 units subcutaneous every week if Hgb less than 10 and Hct less than 30; Zarxio 480 mcg subcutaneously daily x3 if ANC less than 1 -[facility]        13.  Prior review office encounter from Dr. Dudley, 11/25/2019.  Recommended definitive course of concurrent chemotherapy and radiation.  Radiation anticipated: 6000-60 600 centigray 180 cGy/day.  14.  Return to the office on 02/17/2020 with pre-office serum iron, Fe sat, ferritin, CBC with differential, CMP, and CEA.     TIME SPENT: Face-to-face time on this encounter, as defined by the American Medical Association in the 2019 Current Procedural Terminology codebook; assessment, record review, lab/imaging review, planning and education - 65 minutes (greater than 50% facetime).

## 2020-01-13 ENCOUNTER — INFUSION (OUTPATIENT)
Dept: ONCOLOGY | Facility: HOSPITAL | Age: 65
End: 2020-01-13

## 2020-01-13 ENCOUNTER — TELEPHONE (OUTPATIENT)
Dept: ONCOLOGY | Facility: CLINIC | Age: 65
End: 2020-01-13

## 2020-01-13 ENCOUNTER — OFFICE VISIT (OUTPATIENT)
Dept: ONCOLOGY | Facility: CLINIC | Age: 65
End: 2020-01-13

## 2020-01-13 ENCOUNTER — APPOINTMENT (OUTPATIENT)
Dept: LAB | Facility: HOSPITAL | Age: 65
End: 2020-01-13

## 2020-01-13 ENCOUNTER — TELEPHONE (OUTPATIENT)
Dept: RADIATION ONCOLOGY | Facility: HOSPITAL | Age: 65
End: 2020-01-13

## 2020-01-13 ENCOUNTER — APPOINTMENT (OUTPATIENT)
Dept: RADIATION ONCOLOGY | Facility: HOSPITAL | Age: 65
End: 2020-01-13

## 2020-01-13 ENCOUNTER — TELEPHONE (OUTPATIENT)
Dept: ONCOLOGY | Facility: HOSPITAL | Age: 65
End: 2020-01-13

## 2020-01-13 VITALS
BODY MASS INDEX: 25.63 KG/M2 | HEART RATE: 97 BPM | RESPIRATION RATE: 16 BRPM | WEIGHT: 159.5 LBS | SYSTOLIC BLOOD PRESSURE: 116 MMHG | HEIGHT: 66 IN | TEMPERATURE: 98.1 F | DIASTOLIC BLOOD PRESSURE: 72 MMHG | OXYGEN SATURATION: 98 %

## 2020-01-13 VITALS
DIASTOLIC BLOOD PRESSURE: 67 MMHG | OXYGEN SATURATION: 98 % | HEART RATE: 88 BPM | TEMPERATURE: 99 F | RESPIRATION RATE: 12 BRPM | HEIGHT: 66 IN | BODY MASS INDEX: 25.55 KG/M2 | WEIGHT: 159 LBS | SYSTOLIC BLOOD PRESSURE: 113 MMHG

## 2020-01-13 DIAGNOSIS — T45.1X5A CHEMOTHERAPY INDUCED NEUTROPENIA (HCC): ICD-10-CM

## 2020-01-13 DIAGNOSIS — C34.81 MALIGNANT NEOPLASM OF OVERLAPPING SITES OF RIGHT LUNG (HCC): Primary | ICD-10-CM

## 2020-01-13 DIAGNOSIS — J43.9 PULMONARY EMPHYSEMA, UNSPECIFIED EMPHYSEMA TYPE (HCC): ICD-10-CM

## 2020-01-13 DIAGNOSIS — C34.81 MALIGNANT NEOPLASM OF OVERLAPPING SITES OF RIGHT LUNG (HCC): ICD-10-CM

## 2020-01-13 DIAGNOSIS — D70.1 CHEMOTHERAPY INDUCED NEUTROPENIA (HCC): ICD-10-CM

## 2020-01-13 DIAGNOSIS — E83.42 HYPOMAGNESEMIA: Primary | ICD-10-CM

## 2020-01-13 LAB
ALBUMIN SERPL-MCNC: 4.2 G/DL (ref 3.5–5.2)
ALBUMIN/GLOB SERPL: 1.5 G/DL
ALP SERPL-CCNC: 57 U/L (ref 39–117)
ALT SERPL W P-5'-P-CCNC: 8 U/L (ref 1–33)
ANION GAP SERPL CALCULATED.3IONS-SCNC: 8 MMOL/L (ref 5–15)
AST SERPL-CCNC: 12 U/L (ref 1–32)
BASOPHILS # BLD AUTO: 0.01 10*3/MM3 (ref 0–0.2)
BASOPHILS NFR BLD AUTO: 0.6 % (ref 0–1.5)
BILIRUB SERPL-MCNC: 0.3 MG/DL (ref 0.2–1.2)
BUN BLD-MCNC: 15 MG/DL (ref 8–23)
BUN/CREAT SERPL: 20.5 (ref 7–25)
CALCIUM SPEC-SCNC: 10 MG/DL (ref 8.6–10.5)
CHLORIDE SERPL-SCNC: 105 MMOL/L (ref 98–107)
CO2 SERPL-SCNC: 27 MMOL/L (ref 22–29)
CREAT BLD-MCNC: 0.73 MG/DL (ref 0.57–1)
DEPRECATED RDW RBC AUTO: 54.6 FL (ref 37–54)
EOSINOPHIL # BLD AUTO: 0.05 10*3/MM3 (ref 0–0.4)
EOSINOPHIL NFR BLD AUTO: 3.2 % (ref 0.3–6.2)
ERYTHROCYTE [DISTWIDTH] IN BLOOD BY AUTOMATED COUNT: 16.9 % (ref 12.3–15.4)
GFR SERPL CREATININE-BSD FRML MDRD: 80 ML/MIN/1.73
GLOBULIN UR ELPH-MCNC: 2.8 GM/DL
GLUCOSE BLD-MCNC: 93 MG/DL (ref 65–99)
HCT VFR BLD AUTO: 31 % (ref 34–46.6)
HGB BLD-MCNC: 9.9 G/DL (ref 12–15.9)
IMM GRANULOCYTES # BLD AUTO: 0.01 10*3/MM3 (ref 0–0.05)
IMM GRANULOCYTES NFR BLD AUTO: 0.6 % (ref 0–0.5)
LYMPHOCYTES # BLD AUTO: 0.22 10*3/MM3 (ref 0.7–3.1)
LYMPHOCYTES NFR BLD AUTO: 14 % (ref 19.6–45.3)
MAGNESIUM SERPL-MCNC: 1.8 MG/DL (ref 1.6–2.4)
MCH RBC QN AUTO: 29.9 PG (ref 26.6–33)
MCHC RBC AUTO-ENTMCNC: 31.9 G/DL (ref 31.5–35.7)
MCV RBC AUTO: 93.7 FL (ref 79–97)
MONOCYTES # BLD AUTO: 0.22 10*3/MM3 (ref 0.1–0.9)
MONOCYTES NFR BLD AUTO: 14 % (ref 5–12)
NEUTROPHILS # BLD AUTO: 1.06 10*3/MM3 (ref 1.7–7)
NEUTROPHILS NFR BLD AUTO: 67.6 % (ref 42.7–76)
NRBC BLD AUTO-RTO: 0 /100 WBC (ref 0–0.2)
PLATELET # BLD AUTO: 97 10*3/MM3 (ref 140–450)
PMV BLD AUTO: 9.8 FL (ref 6–12)
POTASSIUM BLD-SCNC: 3.7 MMOL/L (ref 3.5–5.2)
PROT SERPL-MCNC: 7 G/DL (ref 6–8.5)
RBC # BLD AUTO: 3.31 10*6/MM3 (ref 3.77–5.28)
SODIUM BLD-SCNC: 140 MMOL/L (ref 136–145)
WBC NRBC COR # BLD: 1.57 10*3/MM3 (ref 3.4–10.8)

## 2020-01-13 PROCEDURE — 85025 COMPLETE CBC W/AUTO DIFF WBC: CPT | Performed by: INTERNAL MEDICINE

## 2020-01-13 PROCEDURE — 83735 ASSAY OF MAGNESIUM: CPT | Performed by: INTERNAL MEDICINE

## 2020-01-13 PROCEDURE — 99215 OFFICE O/P EST HI 40 MIN: CPT | Performed by: INTERNAL MEDICINE

## 2020-01-13 PROCEDURE — 25010000002 FILGRASTIM PER 480 MCG: Performed by: INTERNAL MEDICINE

## 2020-01-13 PROCEDURE — 80053 COMPREHEN METABOLIC PANEL: CPT | Performed by: INTERNAL MEDICINE

## 2020-01-13 PROCEDURE — 96372 THER/PROPH/DIAG INJ SC/IM: CPT

## 2020-01-13 PROCEDURE — 36415 COLL VENOUS BLD VENIPUNCTURE: CPT | Performed by: INTERNAL MEDICINE

## 2020-01-13 RX ORDER — ONDANSETRON HYDROCHLORIDE 8 MG/1
8 TABLET, FILM COATED ORAL 3 TIMES DAILY PRN
Qty: 30 TABLET | Refills: 0 | Status: CANCELLED | OUTPATIENT
Start: 2020-01-13

## 2020-01-13 RX ORDER — SODIUM CHLORIDE 0.9 % (FLUSH) 0.9 %
10 SYRINGE (ML) INJECTION AS NEEDED
Status: DISCONTINUED | OUTPATIENT
Start: 2020-01-13 | End: 2020-01-13 | Stop reason: HOSPADM

## 2020-01-13 RX ORDER — SODIUM CHLORIDE 0.9 % (FLUSH) 0.9 %
10 SYRINGE (ML) INJECTION AS NEEDED
Status: CANCELLED | OUTPATIENT
Start: 2020-01-13

## 2020-01-13 RX ADMIN — FILGRASTIM 480 MCG: 480 INJECTION, SOLUTION INTRAVENOUS; SUBCUTANEOUS at 11:37

## 2020-01-13 RX ADMIN — Medication 500 UNITS: at 11:42

## 2020-01-13 RX ADMIN — SODIUM CHLORIDE, PRESERVATIVE FREE 10 ML: 5 INJECTION INTRAVENOUS at 11:42

## 2020-01-13 NOTE — TELEPHONE ENCOUNTER
Received call from Lakshmi, Nurse [facility]. She reports patient is here for consideration of her txt Carbo/Taxol.   PLT: 97  WBC: 1.57  ANC: 1.06  Last week txt was held due to low PLT count of 97. She questions to proceed with txt?  Discussed with Dr Russell. He request txt be held today, move out x one week and give Neupogen x 3 days. Relayed this information to Lakshmi, she v/u of plan.     Notified therapists.

## 2020-01-13 NOTE — TELEPHONE ENCOUNTER
Received call from Lakshmi, [facility]. She reports patient is here for consideration of her txt Carbo/Taxol.   PLT: 97  WBC: 1.57  ANC: 1.06  Last week txt was held due to low PLT count of 97. She questions to proceed with txt?  Discussed with Dr Russell. He request txt be held today, move out x one week and give Neupogen x 3 days. Relayed this information to Lakshmi, she v/u of plan.

## 2020-01-13 NOTE — PROGRESS NOTES
Informed CONSTANZA Pacheco of lab results  (PLLT 97, WBC 1.57, ANC 1.06.  Her treatment was held last week 2/2 PLT, will discuss with Dr. WALSH and advise    1115  Hold treatment today. Reschedule for next week.  Will give Neupogen for 3 days.     1124 - Spoke with Leela in Revenue, patient has not had a prior authorization completed, it is ok to give today and she will begin the process.    1132 left a message with XRT that patient is not receiving chemo today, but will receive Neupogen x 3 days.

## 2020-01-14 ENCOUNTER — INFUSION (OUTPATIENT)
Dept: ONCOLOGY | Facility: HOSPITAL | Age: 65
End: 2020-01-14

## 2020-01-14 VITALS
SYSTOLIC BLOOD PRESSURE: 124 MMHG | DIASTOLIC BLOOD PRESSURE: 72 MMHG | HEART RATE: 100 BPM | WEIGHT: 158.8 LBS | BODY MASS INDEX: 25.52 KG/M2 | RESPIRATION RATE: 18 BRPM | HEIGHT: 66 IN | TEMPERATURE: 98.7 F | OXYGEN SATURATION: 100 %

## 2020-01-14 DIAGNOSIS — D70.1 CHEMOTHERAPY INDUCED NEUTROPENIA (HCC): Primary | ICD-10-CM

## 2020-01-14 DIAGNOSIS — T45.1X5A CHEMOTHERAPY INDUCED NEUTROPENIA (HCC): Primary | ICD-10-CM

## 2020-01-14 PROCEDURE — 25010000002 FILGRASTIM PER 480 MCG: Performed by: INTERNAL MEDICINE

## 2020-01-14 PROCEDURE — 96372 THER/PROPH/DIAG INJ SC/IM: CPT

## 2020-01-14 RX ADMIN — FILGRASTIM 480 MCG: 480 INJECTION, SOLUTION INTRAVENOUS; SUBCUTANEOUS at 13:41

## 2020-01-14 NOTE — PATIENT INSTRUCTIONS
Durvalumab injection  What is this medicine?  DURVALUMAB (dur EBONI ue mab) is a monoclonal antibody. It is used to treat urothelial cancer and lung cancer.  This medicine may be used for other purposes; ask your health care provider or pharmacist if you have questions.  COMMON BRAND NAME(S): IMFINZI  What should I tell my health care provider before I take this medicine?  They need to know if you have any of these conditions:  -diabetes  -immune system problems  -infection  -inflammatory bowel disease  -kidney disease  -liver disease  -lung or breathing disease  -lupus  -organ transplant  -stomach or intestine problems  -thyroid disease  -an unusual or allergic reaction to durvalumab, other medicines, foods, dyes, or preservatives  -pregnant or trying to get pregnant  -breast-feeding  How should I use this medicine?  This medicine is for infusion into a vein. It is given by a health care professional in a hospital or clinic setting.  A special MedGuide will be given to you before each treatment. Be sure to read this information carefully each time.  Talk to your pediatrician regarding the use of this medicine in children. Special care may be needed.  Overdosage: If you think you have taken too much of this medicine contact a poison control center or emergency room at once.  NOTE: This medicine is only for you. Do not share this medicine with others.  What if I miss a dose?  It is important not to miss your dose. Call your doctor or health care professional if you are unable to keep an appointment.  What may interact with this medicine?  Interactions have not been studied.  This list may not describe all possible interactions. Give your health care provider a list of all the medicines, herbs, non-prescription drugs, or dietary supplements you use. Also tell them if you smoke, drink alcohol, or use illegal drugs. Some items may interact with your medicine.  What should I watch for while using this medicine?  This drug  may make you feel generally unwell. Continue your course of treatment even though you feel ill unless your doctor tells you to stop.  You may need blood work done while you are taking this medicine.  Do not become pregnant while taking this medicine or for 3 months after stopping it. Women should inform their doctor if they wish to become pregnant or think they might be pregnant. There is a potential for serious side effects to an unborn child. Talk to your health care professional or pharmacist for more information. Do not breast-feed an infant while taking this medicine or for 3 months after stopping it.  What side effects may I notice from receiving this medicine?  Side effects that you should report to your doctor or health care professional as soon as possible:  -allergic reactions like skin rash, itching or hives, swelling of the face, lips, or tongue  -black, tarry stools  -bloody or watery diarrhea  -breathing problems  -change in emotions or moods  -change in sex drive  -changes in vision  -chest pain or chest tightness  -chills  -confusion  -cough  -facial flushing  -fever  -headache  -signs and symptoms of high blood sugar such as dizziness; dry mouth; dry skin; fruity breath; nausea; stomach pain; increased hunger or thirst; increased urination  -signs and symptoms of liver injury like dark yellow or brown urine; general ill feeling or flu-like symptoms; light-colored stools; loss of appetite; nausea; right upper belly pain; unusually weak or tired; yellowing of the eyes or skin  -stomach pain  -trouble passing urine or change in the amount of urine  -weight gain or weight loss  Side effects that usually do not require medical attention (report these to your doctor or health care professional if they continue or are bothersome):  -bone pain  -constipation  -loss of appetite  -muscle pain  -nausea  -swelling of the ankles, feet, hands  -tiredness  This list may not describe all possible side effects. Call  your doctor for medical advice about side effects. You may report side effects to FDA at 9-414-UBZ-3309.  Where should I keep my medicine?  This drug is given in a hospital or clinic and will not be stored at home.  NOTE: This sheet is a summary. It may not cover all possible information. If you have questions about this medicine, talk to your doctor, pharmacist, or health care provider.  © 2019 [employer] (2018-02-27 19:25:04)

## 2020-01-15 ENCOUNTER — INFUSION (OUTPATIENT)
Dept: ONCOLOGY | Facility: HOSPITAL | Age: 65
End: 2020-01-15

## 2020-01-15 VITALS
HEART RATE: 85 BPM | TEMPERATURE: 99 F | HEIGHT: 66 IN | OXYGEN SATURATION: 100 % | WEIGHT: 160 LBS | DIASTOLIC BLOOD PRESSURE: 69 MMHG | RESPIRATION RATE: 16 BRPM | SYSTOLIC BLOOD PRESSURE: 114 MMHG | BODY MASS INDEX: 25.71 KG/M2

## 2020-01-15 DIAGNOSIS — T45.1X5A CHEMOTHERAPY INDUCED NEUTROPENIA (HCC): Primary | ICD-10-CM

## 2020-01-15 DIAGNOSIS — D70.1 CHEMOTHERAPY INDUCED NEUTROPENIA (HCC): Primary | ICD-10-CM

## 2020-01-15 PROCEDURE — 25010000002 FILGRASTIM PER 480 MCG: Performed by: INTERNAL MEDICINE

## 2020-01-15 PROCEDURE — 96372 THER/PROPH/DIAG INJ SC/IM: CPT

## 2020-01-15 RX ADMIN — FILGRASTIM 480 MCG: 480 INJECTION, SOLUTION INTRAVENOUS; SUBCUTANEOUS at 14:02

## 2020-01-15 NOTE — PROGRESS NOTES
Per Zuly okay to give neupogen today since authorization is in place. Called Zuly pritchett make sure okay to give with ANC being 1.06 on 1/13/2020.

## 2020-01-16 ENCOUNTER — TELEPHONE (OUTPATIENT)
Dept: ONCOLOGY | Facility: CLINIC | Age: 65
End: 2020-01-16

## 2020-01-16 ENCOUNTER — HOSPITAL ENCOUNTER (OUTPATIENT)
Dept: RADIATION ONCOLOGY | Facility: HOSPITAL | Age: 65
Setting detail: RADIATION/ONCOLOGY SERIES
Discharge: HOME | End: 2020-01-16

## 2020-01-16 ENCOUNTER — LAB (OUTPATIENT)
Dept: LAB | Facility: HOSPITAL | Age: 65
End: 2020-01-16

## 2020-01-16 DIAGNOSIS — J43.9 PULMONARY EMPHYSEMA, UNSPECIFIED EMPHYSEMA TYPE (HCC): ICD-10-CM

## 2020-01-16 DIAGNOSIS — C34.81 MALIGNANT NEOPLASM OF OVERLAPPING SITES OF RIGHT LUNG (HCC): ICD-10-CM

## 2020-01-16 LAB
ALBUMIN SERPL-MCNC: 4.3 G/DL (ref 3.5–5.2)
ALBUMIN/GLOB SERPL: 1.7 G/DL
ALP SERPL-CCNC: 64 U/L (ref 39–117)
ALT SERPL W P-5'-P-CCNC: 9 U/L (ref 1–33)
ANION GAP SERPL CALCULATED.3IONS-SCNC: 11 MMOL/L (ref 5–15)
ANISOCYTOSIS BLD QL: ABNORMAL
AST SERPL-CCNC: 11 U/L (ref 1–32)
BASOPHILS # BLD MANUAL: 0.03 10*3/MM3 (ref 0–0.2)
BASOPHILS NFR BLD AUTO: 1 % (ref 0–1.5)
BILIRUB SERPL-MCNC: 0.3 MG/DL (ref 0.2–1.2)
BUN BLD-MCNC: 16 MG/DL (ref 8–23)
BUN/CREAT SERPL: 24.2 (ref 7–25)
CALCIUM SPEC-SCNC: 9.8 MG/DL (ref 8.6–10.5)
CHLORIDE SERPL-SCNC: 105 MMOL/L (ref 98–107)
CO2 SERPL-SCNC: 25 MMOL/L (ref 22–29)
CREAT BLD-MCNC: 0.66 MG/DL (ref 0.57–1)
DEPRECATED RDW RBC AUTO: 59.7 FL (ref 37–54)
EOSINOPHIL # BLD MANUAL: 0.07 10*3/MM3 (ref 0–0.4)
EOSINOPHIL NFR BLD MANUAL: 2 % (ref 0.3–6.2)
ERYTHROCYTE [DISTWIDTH] IN BLOOD BY AUTOMATED COUNT: 18.4 % (ref 12.3–15.4)
GFR SERPL CREATININE-BSD FRML MDRD: 90 ML/MIN/1.73
GIANT PLATELETS: ABNORMAL
GLOBULIN UR ELPH-MCNC: 2.6 GM/DL
GLUCOSE BLD-MCNC: 96 MG/DL (ref 65–99)
HCT VFR BLD AUTO: 30.2 % (ref 34–46.6)
HGB BLD-MCNC: 9.8 G/DL (ref 12–15.9)
LYMPHOCYTES # BLD MANUAL: 0.3 10*3/MM3 (ref 0.7–3.1)
LYMPHOCYTES NFR BLD MANUAL: 16.2 % (ref 5–12)
LYMPHOCYTES NFR BLD MANUAL: 9.1 % (ref 19.6–45.3)
MAGNESIUM SERPL-MCNC: 1.9 MG/DL (ref 1.6–2.4)
MCH RBC QN AUTO: 30.6 PG (ref 26.6–33)
MCHC RBC AUTO-ENTMCNC: 32.5 G/DL (ref 31.5–35.7)
MCV RBC AUTO: 94.4 FL (ref 79–97)
MONOCYTES # BLD AUTO: 0.53 10*3/MM3 (ref 0.1–0.9)
NEUTROPHILS # BLD AUTO: 2.28 10*3/MM3 (ref 1.7–7)
NEUTROPHILS NFR BLD MANUAL: 61.6 % (ref 42.7–76)
NEUTS BAND NFR BLD MANUAL: 8.1 % (ref 0–5)
PLATELET # BLD AUTO: 102 10*3/MM3 (ref 140–450)
PMV BLD AUTO: 10 FL (ref 6–12)
POTASSIUM BLD-SCNC: 4.1 MMOL/L (ref 3.5–5.2)
PROT SERPL-MCNC: 6.9 G/DL (ref 6–8.5)
RBC # BLD AUTO: 3.2 10*6/MM3 (ref 3.77–5.28)
SMALL PLATELETS BLD QL SMEAR: ABNORMAL
SODIUM BLD-SCNC: 141 MMOL/L (ref 136–145)
TOXIC GRANULATION: ABNORMAL
VARIANT LYMPHS NFR BLD MANUAL: 2 % (ref 0–5)
WBC NRBC COR # BLD: 3.27 10*3/MM3 (ref 3.4–10.8)

## 2020-01-16 PROCEDURE — 80053 COMPREHEN METABOLIC PANEL: CPT

## 2020-01-16 PROCEDURE — 85007 BL SMEAR W/DIFF WBC COUNT: CPT

## 2020-01-16 PROCEDURE — 85025 COMPLETE CBC W/AUTO DIFF WBC: CPT

## 2020-01-16 PROCEDURE — 77386: CPT | Performed by: RADIOLOGY

## 2020-01-16 PROCEDURE — 36415 COLL VENOUS BLD VENIPUNCTURE: CPT

## 2020-01-16 PROCEDURE — 83735 ASSAY OF MAGNESIUM: CPT

## 2020-01-16 NOTE — TELEPHONE ENCOUNTER
Patient called, she had labs drawn today and she said they kelechi what was going to be drawn on 1/20/20 so she was checking if she still needed to come to have labs drawn on Monday or not.

## 2020-01-17 ENCOUNTER — APPOINTMENT (OUTPATIENT)
Dept: ONCOLOGY | Facility: HOSPITAL | Age: 65
End: 2020-01-17

## 2020-01-17 ENCOUNTER — TELEPHONE (OUTPATIENT)
Dept: ONCOLOGY | Facility: CLINIC | Age: 65
End: 2020-01-17

## 2020-01-17 ENCOUNTER — HOSPITAL ENCOUNTER (OUTPATIENT)
Dept: RADIATION ONCOLOGY | Facility: HOSPITAL | Age: 65
Setting detail: RADIATION/ONCOLOGY SERIES
Discharge: HOME | End: 2020-01-17

## 2020-01-17 ENCOUNTER — APPOINTMENT (OUTPATIENT)
Dept: LAB | Facility: HOSPITAL | Age: 65
End: 2020-01-17

## 2020-01-17 PROCEDURE — 77386: CPT | Performed by: RADIOLOGY

## 2020-01-17 NOTE — TELEPHONE ENCOUNTER
Spoke with patient and let her know that we would need to get her scheduled for a teaching appt with Chloé on 02/10/2020.

## 2020-01-17 NOTE — TELEPHONE ENCOUNTER
Spoke with patient and let her know that it looked like lab had collected all need blood work and she would not need to be stuck again.

## 2020-01-20 ENCOUNTER — APPOINTMENT (OUTPATIENT)
Dept: LAB | Facility: HOSPITAL | Age: 65
End: 2020-01-20

## 2020-01-20 ENCOUNTER — LAB (OUTPATIENT)
Dept: LAB | Facility: HOSPITAL | Age: 65
End: 2020-01-20

## 2020-01-20 ENCOUNTER — INFUSION (OUTPATIENT)
Dept: ONCOLOGY | Facility: HOSPITAL | Age: 65
End: 2020-01-20

## 2020-01-20 ENCOUNTER — HOSPITAL ENCOUNTER (OUTPATIENT)
Dept: RADIATION ONCOLOGY | Facility: HOSPITAL | Age: 65
Setting detail: RADIATION/ONCOLOGY SERIES
Discharge: HOME | End: 2020-01-20

## 2020-01-20 VITALS
BODY MASS INDEX: 25.65 KG/M2 | DIASTOLIC BLOOD PRESSURE: 55 MMHG | SYSTOLIC BLOOD PRESSURE: 109 MMHG | WEIGHT: 159.6 LBS | TEMPERATURE: 98.2 F | RESPIRATION RATE: 17 BRPM | HEIGHT: 66 IN | HEART RATE: 88 BPM | OXYGEN SATURATION: 99 %

## 2020-01-20 DIAGNOSIS — C34.81 MALIGNANT NEOPLASM OF OVERLAPPING SITES OF RIGHT LUNG (HCC): Primary | ICD-10-CM

## 2020-01-20 DIAGNOSIS — E83.42 HYPOMAGNESEMIA: ICD-10-CM

## 2020-01-20 DIAGNOSIS — C34.81 MALIGNANT NEOPLASM OF OVERLAPPING SITES OF RIGHT LUNG (HCC): ICD-10-CM

## 2020-01-20 LAB
ALBUMIN SERPL-MCNC: 4.3 G/DL (ref 3.5–5.2)
ALBUMIN/GLOB SERPL: 1.5 G/DL
ALP SERPL-CCNC: 63 U/L (ref 39–117)
ALT SERPL W P-5'-P-CCNC: 10 U/L (ref 1–33)
ANION GAP SERPL CALCULATED.3IONS-SCNC: 9 MMOL/L (ref 5–15)
AST SERPL-CCNC: 14 U/L (ref 1–32)
BASOPHILS # BLD AUTO: 0.01 10*3/MM3 (ref 0–0.2)
BASOPHILS NFR BLD AUTO: 0.4 % (ref 0–1.5)
BILIRUB SERPL-MCNC: 0.2 MG/DL (ref 0.2–1.2)
BUN BLD-MCNC: 13 MG/DL (ref 8–23)
BUN/CREAT SERPL: 17.8 (ref 7–25)
CALCIUM SPEC-SCNC: 9.9 MG/DL (ref 8.6–10.5)
CHLORIDE SERPL-SCNC: 106 MMOL/L (ref 98–107)
CO2 SERPL-SCNC: 26 MMOL/L (ref 22–29)
CREAT BLD-MCNC: 0.73 MG/DL (ref 0.57–1)
DEPRECATED RDW RBC AUTO: 64.4 FL (ref 37–54)
EOSINOPHIL # BLD AUTO: 0.08 10*3/MM3 (ref 0–0.4)
EOSINOPHIL NFR BLD AUTO: 3.1 % (ref 0.3–6.2)
ERYTHROCYTE [DISTWIDTH] IN BLOOD BY AUTOMATED COUNT: 18.9 % (ref 12.3–15.4)
GFR SERPL CREATININE-BSD FRML MDRD: 80 ML/MIN/1.73
GLOBULIN UR ELPH-MCNC: 2.9 GM/DL
GLUCOSE BLD-MCNC: 104 MG/DL (ref 65–99)
HCT VFR BLD AUTO: 32.9 % (ref 34–46.6)
HGB BLD-MCNC: 10.5 G/DL (ref 12–15.9)
IMM GRANULOCYTES # BLD AUTO: 0.06 10*3/MM3 (ref 0–0.05)
IMM GRANULOCYTES NFR BLD AUTO: 2.4 % (ref 0–0.5)
LYMPHOCYTES # BLD AUTO: 0.34 10*3/MM3 (ref 0.7–3.1)
LYMPHOCYTES NFR BLD AUTO: 13.3 % (ref 19.6–45.3)
MAGNESIUM SERPL-MCNC: 2 MG/DL (ref 1.6–2.4)
MCH RBC QN AUTO: 30.8 PG (ref 26.6–33)
MCHC RBC AUTO-ENTMCNC: 31.9 G/DL (ref 31.5–35.7)
MCV RBC AUTO: 96.5 FL (ref 79–97)
MONOCYTES # BLD AUTO: 0.44 10*3/MM3 (ref 0.1–0.9)
MONOCYTES NFR BLD AUTO: 17.3 % (ref 5–12)
NEUTROPHILS # BLD AUTO: 1.62 10*3/MM3 (ref 1.7–7)
NEUTROPHILS NFR BLD AUTO: 63.5 % (ref 42.7–76)
NRBC BLD AUTO-RTO: 0 /100 WBC (ref 0–0.2)
PLATELET # BLD AUTO: 187 10*3/MM3 (ref 140–450)
PMV BLD AUTO: 9.9 FL (ref 6–12)
POTASSIUM BLD-SCNC: 4 MMOL/L (ref 3.5–5.2)
PROT SERPL-MCNC: 7.2 G/DL (ref 6–8.5)
RBC # BLD AUTO: 3.41 10*6/MM3 (ref 3.77–5.28)
SODIUM BLD-SCNC: 141 MMOL/L (ref 136–145)
WBC NRBC COR # BLD: 2.55 10*3/MM3 (ref 3.4–10.8)

## 2020-01-20 PROCEDURE — 25010000002 PALONOSETRON PER 25 MCG: Performed by: NURSE PRACTITIONER

## 2020-01-20 PROCEDURE — 77386: CPT | Performed by: RADIOLOGY

## 2020-01-20 PROCEDURE — 25010000002 PACLITAXEL PER 30 MG: Performed by: NURSE PRACTITIONER

## 2020-01-20 PROCEDURE — 96375 TX/PRO/DX INJ NEW DRUG ADDON: CPT

## 2020-01-20 PROCEDURE — 96417 CHEMO IV INFUS EACH ADDL SEQ: CPT

## 2020-01-20 PROCEDURE — 83735 ASSAY OF MAGNESIUM: CPT | Performed by: INTERNAL MEDICINE

## 2020-01-20 PROCEDURE — 25010000002 CARBOPLATIN PER 50 MG: Performed by: NURSE PRACTITIONER

## 2020-01-20 PROCEDURE — 80053 COMPREHEN METABOLIC PANEL: CPT

## 2020-01-20 PROCEDURE — 96367 TX/PROPH/DG ADDL SEQ IV INF: CPT

## 2020-01-20 PROCEDURE — 36415 COLL VENOUS BLD VENIPUNCTURE: CPT

## 2020-01-20 PROCEDURE — 85025 COMPLETE CBC W/AUTO DIFF WBC: CPT

## 2020-01-20 PROCEDURE — 96413 CHEMO IV INFUSION 1 HR: CPT

## 2020-01-20 PROCEDURE — 25010000002 DIPHENHYDRAMINE PER 50 MG: Performed by: NURSE PRACTITIONER

## 2020-01-20 PROCEDURE — 25010000002 DEXAMETHASONE SODIUM PHOSPHATE 100 MG/10ML SOLUTION: Performed by: NURSE PRACTITIONER

## 2020-01-20 RX ORDER — PALONOSETRON 0.05 MG/ML
0.25 INJECTION, SOLUTION INTRAVENOUS ONCE
Status: COMPLETED | OUTPATIENT
Start: 2020-01-20 | End: 2020-01-20

## 2020-01-20 RX ORDER — DIPHENHYDRAMINE HYDROCHLORIDE 50 MG/ML
50 INJECTION INTRAMUSCULAR; INTRAVENOUS AS NEEDED
Status: DISCONTINUED | OUTPATIENT
Start: 2020-01-20 | End: 2020-01-20 | Stop reason: HOSPADM

## 2020-01-20 RX ORDER — FAMOTIDINE 10 MG/ML
20 INJECTION, SOLUTION INTRAVENOUS ONCE
Status: COMPLETED | OUTPATIENT
Start: 2020-01-20 | End: 2020-01-20

## 2020-01-20 RX ORDER — FAMOTIDINE 10 MG/ML
20 INJECTION, SOLUTION INTRAVENOUS AS NEEDED
Status: DISCONTINUED | OUTPATIENT
Start: 2020-01-20 | End: 2020-01-20 | Stop reason: HOSPADM

## 2020-01-20 RX ORDER — HEPARIN SODIUM (PORCINE) LOCK FLUSH IV SOLN 100 UNIT/ML 100 UNIT/ML
500 SOLUTION INTRAVENOUS AS NEEDED
Status: CANCELLED | OUTPATIENT
Start: 2020-01-20

## 2020-01-20 RX ORDER — SODIUM CHLORIDE 0.9 % (FLUSH) 0.9 %
10 SYRINGE (ML) INJECTION AS NEEDED
Status: DISCONTINUED | OUTPATIENT
Start: 2020-01-20 | End: 2020-01-20 | Stop reason: HOSPADM

## 2020-01-20 RX ORDER — SODIUM CHLORIDE 9 MG/ML
250 INJECTION, SOLUTION INTRAVENOUS ONCE
Status: COMPLETED | OUTPATIENT
Start: 2020-01-20 | End: 2020-01-20

## 2020-01-20 RX ORDER — SODIUM CHLORIDE 0.9 % (FLUSH) 0.9 %
10 SYRINGE (ML) INJECTION AS NEEDED
Status: CANCELLED | OUTPATIENT
Start: 2020-01-20

## 2020-01-20 RX ADMIN — SODIUM CHLORIDE 250 ML: 9 INJECTION, SOLUTION INTRAVENOUS at 11:35

## 2020-01-20 RX ADMIN — CARBOPLATIN 220 MG: 10 INJECTION, SOLUTION INTRAVENOUS at 13:26

## 2020-01-20 RX ADMIN — Medication 500 UNITS: at 14:07

## 2020-01-20 RX ADMIN — SODIUM CHLORIDE, PRESERVATIVE FREE 10 ML: 5 INJECTION INTRAVENOUS at 14:07

## 2020-01-20 RX ADMIN — PACLITAXEL 80 MG: 6 INJECTION, SOLUTION INTRAVENOUS at 12:18

## 2020-01-20 RX ADMIN — DIPHENHYDRAMINE HYDROCHLORIDE 25 MG: 50 INJECTION, SOLUTION INTRAMUSCULAR; INTRAVENOUS at 11:35

## 2020-01-20 RX ADMIN — FAMOTIDINE 20 MG: 10 INJECTION, SOLUTION INTRAVENOUS at 11:56

## 2020-01-20 RX ADMIN — PALONOSETRON HYDROCHLORIDE 0.25 MG: 0.25 INJECTION, SOLUTION INTRAVENOUS at 11:57

## 2020-01-20 RX ADMIN — DEXAMETHASONE SODIUM PHOSPHATE 12 MG: 10 INJECTION, SOLUTION INTRAMUSCULAR; INTRAVENOUS at 11:58

## 2020-01-21 ENCOUNTER — HOSPITAL ENCOUNTER (OUTPATIENT)
Dept: RADIATION ONCOLOGY | Facility: HOSPITAL | Age: 65
Setting detail: RADIATION/ONCOLOGY SERIES
Discharge: HOME | End: 2020-01-21

## 2020-01-21 PROCEDURE — 77386: CPT | Performed by: RADIOLOGY

## 2020-01-22 ENCOUNTER — HOSPITAL ENCOUNTER (OUTPATIENT)
Dept: RADIATION ONCOLOGY | Facility: HOSPITAL | Age: 65
Setting detail: RADIATION/ONCOLOGY SERIES
Discharge: HOME | End: 2020-01-22

## 2020-01-22 PROCEDURE — 77386: CPT | Performed by: RADIOLOGY

## 2020-01-22 PROCEDURE — 77336 RADIATION PHYSICS CONSULT: CPT | Performed by: RADIOLOGY

## 2020-01-23 ENCOUNTER — HOSPITAL ENCOUNTER (OUTPATIENT)
Dept: RADIATION ONCOLOGY | Facility: HOSPITAL | Age: 65
Setting detail: RADIATION/ONCOLOGY SERIES
Discharge: HOME | End: 2020-01-23

## 2020-01-23 PROCEDURE — 77386: CPT | Performed by: RADIOLOGY

## 2020-01-24 ENCOUNTER — APPOINTMENT (OUTPATIENT)
Dept: ONCOLOGY | Facility: HOSPITAL | Age: 65
End: 2020-01-24

## 2020-01-24 ENCOUNTER — HOSPITAL ENCOUNTER (OUTPATIENT)
Dept: RADIATION ONCOLOGY | Facility: HOSPITAL | Age: 65
Setting detail: RADIATION/ONCOLOGY SERIES
Discharge: HOME | End: 2020-01-24

## 2020-01-24 ENCOUNTER — APPOINTMENT (OUTPATIENT)
Dept: LAB | Facility: HOSPITAL | Age: 65
End: 2020-01-24

## 2020-01-24 PROCEDURE — 77386: CPT | Performed by: RADIOLOGY

## 2020-01-27 ENCOUNTER — LAB (OUTPATIENT)
Dept: LAB | Facility: HOSPITAL | Age: 65
End: 2020-01-27

## 2020-01-27 ENCOUNTER — INFUSION (OUTPATIENT)
Dept: ONCOLOGY | Facility: HOSPITAL | Age: 65
End: 2020-01-27

## 2020-01-27 ENCOUNTER — HOSPITAL ENCOUNTER (OUTPATIENT)
Dept: RADIATION ONCOLOGY | Facility: HOSPITAL | Age: 65
Setting detail: RADIATION/ONCOLOGY SERIES
Discharge: HOME | End: 2020-01-27

## 2020-01-27 VITALS
HEIGHT: 66 IN | RESPIRATION RATE: 16 BRPM | SYSTOLIC BLOOD PRESSURE: 114 MMHG | DIASTOLIC BLOOD PRESSURE: 61 MMHG | TEMPERATURE: 97.9 F | OXYGEN SATURATION: 97 % | WEIGHT: 159 LBS | BODY MASS INDEX: 25.55 KG/M2 | HEART RATE: 86 BPM

## 2020-01-27 DIAGNOSIS — J43.9 PULMONARY EMPHYSEMA, UNSPECIFIED EMPHYSEMA TYPE (HCC): ICD-10-CM

## 2020-01-27 DIAGNOSIS — C34.81 MALIGNANT NEOPLASM OF OVERLAPPING SITES OF RIGHT LUNG (HCC): ICD-10-CM

## 2020-01-27 DIAGNOSIS — E83.42 HYPOMAGNESEMIA: Primary | ICD-10-CM

## 2020-01-27 DIAGNOSIS — E83.42 HYPOMAGNESEMIA: ICD-10-CM

## 2020-01-27 LAB
ALBUMIN SERPL-MCNC: 4.2 G/DL (ref 3.5–5.2)
ALBUMIN/GLOB SERPL: 1.6 G/DL
ALP SERPL-CCNC: 57 U/L (ref 39–117)
ALT SERPL W P-5'-P-CCNC: 10 U/L (ref 1–33)
ANION GAP SERPL CALCULATED.3IONS-SCNC: 11 MMOL/L (ref 5–15)
AST SERPL-CCNC: 16 U/L (ref 1–32)
BASOPHILS # BLD AUTO: 0.02 10*3/MM3 (ref 0–0.2)
BASOPHILS NFR BLD AUTO: 0.7 % (ref 0–1.5)
BILIRUB SERPL-MCNC: <0.2 MG/DL (ref 0.2–1.2)
BUN BLD-MCNC: 13 MG/DL (ref 8–23)
BUN/CREAT SERPL: 16.5 (ref 7–25)
CALCIUM SPEC-SCNC: 9.9 MG/DL (ref 8.6–10.5)
CHLORIDE SERPL-SCNC: 106 MMOL/L (ref 98–107)
CO2 SERPL-SCNC: 25 MMOL/L (ref 22–29)
CREAT BLD-MCNC: 0.79 MG/DL (ref 0.57–1)
DEPRECATED RDW RBC AUTO: 66.5 FL (ref 37–54)
EOSINOPHIL # BLD AUTO: 0.03 10*3/MM3 (ref 0–0.4)
EOSINOPHIL NFR BLD AUTO: 1 % (ref 0.3–6.2)
ERYTHROCYTE [DISTWIDTH] IN BLOOD BY AUTOMATED COUNT: 19.4 % (ref 12.3–15.4)
GFR SERPL CREATININE-BSD FRML MDRD: 73 ML/MIN/1.73
GLOBULIN UR ELPH-MCNC: 2.6 GM/DL
GLUCOSE BLD-MCNC: 106 MG/DL (ref 65–99)
HCT VFR BLD AUTO: 32 % (ref 34–46.6)
HGB BLD-MCNC: 10.2 G/DL (ref 12–15.9)
IMM GRANULOCYTES # BLD AUTO: 0.02 10*3/MM3 (ref 0–0.05)
IMM GRANULOCYTES NFR BLD AUTO: 0.7 % (ref 0–0.5)
LYMPHOCYTES # BLD AUTO: 0.29 10*3/MM3 (ref 0.7–3.1)
LYMPHOCYTES NFR BLD AUTO: 10 % (ref 19.6–45.3)
MAGNESIUM SERPL-MCNC: 1.8 MG/DL (ref 1.6–2.4)
MCH RBC QN AUTO: 30.8 PG (ref 26.6–33)
MCHC RBC AUTO-ENTMCNC: 31.9 G/DL (ref 31.5–35.7)
MCV RBC AUTO: 96.7 FL (ref 79–97)
MONOCYTES # BLD AUTO: 0.36 10*3/MM3 (ref 0.1–0.9)
MONOCYTES NFR BLD AUTO: 12.4 % (ref 5–12)
NEUTROPHILS # BLD AUTO: 2.19 10*3/MM3 (ref 1.7–7)
NEUTROPHILS NFR BLD AUTO: 75.2 % (ref 42.7–76)
NRBC BLD AUTO-RTO: 0 /100 WBC (ref 0–0.2)
PLATELET # BLD AUTO: 303 10*3/MM3 (ref 140–450)
PMV BLD AUTO: 9.8 FL (ref 6–12)
POTASSIUM BLD-SCNC: 4 MMOL/L (ref 3.5–5.2)
PROT SERPL-MCNC: 6.8 G/DL (ref 6–8.5)
RBC # BLD AUTO: 3.31 10*6/MM3 (ref 3.77–5.28)
SODIUM BLD-SCNC: 142 MMOL/L (ref 136–145)
WBC NRBC COR # BLD: 2.91 10*3/MM3 (ref 3.4–10.8)

## 2020-01-27 PROCEDURE — 25010000002 CARBOPLATIN PER 50 MG: Performed by: NURSE PRACTITIONER

## 2020-01-27 PROCEDURE — 25010000002 PACLITAXEL PER 30 MG: Performed by: NURSE PRACTITIONER

## 2020-01-27 PROCEDURE — 96413 CHEMO IV INFUSION 1 HR: CPT

## 2020-01-27 PROCEDURE — 96417 CHEMO IV INFUS EACH ADDL SEQ: CPT

## 2020-01-27 PROCEDURE — 25010000002 DEXAMETHASONE SODIUM PHOSPHATE 100 MG/10ML SOLUTION: Performed by: NURSE PRACTITIONER

## 2020-01-27 PROCEDURE — 25010000003 HEPARIN LOCK FLUCH PER 10 UNITS: Performed by: INTERNAL MEDICINE

## 2020-01-27 PROCEDURE — 36415 COLL VENOUS BLD VENIPUNCTURE: CPT

## 2020-01-27 PROCEDURE — 77386: CPT | Performed by: RADIOLOGY

## 2020-01-27 PROCEDURE — 25010000002 DIPHENHYDRAMINE PER 50 MG: Performed by: NURSE PRACTITIONER

## 2020-01-27 PROCEDURE — 77336 RADIATION PHYSICS CONSULT: CPT | Performed by: RADIOLOGY

## 2020-01-27 PROCEDURE — 85025 COMPLETE CBC W/AUTO DIFF WBC: CPT

## 2020-01-27 PROCEDURE — 96375 TX/PRO/DX INJ NEW DRUG ADDON: CPT

## 2020-01-27 PROCEDURE — 83735 ASSAY OF MAGNESIUM: CPT

## 2020-01-27 PROCEDURE — 25010000002 PALONOSETRON PER 25 MCG: Performed by: NURSE PRACTITIONER

## 2020-01-27 PROCEDURE — 96367 TX/PROPH/DG ADDL SEQ IV INF: CPT

## 2020-01-27 PROCEDURE — 80053 COMPREHEN METABOLIC PANEL: CPT

## 2020-01-27 RX ORDER — FAMOTIDINE 10 MG/ML
20 INJECTION, SOLUTION INTRAVENOUS AS NEEDED
Status: CANCELLED | OUTPATIENT
Start: 2020-01-27

## 2020-01-27 RX ORDER — SODIUM CHLORIDE 0.9 % (FLUSH) 0.9 %
10 SYRINGE (ML) INJECTION AS NEEDED
Status: DISCONTINUED | OUTPATIENT
Start: 2020-01-27 | End: 2020-01-27 | Stop reason: HOSPADM

## 2020-01-27 RX ORDER — SODIUM CHLORIDE 9 MG/ML
250 INJECTION, SOLUTION INTRAVENOUS ONCE
Status: COMPLETED | OUTPATIENT
Start: 2020-01-27 | End: 2020-01-27

## 2020-01-27 RX ORDER — FAMOTIDINE 10 MG/ML
20 INJECTION, SOLUTION INTRAVENOUS ONCE
Status: CANCELLED | OUTPATIENT
Start: 2020-01-27

## 2020-01-27 RX ORDER — DIPHENHYDRAMINE HYDROCHLORIDE 50 MG/ML
50 INJECTION INTRAMUSCULAR; INTRAVENOUS AS NEEDED
Status: CANCELLED | OUTPATIENT
Start: 2020-01-27

## 2020-01-27 RX ORDER — PALONOSETRON 0.05 MG/ML
0.25 INJECTION, SOLUTION INTRAVENOUS ONCE
Status: CANCELLED | OUTPATIENT
Start: 2020-01-27

## 2020-01-27 RX ORDER — SODIUM CHLORIDE 9 MG/ML
250 INJECTION, SOLUTION INTRAVENOUS ONCE
Status: CANCELLED | OUTPATIENT
Start: 2020-01-27

## 2020-01-27 RX ORDER — HEPARIN SODIUM (PORCINE) LOCK FLUSH IV SOLN 100 UNIT/ML 100 UNIT/ML
500 SOLUTION INTRAVENOUS AS NEEDED
Status: CANCELLED | OUTPATIENT
Start: 2020-01-27

## 2020-01-27 RX ORDER — FAMOTIDINE 10 MG/ML
20 INJECTION, SOLUTION INTRAVENOUS ONCE
Status: COMPLETED | OUTPATIENT
Start: 2020-01-27 | End: 2020-01-27

## 2020-01-27 RX ORDER — SODIUM CHLORIDE 0.9 % (FLUSH) 0.9 %
10 SYRINGE (ML) INJECTION AS NEEDED
Status: CANCELLED | OUTPATIENT
Start: 2020-01-27

## 2020-01-27 RX ORDER — FAMOTIDINE 10 MG/ML
20 INJECTION, SOLUTION INTRAVENOUS AS NEEDED
Status: DISCONTINUED | OUTPATIENT
Start: 2020-01-27 | End: 2020-01-27 | Stop reason: HOSPADM

## 2020-01-27 RX ORDER — HEPARIN SODIUM (PORCINE) LOCK FLUSH IV SOLN 100 UNIT/ML 100 UNIT/ML
500 SOLUTION INTRAVENOUS AS NEEDED
Status: DISCONTINUED | OUTPATIENT
Start: 2020-01-27 | End: 2020-01-27 | Stop reason: HOSPADM

## 2020-01-27 RX ORDER — DIPHENHYDRAMINE HYDROCHLORIDE 50 MG/ML
50 INJECTION INTRAMUSCULAR; INTRAVENOUS AS NEEDED
Status: DISCONTINUED | OUTPATIENT
Start: 2020-01-27 | End: 2020-01-27 | Stop reason: HOSPADM

## 2020-01-27 RX ORDER — PALONOSETRON 0.05 MG/ML
0.25 INJECTION, SOLUTION INTRAVENOUS ONCE
Status: COMPLETED | OUTPATIENT
Start: 2020-01-27 | End: 2020-01-27

## 2020-01-27 RX ADMIN — PALONOSETRON HYDROCHLORIDE 0.25 MG: 0.25 INJECTION, SOLUTION INTRAVENOUS at 10:29

## 2020-01-27 RX ADMIN — FAMOTIDINE 20 MG: 10 INJECTION, SOLUTION INTRAVENOUS at 10:55

## 2020-01-27 RX ADMIN — SODIUM CHLORIDE 250 ML: 9 INJECTION, SOLUTION INTRAVENOUS at 10:15

## 2020-01-27 RX ADMIN — Medication 500 UNITS: at 13:35

## 2020-01-27 RX ADMIN — DIPHENHYDRAMINE HYDROCHLORIDE 25 MG: 50 INJECTION, SOLUTION INTRAMUSCULAR; INTRAVENOUS at 10:57

## 2020-01-27 RX ADMIN — SODIUM CHLORIDE, PRESERVATIVE FREE 10 ML: 5 INJECTION INTRAVENOUS at 13:35

## 2020-01-27 RX ADMIN — DEXAMETHASONE SODIUM PHOSPHATE 12 MG: 10 INJECTION, SOLUTION INTRAMUSCULAR; INTRAVENOUS at 10:31

## 2020-01-27 RX ADMIN — CARBOPLATIN 210 MG: 10 INJECTION, SOLUTION INTRAVENOUS at 12:39

## 2020-01-27 RX ADMIN — PACLITAXEL 80 MG: 6 INJECTION, SOLUTION INTRAVENOUS at 11:18

## 2020-01-29 ENCOUNTER — APPOINTMENT (OUTPATIENT)
Dept: RADIATION ONCOLOGY | Facility: HOSPITAL | Age: 65
End: 2020-01-29

## 2020-01-29 ENCOUNTER — TELEPHONE (OUTPATIENT)
Dept: ONCOLOGY | Facility: CLINIC | Age: 65
End: 2020-01-29

## 2020-01-29 NOTE — TELEPHONE ENCOUNTER
----- Message from Ivelisse Benjamin sent at 1/29/2020  3:59 PM CST -----  [id number] ITS ABOUT A LAB THAT SHES GETTING ON Monday.AND AN INFUSION. SHE WOULD LIKE MORE INFO ON THIS. THANK YOU

## 2020-01-31 ENCOUNTER — APPOINTMENT (OUTPATIENT)
Dept: ONCOLOGY | Facility: HOSPITAL | Age: 65
End: 2020-01-31

## 2020-01-31 ENCOUNTER — APPOINTMENT (OUTPATIENT)
Dept: LAB | Facility: HOSPITAL | Age: 65
End: 2020-01-31

## 2020-02-03 ENCOUNTER — LAB (OUTPATIENT)
Dept: LAB | Facility: HOSPITAL | Age: 65
End: 2020-02-03

## 2020-02-03 ENCOUNTER — INFUSION (OUTPATIENT)
Dept: ONCOLOGY | Facility: HOSPITAL | Age: 65
End: 2020-02-03

## 2020-02-03 VITALS
RESPIRATION RATE: 17 BRPM | OXYGEN SATURATION: 97 % | WEIGHT: 159.6 LBS | HEART RATE: 88 BPM | DIASTOLIC BLOOD PRESSURE: 67 MMHG | SYSTOLIC BLOOD PRESSURE: 111 MMHG | TEMPERATURE: 98.4 F | BODY MASS INDEX: 25.65 KG/M2 | HEIGHT: 66 IN

## 2020-02-03 DIAGNOSIS — J43.9 PULMONARY EMPHYSEMA, UNSPECIFIED EMPHYSEMA TYPE (HCC): ICD-10-CM

## 2020-02-03 DIAGNOSIS — C34.81 MALIGNANT NEOPLASM OF OVERLAPPING SITES OF RIGHT LUNG (HCC): ICD-10-CM

## 2020-02-03 LAB
ALBUMIN SERPL-MCNC: 4 G/DL (ref 3.5–5.2)
ALBUMIN/GLOB SERPL: 1.4 G/DL
ALP SERPL-CCNC: 53 U/L (ref 39–117)
ALT SERPL W P-5'-P-CCNC: 16 U/L (ref 1–33)
ANION GAP SERPL CALCULATED.3IONS-SCNC: 11 MMOL/L (ref 5–15)
AST SERPL-CCNC: 17 U/L (ref 1–32)
BASOPHILS # BLD AUTO: 0.02 10*3/MM3 (ref 0–0.2)
BASOPHILS NFR BLD AUTO: 0.8 % (ref 0–1.5)
BILIRUB SERPL-MCNC: <0.2 MG/DL (ref 0.2–1.2)
BUN BLD-MCNC: 13 MG/DL (ref 8–23)
BUN/CREAT SERPL: 18.3 (ref 7–25)
CALCIUM SPEC-SCNC: 9.4 MG/DL (ref 8.6–10.5)
CHLORIDE SERPL-SCNC: 105 MMOL/L (ref 98–107)
CO2 SERPL-SCNC: 25 MMOL/L (ref 22–29)
CREAT BLD-MCNC: 0.71 MG/DL (ref 0.57–1)
DEPRECATED RDW RBC AUTO: 68 FL (ref 37–54)
EOSINOPHIL # BLD AUTO: 0.04 10*3/MM3 (ref 0–0.4)
EOSINOPHIL NFR BLD AUTO: 1.6 % (ref 0.3–6.2)
ERYTHROCYTE [DISTWIDTH] IN BLOOD BY AUTOMATED COUNT: 19.7 % (ref 12.3–15.4)
GFR SERPL CREATININE-BSD FRML MDRD: 82 ML/MIN/1.73
GLOBULIN UR ELPH-MCNC: 2.9 GM/DL
GLUCOSE BLD-MCNC: 88 MG/DL (ref 65–99)
HCT VFR BLD AUTO: 30.3 % (ref 34–46.6)
HGB BLD-MCNC: 9.8 G/DL (ref 12–15.9)
LYMPHOCYTES # BLD AUTO: 0.38 10*3/MM3 (ref 0.7–3.1)
LYMPHOCYTES NFR BLD AUTO: 15.4 % (ref 19.6–45.3)
MAGNESIUM SERPL-MCNC: 1.8 MG/DL (ref 1.6–2.4)
MCH RBC QN AUTO: 31.6 PG (ref 26.6–33)
MCHC RBC AUTO-ENTMCNC: 32.3 G/DL (ref 31.5–35.7)
MCV RBC AUTO: 97.7 FL (ref 79–97)
MONOCYTES # BLD AUTO: 0.58 10*3/MM3 (ref 0.1–0.9)
MONOCYTES NFR BLD AUTO: 23.5 % (ref 5–12)
NEUTROPHILS # BLD AUTO: 1.44 10*3/MM3 (ref 1.7–7)
NEUTROPHILS NFR BLD AUTO: 58.3 % (ref 42.7–76)
PLATELET # BLD AUTO: 275 10*3/MM3 (ref 140–450)
PMV BLD AUTO: 9.7 FL (ref 6–12)
POTASSIUM BLD-SCNC: 4 MMOL/L (ref 3.5–5.2)
PROT SERPL-MCNC: 6.9 G/DL (ref 6–8.5)
RBC # BLD AUTO: 3.1 10*6/MM3 (ref 3.77–5.28)
SODIUM BLD-SCNC: 141 MMOL/L (ref 136–145)
WBC NRBC COR # BLD: 2.47 10*3/MM3 (ref 3.4–10.8)

## 2020-02-03 PROCEDURE — 85025 COMPLETE CBC W/AUTO DIFF WBC: CPT

## 2020-02-03 PROCEDURE — 83735 ASSAY OF MAGNESIUM: CPT

## 2020-02-03 PROCEDURE — G0463 HOSPITAL OUTPT CLINIC VISIT: HCPCS

## 2020-02-03 PROCEDURE — 80053 COMPREHEN METABOLIC PANEL: CPT

## 2020-02-03 PROCEDURE — 36415 COLL VENOUS BLD VENIPUNCTURE: CPT

## 2020-02-03 NOTE — PROGRESS NOTES
0106 Labs noted Hgb 9.8, Hct 30.3 not within parameters for procrit per Dr. Russell's note and WBC 2.47 with ANC of 1.44 not within parameters for neupogen. Patient aware.Qasim DOAN

## 2020-02-06 ENCOUNTER — OFFICE VISIT (OUTPATIENT)
Dept: PRIMARY CARE CLINIC | Age: 65
End: 2020-02-06
Payer: MEDICARE

## 2020-02-06 ENCOUNTER — TELEPHONE (OUTPATIENT)
Dept: GASTROENTEROLOGY | Facility: CLINIC | Age: 65
End: 2020-02-06

## 2020-02-06 VITALS
SYSTOLIC BLOOD PRESSURE: 120 MMHG | OXYGEN SATURATION: 97 % | DIASTOLIC BLOOD PRESSURE: 78 MMHG | TEMPERATURE: 97.7 F | HEART RATE: 104 BPM | WEIGHT: 160 LBS | HEIGHT: 66 IN | RESPIRATION RATE: 18 BRPM | BODY MASS INDEX: 25.71 KG/M2

## 2020-02-06 PROBLEM — C34.90 SQUAMOUS CELL CARCINOMA OF LUNG (HCC): Status: ACTIVE | Noted: 2020-02-06

## 2020-02-06 PROBLEM — M06.09 RHEUMATOID ARTHRITIS OF MULTIPLE SITES WITH NEGATIVE RHEUMATOID FACTOR (HCC): Status: ACTIVE | Noted: 2020-02-06

## 2020-02-06 PROBLEM — J44.9 STAGE 3 SEVERE COPD BY GOLD CLASSIFICATION (HCC): Status: ACTIVE | Noted: 2020-02-06

## 2020-02-06 PROBLEM — G93.32 CHRONIC FATIGUE SYNDROME: Status: ACTIVE | Noted: 2020-02-06

## 2020-02-06 LAB
AMPHETAMINE SCREEN, URINE: NORMAL
BARBITURATE SCREEN, URINE: NORMAL
BENZODIAZEPINE SCREEN, URINE: NORMAL
BUPRENORPHINE URINE: NORMAL
COCAINE METABOLITE SCREEN URINE: NORMAL
GABAPENTIN SCREEN, URINE: NORMAL
MDMA URINE: NORMAL
METHADONE SCREEN, URINE: NORMAL
METHAMPHETAMINE, URINE: NORMAL
OPIATE SCREEN URINE: NORMAL
OXYCODONE SCREEN URINE: NORMAL
PHENCYCLIDINE SCREEN URINE: NORMAL
PROPOXYPHENE SCREEN, URINE: NORMAL
THC SCREEN, URINE: NORMAL
TRICYCLIC ANTIDEPRESSANTS, UR: NORMAL

## 2020-02-06 PROCEDURE — 80305 DRUG TEST PRSMV DIR OPT OBS: CPT | Performed by: NURSE PRACTITIONER

## 2020-02-06 PROCEDURE — 99215 OFFICE O/P EST HI 40 MIN: CPT | Performed by: NURSE PRACTITIONER

## 2020-02-06 RX ORDER — HYDROCODONE BITARTRATE AND ACETAMINOPHEN 5; 325 MG/1; MG/1
1 TABLET ORAL 2 TIMES DAILY PRN
Qty: 45 TABLET | Refills: 0 | Status: SHIPPED | OUTPATIENT
Start: 2020-02-06 | End: 2020-03-07

## 2020-02-06 ASSESSMENT — ENCOUNTER SYMPTOMS
SHORTNESS OF BREATH: 1
WHEEZING: 1
GASTROINTESTINAL NEGATIVE: 1
EYES NEGATIVE: 1

## 2020-02-06 NOTE — PROGRESS NOTES
Pt sees [facility]  [address] [place] [address] [facility] 6   [phone]    Dr. Ruth Vizcaino Rheum needs records

## 2020-02-06 NOTE — PROGRESS NOTES
reviewed. Constitutional:       Appearance: Normal appearance. She is normal weight. She is ill-appearing. Comments: generalized wekaness   HENT:      Head: Normocephalic and atraumatic. Jaw: There is normal jaw occlusion. Right Ear: Hearing, tympanic membrane, ear canal and external ear normal.      Left Ear: Hearing, tympanic membrane, ear canal and external ear normal.      Nose: Nose normal.      Mouth/Throat:      Lips: Pink. Mouth: Mucous membranes are moist.      Pharynx: Oropharynx is clear. Eyes:      General: Lids are normal.      Extraocular Movements: Extraocular movements intact. Conjunctiva/sclera: Conjunctivae normal.      Pupils: Pupils are equal, round, and reactive to light. Neck:      Musculoskeletal: Full passive range of motion without pain, normal range of motion and neck supple. Thyroid: No thyromegaly. Trachea: Trachea normal.   Cardiovascular:      Rate and Rhythm: Normal rate and regular rhythm. Pulses: Normal pulses. Dorsalis pedis pulses are 2+ on the right side and 2+ on the left side. Posterior tibial pulses are 2+ on the right side and 2+ on the left side. Heart sounds: Normal heart sounds. No murmur. Pulmonary:      Effort: Pulmonary effort is normal.      Breath sounds: Normal breath sounds and air entry. Chest:       Abdominal:      General: Bowel sounds are normal.      Palpations: Abdomen is soft. Musculoskeletal:      Right knee: She exhibits decreased range of motion. Tenderness found. Left knee: She exhibits decreased range of motion. Tenderness found. Cervical back: She exhibits normal range of motion. Thoracic back: She exhibits decreased range of motion and tenderness. Lumbar back: She exhibits decreased range of motion and tenderness. Right lower leg: No edema. Left lower leg: No edema. Lymphadenopathy:      Cervical: No cervical adenopathy.    Skin:     General: Skin is treatments. Silvadene for radiation burns on chest and back to see if this will help any at all. More than 50% of the time was spent counseling and coordinating care for a total time of 45 mins face to face. Return in about 4 weeks (around 3/5/2020) for cancer / chronic pain. Orders Placed This Encounter   Procedures    POCT Rapid Drug Screen       Orders Placed This Encounter   Medications    silver sulfADIAZINE (SILVADENE) 1 % cream     Sig: Apply topically daily. Dispense:  50 g     Refill:  1    HYDROcodone-acetaminophen (NORCO) 5-325 MG per tablet     Sig: Take 1 tablet by mouth 2 times daily as needed for Pain for up to 30 days. Dispense:  45 tablet     Refill:  0     Reduce doses taken as pain becomes manageable        Patient offered educational handouts and has had all questions answered. Patient voices understanding and agrees to plans along with risks and benefits of plan. Patient is instructed to continue prior meds, diet, and exercise plans as instructed. Patient agrees to follow up as instructed and sooner if needed. Patient agrees to go to ER if condition becomes emergent. EMR Dragon/transcription disclaimer: Some of this encounter note is an electronic transcription/translation of spoken language to printed text. The electronic translation of spoken language may permit erroneous, or at times, nonsensical words or phrases to be inadvertently transcribed.  Although I have reviewed the note for such errors, some may still exist.    Electronically signed by ROBER Meyer on 2/6/2020 at 9:48 PM

## 2020-02-07 NOTE — PROGRESS NOTES
[facility]  HEMATOLOGY & ONCOLOGY    [facility] ONC [facility] HEMATOLOGY AND ONCOLOGY  [address]  [place] [address] [phone]    Patient Name: Damaris Hu  Encounter Date: 02/10/2020  YOB: 1955  Patient Number: [id number]    Chief Complaint   Patient presents with   • Lung Cancer     Here for chemo educaiton       REASON FOR VISIT: Damaris Hu is a 64-year-old female who returns in follow-up of stage III squamous cell lung carcinoma.  She was started on definitive radiation (12/05/2019) concurrent with weekly chemotherapy, week 4 on 12/30/2019.  Weeks 5 and 6 held due to thrombocytopenia and leukopenia, but was able to complete them.  She completed her 6th week of chemotherapy and radiation on 1/27/2020.  She is here with her daughter Tawana.    She presents today feeling well other than fatigue and being tired.  She states that this is partly related to her chronic fatigue syndrome but the chemo and radiation did make it some worse. She has a mild rash/burn to the chest and back from the radiation that she is keeping creams on.  She has had no other complications.  Her labs are improving as well.      DIAGNOSTIC ABNORMALITIES:          1.   10/28/2019 patient presented to the emergency room with chest pain that has been ongoing for the past year but that recently had gotten more noticeable, more frequent, and has been associated with shortness of breath.  On the day prior to her hospital admission she apparently had a syncopal episode.  An evaluation ensued:  Labs: Glucose 112 otherwise CMP was normal with a BUN of 14 creatinine 0.91 (GFR 62) calcium 10.2, total protein 7.1 and normal liver enzymes.  Troponin T was less than 0.010, proBNP normal at 97.4, d-dimer was normal at 0.27, sed rate 10, CRP 2.02 (slightly elevated), hemoglobin 11.3, hematocrit 35.6, MCV 92.5, platelets 306,000, WBC 7.37 with 76 point 1 segs  15.2 lymphs.          2.   10/28/2019 - head without contrast.  Impression: No acute intracranial disease.          3.   10/28/2019-CT chest with contrast.  Impression: Primary lung mass centered in the right upper lobe and abuts the posterior mediastinal pleura (5.4 x 5.3 x 4.5 cm).  This also partially encases the right bronchus intermedius and right upper lobe bronchus.  This is consistent with pulmonary malignancy.  Surgical sampling recommended with bronchoscopy.  Suspected metastatic lymph node in the pretracheal region measuring 1.3 cm in short axis.          4.   11/04/2019 - was seen by Dr. Delonte Burns, [facility] for further assessment of the lung mass.  Spirometry on that date revealed severe COPD with positive bronchodilator response.  Postbronchodilator FEV1 revealed significant improvement to moderate/severe COPD.  Plan: Navigational bronchoscopy and possible EBUS for tissue diagnosis.          5.   11/05/2019- PET scan, skull vertex to mid thighs.  Impression: Markedly hypermetabolic right perihilar tumor (49 x 37 mm) with right supraclavicular and mediastinal kyler metastasis.          6.   11/07/2019- CT chest without contrast at [facility], [place], operated by [facility].  Comparison, PET CT 11/5/2019.  Impression: Large, spiculated right infrahilar mass, crossing the major fissure to involve the right upper and right lower lobes with direct extension into the subcarinal space.  Mildly enlarged mediastinal lymph nodes and normal-sized right supraclavicular lymph node corresponding to the area of PET positivity.          7.   11/07/2019-bronchoscopy and EBUS guided FNA biopsy of right upper lobe lung: Positive for malignant cells consistent with squamous cell carcinoma.  EBUS guided FNA, station 7: Lymph node elements present.  No malignant cells identified.  EBUS guided FNA, station 4R: Positive for malignant cells consistent with squamous cell carcinoma.  Immunohistochemical stains  performed on cell block #3.  Tumor cells positive for squamous squamous markers p63 and CK 5/6, negative for CK7, TTF-1, and CD 56.  Immunoprofile supports the above diagnosis.  Addendum: Subsequent biopsy showed metastatic disease in a supraclavicular lymph node, finding consistent with advanced stage disease.  Per protocol will proceed with tumor genomic testing on this specimen.  Due to the relatively small amount of tumor cellularity available, the entire panel of testing may not be able to be completed.  BRAF mutation not detected, K-mayco mutation not detected, EGFR mutation not detected.          8.   11/07/2019-navigational bronchoscopy and forceps biopsy of posterior right upper lobe lung mass.  Diagnosis: Fragments of bronchial mucosa.  No evidence of granuloma or malignancy.          9.   11/08/2019- ultrasound-guided biopsy right supraclavicular lymph node (fine-needle aspirate).  Cytopathology diagnosis: Positive for malignant cells consistent with previously established diagnosis of metastatic non-small cell carcinoma.  Comment: Insufficient tumor cellularity for further testing on the specimen.         10.  11/15/2019-- labs, 11/15/2019 with stable anemia otherwise normal CBC with differential, slightly depressed ferritin (65), iron saturation 7%, serum iron 19, TIBC 289.  Repleted B12 and folate.  Received Injectafer.  CMP, CEA, serum iron, iron saturation, ferritin, B12, folate.  Slightly elevated CEA (3.38).         11.   11/21/2019- brain MRI at [facility].  White matter changes.  Empty sella syndrome.  No metastatic lesions.        PREVIOUS INTERVENTIONS:          1.   11/19/2019-Injectafer 750 mg IV x1          2.   Definitive course of radiation to the chest (concurrent with chemotherapy)-radiation initiated 12/5/2019 with anticipated fractionated course of 6000 to    6600 cGy at 180 cGy/day - completed 1/27/2020          3.   Weekly concurrent chemotherapy with carboplatin and Taxol beginning  12/09/2019 through 1/27/2020 (6 weeks)          4.   Maintenance Durvalumab       PAST MEDICAL HISTORY:  ALLERGIES:  Allergies   Allergen Reactions   • Amoxicillin GI Intolerance       CURRENT MEDICATIONS:  Outpatient Encounter Medications as of 2/10/2020   Medication Sig Dispense Refill   • cetirizine (zyrTEC) 10 MG tablet Take 10 mg by mouth Daily.     • citalopram (CeleXA) 20 MG tablet Take 20 mg by mouth Daily.     • fluticasone (FLONASE) 50 MCG/ACT nasal spray 1 spray by Each Nare route Daily As Needed.  5   • Fluticasone-Umeclidin-Vilant (TRELEGY ELLIPTA) 100-62.5-25 MCG/INH aerosol powder  Inhale Every Morning.     • HYDROcodone-acetaminophen (NORCO) 5-325 MG per tablet Take 1 tablet by mouth Take As Directed. 1.5 tabs daily as needed for pain     • lidocaine-prilocaine (EMLA) 2.5-2.5 % cream Apply to port 30 minutes before access 1 each 0   • omeprazole (priLOSEC) 20 MG capsule Take 20 mg by mouth Every Morning Before Breakfast.  0   • ondansetron (ZOFRAN) 8 MG tablet Take 1 tablet by mouth 3 (Three) Times a Day As Needed for Nausea or Vomiting. 30 tablet 5   • ZOLMitriptan (ZOMIG) 2.5 MG tablet Take 2.5 mg by mouth As Needed.       No facility-administered encounter medications on file as of 2/10/2020.      ADULT ILLNESSES:  Patient Active Problem List   Diagnosis Code   • Age-related osteoporosis without current pathological fracture M81.0   • Anxiety and depression F41.9, F32.9   • Cervical disc disease M50.90   • Hyperlipidemia E78.5   • Lumbar degenerative disc disease M51.36   • Malignant neoplasm of overlapping sites of right lung (CMS/HCC) C34.81   • Emphysema lung (CMS/HCC) J43.9   • Iron deficiency anemia D50.9   • Chemotherapy induced neutropenia (CMS/HCC) D70.1, T45.1X5A   • Antineoplastic chemotherapy induced anemia D64.81, T45.1X5A   • Encounter for consultation Z71.9   • Former smoker Z87.891   • Regional lymph node metastasis present (CMS/HCC) C77.9   • 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   • Hypomagnesemia E83.42     SURGERIES:  Past Surgical History:   Procedure Laterality Date   • BREAST SURGERY      Cyst removal   • COLONOSCOPY N/A 12/3/2018    Procedure: COLONOSCOPY WITH ANESTHESIA;  Surgeon: Myah Pool MD;  Location: [facility] ENDOSCOPY;  Service: Gastroenterology   • HYSTERECTOMY     • VENOUS ACCESS DEVICE (PORT) INSERTION N/A 11/22/2019    Procedure: PLACEMENT OF SINGLE LUMEN PORT;  Surgeon: Mona Gibson MD;  Location: [facility] OR;  Service: General     HEALTH MAINTENANCE ITEMS:  Health Maintenance Due   Topic Date Due   • ZOSTER VACCINE (2 of 3) 09/15/2015   • HEPATITIS C SCREENING  10/02/2018   • MEDICARE ANNUAL WELLNESS  10/02/2018   • PNEUMOCOCCAL VACCINE (65+ HIGH RISK) (2 of 2 - PPSV23) 10/19/2018   • LIPID PANEL  11/15/2019   • COLONOSCOPY  12/03/2019       Last Completed Colonoscopy       Status Date      COLONOSCOPY Done 12/3/2018 COLONOSCOPY                                                                    5 polyps removed; recommended 1 year follow up       Last Completed Mammogram       Status Date      MAMMOGRAM Done 2/5/2019 Ext Proc: CHG SCREENING DIGITAL BREAST TOMOSYNTHESIS BI                                                                      No suspicious findings      FAMILY HISTORY:  Family History   Problem Relation Age of Onset   • Colon polyps Mother         < 60 years old   • Lung cancer Mother    • No Known Problems Father    • No Known Problems Sister    • Prostate cancer Maternal Grandfather    • Breast cancer Maternal Aunt    • Colon cancer Neg Hx      SOCIAL HISTORY:  Social History     Socioeconomic History   • Marital status:      Spouse name: Not on file   • Number of children: Not on file   • Years of education: Not on file   • Highest education level: Not on file   Tobacco Use   • Smoking status: Former Smoker     Types: Cigarettes   • Smokeless tobacco: Never Used   • Tobacco comment: Quit 4 years  "ago   Substance and Sexual Activity   • Alcohol use: No   • Drug use: No   • Sexual activity: Defer       REVIEW OF SYSTEMS:  Review of Systems   Constitutional: Positive for fatigue. Negative for activity change, appetite change, chills, diaphoresis, fever and unexpected weight loss.        Manages all ADLs and some chores   HENT: Negative for ear pain, nosebleeds, sinus pressure, sore throat and voice change.    Eyes: Negative for blurred vision, double vision, pain and visual disturbance.   Respiratory: Positive for shortness of breath (improved with inhalers). Negative for cough.    Cardiovascular: Negative for chest pain, palpitations and leg swelling.   Gastrointestinal: Negative for abdominal pain, anal bleeding, blood in stool, constipation, diarrhea, nausea and vomiting.   Endocrine: Negative for heat intolerance, polydipsia and polyuria.   Genitourinary: Negative for dysuria, frequency, hematuria, urgency and urinary incontinence.   Musculoskeletal: Positive for arthralgias, back pain and neck pain. Negative for myalgias.   Skin: Negative for rash and skin lesions.   Neurological: Negative for dizziness, tremors, seizures, syncope, speech difficulty, weakness and headache.   Hematological: Negative for adenopathy. Does not bruise/bleed easily.   Psychiatric/Behavioral: Positive for depressed mood. Negative for dysphoric mood, sleep disturbance and suicidal ideas. The patient is nervous/anxious.        /66   Pulse 88   Temp 98.6 °F (37 °C) (Temporal)   Resp 16   Ht 167.6 cm (66\")   Wt 73 kg (160 lb 14.4 oz)   LMP  (LMP Unknown)   SpO2 99%   Breastfeeding No   BMI 25.97 kg/m²  Body surface area is 1.82 meters squared.  Pain Score    02/10/20 1405   PainSc:   4   PainLoc: Back       Physical Exam:  Physical Exam   Constitutional: She is oriented to person, place, and time. She appears well-developed and well-nourished.   Pleasant, cooperative, heavy-set, modestly kept elderly female.  " Ambulatory.  ECOG 1 (previously - 2).      HENT:   Head: Atraumatic.   Mouth/Throat: Oropharynx is clear and moist.   Eyes: Pupils are equal, round, and reactive to light. EOM are normal. No scleral icterus.   Neck: Trachea normal. Neck supple. No JVD present.   Cardiovascular: Normal rate, regular rhythm and normal pulses. Exam reveals no gallop and no friction rub.   No murmur heard.  Pulmonary/Chest: Effort normal and breath sounds normal. She has no wheezes. She has no rhonchi. She has no rales. Chest wall is not dull to percussion.   Barrel shaped chest with diminished breath sounds globally.  Port in the left upper chest is noted.  Is well seated.    Abdominal: Soft. Normal appearance. There is no tenderness. There is no rebound and no guarding.   Lymphadenopathy:     She has no cervical adenopathy.     She has no axillary adenopathy.        Right: No inguinal and no supraclavicular adenopathy present.        Left: No inguinal and no supraclavicular adenopathy present.   Neurological: She is alert and oriented to person, place, and time. She has normal strength. No sensory deficit.   Psychiatric: She has a normal mood and affect. Judgment normal.       Damaris C Ray reports a pain score of 4.  Patient's Body mass index is 25.97 kg/m². BMI is within normal parameters. No follow-up required..    LABS    Lab Results - Last 18 Months   Lab Units 02/10/20  1311 02/03/20  1314 01/27/20  0922 01/20/20  1045 01/16/20  1339 01/13/20  1001 01/06/20  0952 12/30/19  0947   HEMOGLOBIN g/dL 10.7* 9.8* 10.2* 10.5* 9.8* 9.9* 9.8* 10.0*   HEMATOCRIT % 33.6* 30.3* 32.0* 32.9* 30.2* 31.0* 30.9* 30.9*   MCV fL 97.1* 97.7* 96.7 96.5 94.4 93.7 92.8 91.4   WBC 10*3/mm3 4.09 2.47* 2.91* 2.55* 3.27* 1.57* 2.17* 2.57*   RDW % 19.5* 19.7* 19.4* 18.9* 18.4* 16.9* 15.0 14.6   MPV fL 9.2 9.7 9.8 9.9 10.0 9.8 10.0 9.7   PLATELETS 10*3/mm3 177 275 303 187 102* 97* 97* 167   IMM GRAN % % 0.2  --  0.7* 2.4*  --  0.6* 0.5 0.4   NEUTROS ABS  10*3/mm3 2.94 1.44* 2.19 1.62* 2.28 1.06* 1.61* 1.92   LYMPHS ABS 10*3/mm3 0.43* 0.38* 0.29* 0.34*  --  0.22* 0.30* 0.28*   MONOS ABS 10*3/mm3 0.62 0.58 0.36 0.44  --  0.22 0.22 0.32   EOS ABS 10*3/mm3 0.06 0.04 0.03 0.08 0.07 0.05 0.02 0.03   BASOS ABS 10*3/mm3 0.03 0.02 0.02 0.01 0.03 0.01 0.01 0.01   IMMATURE GRANS (ABS) 10*3/mm3 0.01  --  0.02 0.06*  --  0.01 0.01 0.01   NRBC /100 WBC 0.0  --  0.0 0.0  --  0.0 0.0 0.0   NEUTROPHIL % %  --   --   --   --  61.6  --   --   --    MONOCYTES % %  --   --   --   --  16.2*  --   --   --    BASOPHIL % %  --   --   --   --  1.0  --   --   --    ATYP LYMPH % %  --   --   --   --  2.0  --   --   --    ANISOCYTOSIS   --   --   --   --  Slight/1+  --   --   --    GIANT PLT   --   --   --   --  Mod/2+  --   --   --        Lab Results - Last 18 Months   Lab Units 02/10/20  1311 02/03/20  1314 01/27/20  0922 01/20/20  1045 01/16/20  1339 01/13/20  1001   GLUCOSE mg/dL 109* 88 106* 104* 96 93   SODIUM mmol/L 140 141 142 141 141 140   POTASSIUM mmol/L 3.9 4.0 4.0 4.0 4.1 3.7   CO2 mmol/L 23.0 25.0 25.0 26.0 25.0 27.0   CHLORIDE mmol/L 104 105 106 106 105 105   ANION GAP mmol/L 13.0 11.0 11.0 9.0 11.0 8.0   CREATININE mg/dL 0.77 0.71 0.79 0.73 0.66 0.73   BUN mg/dL 14 13 13 13 16 15   BUN / CREAT RATIO  18.2 18.3 16.5 17.8 24.2 20.5   CALCIUM mg/dL 9.8 9.4 9.9 9.9 9.8 10.0   EGFR IF NONAFRICN AM mL/min/1.73 75 82 73 80 90 80   ALK PHOS U/L 63 53 57 63 64 57   TOTAL PROTEIN g/dL 7.1 6.9 6.8 7.2 6.9 7.0   ALT (SGPT) U/L 11 16 10 10 9 8   AST (SGOT) U/L 14 17 16 14 11 12   BILIRUBIN mg/dL 0.2 <0.2* <0.2* 0.2 0.3 0.3   ALBUMIN g/dL 4.30 4.00 4.20 4.30 4.30 4.20   GLOBULIN gm/dL 2.8 2.9 2.6 2.9 2.6 2.8       Lab Results - Last 18 Months   Lab Units 01/06/20  0952 11/15/19  1332   CEA ng/mL 4.12 3.38       Lab Results - Last 18 Months   Lab Units 02/10/20  1311 01/06/20  0952 11/15/19  1332 08/24/18  1420   IRON mcg/dL 66 95 19*  --    TIBC mcg/dL 268* 270* 289*  --    IRON SATURATION %  25 35 7*  --    FERRITIN ng/mL 368.30* 591.50* 65.15  --    TSH uIU/mL  --   --   --  2.720   FOLATE ng/mL  --   --  >20.00  --      Assessment:  1.  Squamous cell carcinoma of the right lung            Tumor stage: IIIC (cT4, cN3, M0)            Bone tumor burden: 5.4 x 5.3 x 4.5 cm mass along the right major fissure that abuts the medial mediastinal pleura and partially encases the right upper lobe bronchus and right bronchus intermedius.  Pathologic pretracheal lymph node measures 1.3 cm.  Right supraclavicular metastasis.            Complications of tumor: Chest pain, dyspnea, syncope?            BRAF mutation not detected            KRAS mutation not detected            EGFR mutation not detected.            PD-L1 and ALK: Not reported apparently due to lack of specimen            Tumor status: Definitive radiation concurrent with chemotherapy completed 1/27/2020. Now ready to begin             maintenance Imfinzi  2.  Chest pain dyspnea and syncope, likely symptoms related to #1.  None since initial visit.  3.  Mild anemia, contribution from anemia of malignancy/anemia of chronic disease and chemo.  Hgb 9.9, 01/13/2020 (prior range: 9.8 - 11.3, 10/28/2019)  4.  Leukopenia and thrombocytopenia, chemo associated -- resolved as of today  2/10/20 with normal WBC and Plts.   5.  Chronic fatigue syndrome  6.  COPD  7.  Former smoker 40 pack years  8.  Depression  9.  Hemorrhoids with history of hematochezia  10. Chronic neck/back pains with DDD, C5-C7.  Followed by Dr. Grady.  Rx for Norco 5 prior to anterior cervical discectomy and fusion C5 in 2015 - never had surgery  11. Mild subconjunctival hemorrhage, OS.  Now using artificial tears.  No visual symptoms.     RECOMMENDATIONS:   1. Dr Russell previously  Re: The patient is apprised of the labs, 01/13/2020 and 01/06/2020 with pancytopenia (chemo), normal CMP, repleted ferritin (591; from 65), iron saturation 35% (from 7%), serum iron 95 (from 19),  TIBC 289.  Repleted B12 and folate.  Received Injectafer. Slightly elevated CEA (4.12; from 3.38).  Chemo tolerance discussed.  Grade 1 nausea and fatigue.  Otherwise doing well.  2. Dr Russell previously reviewed available molecular studies from lung biopsy done on 11/07/2019 (above) noting lack of mutation for EGFR, BRAF and KRAS.  ALK and PD-L1 not reported presumably due to lack of specimen.   3.  Dr Russell previously reviewed extensive records from Dr. Delonte Burns and [facility], to include radiographs, and biopsy results.  4.  Dr Russell previously discussed the Taxol and carboplatin. Potential toxicities of same previously discussed (to included but not limited to: Myelosuppression, alopecia, neuropathy, arthralgias/myalgias, nausea/vomiting, hypersensitivity reactions, diarrhea, mucositis, risks for infection, abnormal liver enzymes, asthenia, fever, low blood pressure, bleeding, renal insufficiency, edema, bradycardia, anaphylaxis, arrhythmias, thromboembolism, myocardial infarction, pulmonary toxicity, gastrointestinal (GI) obstruction/perforation, paralytic ileus, ischemic colitis, pancreatitis, severe skin reactions; electrolyte disorders, ototoxicity, nephrotoxicity, vision loss). Questions answered. She agrees to press on with therapy.         5.  Chemotherapy completed on 1/27/2020 now ready for maintenance therapy.         6.  NCCN Guidelines Version 5.2019 for stage III squamous cell cancer management previously reviewed. For T3-4N1 disease after definitive chemo + radiation recommendation is Durvalumab (category 1) x 1 year.   7.  Schedule C1 on 02/17/2020; C2 on 03/02/2020, C3 on 03/16/2020 - durvalumab (plan: every 2 weeks x 24 cycles - 12 months, progression or toxicity) per administration guidelines - at [facility]               durvalumab 10 mg/kg (WT = 85 kg; TD = 850 mg)   8.  Premeds:              Decadron 10 mg IV             Benadryl 25 mg IV             Pepcid 10 mg  IV   9. Dr Russell has ordered CMP, Mg++ and CBC with differential weekly with Zarxio 480 mcg subcutaneously daily x3 if ANC less than 1 -[facility]        10.  Dr Russell previously reviewed office encounter from Dr. Dudley, 11/25/2019.  Recommended definitive course of concurrent chemotherapy and radiation.  Radiation anticipated: 6000-60 600 centigray 180 cGy/day. Completed 1/27/2020.  11.  Return to the office on 02/17/2020 to see Dr Russell as scheduled with pre-office serum iron, Fe sat, ferritin, CBC with differential, CMP, and CEA.      12. Education was provided today on maintenance Durvalumab (Imfinzi). Handout from [employer] was discussed and given to the patient.  The information that was discussed but not limited to is listed below.  She verbalized understanding and signed the consent to begin therapy.     Important things to remember about the side effects of durvalumab:  •Most people will not experience all of the durvalumab side effects listed.  •Side effects are often predictable in terms of their onset, duration, and severity.  •Side effects are almost always reversible and will go away after therapy is complete.  •Side effects may be quite manageable. There are many options to minimize or prevent side effects of durvalumab.    The following side effects are common (occurring in greater than 30%) for patients taking durvalumab:  •Fatigue  •Infection    The following are rare but serious complications of durvalumab therapy triggered by an auto-immune reaction where the immune system goes after normal cells in the body. This can happen at any time while taking, and/or after stopping durvalumab. Contact your healthcare provider immediately if you have signs/symptoms of the following:  •Pneumonitis (lung problems) identified by:  ?New or worsening cough  ?Shortness of breath  ?Chest pain  •Hepatitis (liver problems)  ?Colitis (intestinal problems) identified by:  ?Diarrhea  ?Blood in your stools  or dark, tarry stool  ?Severe stomach pain or tenderness  •Hormone gland problems (thyroid gland, adrenal gland, and pancreas)    Not all side effects are listed above. Side effects that are very rare -- occurring in less than about 10 percent of patients -- are not listed here. But you should always inform your health care provider if you experience any unusual symptoms.    Contact your health care provider immediately, day or night, if you should experience any of the following symptoms:  •Fever of 100.4º F (38º C) or higher, chills (possible signs of infection)  •Shortness of breath, cough  •Confusion, imbalance    The following symptoms require medical attention, but are not an emergency. Contact your health care provider within 24 hours of noticing any of the following:  •Nausea (interferes with ability to eat and unrelieved with prescribed medication)  •Vomiting (vomiting more than 4-5 times in a 24-hour period)  •Diarrhea (4-6 episodes in a 24-hour period)  •Unusual bleeding or bruising  •Black or tarry stools, or blood in your stools  •Blood in the urine  •Pain or burning with urination  •Extreme fatigue (unable to carry on self-care activities)  •Yellowing of skin or eyes  •Constipation unrelieved by laxatives use.    Always inform your health care provider if you experience any unusual symptoms.    All activities occurring within this visit are in accordance with the plan of care as set forth by Dr. Kade Russell.    I have spent a total of  _30___  minutes in face-to-face encounter with the patient, out of which more than 50% was counseling the patient and family regarding coordination of care, side effects of therapy as well as answering questions.     Sarai Williamson, RONALD   02/10/2020  3:07 PM

## 2020-02-07 NOTE — TELEPHONE ENCOUNTER
Pt just called me because she rec'd my VM yesterday. Pt was dx'd with lung cancer 11/2019 and is currently undergoing chemo and radiation. She wants me to push her recall out about 6 months so she doesn't forget about it. She is also going to talk to her oncologist about when he would recommend her having this procedure. I advised her if he felt it was needed sooner, to call our office and make an appt. Otherwise, I changed her recall to 8/2020 and will call her at that time to see how she is going.  Pt advised to call me back with any further questions/problems.

## 2020-02-10 ENCOUNTER — OFFICE VISIT (OUTPATIENT)
Dept: ONCOLOGY | Facility: CLINIC | Age: 65
End: 2020-02-10

## 2020-02-10 ENCOUNTER — INFUSION (OUTPATIENT)
Dept: ONCOLOGY | Facility: HOSPITAL | Age: 65
End: 2020-02-10

## 2020-02-10 ENCOUNTER — LAB (OUTPATIENT)
Dept: LAB | Facility: HOSPITAL | Age: 65
End: 2020-02-10

## 2020-02-10 VITALS
BODY MASS INDEX: 25.78 KG/M2 | HEART RATE: 86 BPM | OXYGEN SATURATION: 98 % | DIASTOLIC BLOOD PRESSURE: 64 MMHG | RESPIRATION RATE: 16 BRPM | HEIGHT: 66 IN | WEIGHT: 160.4 LBS | TEMPERATURE: 98.8 F | SYSTOLIC BLOOD PRESSURE: 104 MMHG

## 2020-02-10 VITALS
SYSTOLIC BLOOD PRESSURE: 102 MMHG | HEIGHT: 66 IN | WEIGHT: 160.9 LBS | DIASTOLIC BLOOD PRESSURE: 66 MMHG | BODY MASS INDEX: 25.86 KG/M2 | TEMPERATURE: 98.6 F | HEART RATE: 88 BPM | RESPIRATION RATE: 16 BRPM | OXYGEN SATURATION: 99 %

## 2020-02-10 DIAGNOSIS — C34.81 MALIGNANT NEOPLASM OF OVERLAPPING SITES OF RIGHT LUNG (HCC): ICD-10-CM

## 2020-02-10 DIAGNOSIS — C34.81 MALIGNANT NEOPLASM OF OVERLAPPING SITES OF RIGHT LUNG (HCC): Primary | ICD-10-CM

## 2020-02-10 DIAGNOSIS — J43.9 PULMONARY EMPHYSEMA, UNSPECIFIED EMPHYSEMA TYPE (HCC): ICD-10-CM

## 2020-02-10 LAB
ALBUMIN SERPL-MCNC: 4.3 G/DL (ref 3.5–5.2)
ALBUMIN/GLOB SERPL: 1.5 G/DL
ALP SERPL-CCNC: 63 U/L (ref 39–117)
ALT SERPL W P-5'-P-CCNC: 11 U/L (ref 1–33)
ANION GAP SERPL CALCULATED.3IONS-SCNC: 13 MMOL/L (ref 5–15)
AST SERPL-CCNC: 14 U/L (ref 1–32)
BASOPHILS # BLD AUTO: 0.03 10*3/MM3 (ref 0–0.2)
BASOPHILS NFR BLD AUTO: 0.7 % (ref 0–1.5)
BILIRUB SERPL-MCNC: 0.2 MG/DL (ref 0.2–1.2)
BUN BLD-MCNC: 14 MG/DL (ref 8–23)
BUN/CREAT SERPL: 18.2 (ref 7–25)
CALCIUM SPEC-SCNC: 9.8 MG/DL (ref 8.6–10.5)
CEA SERPL-MCNC: 3.71 NG/ML
CHLORIDE SERPL-SCNC: 104 MMOL/L (ref 98–107)
CO2 SERPL-SCNC: 23 MMOL/L (ref 22–29)
CREAT BLD-MCNC: 0.77 MG/DL (ref 0.57–1)
DEPRECATED RDW RBC AUTO: 69 FL (ref 37–54)
EOSINOPHIL # BLD AUTO: 0.06 10*3/MM3 (ref 0–0.4)
EOSINOPHIL NFR BLD AUTO: 1.5 % (ref 0.3–6.2)
ERYTHROCYTE [DISTWIDTH] IN BLOOD BY AUTOMATED COUNT: 19.5 % (ref 12.3–15.4)
FERRITIN SERPL-MCNC: 368.3 NG/ML (ref 13–150)
GFR SERPL CREATININE-BSD FRML MDRD: 75 ML/MIN/1.73
GLOBULIN UR ELPH-MCNC: 2.8 GM/DL
GLUCOSE BLD-MCNC: 109 MG/DL (ref 65–99)
HCT VFR BLD AUTO: 33.6 % (ref 34–46.6)
HGB BLD-MCNC: 10.7 G/DL (ref 12–15.9)
IMM GRANULOCYTES # BLD AUTO: 0.01 10*3/MM3 (ref 0–0.05)
IMM GRANULOCYTES NFR BLD AUTO: 0.2 % (ref 0–0.5)
IRON 24H UR-MRATE: 66 MCG/DL (ref 37–145)
IRON SATN MFR SERPL: 25 % (ref 20–50)
LYMPHOCYTES # BLD AUTO: 0.43 10*3/MM3 (ref 0.7–3.1)
LYMPHOCYTES NFR BLD AUTO: 10.5 % (ref 19.6–45.3)
MAGNESIUM SERPL-MCNC: 1.7 MG/DL (ref 1.6–2.4)
MCH RBC QN AUTO: 30.9 PG (ref 26.6–33)
MCHC RBC AUTO-ENTMCNC: 31.8 G/DL (ref 31.5–35.7)
MCV RBC AUTO: 97.1 FL (ref 79–97)
MONOCYTES # BLD AUTO: 0.62 10*3/MM3 (ref 0.1–0.9)
MONOCYTES NFR BLD AUTO: 15.2 % (ref 5–12)
NEUTROPHILS # BLD AUTO: 2.94 10*3/MM3 (ref 1.7–7)
NEUTROPHILS NFR BLD AUTO: 71.9 % (ref 42.7–76)
NRBC BLD AUTO-RTO: 0 /100 WBC (ref 0–0.2)
PLATELET # BLD AUTO: 177 10*3/MM3 (ref 140–450)
PMV BLD AUTO: 9.2 FL (ref 6–12)
POTASSIUM BLD-SCNC: 3.9 MMOL/L (ref 3.5–5.2)
PROT SERPL-MCNC: 7.1 G/DL (ref 6–8.5)
RBC # BLD AUTO: 3.46 10*6/MM3 (ref 3.77–5.28)
SODIUM BLD-SCNC: 140 MMOL/L (ref 136–145)
TIBC SERPL-MCNC: 268 MCG/DL (ref 298–536)
TRANSFERRIN SERPL-MCNC: 180 MG/DL (ref 200–360)
WBC NRBC COR # BLD: 4.09 10*3/MM3 (ref 3.4–10.8)

## 2020-02-10 PROCEDURE — 83540 ASSAY OF IRON: CPT

## 2020-02-10 PROCEDURE — 84466 ASSAY OF TRANSFERRIN: CPT

## 2020-02-10 PROCEDURE — 99213 OFFICE O/P EST LOW 20 MIN: CPT | Performed by: NURSE PRACTITIONER

## 2020-02-10 PROCEDURE — 80053 COMPREHEN METABOLIC PANEL: CPT

## 2020-02-10 PROCEDURE — 82378 CARCINOEMBRYONIC ANTIGEN: CPT

## 2020-02-10 PROCEDURE — 82728 ASSAY OF FERRITIN: CPT

## 2020-02-10 PROCEDURE — 83735 ASSAY OF MAGNESIUM: CPT

## 2020-02-10 PROCEDURE — G0463 HOSPITAL OUTPT CLINIC VISIT: HCPCS

## 2020-02-10 PROCEDURE — 85025 COMPLETE CBC W/AUTO DIFF WBC: CPT

## 2020-02-10 PROCEDURE — 36415 COLL VENOUS BLD VENIPUNCTURE: CPT

## 2020-02-10 RX ORDER — HYDROCODONE BITARTRATE AND ACETAMINOPHEN 5; 325 MG/1; MG/1
1 TABLET ORAL TAKE AS DIRECTED
COMMUNITY
End: 2020-05-07 | Stop reason: ALTCHOICE

## 2020-02-14 DIAGNOSIS — C77.9 REGIONAL LYMPH NODE METASTASIS PRESENT (HCC): ICD-10-CM

## 2020-02-14 DIAGNOSIS — C34.81 MALIGNANT NEOPLASM OF OVERLAPPING SITES OF RIGHT LUNG (HCC): ICD-10-CM

## 2020-02-14 DIAGNOSIS — C77.0 SECONDARY MALIGNANT NEOPLASM OF SUPRACLAVICULAR LYMPH NODE (HCC): ICD-10-CM

## 2020-02-14 RX ORDER — FAMOTIDINE 10 MG/ML
20 INJECTION, SOLUTION INTRAVENOUS ONCE
Status: CANCELLED | OUTPATIENT
Start: 2020-02-17

## 2020-02-14 RX ORDER — SODIUM CHLORIDE 9 MG/ML
250 INJECTION, SOLUTION INTRAVENOUS ONCE
Status: CANCELLED | OUTPATIENT
Start: 2020-02-17

## 2020-02-14 NOTE — PROGRESS NOTES
[facility] HEMATOLOGY AND ONCOLOGY  [address]  [facility] [address] [phone]    Patient Name: Damaris Hu  Encounter Date: 02/17/2020  YOB: 1955  Patient Number: [id number]    REASON FOR VISIT: Damaris Hu is a 64-year-old female who returns in follow-up of stage III squamous cell lung carcinoma.  She was started on definitive radiation (12/05/2019) concurrent with weekly chemotherapy, week 5 on 01/20/2020, week 6 on 01/27/2020.  She is here with her Sister Ashley (previously with her daughter Tawana).    DIAGNOSTIC ABNORMALITIES:          1.   10/28/2019 patient presented to the emergency room with chest pain that has been ongoing for the past year but that recently had gotten more noticeable, more frequent, and has been associated with shortness of breath.  On the day prior to her hospital admission she apparently had a syncopal episode.  An evaluation ensued:  Labs: Glucose 112 otherwise CMP was normal with a BUN of 14 creatinine 0.91 (GFR 62) calcium 10.2, total protein 7.1 and normal liver enzymes.  Troponin T was less than 0.010, proBNP normal at 97.4, d-dimer was normal at 0.27, sed rate 10, CRP 2.02 (slightly elevated), hemoglobin 11.3, hematocrit 35.6, MCV 92.5, platelets 306,000, WBC 7.37 with 76 point 1 segs 15.2 lymphs.          2.   10/28/2019 - head without contrast.  Impression: No acute intracranial disease.          3.   10/28/2019-CT chest with contrast.  Impression: Primary lung mass centered in the right upper lobe and abuts the posterior mediastinal pleura (5.4 x 5.3 x 4.5 cm).  This also partially encases the right bronchus intermedius and right upper lobe bronchus.  This is consistent with pulmonary malignancy.  Surgical sampling recommended with bronchoscopy.  Suspected metastatic lymph node in the pretracheal region measuring 1.3 cm in short axis.          4.   11/04/2019 - was seen by Dr. Martel  [facility] for further assessment of the lung mass.  Spirometry on that date revealed severe COPD with positive bronchodilator response.  Postbronchodilator FEV1 revealed significant improvement to moderate/severe COPD.  Plan: Navigational bronchoscopy and possible EBUS for tissue diagnosis.          5.   11/05/2019- PET scan, skull vertex to mid thighs.  Impression: Markedly hypermetabolic right perihilar tumor (49 x 37 mm) with right supraclavicular and mediastinal kyler metastasis.          6.   11/07/2019- CT chest without contrast at [facility], [place], operated by [facility].  Comparison, PET CT 11/5/2019.  Impression: Large, spiculated right infrahilar mass, crossing the major fissure to involve the right upper and right lower lobes with direct extension into the subcarinal space.  Mildly enlarged mediastinal lymph nodes and normal-sized right supraclavicular lymph node corresponding to the area of PET positivity.          7.   11/07/2019-bronchoscopy and EBUS guided FNA biopsy of right upper lobe lung: Positive for malignant cells consistent with squamous cell carcinoma.  EBUS guided FNA, station 7: Lymph node elements present.  No malignant cells identified.  EBUS guided FNA, station 4R: Positive for malignant cells consistent with squamous cell carcinoma.  Immunohistochemical stains performed on cell block #3.  Tumor cells positive for squamous squamous markers p63 and CK 5/6, negative for CK7, TTF-1, and CD 56.  Immunoprofile supports the above diagnosis.  Addendum: Subsequent biopsy showed metastatic disease in a supraclavicular lymph node, finding consistent with advanced stage disease.  Per protocol will proceed with tumor genomic testing on this specimen.  Due to the relatively small amount of tumor cellularity available, the entire panel of testing may not be able to be completed.  BRAF mutation not detected, K-mayco mutation not detected, EGFR mutation not detected.          8.    11/07/2019-navigational bronchoscopy and forceps biopsy of posterior right upper lobe lung mass.  Diagnosis: Fragments of bronchial mucosa.  No evidence of granuloma or malignancy.          9.   11/08/2019- ultrasound-guided biopsy right supraclavicular lymph node (fine-needle aspirate).  Cytopathology diagnosis: Positive for malignant cells consistent with previously established diagnosis of metastatic non-small cell carcinoma.  Comment: Insufficient tumor cellularity for further testing on the specimen.         10.  11/15/2019-- labs, 11/15/2019 with stable anemia otherwise normal CBC with differential, slightly depressed ferritin (65), iron saturation 7%, serum iron 19, TIBC 289.  Repleted B12 and folate.  Received Injectafer.  CMP, CEA, serum iron, iron saturation, ferritin, B12, folate.  Slightly elevated CEA (3.38).         11.   11/21/2019- [facility].  White matter changes.  Empty sella syndrome.  No metastatic lesions.      PREVIOUS INTERVENTIONS:          1.   11/19/2019-Injectafer 750 mg IV x1          2.   Definitive course of radiation to the chest (concurrent with chemotherapy)-radiation initiated 12/5/2019 through 02/03/2020 with anticipated fractionated course of 6000 to 6600 cGy at 180 cGy/day          3.   Weekly concurrent chemotherapy with carboplatin and Taxol beginning 12/09/2019 through 01/27/2020 (6 weeks)          4.   Maintenance Durvalumab beginning 02/17/2020. C1    LABS    Lab Results - Last 18 Months   Lab Units 02/17/20  1121 02/10/20  1311 02/03/20  1314 01/27/20  0922 01/20/20  1045 01/16/20  1339 01/13/20  1001 01/06/20  0952   HEMOGLOBIN g/dL 11.5* 10.7* 9.8* 10.2* 10.5* 9.8* 9.9* 9.8*   HEMATOCRIT % 35.3 33.6* 30.3* 32.0* 32.9* 30.2* 31.0* 30.9*   MCV fL 97.2* 97.1* 97.7* 96.7 96.5 94.4 93.7 92.8   WBC 10*3/mm3 5.79 4.09 2.47* 2.91* 2.55* 3.27* 1.57* 2.17*   RDW % 19.2* 19.5* 19.7* 19.4* 18.9* 18.4* 16.9* 15.0   MPV fL 9.6 9.2 9.7 9.8 9.9 10.0 9.8 10.0   PLATELETS  10*3/mm3 118* 177 275 303 187 102* 97* 97*   IMM GRAN % % 0.2 0.2  --  0.7* 2.4*  --  0.6* 0.5   NEUTROS ABS 10*3/mm3 4.48 2.94 1.44* 2.19 1.62* 2.28 1.06* 1.61*   LYMPHS ABS 10*3/mm3 0.43* 0.43* 0.38* 0.29* 0.34*  --  0.22* 0.30*   MONOS ABS 10*3/mm3 0.67 0.62 0.58 0.36 0.44  --  0.22 0.22   EOS ABS 10*3/mm3 0.17 0.06 0.04 0.03 0.08 0.07 0.05 0.02   BASOS ABS 10*3/mm3 0.03 0.03 0.02 0.02 0.01 0.03 0.01 0.01   IMMATURE GRANS (ABS) 10*3/mm3 0.01 0.01  --  0.02 0.06*  --  0.01 0.01   NRBC /100 WBC 0.0 0.0  --  0.0 0.0  --  0.0 0.0   NEUTROPHIL % %  --   --   --   --   --  61.6  --   --    MONOCYTES % %  --   --   --   --   --  16.2*  --   --    BASOPHIL % %  --   --   --   --   --  1.0  --   --    ATYP LYMPH % %  --   --   --   --   --  2.0  --   --    ANISOCYTOSIS   --   --   --   --   --  Slight/1+  --   --    GIANT PLT   --   --   --   --   --  Mod/2+  --   --        Lab Results - Last 18 Months   Lab Units 02/17/20  1121 02/10/20  1311 02/03/20  1314 01/27/20  0922 01/20/20  1045 01/16/20  1339   GLUCOSE mg/dL 88 109* 88 106* 104* 96   SODIUM mmol/L 141 140 141 142 141 141   POTASSIUM mmol/L 4.1 3.9 4.0 4.0 4.0 4.1   CO2 mmol/L 24.0 23.0 25.0 25.0 26.0 25.0   CHLORIDE mmol/L 105 104 105 106 106 105   ANION GAP mmol/L 12.0 13.0 11.0 11.0 9.0 11.0   CREATININE mg/dL 0.73 0.77 0.71 0.79 0.73 0.66   BUN mg/dL 15 14 13 13 13 16   BUN / CREAT RATIO  20.5 18.2 18.3 16.5 17.8 24.2   CALCIUM mg/dL 10.0 9.8 9.4 9.9 9.9 9.8   EGFR IF NONAFRICN AM mL/min/1.73 80 75 82 73 80 90   ALK PHOS U/L 64 63 53 57 63 64   TOTAL PROTEIN g/dL 7.2 7.1 6.9 6.8 7.2 6.9   ALT (SGPT) U/L 12 11 16 10 10 9   AST (SGOT) U/L 15 14 17 16 14 11   BILIRUBIN mg/dL 0.3 0.2 <0.2* <0.2* 0.2 0.3   ALBUMIN g/dL 4.60 4.30 4.00 4.20 4.30 4.30   GLOBULIN gm/dL 2.6 2.8 2.9 2.6 2.9 2.6       Lab Results - Last 18 Months   Lab Units 02/10/20  1311 01/06/20  0952 11/15/19  1332   CEA ng/mL 3.71 4.12 3.38       Lab Results - Last 18 Months   Lab Units  02/17/20  1121 02/10/20  1311 01/06/20  0952 11/15/19  1332 08/24/18  1420   IRON mcg/dL  --  66 95 19*  --    TIBC mcg/dL  --  268* 270* 289*  --    IRON SATURATION %  --  25 35 7*  --    FERRITIN ng/mL  --  368.30* 591.50* 65.15  --    TSH uIU/mL 3.440  --   --   --  2.720   FOLATE ng/mL  --   --   --  >20.00  --          PAST MEDICAL HISTORY:  ALLERGIES:  Allergies   Allergen Reactions   • Amoxicillin GI Intolerance     CURRENT MEDICATIONS:  Outpatient Encounter Medications as of 2/17/2020   Medication Sig Dispense Refill   • cetirizine (zyrTEC) 10 MG tablet Take 10 mg by mouth Daily.     • citalopram (CeleXA) 20 MG tablet Take 20 mg by mouth Daily.     • fluticasone (FLONASE) 50 MCG/ACT nasal spray 1 spray by Each Nare route Daily As Needed.  5   • Fluticasone-Umeclidin-Vilant (TRELEGY ELLIPTA) 100-62.5-25 MCG/INH aerosol powder  Inhale Every Morning.     • HYDROcodone-acetaminophen (NORCO) 5-325 MG per tablet Take 1 tablet by mouth Take As Directed. 1.5 tabs daily as needed for pain     • lidocaine-prilocaine (EMLA) 2.5-2.5 % cream Apply to port 30 minutes before access 1 each 0   • omeprazole (priLOSEC) 20 MG capsule Take 20 mg by mouth Every Morning Before Breakfast.  0   • ondansetron (ZOFRAN) 8 MG tablet Take 1 tablet by mouth 3 (Three) Times a Day As Needed for Nausea or Vomiting. 30 tablet 5   • ZOLMitriptan (ZOMIG) 2.5 MG tablet Take 2.5 mg by mouth As Needed.       Facility-Administered Encounter Medications as of 2/17/2020   Medication Dose Route Frequency Provider Last Rate Last Dose   • [COMPLETED] dexamethasone (DECADRON) IVPB 12 mg  12 mg Intravenous Once Sarai Williamson APRN   Stopped at 02/17/20 1249   • [COMPLETED] diphenhydrAMINE (BENADRYL) IVPB 25 mg  25 mg Intravenous Once Sarai Williamson APRN   Stopped at 02/17/20 1311   • [COMPLETED] durvalumab (IMFINZI) 720 mg in sodium chloride 0.9 % 264.4 mL chemo IVPB  10 mg/kg (Treatment Plan Recorded) Intravenous Once Sarai Williamson  RONALD Camacho   Stopped at 02/17/20 1416   • [COMPLETED] famotidine (PEPCID) injection 20 mg  20 mg Intravenous Once Sarai Williamson RONALD Camacho   20 mg at 02/17/20 1228   • [COMPLETED] sodium chloride 0.9 % infusion 250 mL  250 mL Intravenous Once Sarai Williamson RONALD Camacho   Stopped at 02/17/20 1424   • [DISCONTINUED] heparin injection 500 Units  500 Units Intravenous PRN Kade Russell MD   500 Units at 02/17/20 1424   • [DISCONTINUED] sodium chloride 0.9 % flush 10 mL  10 mL Intravenous PRN Kade Russell MD   10 mL at 02/17/20 1424     Adult illnesses:  Depression  GERD  Vitamin D deficiency  Chronic fatigue syndrome  Former smoker, quit 4 years ago  Hemorrhoids with history of bright red blood per rectum.  Chronic back pain  Chronic neck pain  Bilateral shoulder and arm radiculopathy, right > left  Degenerative disc disease, C5-C7  Anxiety    Past surgeries:  Breast surgery for cyst removal  Colonoscopy, 12/3/2018  Hysterectomy  Left subclavian Mediport placement, 11/22/2019.  Dr. Gibson    ADULT ILLNESSES:  Patient Active Problem List   Diagnosis Code   • Age-related osteoporosis without current pathological fracture M81.0   • Anxiety and depression F41.9, F32.9   • Cervical disc disease M50.90   • Hyperlipidemia E78.5   • Lumbar degenerative disc disease M51.36   • Malignant neoplasm of overlapping sites of right lung (CMS/HCC) C34.81   • Emphysema lung (CMS/HCC) J43.9   • Iron deficiency anemia D50.9   • Chemotherapy induced neutropenia (CMS/HCC) D70.1, T45.1X5A   • Antineoplastic chemotherapy induced anemia D64.81, T45.1X5A   • Encounter for consultation Z71.9   • Former smoker Z87.891   • Regional lymph node metastasis present (CMS/HCC) C77.9   • 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   • Hypomagnesemia E83.42     SURGERIES:  Past Surgical History:   Procedure Laterality Date   • BREAST SURGERY      Cyst removal   • COLONOSCOPY N/A 12/3/2018    Procedure:  COLONOSCOPY WITH ANESTHESIA;  Surgeon: Myah Pool MD;  Location: [facility] ENDOSCOPY;  Service: Gastroenterology   • HYSTERECTOMY     • VENOUS ACCESS DEVICE (PORT) INSERTION N/A 11/22/2019    Procedure: PLACEMENT OF SINGLE LUMEN PORT;  Surgeon: Mona Gibson MD;  Location: [facility] OR;  Service: General     HEALTH MAINTENANCE ITEMS:  Health Maintenance Due   Topic Date Due   • ZOSTER VACCINE (2 of 3) 09/15/2015   • HEPATITIS C SCREENING  10/02/2018   • MEDICARE ANNUAL WELLNESS  10/02/2018   • PNEUMOCOCCAL VACCINE (65+ HIGH RISK) (2 of 2 - PPSV23) 10/19/2018   • LIPID PANEL  11/15/2019   • COLONOSCOPY  12/03/2019       <no information>  Last Completed Colonoscopy       Status Date      COLONOSCOPY Done 12/3/2018 COLONOSCOPY          There is no immunization history on file for this patient.  Last Completed Mammogram       Status Date      MAMMOGRAM Done 2/5/2019 Ext Proc: CHG SCREENING DIGITAL BREAST TOMOSYNTHESIS BI            FAMILY HISTORY:  Family History   Problem Relation Age of Onset   • Colon polyps Mother         < 60 years old   • Lung cancer Mother    • No Known Problems Father    • No Known Problems Sister    • Prostate cancer Maternal Grandfather    • Breast cancer Maternal Aunt    • Colon cancer Neg Hx      SOCIAL HISTORY:  Social History     Socioeconomic History   • Marital status:      Spouse name: Not on file   • Number of children: Not on file   • Years of education: Not on file   • Highest education level: Not on file   Tobacco Use   • Smoking status: Former Smoker     Types: Cigarettes   • Smokeless tobacco: Never Used   • Tobacco comment: Quit 4 years ago   Substance and Sexual Activity   • Alcohol use: No   • Drug use: No   • Sexual activity: Defer       REVIEW OF SYSTEMS:  Review of Systems   Constitutional: Positive for fatigue (Chronic.  Multifactorial.). Negative for activity change (Manages her personal ADLs and some chores.  Has not been driving while on chemo), appetite  "change (eating well), chills, diaphoresis, fever, unexpected weight gain and unexpected weight loss.        Manages her personal ADLs, light chores, but isn't running errands, but still lives by herself.  Says spends 20-25% up and about    Durvalumab tolerance discussed.  Fatigue but no nausea no mouth sores, no vomiting, no skin changes, no neuropathy, no diarrhea.   Eyes: Negative.    Respiratory: Positive for shortness of breath (Improved on inhalers). Negative for cough.    Gastrointestinal: Negative.    Genitourinary: Negative.    Musculoskeletal: Positive for arthralgias, back pain and neck pain.   Allergic/Immunologic: Positive for environmental allergies.   Neurological: Negative for syncope (prior to her ER visit).   Hematological: Negative for adenopathy. Does not bruise/bleed easily.   Psychiatric/Behavioral: Positive for depressed mood. The patient is nervous/anxious.      /76   Pulse 90   Temp 98.5 °F (36.9 °C)   Resp 16   Ht 167.6 cm (66\")   Wt 72.2 kg (159 lb 1.6 oz)   LMP  (LMP Unknown)   SpO2 98%   Breastfeeding No   BMI 25.68 kg/m²  Body surface area is 1.82 meters squared.  Pain Score    02/17/20 1500   PainSc:   4       Physical Exam:  Physical Exam   Constitutional: She is oriented to person, place, and time. She appears well-developed and well-nourished. No distress.   Pleasant, cooperative, heavy-set, modestly kept elderly female.  Ambulatory.  ECOG 2-3 (same).  Has no weight changes (had lost 2 pounds at his prior visit).   HENT:   Head: Normocephalic.   Mouth/Throat: No oropharyngeal exudate.   Eyes: Pupils are equal, round, and reactive to light. EOM are normal. No scleral icterus.   Neck: Normal range of motion. Neck supple. No JVD present. No tracheal deviation present. No thyromegaly present.   Cardiovascular: Normal rate, regular rhythm and normal heart sounds. Exam reveals no friction rub.   No murmur heard.  Pulmonary/Chest: Effort normal. No stridor. No respiratory " distress. She has no wheezes (Soft expiratory wheezes diffusely). She has no rales. She exhibits no tenderness.   Barrel shaped chest with diminished breath sounds globally.  Port in the left upper chest is noted.  Is well seated.   Abdominal: Soft. Bowel sounds are normal. She exhibits no distension and no mass. There is no tenderness. There is no guarding.   Musculoskeletal: Normal range of motion. She exhibits no edema or tenderness.   Neurological: She is alert and oriented to person, place, and time. No cranial nerve deficit.   Skin: Skin is warm and dry. No erythema. There is pallor.   Psychiatric: Her behavior is normal. Judgment and thought content normal.   Vitals reviewed.      Assessment:  1.  Squamous cell carcinoma of the right lung            Tumor stage: IIIC (cT4, cN3, M0)            Bone tumor burden: 5.4 x 5.3 x 4.5 cm mass along the right major fissure that abuts the medial mediastinal pleura and partially encases the right upper lobe bronchus and right bronchus intermedius.  Pathologic pretracheal lymph node measures 1.3 cm.  Right supraclavicular metastasis.            Complications of tumor: Chest pain, dyspnea, syncope?            BRAF mutation not detected            KRAS mutation not detected            EGFR mutation not detected.            PD-L1 and ALK: Not reported apparently due to lack of specimen            Tumor status: Definitive radiation concurrent with chemotherapy in progress  2.  Chest pain dyspnea and syncope, likely symptoms related to #1.  None since initial visit.  3.  Mild anemia, contribution from anemia of malignancy/anemia of chronic disease and chemo.  Hgb 10.7, 02/10/2020 (prior range: 9.8 - 11.3, 10/28/2019)  4.  Leukopenia and thrombocytopenia, chemo associated.  5.  Chronic fatigue syndrome  6.  COPD  7.  Former smoker 40 pack years  8.  Depression  9.  Hemorrhoids with history of hematochezia  10. Chronic neck/back pains with DDD, C5-C7.  Followed by Dr. Grady.   Rx for Norco 5 prior to anterior cervical discectomy and fusion C5 in 2015 - never had surgery  11. Mild subconjunctival hemorrhage, OS.  Now using artificial tears.  No visual symptoms.    RECOMMENDATIONS:   1.  Re: The patient is apprised of the labs, 02/10/2020 with low normal WBC, stable anemia, normal platelets, normal CMP, repleted ferritin (368; from 591; from 65), iron saturation 25% (from 35%; from 7%), serum iron 66 (from 95; from 19).  Repleted B12 and folate.  Received Injectafer. Slightly elevated CEA (3.71; from 4.12; from 3.38).  Chemo tolerance discussed.  Grade 1 nausea and fatigue.  Otherwise doing well.  2.  Previously reviewed available molecular studies from lung biopsy done on 11/07/2019 (above) noting lack of mutation for EGFR, BRAF and KRAS.  ALK and PD-L1 not reported presumably due to lack of specimen.   3.  Previously reviewed extensive records from Dr. Delonte Burns and [facility], to include radiographs, and biopsy results.  4.  NCCN Guidelines Version 5.2019 for stage III squamous cell cancer management previously reviewed. For T3-4N1 disease after definitive chemo + radiation recommendation is Durvalumab (category 1) x 1 year.   5.  Durvalumab. Discussed the potential toxicities to include but not limited to (immune mediated reaction, pneumonitis interstitial lung disease, hepatitis, colitis, severe diarrhea, hypothyroidism, hyperthyroidism, thyroiditis, type 1 diabetes, hypopituitarism, thrombocytopenic purpura, nephritis, aseptic meningitis, hemolytic anemia, myocarditis, severe infection, uveitis, infusion reaction, electrolyte abnormalities, lymphopenia, anemia, skin reaction, fatigue, upper respiratory infection, musculoskeletal pain, constipation, decreased appetite, nausea, edema, urinary tract infection, abdominal pain, pyrexia, dyspnea, rash, cough). Questions answered. She agrees with a trial of therapy.      6.  Schedule C1 on 02/17/2020; C2 on  03/02/2020, C3 on 03/16/2020 - durvalumab (plan: every 2 weeks x 24 cycles - 12 months, progression or toxicity) per administration guidelines - at [facility]                 durvalumab 10 mg/kg (WT = 85 kg; TD = 850 mg)      7.  Premeds:              Decadron 10 mg IV             Benadryl 25 mg IV             Pepcid 10 mg IV        8. CMP, Mg++ and CBC with differential every 2 weeks - on days of durvalumab - with Procrit 40,000 units subcutaneous every week if Hgb less than 10 and Hct less than 30; Zarxio 480 mcg subcutaneously daily x3 if ANC less than 1 -[facility]        9.  Prior review office encounter from Dr. Dudley, 11/25/2019.  Recommended definitive course of concurrent chemotherapy and radiation.  Radiation anticipated: 6000-60 600 centigray 180 cGy/day - next appointment on 03/09/2020.        10.  Appoint to Dr. Ekaterina Myers:  [place] of care for COPD  11.  Return to the office on 03/23/2020 with pre-office serum iron, Fe sat, ferritin, CBC with differential, CMP, and CEA.     TIME SPENT: Face-to-face time on this encounter, as defined by the American Medical Association in the 2020 Current Procedural Terminology codebook; assessment, record review, lab/imaging review, planning and education - at least 40 minutes (greater than 50% facetime).

## 2020-02-17 ENCOUNTER — INFUSION (OUTPATIENT)
Dept: ONCOLOGY | Facility: HOSPITAL | Age: 65
End: 2020-02-17

## 2020-02-17 ENCOUNTER — OFFICE VISIT (OUTPATIENT)
Dept: ONCOLOGY | Facility: CLINIC | Age: 65
End: 2020-02-17

## 2020-02-17 ENCOUNTER — LAB (OUTPATIENT)
Dept: LAB | Facility: HOSPITAL | Age: 65
End: 2020-02-17

## 2020-02-17 VITALS
RESPIRATION RATE: 16 BRPM | BODY MASS INDEX: 25.57 KG/M2 | HEART RATE: 90 BPM | DIASTOLIC BLOOD PRESSURE: 76 MMHG | TEMPERATURE: 98.5 F | SYSTOLIC BLOOD PRESSURE: 118 MMHG | OXYGEN SATURATION: 98 % | WEIGHT: 159.1 LBS | HEIGHT: 66 IN

## 2020-02-17 VITALS
WEIGHT: 158 LBS | TEMPERATURE: 98.1 F | BODY MASS INDEX: 25.39 KG/M2 | RESPIRATION RATE: 18 BRPM | SYSTOLIC BLOOD PRESSURE: 113 MMHG | OXYGEN SATURATION: 100 % | HEIGHT: 66 IN | HEART RATE: 82 BPM | DIASTOLIC BLOOD PRESSURE: 62 MMHG

## 2020-02-17 DIAGNOSIS — C77.0 SECONDARY MALIGNANT NEOPLASM OF SUPRACLAVICULAR LYMPH NODE (HCC): ICD-10-CM

## 2020-02-17 DIAGNOSIS — C34.81 MALIGNANT NEOPLASM OF OVERLAPPING SITES OF RIGHT LUNG (HCC): ICD-10-CM

## 2020-02-17 DIAGNOSIS — C34.81 MALIGNANT NEOPLASM OF OVERLAPPING SITES OF RIGHT LUNG (HCC): Primary | ICD-10-CM

## 2020-02-17 DIAGNOSIS — C77.9 REGIONAL LYMPH NODE METASTASIS PRESENT (HCC): ICD-10-CM

## 2020-02-17 DIAGNOSIS — J43.9 PULMONARY EMPHYSEMA, UNSPECIFIED EMPHYSEMA TYPE (HCC): ICD-10-CM

## 2020-02-17 DIAGNOSIS — C77.9 REGIONAL LYMPH NODE METASTASIS PRESENT (HCC): Primary | ICD-10-CM

## 2020-02-17 LAB
ALBUMIN SERPL-MCNC: 4.6 G/DL (ref 3.5–5.2)
ALBUMIN/GLOB SERPL: 1.8 G/DL
ALP SERPL-CCNC: 64 U/L (ref 39–117)
ALT SERPL W P-5'-P-CCNC: 12 U/L (ref 1–33)
ANION GAP SERPL CALCULATED.3IONS-SCNC: 12 MMOL/L (ref 5–15)
AST SERPL-CCNC: 15 U/L (ref 1–32)
BASOPHILS # BLD AUTO: 0.03 10*3/MM3 (ref 0–0.2)
BASOPHILS NFR BLD AUTO: 0.5 % (ref 0–1.5)
BILIRUB SERPL-MCNC: 0.3 MG/DL (ref 0.2–1.2)
BUN BLD-MCNC: 15 MG/DL (ref 8–23)
BUN/CREAT SERPL: 20.5 (ref 7–25)
CALCIUM SPEC-SCNC: 10 MG/DL (ref 8.6–10.5)
CHLORIDE SERPL-SCNC: 105 MMOL/L (ref 98–107)
CO2 SERPL-SCNC: 24 MMOL/L (ref 22–29)
CREAT BLD-MCNC: 0.73 MG/DL (ref 0.57–1)
DEPRECATED RDW RBC AUTO: 68.7 FL (ref 37–54)
EOSINOPHIL # BLD AUTO: 0.17 10*3/MM3 (ref 0–0.4)
EOSINOPHIL NFR BLD AUTO: 2.9 % (ref 0.3–6.2)
ERYTHROCYTE [DISTWIDTH] IN BLOOD BY AUTOMATED COUNT: 19.2 % (ref 12.3–15.4)
GFR SERPL CREATININE-BSD FRML MDRD: 80 ML/MIN/1.73
GLOBULIN UR ELPH-MCNC: 2.6 GM/DL
GLUCOSE BLD-MCNC: 88 MG/DL (ref 65–99)
HCT VFR BLD AUTO: 35.3 % (ref 34–46.6)
HGB BLD-MCNC: 11.5 G/DL (ref 12–15.9)
IMM GRANULOCYTES # BLD AUTO: 0.01 10*3/MM3 (ref 0–0.05)
IMM GRANULOCYTES NFR BLD AUTO: 0.2 % (ref 0–0.5)
LYMPHOCYTES # BLD AUTO: 0.43 10*3/MM3 (ref 0.7–3.1)
LYMPHOCYTES NFR BLD AUTO: 7.4 % (ref 19.6–45.3)
MAGNESIUM SERPL-MCNC: 1.9 MG/DL (ref 1.6–2.4)
MCH RBC QN AUTO: 31.7 PG (ref 26.6–33)
MCHC RBC AUTO-ENTMCNC: 32.6 G/DL (ref 31.5–35.7)
MCV RBC AUTO: 97.2 FL (ref 79–97)
MONOCYTES # BLD AUTO: 0.67 10*3/MM3 (ref 0.1–0.9)
MONOCYTES NFR BLD AUTO: 11.6 % (ref 5–12)
NEUTROPHILS # BLD AUTO: 4.48 10*3/MM3 (ref 1.7–7)
NEUTROPHILS NFR BLD AUTO: 77.4 % (ref 42.7–76)
NRBC BLD AUTO-RTO: 0 /100 WBC (ref 0–0.2)
PLATELET # BLD AUTO: 118 10*3/MM3 (ref 140–450)
PMV BLD AUTO: 9.6 FL (ref 6–12)
POTASSIUM BLD-SCNC: 4.1 MMOL/L (ref 3.5–5.2)
PROT SERPL-MCNC: 7.2 G/DL (ref 6–8.5)
RBC # BLD AUTO: 3.63 10*6/MM3 (ref 3.77–5.28)
SODIUM BLD-SCNC: 141 MMOL/L (ref 136–145)
T4 FREE SERPL-MCNC: 0.91 NG/DL (ref 0.93–1.7)
TSH SERPL DL<=0.05 MIU/L-ACNC: 3.44 UIU/ML (ref 0.27–4.2)
WBC NRBC COR # BLD: 5.79 10*3/MM3 (ref 3.4–10.8)

## 2020-02-17 PROCEDURE — 96375 TX/PRO/DX INJ NEW DRUG ADDON: CPT

## 2020-02-17 PROCEDURE — 99215 OFFICE O/P EST HI 40 MIN: CPT | Performed by: INTERNAL MEDICINE

## 2020-02-17 PROCEDURE — 84439 ASSAY OF FREE THYROXINE: CPT

## 2020-02-17 PROCEDURE — 84443 ASSAY THYROID STIM HORMONE: CPT

## 2020-02-17 PROCEDURE — 96413 CHEMO IV INFUSION 1 HR: CPT

## 2020-02-17 PROCEDURE — 80053 COMPREHEN METABOLIC PANEL: CPT

## 2020-02-17 PROCEDURE — 25010000002 DURVALUMAB 50 MG/ML SOLUTION 10 ML VIAL: Performed by: NURSE PRACTITIONER

## 2020-02-17 PROCEDURE — 25010000002 DURVALUMAB 50 MG/ML SOLUTION 2.4 ML VIAL: Performed by: NURSE PRACTITIONER

## 2020-02-17 PROCEDURE — 36415 COLL VENOUS BLD VENIPUNCTURE: CPT

## 2020-02-17 PROCEDURE — 83735 ASSAY OF MAGNESIUM: CPT

## 2020-02-17 PROCEDURE — 85025 COMPLETE CBC W/AUTO DIFF WBC: CPT

## 2020-02-17 PROCEDURE — 96367 TX/PROPH/DG ADDL SEQ IV INF: CPT

## 2020-02-17 PROCEDURE — 25010000002 DEXAMETHASONE SODIUM PHOSPHATE 100 MG/10ML SOLUTION: Performed by: NURSE PRACTITIONER

## 2020-02-17 PROCEDURE — 25010000002 DIPHENHYDRAMINE PER 50 MG: Performed by: NURSE PRACTITIONER

## 2020-02-17 PROCEDURE — 25010000003 HEPARIN LOCK FLUCH PER 10 UNITS: Performed by: INTERNAL MEDICINE

## 2020-02-17 RX ORDER — FAMOTIDINE 10 MG/ML
20 INJECTION, SOLUTION INTRAVENOUS ONCE
Status: COMPLETED | OUTPATIENT
Start: 2020-02-17 | End: 2020-02-17

## 2020-02-17 RX ORDER — HEPARIN SODIUM (PORCINE) LOCK FLUSH IV SOLN 100 UNIT/ML 100 UNIT/ML
500 SOLUTION INTRAVENOUS AS NEEDED
Status: CANCELLED | OUTPATIENT
Start: 2020-02-17

## 2020-02-17 RX ORDER — HEPARIN SODIUM (PORCINE) LOCK FLUSH IV SOLN 100 UNIT/ML 100 UNIT/ML
500 SOLUTION INTRAVENOUS AS NEEDED
Status: DISCONTINUED | OUTPATIENT
Start: 2020-02-17 | End: 2020-02-17 | Stop reason: HOSPADM

## 2020-02-17 RX ORDER — SODIUM CHLORIDE 9 MG/ML
250 INJECTION, SOLUTION INTRAVENOUS ONCE
Status: COMPLETED | OUTPATIENT
Start: 2020-02-17 | End: 2020-02-17

## 2020-02-17 RX ORDER — SODIUM CHLORIDE 0.9 % (FLUSH) 0.9 %
10 SYRINGE (ML) INJECTION AS NEEDED
Status: DISCONTINUED | OUTPATIENT
Start: 2020-02-17 | End: 2020-02-17 | Stop reason: HOSPADM

## 2020-02-17 RX ORDER — SODIUM CHLORIDE 0.9 % (FLUSH) 0.9 %
10 SYRINGE (ML) INJECTION AS NEEDED
Status: CANCELLED | OUTPATIENT
Start: 2020-02-17

## 2020-02-17 RX ADMIN — Medication 500 UNITS: at 14:24

## 2020-02-17 RX ADMIN — SODIUM CHLORIDE, PRESERVATIVE FREE 10 ML: 5 INJECTION INTRAVENOUS at 14:24

## 2020-02-17 RX ADMIN — SODIUM CHLORIDE 720 MG: 9 INJECTION, SOLUTION INTRAVENOUS at 13:12

## 2020-02-17 RX ADMIN — DIPHENHYDRAMINE HYDROCHLORIDE 25 MG: 50 INJECTION, SOLUTION INTRAMUSCULAR; INTRAVENOUS at 12:50

## 2020-02-17 RX ADMIN — FAMOTIDINE 20 MG: 10 INJECTION, SOLUTION INTRAVENOUS at 12:28

## 2020-02-17 RX ADMIN — SODIUM CHLORIDE 250 ML: 9 INJECTION, SOLUTION INTRAVENOUS at 12:22

## 2020-02-17 RX ADMIN — DEXAMETHASONE SODIUM PHOSPHATE 12 MG: 10 INJECTION, SOLUTION INTRAMUSCULAR; INTRAVENOUS at 12:30

## 2020-02-24 ENCOUNTER — APPOINTMENT (OUTPATIENT)
Dept: LAB | Facility: HOSPITAL | Age: 65
End: 2020-02-24

## 2020-03-02 ENCOUNTER — LAB (OUTPATIENT)
Dept: LAB | Facility: HOSPITAL | Age: 65
End: 2020-03-02

## 2020-03-02 ENCOUNTER — INFUSION (OUTPATIENT)
Dept: ONCOLOGY | Facility: HOSPITAL | Age: 65
End: 2020-03-02

## 2020-03-02 VITALS
SYSTOLIC BLOOD PRESSURE: 118 MMHG | WEIGHT: 160 LBS | TEMPERATURE: 98.3 F | HEART RATE: 91 BPM | BODY MASS INDEX: 25.71 KG/M2 | HEIGHT: 66 IN | OXYGEN SATURATION: 98 % | RESPIRATION RATE: 17 BRPM | DIASTOLIC BLOOD PRESSURE: 58 MMHG

## 2020-03-02 DIAGNOSIS — C34.81 MALIGNANT NEOPLASM OF OVERLAPPING SITES OF RIGHT LUNG (HCC): Primary | ICD-10-CM

## 2020-03-02 DIAGNOSIS — C77.9 REGIONAL LYMPH NODE METASTASIS PRESENT (HCC): ICD-10-CM

## 2020-03-02 DIAGNOSIS — C34.81 MALIGNANT NEOPLASM OF OVERLAPPING SITES OF RIGHT LUNG (HCC): ICD-10-CM

## 2020-03-02 DIAGNOSIS — C77.0 SECONDARY MALIGNANT NEOPLASM OF SUPRACLAVICULAR LYMPH NODE (HCC): ICD-10-CM

## 2020-03-02 LAB
ALBUMIN SERPL-MCNC: 3.9 G/DL (ref 3.5–5.2)
ALBUMIN/GLOB SERPL: 1.2 G/DL
ALP SERPL-CCNC: 65 U/L (ref 39–117)
ALT SERPL W P-5'-P-CCNC: 7 U/L (ref 1–33)
ANION GAP SERPL CALCULATED.3IONS-SCNC: 11 MMOL/L (ref 5–15)
AST SERPL-CCNC: 12 U/L (ref 1–32)
BASOPHILS # BLD AUTO: 0.01 10*3/MM3 (ref 0–0.2)
BASOPHILS NFR BLD AUTO: 0.2 % (ref 0–1.5)
BILIRUB SERPL-MCNC: 0.2 MG/DL (ref 0.2–1.2)
BUN BLD-MCNC: 15 MG/DL (ref 8–23)
BUN/CREAT SERPL: 18.8 (ref 7–25)
CALCIUM SPEC-SCNC: 9.9 MG/DL (ref 8.6–10.5)
CHLORIDE SERPL-SCNC: 104 MMOL/L (ref 98–107)
CO2 SERPL-SCNC: 26 MMOL/L (ref 22–29)
CREAT BLD-MCNC: 0.8 MG/DL (ref 0.57–1)
DEPRECATED RDW RBC AUTO: 62.1 FL (ref 37–54)
EOSINOPHIL # BLD AUTO: 0.21 10*3/MM3 (ref 0–0.4)
EOSINOPHIL NFR BLD AUTO: 4.8 % (ref 0.3–6.2)
ERYTHROCYTE [DISTWIDTH] IN BLOOD BY AUTOMATED COUNT: 16.8 % (ref 12.3–15.4)
GFR SERPL CREATININE-BSD FRML MDRD: 72 ML/MIN/1.73
GLOBULIN UR ELPH-MCNC: 3.2 GM/DL
GLUCOSE BLD-MCNC: 107 MG/DL (ref 65–99)
HCT VFR BLD AUTO: 33.1 % (ref 34–46.6)
HGB BLD-MCNC: 10.5 G/DL (ref 12–15.9)
IMM GRANULOCYTES # BLD AUTO: 0.01 10*3/MM3 (ref 0–0.05)
IMM GRANULOCYTES NFR BLD AUTO: 0.2 % (ref 0–0.5)
LYMPHOCYTES # BLD AUTO: 0.36 10*3/MM3 (ref 0.7–3.1)
LYMPHOCYTES NFR BLD AUTO: 8.3 % (ref 19.6–45.3)
MAGNESIUM SERPL-MCNC: 1.9 MG/DL (ref 1.6–2.4)
MCH RBC QN AUTO: 31.7 PG (ref 26.6–33)
MCHC RBC AUTO-ENTMCNC: 31.7 G/DL (ref 31.5–35.7)
MCV RBC AUTO: 100 FL (ref 79–97)
MONOCYTES # BLD AUTO: 0.37 10*3/MM3 (ref 0.1–0.9)
MONOCYTES NFR BLD AUTO: 8.5 % (ref 5–12)
NEUTROPHILS # BLD AUTO: 3.37 10*3/MM3 (ref 1.7–7)
NEUTROPHILS NFR BLD AUTO: 78 % (ref 42.7–76)
NRBC BLD AUTO-RTO: 0 /100 WBC (ref 0–0.2)
PLATELET # BLD AUTO: 167 10*3/MM3 (ref 140–450)
PMV BLD AUTO: 10 FL (ref 6–12)
POTASSIUM BLD-SCNC: 4.1 MMOL/L (ref 3.5–5.2)
PROT SERPL-MCNC: 7.1 G/DL (ref 6–8.5)
RBC # BLD AUTO: 3.31 10*6/MM3 (ref 3.77–5.28)
SODIUM BLD-SCNC: 141 MMOL/L (ref 136–145)
WBC NRBC COR # BLD: 4.33 10*3/MM3 (ref 3.4–10.8)

## 2020-03-02 PROCEDURE — 25010000003 HEPARIN LOCK FLUCH PER 10 UNITS: Performed by: INTERNAL MEDICINE

## 2020-03-02 PROCEDURE — 96367 TX/PROPH/DG ADDL SEQ IV INF: CPT

## 2020-03-02 PROCEDURE — 96413 CHEMO IV INFUSION 1 HR: CPT

## 2020-03-02 PROCEDURE — 83735 ASSAY OF MAGNESIUM: CPT

## 2020-03-02 PROCEDURE — 25010000002 DIPHENHYDRAMINE PER 50 MG: Performed by: NURSE PRACTITIONER

## 2020-03-02 PROCEDURE — 25010000002 DEXAMETHASONE SODIUM PHOSPHATE 100 MG/10ML SOLUTION: Performed by: NURSE PRACTITIONER

## 2020-03-02 PROCEDURE — 80053 COMPREHEN METABOLIC PANEL: CPT

## 2020-03-02 PROCEDURE — 36415 COLL VENOUS BLD VENIPUNCTURE: CPT

## 2020-03-02 PROCEDURE — 85025 COMPLETE CBC W/AUTO DIFF WBC: CPT

## 2020-03-02 PROCEDURE — 25010000002 DURVALUMAB 50 MG/ML SOLUTION 2.4 ML VIAL: Performed by: NURSE PRACTITIONER

## 2020-03-02 PROCEDURE — 25010000002 DURVALUMAB 50 MG/ML SOLUTION 10 ML VIAL: Performed by: NURSE PRACTITIONER

## 2020-03-02 PROCEDURE — 96375 TX/PRO/DX INJ NEW DRUG ADDON: CPT

## 2020-03-02 RX ORDER — FAMOTIDINE 10 MG/ML
20 INJECTION, SOLUTION INTRAVENOUS ONCE
Status: COMPLETED | OUTPATIENT
Start: 2020-03-02 | End: 2020-03-02

## 2020-03-02 RX ORDER — SODIUM CHLORIDE 0.9 % (FLUSH) 0.9 %
10 SYRINGE (ML) INJECTION AS NEEDED
Status: DISCONTINUED | OUTPATIENT
Start: 2020-03-02 | End: 2020-03-02 | Stop reason: HOSPADM

## 2020-03-02 RX ORDER — FAMOTIDINE 10 MG/ML
20 INJECTION, SOLUTION INTRAVENOUS ONCE
Status: CANCELLED | OUTPATIENT
Start: 2020-03-02

## 2020-03-02 RX ORDER — SODIUM CHLORIDE 0.9 % (FLUSH) 0.9 %
10 SYRINGE (ML) INJECTION AS NEEDED
Status: CANCELLED | OUTPATIENT
Start: 2020-03-02

## 2020-03-02 RX ORDER — HEPARIN SODIUM (PORCINE) LOCK FLUSH IV SOLN 100 UNIT/ML 100 UNIT/ML
500 SOLUTION INTRAVENOUS AS NEEDED
Status: CANCELLED | OUTPATIENT
Start: 2020-03-02

## 2020-03-02 RX ORDER — SODIUM CHLORIDE 9 MG/ML
250 INJECTION, SOLUTION INTRAVENOUS ONCE
Status: COMPLETED | OUTPATIENT
Start: 2020-03-02 | End: 2020-03-02

## 2020-03-02 RX ORDER — SODIUM CHLORIDE 9 MG/ML
250 INJECTION, SOLUTION INTRAVENOUS ONCE
Status: CANCELLED | OUTPATIENT
Start: 2020-03-02

## 2020-03-02 RX ORDER — HEPARIN SODIUM (PORCINE) LOCK FLUSH IV SOLN 100 UNIT/ML 100 UNIT/ML
500 SOLUTION INTRAVENOUS AS NEEDED
Status: DISCONTINUED | OUTPATIENT
Start: 2020-03-02 | End: 2020-03-02 | Stop reason: HOSPADM

## 2020-03-02 RX ADMIN — Medication 500 UNITS: at 14:38

## 2020-03-02 RX ADMIN — SODIUM CHLORIDE 720 MG: 9 INJECTION, SOLUTION INTRAVENOUS at 13:11

## 2020-03-02 RX ADMIN — DEXAMETHASONE SODIUM PHOSPHATE 12 MG: 10 INJECTION, SOLUTION INTRAMUSCULAR; INTRAVENOUS at 12:25

## 2020-03-02 RX ADMIN — DIPHENHYDRAMINE HYDROCHLORIDE 25 MG: 50 INJECTION, SOLUTION INTRAMUSCULAR; INTRAVENOUS at 12:47

## 2020-03-02 RX ADMIN — SODIUM CHLORIDE, PRESERVATIVE FREE 10 ML: 5 INJECTION INTRAVENOUS at 14:38

## 2020-03-02 RX ADMIN — FAMOTIDINE 20 MG: 10 INJECTION, SOLUTION INTRAVENOUS at 12:26

## 2020-03-02 RX ADMIN — SODIUM CHLORIDE 250 ML: 9 INJECTION, SOLUTION INTRAVENOUS at 12:17

## 2020-03-06 ENCOUNTER — OFFICE VISIT (OUTPATIENT)
Dept: PRIMARY CARE CLINIC | Age: 65
End: 2020-03-06
Payer: MEDICARE

## 2020-03-06 VITALS
OXYGEN SATURATION: 99 % | TEMPERATURE: 98 F | WEIGHT: 160 LBS | HEART RATE: 91 BPM | SYSTOLIC BLOOD PRESSURE: 102 MMHG | HEIGHT: 66 IN | RESPIRATION RATE: 20 BRPM | DIASTOLIC BLOOD PRESSURE: 68 MMHG | BODY MASS INDEX: 25.71 KG/M2

## 2020-03-06 PROCEDURE — 99214 OFFICE O/P EST MOD 30 MIN: CPT | Performed by: NURSE PRACTITIONER

## 2020-03-06 RX ORDER — HYDROCODONE BITARTRATE AND ACETAMINOPHEN 7.5; 325 MG/1; MG/1
1 TABLET ORAL 2 TIMES DAILY
Qty: 60 TABLET | Refills: 0 | Status: SHIPPED | OUTPATIENT
Start: 2020-03-06 | End: 2020-03-06

## 2020-03-06 ASSESSMENT — ENCOUNTER SYMPTOMS
GASTROINTESTINAL NEGATIVE: 1
EYES NEGATIVE: 1
RESPIRATORY NEGATIVE: 1

## 2020-03-06 NOTE — PROGRESS NOTES
per tablet Take 1 tablet by mouth 2 times daily as needed for Pain for up to 30 days. 45 tablet 0    ZOLMitriptan (ZOMIG) 2.5 MG tablet TAKE 1 TAB. BY MOUTH AS NEEDED FOR MIGRAINES 6 tablet 3    omeprazole (PRILOSEC) 20 MG delayed release capsule Take 1 capsule by mouth every morning (before breakfast) 30 capsule 0    citalopram (CELEXA) 20 MG tablet TAKE 1 TABLET BY MOUTH ONCE DAILY 30 tablet 5    cetirizine (ZYRTEC) 10 MG tablet Take 1 tablet by mouth daily 30 tablet 11    ZOLMitriptan (ZOMIG) 2.5 MG tablet Take 1 tablet by mouth as needed for Migraine 6 tablet 3    fluticasone (FLONASE) 50 MCG/ACT nasal spray 1 spray by Nasal route daily 1 Bottle 5     No current facility-administered medications for this visit. No Known Allergies    No family history on file. Subjective:      Review of Systems   Constitutional: Positive for fatigue. HENT: Negative. Eyes: Negative. Respiratory: Negative. Cardiovascular: Negative. Gastrointestinal: Negative. Endocrine: Negative. Genitourinary: Negative. Musculoskeletal: Positive for arthralgias (chronic pain). Skin: Negative. Neurological: Negative. Hematological: Negative. Psychiatric/Behavioral: Negative. Objective:     Physical Exam  Vitals signs and nursing note reviewed. Constitutional:       Appearance: Normal appearance. She is normal weight. She is ill-appearing. Comments: generalized wekaness   HENT:      Head: Normocephalic and atraumatic. Jaw: There is normal jaw occlusion. Right Ear: Hearing, tympanic membrane, ear canal and external ear normal.      Left Ear: Hearing, tympanic membrane, ear canal and external ear normal.      Nose: Nose normal.      Mouth/Throat:      Lips: Pink. Mouth: Mucous membranes are moist.      Pharynx: Oropharynx is clear. Eyes:      General: Lids are normal.      Extraocular Movements: Extraocular movements intact.       Conjunctiva/sclera: Conjunctivae normal.      Pupils: Pupils are equal, round, and reactive to light. Neck:      Musculoskeletal: Full passive range of motion without pain, normal range of motion and neck supple. Thyroid: No thyromegaly. Trachea: Trachea normal.   Cardiovascular:      Rate and Rhythm: Normal rate and regular rhythm. Pulses: Normal pulses. Dorsalis pedis pulses are 2+ on the right side and 2+ on the left side. Posterior tibial pulses are 2+ on the right side and 2+ on the left side. Heart sounds: Normal heart sounds. No murmur. Pulmonary:      Effort: Pulmonary effort is normal.      Breath sounds: Normal breath sounds and air entry. Abdominal:      General: Bowel sounds are normal.      Palpations: Abdomen is soft. Musculoskeletal:      Right knee: She exhibits decreased range of motion. Tenderness found. Left knee: She exhibits decreased range of motion. Tenderness found. Cervical back: She exhibits normal range of motion. Thoracic back: She exhibits decreased range of motion and tenderness. Lumbar back: She exhibits decreased range of motion and tenderness. Right lower leg: No edema. Left lower leg: No edema. Lymphadenopathy:      Cervical: No cervical adenopathy. Skin:     General: Skin is warm and dry. Capillary Refill: Capillary refill takes less than 2 seconds. Neurological:      General: No focal deficit present. Mental Status: She is alert and oriented to person, place, and time. Mental status is at baseline. Psychiatric:         Attention and Perception: Attention normal.         Mood and Affect: Mood normal.         Speech: Speech normal.         Behavior: Behavior normal.         Thought Content:  Thought content normal.         Cognition and Memory: Cognition normal.         Judgment: Judgment normal.         /68   Pulse 91   Temp 98 °F (36.7 °C) (Temporal)   Resp 20   Ht 5' 6\" (1.676 m)   Wt 160 lb (72.6 kg)   SpO2 99%   BMI 25.82 kg/m²     Assessment:      Diagnosis Orders   1. Chronic fatigue syndrome     2. Squamous cell carcinoma of lung, unspecified laterality (HCC)  DISCONTINUED: HYDROcodone-acetaminophen (NORCO) 7.5-325 MG per tablet   3. Skin irritation Improving     completely resolved       No results found for this visit on 03/06/20. Plan:     Patient is to continue treatment per oncology. Increasing Norco to 7.5 mg BID. She can split the tablet and take throughout the day if she wishes. RTC in 2 months to monitor overall status. No follow-ups on file. No orders of the defined types were placed in this encounter. Orders Placed This Encounter   Medications    DISCONTD: HYDROcodone-acetaminophen (NORCO) 7.5-325 MG per tablet     Sig: Take 1 tablet by mouth 2 times daily for 30 days. Intended supply: 30 days     Dispense:  60 tablet     Refill:  0     Reduce doses taken as pain becomes manageable        Patient offered educational handouts and has had all questions answered. Patient voices understanding and agrees to plans along with risks and benefits of plan. Patient is instructed to continue prior meds, diet, and exercise plans as instructed. Patient agrees to follow up as instructed and sooner if needed. Patient agrees to go to ER if condition becomes emergent. EMR Dragon/transcription disclaimer: Some of this encounter note is an electronic transcription/translation of spoken language to printed text. The electronic translation of spoken language may permit erroneous, or at times, nonsensical words or phrases to be inadvertently transcribed.  Although I have reviewed the note for such errors, some may still exist.    Electronically signed by ROBER Luciano on 3/6/2020 at 2:20 PM

## 2020-03-11 DIAGNOSIS — D53.9 MACROCYTIC ANEMIA: Primary | ICD-10-CM

## 2020-03-11 PROBLEM — Z92.3 HISTORY OF RADIATION THERAPY: Status: ACTIVE | Noted: 2020-03-11

## 2020-03-12 ENCOUNTER — HOSPITAL ENCOUNTER (OUTPATIENT)
Dept: RADIATION ONCOLOGY | Facility: HOSPITAL | Age: 65
Setting detail: RADIATION/ONCOLOGY SERIES
End: 2020-03-12

## 2020-03-12 ENCOUNTER — OFFICE VISIT (OUTPATIENT)
Dept: RADIATION ONCOLOGY | Facility: HOSPITAL | Age: 65
End: 2020-03-12

## 2020-03-12 VITALS
WEIGHT: 158 LBS | OXYGEN SATURATION: 100 % | DIASTOLIC BLOOD PRESSURE: 63 MMHG | HEIGHT: 66 IN | BODY MASS INDEX: 25.39 KG/M2 | SYSTOLIC BLOOD PRESSURE: 113 MMHG

## 2020-03-12 DIAGNOSIS — C77.0 SECONDARY MALIGNANT NEOPLASM OF SUPRACLAVICULAR LYMPH NODE (HCC): ICD-10-CM

## 2020-03-12 DIAGNOSIS — Z92.3 HISTORY OF RADIATION THERAPY: ICD-10-CM

## 2020-03-12 DIAGNOSIS — C34.81 MALIGNANT NEOPLASM OF OVERLAPPING SITES OF RIGHT LUNG (HCC): Primary | ICD-10-CM

## 2020-03-12 DIAGNOSIS — C77.9 REGIONAL LYMPH NODE METASTASIS PRESENT (HCC): ICD-10-CM

## 2020-03-12 DIAGNOSIS — Z87.891 FORMER SMOKER: ICD-10-CM

## 2020-03-12 PROCEDURE — G0463 HOSPITAL OUTPT CLINIC VISIT: HCPCS | Performed by: RADIOLOGY

## 2020-03-13 DIAGNOSIS — C77.0 SECONDARY MALIGNANT NEOPLASM OF SUPRACLAVICULAR LYMPH NODE (HCC): ICD-10-CM

## 2020-03-13 DIAGNOSIS — C77.9 REGIONAL LYMPH NODE METASTASIS PRESENT (HCC): ICD-10-CM

## 2020-03-13 DIAGNOSIS — C34.81 MALIGNANT NEOPLASM OF OVERLAPPING SITES OF RIGHT LUNG (HCC): ICD-10-CM

## 2020-03-13 RX ORDER — FAMOTIDINE 10 MG/ML
20 INJECTION, SOLUTION INTRAVENOUS ONCE
Status: CANCELLED | OUTPATIENT
Start: 2020-03-16

## 2020-03-13 RX ORDER — SODIUM CHLORIDE 9 MG/ML
250 INJECTION, SOLUTION INTRAVENOUS ONCE
Status: CANCELLED | OUTPATIENT
Start: 2020-03-16

## 2020-03-14 ENCOUNTER — HOSPITAL ENCOUNTER (EMERGENCY)
Facility: HOSPITAL | Age: 65
Discharge: HOME | End: 2020-03-15
Admitting: EMERGENCY MEDICINE

## 2020-03-14 DIAGNOSIS — R09.1 PLEURISY: Primary | ICD-10-CM

## 2020-03-14 PROCEDURE — 87040 BLOOD CULTURE FOR BACTERIA: CPT | Performed by: EMERGENCY MEDICINE

## 2020-03-14 PROCEDURE — 80053 COMPREHEN METABOLIC PANEL: CPT | Performed by: EMERGENCY MEDICINE

## 2020-03-14 PROCEDURE — 93010 ELECTROCARDIOGRAM REPORT: CPT | Performed by: INTERNAL MEDICINE

## 2020-03-14 PROCEDURE — 84484 ASSAY OF TROPONIN QUANT: CPT | Performed by: EMERGENCY MEDICINE

## 2020-03-14 PROCEDURE — 85379 FIBRIN DEGRADATION QUANT: CPT | Performed by: EMERGENCY MEDICINE

## 2020-03-14 PROCEDURE — 85730 THROMBOPLASTIN TIME PARTIAL: CPT | Performed by: EMERGENCY MEDICINE

## 2020-03-14 PROCEDURE — 99284 EMERGENCY DEPT VISIT MOD MDM: CPT

## 2020-03-14 PROCEDURE — 85025 COMPLETE CBC W/AUTO DIFF WBC: CPT | Performed by: EMERGENCY MEDICINE

## 2020-03-14 PROCEDURE — 93005 ELECTROCARDIOGRAM TRACING: CPT | Performed by: PHYSICIAN ASSISTANT

## 2020-03-14 PROCEDURE — 85610 PROTHROMBIN TIME: CPT | Performed by: EMERGENCY MEDICINE

## 2020-03-14 PROCEDURE — 83880 ASSAY OF NATRIURETIC PEPTIDE: CPT | Performed by: EMERGENCY MEDICINE

## 2020-03-14 PROCEDURE — 93005 ELECTROCARDIOGRAM TRACING: CPT | Performed by: EMERGENCY MEDICINE

## 2020-03-14 RX ORDER — ONDANSETRON 2 MG/ML
4 INJECTION INTRAMUSCULAR; INTRAVENOUS ONCE
Status: COMPLETED | OUTPATIENT
Start: 2020-03-14 | End: 2020-03-15

## 2020-03-14 RX ORDER — FENTANYL CITRATE 50 UG/ML
75 INJECTION, SOLUTION INTRAMUSCULAR; INTRAVENOUS ONCE
Status: COMPLETED | OUTPATIENT
Start: 2020-03-14 | End: 2020-03-15

## 2020-03-15 ENCOUNTER — APPOINTMENT (OUTPATIENT)
Dept: GENERAL RADIOLOGY | Facility: HOSPITAL | Age: 65
End: 2020-03-15

## 2020-03-15 ENCOUNTER — APPOINTMENT (OUTPATIENT)
Dept: CT IMAGING | Facility: HOSPITAL | Age: 65
End: 2020-03-15

## 2020-03-15 VITALS
WEIGHT: 158 LBS | HEIGHT: 66 IN | SYSTOLIC BLOOD PRESSURE: 131 MMHG | BODY MASS INDEX: 25.39 KG/M2 | RESPIRATION RATE: 20 BRPM | DIASTOLIC BLOOD PRESSURE: 65 MMHG | OXYGEN SATURATION: 91 % | TEMPERATURE: 98.8 F | HEART RATE: 96 BPM

## 2020-03-15 LAB
ALBUMIN SERPL-MCNC: 4.1 G/DL (ref 3.5–5.2)
ALBUMIN/GLOB SERPL: 1.3 G/DL
ALP SERPL-CCNC: 61 U/L (ref 39–117)
ALT SERPL W P-5'-P-CCNC: 8 U/L (ref 1–33)
ANION GAP SERPL CALCULATED.3IONS-SCNC: 15 MMOL/L (ref 5–15)
APTT PPP: 40 SECONDS (ref 24.1–35)
AST SERPL-CCNC: 12 U/L (ref 1–32)
BASOPHILS # BLD AUTO: 0.02 10*3/MM3 (ref 0–0.2)
BASOPHILS NFR BLD AUTO: 0.2 % (ref 0–1.5)
BILIRUB SERPL-MCNC: 0.2 MG/DL (ref 0.2–1.2)
BUN BLD-MCNC: 13 MG/DL (ref 8–23)
BUN/CREAT SERPL: 19.4 (ref 7–25)
CALCIUM SPEC-SCNC: 9.7 MG/DL (ref 8.6–10.5)
CHLORIDE SERPL-SCNC: 102 MMOL/L (ref 98–107)
CO2 SERPL-SCNC: 21 MMOL/L (ref 22–29)
CREAT BLD-MCNC: 0.67 MG/DL (ref 0.57–1)
CREAT BLDA-MCNC: 0.6 MG/DL (ref 0.6–1.3)
D DIMER PPP FEU-MCNC: 1.33 MG/L (FEU) (ref 0–0.5)
D-LACTATE SERPL-SCNC: 0.9 MMOL/L (ref 0.5–2)
DEPRECATED RDW RBC AUTO: 55.8 FL (ref 37–54)
EOSINOPHIL # BLD AUTO: 0.36 10*3/MM3 (ref 0–0.4)
EOSINOPHIL NFR BLD AUTO: 4.3 % (ref 0.3–6.2)
ERYTHROCYTE [DISTWIDTH] IN BLOOD BY AUTOMATED COUNT: 15.9 % (ref 12.3–15.4)
GFR SERPL CREATININE-BSD FRML MDRD: 88 ML/MIN/1.73
GLOBULIN UR ELPH-MCNC: 3.2 GM/DL
GLUCOSE BLD-MCNC: 112 MG/DL (ref 65–99)
HCT VFR BLD AUTO: 29.9 % (ref 34–46.6)
HGB BLD-MCNC: 9.8 G/DL (ref 12–15.9)
IMM GRANULOCYTES # BLD AUTO: 0.03 10*3/MM3 (ref 0–0.05)
IMM GRANULOCYTES NFR BLD AUTO: 0.4 % (ref 0–0.5)
INR PPP: 1.07 (ref 0.91–1.09)
LYMPHOCYTES # BLD AUTO: 0.59 10*3/MM3 (ref 0.7–3.1)
LYMPHOCYTES NFR BLD AUTO: 7.1 % (ref 19.6–45.3)
MCH RBC QN AUTO: 31.3 PG (ref 26.6–33)
MCHC RBC AUTO-ENTMCNC: 32.8 G/DL (ref 31.5–35.7)
MCV RBC AUTO: 95.5 FL (ref 79–97)
MONOCYTES # BLD AUTO: 1.35 10*3/MM3 (ref 0.1–0.9)
MONOCYTES NFR BLD AUTO: 16.2 % (ref 5–12)
NEUTROPHILS # BLD AUTO: 6 10*3/MM3 (ref 1.7–7)
NEUTROPHILS NFR BLD AUTO: 71.8 % (ref 42.7–76)
NRBC BLD AUTO-RTO: 0 /100 WBC (ref 0–0.2)
NT-PROBNP SERPL-MCNC: 72.8 PG/ML (ref 5–900)
PLATELET # BLD AUTO: 235 10*3/MM3 (ref 140–450)
PMV BLD AUTO: 10.4 FL (ref 6–12)
POTASSIUM BLD-SCNC: 3.8 MMOL/L (ref 3.5–5.2)
PROT SERPL-MCNC: 7.3 G/DL (ref 6–8.5)
PROTHROMBIN TIME: 13.8 SECONDS (ref 11.9–14.6)
RBC # BLD AUTO: 3.13 10*6/MM3 (ref 3.77–5.28)
SODIUM BLD-SCNC: 138 MMOL/L (ref 136–145)
TROPONIN T SERPL-MCNC: <0.01 NG/ML (ref 0–0.03)
WBC NRBC COR # BLD: 8.35 10*3/MM3 (ref 3.4–10.8)

## 2020-03-15 PROCEDURE — 25010000002 PIPERACILLIN SOD-TAZOBACTAM PER 1 G: Performed by: PHYSICIAN ASSISTANT

## 2020-03-15 PROCEDURE — 96367 TX/PROPH/DG ADDL SEQ IV INF: CPT

## 2020-03-15 PROCEDURE — 0 IOPAMIDOL PER 1 ML: Performed by: PHYSICIAN ASSISTANT

## 2020-03-15 PROCEDURE — 25010000002 FENTANYL CITRATE (PF) 100 MCG/2ML SOLUTION: Performed by: EMERGENCY MEDICINE

## 2020-03-15 PROCEDURE — 83605 ASSAY OF LACTIC ACID: CPT | Performed by: EMERGENCY MEDICINE

## 2020-03-15 PROCEDURE — 96375 TX/PRO/DX INJ NEW DRUG ADDON: CPT

## 2020-03-15 PROCEDURE — 71275 CT ANGIOGRAPHY CHEST: CPT

## 2020-03-15 PROCEDURE — 82565 ASSAY OF CREATININE: CPT

## 2020-03-15 PROCEDURE — 96365 THER/PROPH/DIAG IV INF INIT: CPT

## 2020-03-15 PROCEDURE — 71045 X-RAY EXAM CHEST 1 VIEW: CPT

## 2020-03-15 PROCEDURE — 25010000002 ONDANSETRON PER 1 MG: Performed by: EMERGENCY MEDICINE

## 2020-03-15 PROCEDURE — 96376 TX/PRO/DX INJ SAME DRUG ADON: CPT

## 2020-03-15 PROCEDURE — 87040 BLOOD CULTURE FOR BACTERIA: CPT | Performed by: EMERGENCY MEDICINE

## 2020-03-15 RX ORDER — FENTANYL CITRATE 50 UG/ML
75 INJECTION, SOLUTION INTRAMUSCULAR; INTRAVENOUS ONCE
Status: DISCONTINUED | OUTPATIENT
Start: 2020-03-15 | End: 2020-03-15

## 2020-03-15 RX ORDER — ONDANSETRON 2 MG/ML
4 INJECTION INTRAMUSCULAR; INTRAVENOUS ONCE
Status: COMPLETED | OUTPATIENT
Start: 2020-03-15 | End: 2020-03-15

## 2020-03-15 RX ORDER — FENTANYL CITRATE 50 UG/ML
75 INJECTION, SOLUTION INTRAMUSCULAR; INTRAVENOUS ONCE
Status: COMPLETED | OUTPATIENT
Start: 2020-03-15 | End: 2020-03-15

## 2020-03-15 RX ORDER — FENTANYL CITRATE 50 UG/ML
50 INJECTION, SOLUTION INTRAMUSCULAR; INTRAVENOUS ONCE
Status: COMPLETED | OUTPATIENT
Start: 2020-03-15 | End: 2020-03-15

## 2020-03-15 RX ORDER — CEFDINIR 300 MG/1
300 CAPSULE ORAL 2 TIMES DAILY
Qty: 20 CAPSULE | Refills: 0 | Status: SHIPPED | OUTPATIENT
Start: 2020-03-15 | End: 2020-03-25

## 2020-03-15 RX ADMIN — ONDANSETRON HYDROCHLORIDE 4 MG: 2 SOLUTION INTRAMUSCULAR; INTRAVENOUS at 00:02

## 2020-03-15 RX ADMIN — PIPERACILLIN AND TAZOBACTAM 4.5 G: 4; .5 INJECTION, POWDER, LYOPHILIZED, FOR SOLUTION INTRAVENOUS; PARENTERAL at 00:51

## 2020-03-15 RX ADMIN — FENTANYL CITRATE 50 MCG: 50 INJECTION, SOLUTION INTRAMUSCULAR; INTRAVENOUS at 03:06

## 2020-03-15 RX ADMIN — ONDANSETRON HYDROCHLORIDE 4 MG: 2 SOLUTION INTRAMUSCULAR; INTRAVENOUS at 01:50

## 2020-03-15 RX ADMIN — VANCOMYCIN HYDROCHLORIDE 1000 MG: 1 INJECTION, POWDER, LYOPHILIZED, FOR SOLUTION INTRAVENOUS at 01:52

## 2020-03-15 RX ADMIN — FENTANYL CITRATE 75 MCG: 50 INJECTION, SOLUTION INTRAMUSCULAR; INTRAVENOUS at 01:50

## 2020-03-15 RX ADMIN — FENTANYL CITRATE 75 MCG: 50 INJECTION, SOLUTION INTRAMUSCULAR; INTRAVENOUS at 00:02

## 2020-03-15 RX ADMIN — IOPAMIDOL 100 ML: 755 INJECTION, SOLUTION INTRAVENOUS at 00:38

## 2020-03-15 NOTE — ED PROVIDER NOTES
Subjective   History of Present Illness  64-year-old female presents with a chief complaintright-sided upper chest pain.  Patient reports she is receiving chemoradiation treatment for right upper lobe cancer.  Patient denies cough fever hemoptysis nausea vomiting diarrhea  Review of Systems   All other systems reviewed and are negative.      Past Medical History:   Diagnosis Date   • Anemia    • Arthritis    • Cancer (CMS/HCC)     Right lung   • Cervical disc disease    • Depression    • Emphysema lung (CMS/HCC)    • GERD (gastroesophageal reflux disease)    • Hypomagnesemia 12/30/2019   • Lung cancer (CMS/HCC)    • Migraines    • Osteoporosis    • Vitamin D deficiency        Allergies   Allergen Reactions   • Amoxicillin GI Intolerance       Past Surgical History:   Procedure Laterality Date   • BREAST SURGERY      Cyst removal   • COLONOSCOPY N/A 12/3/2018    Procedure: COLONOSCOPY WITH ANESTHESIA;  Surgeon: Myah Pool MD;  Location: [facility] ENDOSCOPY;  Service: Gastroenterology   • HYSTERECTOMY     • VENOUS ACCESS DEVICE (PORT) INSERTION N/A 11/22/2019    Procedure: PLACEMENT OF SINGLE LUMEN PORT;  Surgeon: Mona Gibson MD;  Location: [facility] OR;  Service: General       Family History   Problem Relation Age of Onset   • Colon polyps Mother         < 60 years old   • Lung cancer Mother    • No Known Problems Father    • No Known Problems Sister    • Prostate cancer Maternal Grandfather    • Breast cancer Maternal Aunt    • Colon cancer Neg Hx        Social History     Socioeconomic History   • Marital status:      Spouse name: Not on file   • Number of children: Not on file   • Years of education: Not on file   • Highest education level: Not on file   Tobacco Use   • Smoking status: Former Smoker     Types: Cigarettes   • Smokeless tobacco: Never Used   • Tobacco comment: Quit 4 years ago   Substance and Sexual Activity   • Alcohol use: No   • Drug use: No   • Sexual activity: Defer            Objective   Physical Exam   Constitutional: She is oriented to person, place, and time. She appears distressed.   HENT:   Head: Normocephalic and atraumatic.   Eyes: Pupils are equal, round, and reactive to light. EOM are normal.   Neck: Normal range of motion. Neck supple.   Cardiovascular: Normal rate and regular rhythm.   Pulmonary/Chest: Effort normal and breath sounds normal.   Abdominal: Soft. Bowel sounds are normal.   Musculoskeletal: Normal range of motion.   Neurological: She is alert and oriented to person, place, and time. No cranial nerve deficit. Coordination normal.   Skin: Skin is warm. Capillary refill takes less than 2 seconds.   Psychiatric: She has a normal mood and affect. Her behavior is normal.   Nursing note and vitals reviewed.      Procedures           ED Course           Labs Reviewed   COMPREHENSIVE METABOLIC PANEL - Abnormal; Notable for the following components:       Result Value    Glucose 112 (*)     CO2 21.0 (*)     All other components within normal limits    Narrative:     GFR Normal >60  Chronic Kidney Disease <60  Kidney Failure <15     APTT - Abnormal; Notable for the following components:    PTT 40.0 (*)     All other components within normal limits   D-DIMER, QUANTITATIVE - Abnormal; Notable for the following components:    D-Dimer, Quantitative 1.33 (*)     All other components within normal limits    Narrative:     Reference Range is 0-0.50 mg/L FEU. However, results <0.50 mg/L FEU tends to rule out DVT or PE. Results >0.50 mg/L FEU are not useful in predicting absence or presence of DVT or PE.     CBC WITH AUTO DIFFERENTIAL - Abnormal; Notable for the following components:    RBC 3.13 (*)     Hemoglobin 9.8 (*)     Hematocrit 29.9 (*)     RDW 15.9 (*)     RDW-SD 55.8 (*)     Lymphocyte % 7.1 (*)     Monocyte % 16.2 (*)     Lymphocytes, Absolute 0.59 (*)     Monocytes, Absolute 1.35 (*)     All other components within normal limits   PROTIME-INR - Normal    TROPONIN (IN-HOUSE) - Normal    Narrative:     Troponin T Reference Range:  <= 0.03 ng/mL-   Negative for AMI  >0.03 ng/mL-     Abnormal for myocardial necrosis.  Clinicians would have to utilize clinical acumen, EKG, Troponin and serial changes to determine if it is an Acute Myocardial Infarction or myocardial injury due to an underlying chronic condition.       Results may be falsely decreased if patient taking Biotin.     BNP (IN-HOUSE) - Normal    Narrative:     Among patients with dyspnea, NT-proBNP is highly sensitive for the detection of acute congestive heart failure. In addition NT-proBNP of <300 pg/ml effectively rules out acute congestive heart failure with 99% negative predictive value.    Results may be falsely decreased if patient taking Biotin.     LACTIC ACID, PLASMA - Normal   BLOOD CULTURE   BLOOD CULTURE   CBC AND DIFFERENTIAL    Narrative:     The following orders were created for panel order CBC & Differential.  Procedure                               Abnormality         Status                     ---------                               -----------         ------                     CBC Auto Differential[[id number]]        Abnormal            Final result                 Please view results for these tests on the individual orders.     XR Chest 1 View    (Results Pending)   CT Angiogram Chest    (Results Pending)                                     MDM  Number of Diagnoses or Management Options  Diagnosis management comments: Patient symptoms resolved CT shows inflammation versus scarring versus infection.  Patient does not have a cough.  She has no leukocytosis.  We will treat her for her pleuritic chest pain and have her follow-up with her specialist and her primary care.  Initially organizing her home with pain medicine but upon review of her Brandon report he does reveal she had recent pain medication prescriptions.  Patient actually does not take her pain medication as prescribed and takes  maybe 1 or 1-1/2 pills a day.  I have instructed her to take her pain medication as prescribed and this should help cover her pain.  She understands that she will follow-up with her primary care doctor.  Strong return precautions given       Amount and/or Complexity of Data Reviewed  Clinical lab tests: reviewed and ordered  Tests in the radiology section of CPT®: ordered and reviewed  Tests in the medicine section of CPT®: ordered and reviewed  Decide to obtain previous medical records or to obtain history from someone other than the patient: yes    Risk of Complications, Morbidity, and/or Mortality  Presenting problems: moderate  Diagnostic procedures: moderate  Management options: moderate    Patient Progress  Patient progress: stable      Final diagnoses:   Pleurisy            Kevin Bolton PA-C  03/15/20 0243

## 2020-03-16 ENCOUNTER — APPOINTMENT (OUTPATIENT)
Dept: LAB | Facility: HOSPITAL | Age: 65
End: 2020-03-16

## 2020-03-16 ENCOUNTER — TELEPHONE (OUTPATIENT)
Dept: RADIATION ONCOLOGY | Facility: HOSPITAL | Age: 65
End: 2020-03-16

## 2020-03-16 ENCOUNTER — APPOINTMENT (OUTPATIENT)
Dept: ONCOLOGY | Facility: HOSPITAL | Age: 65
End: 2020-03-16

## 2020-03-16 ENCOUNTER — TELEPHONE (OUTPATIENT)
Dept: ONCOLOGY | Facility: CLINIC | Age: 65
End: 2020-03-16

## 2020-03-16 RX ORDER — DEXAMETHASONE 2 MG/1
2 TABLET ORAL
Qty: 40 TABLET | Refills: 0 | Status: SHIPPED | OUTPATIENT
Start: 2020-03-16 | End: 2020-05-07 | Stop reason: ALTCHOICE

## 2020-03-16 NOTE — TELEPHONE ENCOUNTER
I spoke to this patient today after reviewing her CT scan which shows significant response to her right sided lung carcinoma.  She does have some inflammatory changes I will call in some steroids as it appears she has symptoms consistent with pleurisy.

## 2020-03-18 ENCOUNTER — DOCUMENTATION (OUTPATIENT)
Dept: RADIATION ONCOLOGY | Facility: HOSPITAL | Age: 65
End: 2020-03-18

## 2020-03-18 RX ORDER — OMEPRAZOLE 20 MG/1
20 CAPSULE, DELAYED RELEASE ORAL
Qty: 30 CAPSULE | Refills: 0 | Status: SHIPPED | OUTPATIENT
Start: 2020-03-18 | End: 2020-05-08

## 2020-03-18 RX ORDER — CITALOPRAM 20 MG/1
TABLET ORAL
Qty: 30 TABLET | Refills: 5 | Status: SHIPPED | OUTPATIENT
Start: 2020-03-18 | End: 2020-05-08

## 2020-03-18 RX ORDER — CITALOPRAM 20 MG/1
TABLET ORAL
Qty: 90 TABLET | Refills: 0 | Status: SHIPPED | OUTPATIENT
Start: 2020-03-18 | End: 2020-07-06 | Stop reason: SDUPTHER

## 2020-03-18 RX ORDER — OMEPRAZOLE 20 MG/1
CAPSULE, DELAYED RELEASE ORAL
Qty: 30 CAPSULE | Refills: 0 | Status: SHIPPED | OUTPATIENT
Start: 2020-03-18 | End: 2020-05-01 | Stop reason: SDUPTHER

## 2020-03-18 NOTE — PROGRESS NOTES
I spoke with this patient tonight and she is doing better on the steroids.  She is on 2 mg of dexamethasone 3 times daily.  Her breathing has improved and we will cancel her CT scan of the thorax as she had a CT angiogram performed in the emergency room.    The good news that shows near complete resolution of the hilar mass although there is some radiation-induced fibrosis and I do not believe this represents any pneumonia.  Also, her blood cultures were negative.    At this time she will likely need to resume her immunotherapy at the discretion of Dr. Melton on.

## 2020-03-20 ENCOUNTER — TELEPHONE (OUTPATIENT)
Dept: RADIATION ONCOLOGY | Facility: HOSPITAL | Age: 65
End: 2020-03-20

## 2020-03-20 LAB — BACTERIA SPEC AEROBE CULT: NORMAL

## 2020-03-20 NOTE — TELEPHONE ENCOUNTER
Called and spoke with patient this afternoon.  She states that she is doing much better and pain is much improved.  Gave patient instructions on steroid taper to start on Monday.    Patient was hesitant to start steroid wean, so I instructed her on a 5 day taper as opposed to 3 day.    Instructed patient to notify our office if she has worsening pain as she is tapering off steroids.  Patient verbalized understanding of instructions.

## 2020-03-20 NOTE — TELEPHONE ENCOUNTER
"----- Message from Anastacio Dudley III, MD sent at 3/16/2020  1:37 PM CDT -----  Regarding: RE: Debilitating pain  Contact: [phone]  Rx for steroids sent in, please check on her later in the week and start taper next week.  ----- Message -----  From: Ivonne Cristina RN  Sent: 3/16/2020   9:24 AM CDT  To: Anastacio Dudley III, MD  Subject: FW: Debilitating pain                            I can tell patient to go to ER.  Didn't know if you wanted to look at her CT to make sure you didn't see something?    Ivonne    ----- Message -----  From: Alondra Pool RegSched Rep  Sent: 3/16/2020   8:47 AM CDT  To: Ivonne Cristina RN  Subject: Debilitating pain                                Pt's caregiver Ashley Rodas, called today stating pt went to the ER Saturday d/t debilitating pain in right shoulder, \"where she received her treatment\". She has been having difficulty with breathing the last 2 weeks and has discussed with Luis Enrique but now it has changed and gotten worse. She can't even make it to the restroom bc the amount of pain she is having. While she was in the ER, pt had CT w/contrast and bloodwork done. She said they stated there was no pneumonia found and would like Dr. Dudley to look at her scan and bloodwork. She said she has been screaming in pain and taking 7.5 mg of her pain medication every 4 - 5 hours just to be able to sit. I recommended her come in if she is in that much pain and she said she didn't think she would be able to get her in the car to get her here.       "

## 2020-03-22 LAB
BACTERIA SPEC AEROBE CULT: ABNORMAL
GRAM STN SPEC: ABNORMAL

## 2020-03-24 ENCOUNTER — TELEPHONE (OUTPATIENT)
Dept: EMERGENCY DEPT | Facility: HOSPITAL | Age: 65
End: 2020-03-24

## 2020-03-27 RX ORDER — PREDNISONE 20 MG/1
TABLET ORAL
Qty: 21 TABLET | Refills: 0 | Status: SHIPPED | OUTPATIENT
Start: 2020-03-27 | End: 2020-04-08 | Stop reason: SDUPTHER

## 2020-03-30 ENCOUNTER — APPOINTMENT (OUTPATIENT)
Dept: LAB | Facility: HOSPITAL | Age: 65
End: 2020-03-30

## 2020-03-30 ENCOUNTER — APPOINTMENT (OUTPATIENT)
Dept: ONCOLOGY | Facility: HOSPITAL | Age: 65
End: 2020-03-30

## 2020-03-30 ENCOUNTER — TELEPHONE (OUTPATIENT)
Dept: ONCOLOGY | Facility: CLINIC | Age: 65
End: 2020-03-30

## 2020-04-02 ENCOUNTER — TELEMEDICINE (OUTPATIENT)
Dept: PULMONOLOGY | Facility: CLINIC | Age: 65
End: 2020-04-02

## 2020-04-02 DIAGNOSIS — J44.9 STAGE 3 SEVERE COPD BY GOLD CLASSIFICATION (HCC): ICD-10-CM

## 2020-04-02 DIAGNOSIS — J43.9 PULMONARY EMPHYSEMA, UNSPECIFIED EMPHYSEMA TYPE (HCC): Primary | ICD-10-CM

## 2020-04-02 DIAGNOSIS — C34.81 MALIGNANT NEOPLASM OF OVERLAPPING SITES OF RIGHT LUNG (HCC): ICD-10-CM

## 2020-04-02 DIAGNOSIS — Z92.3 HISTORY OF RADIATION THERAPY: ICD-10-CM

## 2020-04-02 PROCEDURE — 99203 OFFICE O/P NEW LOW 30 MIN: CPT | Performed by: INTERNAL MEDICINE

## 2020-04-02 RX ORDER — HYDROCODONE BITARTRATE AND ACETAMINOPHEN 7.5; 325 MG/1; MG/1
1 TABLET ORAL 2 TIMES DAILY
Qty: 60 TABLET | Refills: 0 | Status: SHIPPED | OUTPATIENT
Start: 2020-04-02 | End: 2020-05-05 | Stop reason: SDUPTHER

## 2020-04-02 NOTE — PROGRESS NOTES
Subjective   Damaris Hu is a 64 y.o. female.     Background: Pt with hx lung cancer dx 2019 by radial ebus, radiation pneumonitis, severe copd on pft in [place].     CC:     This visit is completed as a video telehealth visit utilizing video technology for remote visit in setting of viral pandemic    Unable to complete visit using a video connection to the patient. A phone visit was used to complete this visits. Total time of discussion was 18 minutes.     History of Present Illness   Patient whom Dr. Melton was asked me to see.  We attempted a telehealth video visit but the video portion failed.  The patient reports last year they found to have lung cancer.  This was diagnosed by navigational bronchoscopy in [place].  She has received radiation therapy and chemotherapy, currently off therapy.  She has had some problems with radiation pneumonitis and also chemotherapy-induced pneumonitis.  She is been treated with steroids.  She was evaluated in [place] for her bronchoscopy she underwent about a pulmonary function test which showed severe airflow obstruction.  He was started on Trelegy at that time and has been using that daily and has felt that that has been tolerable.  She reports mild dyspnea on exertion alleviated by rest and inhalers, going on for the past few months and unchanging pattern.  Dr. Melton on is asked me to see her as she does not have a local pulmonologist.    Medical/Family/Social History   has a past medical history of Anemia, Arthritis, Cancer (CMS/HCC), Cervical disc disease, Depression, Emphysema lung (CMS/HCC), GERD (gastroesophageal reflux disease), Hypomagnesemia (12/30/2019), Lung cancer (CMS/HCC), Migraines, Osteoporosis, and Vitamin D deficiency.   has a past surgical history that includes Breast surgery; Hysterectomy; Colonoscopy (N/A, 12/3/2018); and Venous Access Device (Port) (N/A, 11/22/2019).  family history includes Breast cancer in her maternal aunt; Colon polyps in her  mother; Lung cancer in her mother; No Known Problems in her father and sister; Prostate cancer in her maternal grandfather.   reports that she has quit smoking. Her smoking use included cigarettes. She has never used smokeless tobacco. She reports that she does not drink alcohol or use drugs.  Allergies   Allergen Reactions   • Amoxicillin GI Intolerance     Medications    Current Outpatient Medications:   •  cetirizine (zyrTEC) 10 MG tablet, Take 10 mg by mouth Daily., Disp: , Rfl:   •  citalopram (CeleXA) 20 MG tablet, Take 20 mg by mouth Daily., Disp: , Rfl:   •  dexamethasone (DECADRON) 2 MG tablet, Take 1 tablet by mouth 3 (Three) Times a Day With Meals., Disp: 40 tablet, Rfl: 0  •  fluticasone (FLONASE) 50 MCG/ACT nasal spray, 1 spray by Each Nare route Daily As Needed., Disp: , Rfl: 5  •  Fluticasone-Umeclidin-Vilant (Trelegy Ellipta) 100-62.5-25 MCG/INH aerosol powder , Inhale 1 puff Every Morning., Disp: 1 each, Rfl: 11  •  HYDROcodone-acetaminophen (NORCO) 5-325 MG per tablet, Take 1 tablet by mouth Take As Directed. 1.5 tabs daily as needed for pain, Disp: , Rfl:   •  lidocaine-prilocaine (EMLA) 2.5-2.5 % cream, Apply to port 30 minutes before access, Disp: 1 each, Rfl: 0  •  omeprazole (priLOSEC) 20 MG capsule, Take 20 mg by mouth Every Morning Before Breakfast., Disp: , Rfl: 0  •  ondansetron (ZOFRAN) 8 MG tablet, Take 1 tablet by mouth 3 (Three) Times a Day As Needed for Nausea or Vomiting., Disp: 30 tablet, Rfl: 5  •  predniSONE (DELTASONE) 20 MG tablet, Take 2 tablets po twice daily for 3 days, then take 2 tablets po once daily for 3 days, then take 1 tablet po once daily for 3 days., Disp: 21 tablet, Rfl: 0  •  ZOLMitriptan (ZOMIG) 2.5 MG tablet, Take 2.5 mg by mouth As Needed., Disp: , Rfl:     Review of Systems   Constitutional: Positive for fatigue. Negative for chills and fever.   HENT: Negative for trouble swallowing and voice change.    Eyes: Negative for photophobia and visual disturbance.    Respiratory: Positive for cough and shortness of breath.    Gastrointestinal: Negative for abdominal pain, nausea, vomiting and GERD.   Genitourinary: Negative for difficulty urinating and hematuria.   Musculoskeletal: Negative for gait problem and joint swelling.   Skin: Negative for pallor and skin lesions.   Neurological: Negative for tremors, weakness and confusion.   Hematological: Negative for adenopathy. Does not bruise/bleed easily.   Psychiatric/Behavioral: Negative for agitation and dysphoric mood. The patient is not nervous/anxious.      Objective     Physical Exam  Could not be completed due to failure of video  -----------------------------------------------------------------------------------------------   CT chest 3/15/2020  Normal heart size.  Calcification of the thoracic aorta and the coronary arteries.     Bilateral upper lobe infiltrate is worrisome for pneumonia though this  may be in part related to radiation therapy fibrosis.     Right hilar region lung neoplasm measures approximately 18 mm compared  with 46 mm previously.  Stable ascending thoracic aorta measuring approximately 39 x 38 mm.  No dissection.     Symmetric well opacified pulmonary arteries.  No pulmonary embolism.  Summary:  1. Near complete resolution of the perihilar right lung mass.  2. Chronic lung changes with bilateral upper lobe infiltrate. Upper lobe  pneumonia is the main concern though some of this may be related to  radiation therapy fibrosis.  3. No pulmonary embolism.    My interpretation: Bilateral upper lobe airspace opacities suggestive of radiation pneumonitis  -----------------------------------------------------------------------------------------------  Assessment/Plan   Problem List Items Addressed This Visit        Pulmonary Problems    Malignant neoplasm of overlapping sites of right lung (CMS/HCC)    Relevant Medications    Fluticasone-Umeclidin-Vilant (Trelegy Ellipta) 100-62.5-25 MCG/INH aerosol powder      Emphysema lung (CMS/[facility]) - Primary    Relevant Medications    Fluticasone-Umeclidin-Vilant (Trelegy Ellipta) 100-62.5-25 MCG/INH aerosol powder        Other    History of radiation therapy      Other Visit Diagnoses     Stage 3 severe COPD by GOLD classification (CMS/[facility])        Relevant Medications    Fluticasone-Umeclidin-Vilant (Trelegy Ellipta) 100-62.5-25 MCG/INH aerosol powder           She appears to have had some pneumonitis complicating both her medical and radiation therapies.  Symptomatically she sounds to be improved from that on steroids.  Recommending taper as tolerated.  We discussed her other medications.  Continue Trelegy daily as she is doing.  Continue albuterol rescue inhaler as needed.  We will plan to do pulmonary function test on her we can get her back in the office hopefully later in the summer.         Electronically signed by Edmond Tobar MD, 4/2/2020, 15:31

## 2020-04-02 NOTE — TELEPHONE ENCOUNTER
Angelica Mathews called to request a refill on her medication. Last office visit : Visit date not found   Next office visit : Visit date not found     Last UDS:   Amphetamine Screen, Urine   Date Value Ref Range Status   02/06/2020 neg  Final     Barbiturate Screen, Urine   Date Value Ref Range Status   02/06/2020 asya  Final     Benzodiazepine Screen, Urine   Date Value Ref Range Status   02/06/2020 neg  Final     Buprenorphine Urine   Date Value Ref Range Status   02/06/2020 neg  Final     Cocaine Metabolite Screen, Urine   Date Value Ref Range Status   02/06/2020 neg  Final     Gabapentin Screen, Urine   Date Value Ref Range Status   02/06/2020 neg  Final     MDMA, Urine   Date Value Ref Range Status   02/06/2020 neg  Final     Methamphetamine, Urine   Date Value Ref Range Status   02/06/2020 neg  Final     Opiate Scrn, Ur   Date Value Ref Range Status   02/06/2020 Psitive  Final     Oxycodone Screen, Ur   Date Value Ref Range Status   02/06/2020 neg  Final     PCP Screen, Urine   Date Value Ref Range Status   02/06/2020 neg  Final     Propoxyphene Screen, Urine   Date Value Ref Range Status   02/06/2020 neg  Final     THC Screen, Urine   Date Value Ref Range Status   02/06/2020 neg  Final     Tricyclic Antidepressants, Urine   Date Value Ref Range Status   02/06/2020 neg  Final       Last Lois Bauer: 02-06-20  Medication Contract: [id number]  Last Fill: 03-06-20    Requested Prescriptions     Pending Prescriptions Disp Refills    HYDROcodone-acetaminophen (NORCO) 7.5-325 MG per tablet 60 tablet 0     Sig: Take 1 tablet by mouth 2 times daily for 30 days. Intended supply: 30 days         Please approve or refuse this medication.    Alethia Bloch, MA

## 2020-04-07 ENCOUNTER — TELEPHONE (OUTPATIENT)
Dept: ONCOLOGY | Facility: CLINIC | Age: 65
End: 2020-04-07

## 2020-04-08 ENCOUNTER — TELEPHONE (OUTPATIENT)
Dept: ONCOLOGY | Facility: CLINIC | Age: 65
End: 2020-04-08

## 2020-04-08 RX ORDER — PREDNISONE 20 MG/1
TABLET ORAL
Qty: 21 TABLET | Refills: 0 | Status: SHIPPED | OUTPATIENT
Start: 2020-04-08 | End: 2020-04-24 | Stop reason: SDUPTHER

## 2020-04-08 NOTE — TELEPHONE ENCOUNTER
----- Message from Damaris Hu sent at 4/8/2020  9:47 AM CDT -----  Regarding: Prescription Question  Contact: [phone]  Since I tapered off Prednisone on April 5th I noticed I started having more shortness of breath. Was up most of Monday & Tuesday nites as middle of back hurting again like it was when I went to ER for pneumonitis?  Since Imfinzi tends to cause the “itis” problems plus radiation too. I was wondering about staying on prednisone to get rid of this. I’ve noticed if you don’t clear it up all the way it will do more damage to my lungs. I would appreciate some help. Staying on heating pad on & off. That’s about all the relief I have. Hurts to breathe in.  Thank you  Damaris Hu.  [phone]

## 2020-04-08 NOTE — TELEPHONE ENCOUNTER
Discussed Damaris's complaint of shortness of breath and that it has gotten worse since she tapered off the prednisone on the 5th.  Verbal order from Dr. Russell to send in new script for prednisone.  Notified Damaris that Dr. Russell is reordering the prednisone.  Verbalized understanding.  Damaris stated that she has had a low grade temp of 99 today.  Instructed her to call if her temp goes up.

## 2020-04-24 ENCOUNTER — TELEPHONE (OUTPATIENT)
Dept: ONCOLOGY | Facility: CLINIC | Age: 65
End: 2020-04-24

## 2020-04-24 RX ORDER — PREDNISONE 20 MG/1
TABLET ORAL
Qty: 21 TABLET | Refills: 0 | Status: SHIPPED | OUTPATIENT
Start: 2020-04-24 | End: 2020-05-07 | Stop reason: ALTCHOICE

## 2020-04-24 NOTE — TELEPHONE ENCOUNTER
We have called in a tapering course of prednisone for her in case she develops shortness of breath as she did on prior treatment with the durvalumab.  She is supposed to use it only if she has symptoms (recurrent shortness of breath).  Thank you

## 2020-04-24 NOTE — TELEPHONE ENCOUNTER
Pt called requesting a call back from Chloé Tamez in regards to her predniSONE medication.    Pt wants their opinion on if she should start back on the medication.    Call pt at [phone] in regards to this

## 2020-05-01 RX ORDER — HYDROCODONE BITARTRATE AND ACETAMINOPHEN 7.5; 325 MG/1; MG/1
1 TABLET ORAL 2 TIMES DAILY
Qty: 60 TABLET | Refills: 0 | OUTPATIENT
Start: 2020-05-01 | End: 2020-05-31

## 2020-05-01 RX ORDER — OMEPRAZOLE 20 MG/1
20 CAPSULE, DELAYED RELEASE ORAL DAILY
Qty: 30 CAPSULE | Refills: 0 | Status: SHIPPED | OUTPATIENT
Start: 2020-05-01 | End: 2020-06-02 | Stop reason: SDUPTHER

## 2020-05-01 NOTE — PROGRESS NOTES
"[facility] ON[facility] HEMATOLOGY AND ONCOLOGY  [address]  [facility] [place] [facility] [address] [phone]    Patient Name: Damaris Hu  Encounter Date: 05/07/2020  YOB: 1955  Patient Number: [id number]    REASON FOR VISIT: Damaris Hu is a 64-year-old female who returns in follow-up of stage III squamous cell lung carcinoma.  She received definitive radiation to the right lung (12/05/2019 through 01/27/2020 -6600 cGy in 33 fractions) concurrent with weekly chemotherapy, week 5 on 01/20/2020, week 6 on 01/27/2020.  She was on maintenance durvalumab, cycle 2 given on 03/02/2020.  She is here alone (usually with her Sister Ashley and her daughter Tawana).    Pertinent interval history: Seen in [facility] ED, 03/15/2020 for chest pain.  Evaluation including labs on 03/14/2020 (see below) revealing only for elevated d-dimer and positive blood cultures for Propionibacterium.  CT angiogram of the chest showed near complete resolution of the perihilar right lung mass.  Chronic lung changes with bilateral upper lobe infiltrate.  Upper lobe pneumonia is the main concern though some of this may be related to radiation therapy fibrosis.  No pulmonary embolism.      On 03/16/2020 she called due to debilitating pain in the right shoulder, \"where she received her treatment\".  She was called by Dr. Dudley after reviewing her CT scan (above) and prescribed 2 mg dexamethasone 3 times daily with 5-day taper-for possible pleurisy symptoms.    Has required tapering doses of prednisone on 03/27/2020 and again on 04/08/2020 for persistent pneumonitis.    DIAGNOSTIC ABNORMALITIES:          1.   10/28/2019 patient presented to the emergency room with chest pain that has been ongoing for the past year but that recently had gotten more noticeable, more frequent, and has been associated with shortness of breath.  On the day prior to her hospital admission she apparently had a syncopal " episode.  An evaluation ensued:  Labs: Glucose 112 otherwise CMP was normal with a BUN of 14 creatinine 0.91 (GFR 62) calcium 10.2, total protein 7.1 and normal liver enzymes.  Troponin T was less than 0.010, proBNP normal at 97.4, d-dimer was normal at 0.27, sed rate 10, CRP 2.02 (slightly elevated), hemoglobin 11.3, hematocrit 35.6, MCV 92.5, platelets 306,000, WBC 7.37 with 76 point 1 segs 15.2 lymphs.          2.   10/28/2019 - head without contrast.  Impression: No acute intracranial disease.          3.   10/28/2019-CT chest with contrast.  Impression: Primary lung mass centered in the right upper lobe and abuts the posterior mediastinal pleura (5.4 x 5.3 x 4.5 cm).  This also partially encases the right bronchus intermedius and right upper lobe bronchus.  This is consistent with pulmonary malignancy.  Surgical sampling recommended with bronchoscopy.  Suspected metastatic lymph node in the pretracheal region measuring 1.3 cm in short axis.          4.   11/04/2019 - was seen by Dr. Delonte Burns, [facility] for further assessment of the lung mass.  Spirometry on that date revealed severe COPD with positive bronchodilator response.  Postbronchodilator FEV1 revealed significant improvement to moderate/severe COPD.  Plan: Navigational bronchoscopy and possible EBUS for tissue diagnosis.          5.   11/05/2019- PET scan, skull vertex to mid thighs.  Impression: Markedly hypermetabolic right perihilar tumor (49 x 37 mm) with right supraclavicular and mediastinal kyler metastasis.          6.   11/07/2019- CT chest without contrast at [facility].  Comparison, PET CT 11/5/2019.  Impression: Large, spiculated right infrahilar mass, crossing the major fissure to involve the right upper and right lower lobes with direct extension into the subcarinal space.  Mildly enlarged mediastinal lymph nodes and normal-sized right supraclavicular lymph node corresponding to the area of  PET positivity.          7.   11/07/2019-bronchoscopy and EBUS guided FNA biopsy of right upper lobe lung: Positive for malignant cells consistent with squamous cell carcinoma.  EBUS guided FNA, station 7: Lymph node elements present.  No malignant cells identified.  EBUS guided FNA, station 4R: Positive for malignant cells consistent with squamous cell carcinoma.  Immunohistochemical stains performed on cell block #3.  Tumor cells positive for squamous squamous markers p63 and CK 5/6, negative for CK7, TTF-1, and CD 56.  Immunoprofile supports the above diagnosis.  Addendum: Subsequent biopsy showed metastatic disease in a supraclavicular lymph node, finding consistent with advanced stage disease.  Per protocol will proceed with tumor genomic testing on this specimen.  Due to the relatively small amount of tumor cellularity available, the entire panel of testing may not be able to be completed.  BRAF mutation not detected, K-mayco mutation not detected, EGFR mutation not detected.          8.   11/07/2019-navigational bronchoscopy and forceps biopsy of posterior right upper lobe lung mass.  Diagnosis: Fragments of bronchial mucosa.  No evidence of granuloma or malignancy.          9.   11/08/2019- ultrasound-guided biopsy right supraclavicular lymph node (fine-needle aspirate).  Cytopathology diagnosis: Positive for malignant cells consistent with previously established diagnosis of metastatic non-small cell carcinoma.  Comment: Insufficient tumor cellularity for further testing on the specimen.         10.  11/15/2019-- labs, 11/15/2019 with stable anemia otherwise normal CBC with differential, slightly depressed ferritin (65), iron saturation 7%, serum iron 19, TIBC 289.  Repleted B12 and folate.  Received Injectafer.  CMP, CEA, serum iron, iron saturation, ferritin, B12, folate.  Slightly elevated CEA (3.38).         11.   11/21/2019- brain MRI at [facility].  White matter changes.  Empty sella syndrome.  No  metastatic lesions.      PREVIOUS INTERVENTIONS:          1.   11/19/2019-Injectafer 750 mg IV x1          2.   Definitive course of radiation to the right lung (concurrent with chemotherapy)-radiation initiated 12/5/2019 through 01/27/2020 - 6600 cGy at 180 cGy/day/33 fractions          3.   Weekly concurrent chemotherapy with carboplatin and Taxol beginning 12/09/2019 through 01/27/2020 (6 weeks)          4.   Maintenance Durvalumab beginning 02/17/2020 through 03/02/2020- C2    LABS    Lab Results - Last 18 Months   Lab Units 05/07/20  1332 03/14/20  2359 03/02/20  1131 02/17/20  1121 02/10/20  1311 02/03/20  1314 01/27/20  0922  01/16/20  1339   HEMOGLOBIN g/dL 12.2 9.8* 10.5* 11.5* 10.7* 9.8* 10.2*   < > 9.8*   HEMATOCRIT % 38.8 29.9* 33.1* 35.3 33.6* 30.3* 32.0*   < > 30.2*   MCV fL 93.3 95.5 100.0* 97.2* 97.1* 97.7* 96.7   < > 94.4   WBC 10*3/mm3 8.93 8.35 4.33 5.79 4.09 2.47* 2.91*   < > 3.27*   RDW % 16.1* 15.9* 16.8* 19.2* 19.5* 19.7* 19.4*   < > 18.4*   MPV fL 9.1 10.4 10.0 9.6 9.2 9.7 9.8   < > 10.0   PLATELETS 10*3/mm3 262 235 167 118* 177 275 303   < > 102*   IMM GRAN % % 1.0* 0.4 0.2 0.2 0.2  --  0.7*   < >  --    NEUTROS ABS 10*3/mm3 6.78 6.00 3.37 4.48 2.94 1.44* 2.19   < > 2.28   LYMPHS ABS 10*3/mm3 0.70 0.59* 0.36* 0.43* 0.43* 0.38* 0.29*   < >  --    MONOS ABS 10*3/mm3 1.20* 1.35* 0.37 0.67 0.62 0.58 0.36   < >  --    EOS ABS 10*3/mm3 0.12 0.36 0.21 0.17 0.06 0.04 0.03   < > 0.07   BASOS ABS 10*3/mm3 0.04 0.02 0.01 0.03 0.03 0.02 0.02   < > 0.03   IMMATURE GRANS (ABS) 10*3/mm3 0.09* 0.03 0.01 0.01 0.01  --  0.02   < >  --    NRBC /100 WBC 0.0 0.0 0.0 0.0 0.0  --  0.0   < >  --    NEUTROPHIL % %  --   --   --   --   --   --   --   --  61.6   MONOCYTES % %  --   --   --   --   --   --   --   --  16.2*   BASOPHIL % %  --   --   --   --   --   --   --   --  1.0   ATYP LYMPH % %  --   --   --   --   --   --   --   --  2.0   ANISOCYTOSIS   --   --   --   --   --   --   --   --  Slight/1+   GIANT PLT    --   --   --   --   --   --   --   --  Mod/2+    < > = values in this interval not displayed.       Lab Results - Last 18 Months   Lab Units 05/07/20  1332 03/15/20  0029 03/14/20  2359 03/02/20  1131 02/17/20  1121 02/10/20  1311 02/03/20  1314   GLUCOSE mg/dL 94  --  112* 107* 88 109* 88   SODIUM mmol/L 140  --  138 141 141 140 141   POTASSIUM mmol/L 4.1  --  3.8 4.1 4.1 3.9 4.0   CO2 mmol/L 24.0  --  21.0* 26.0 24.0 23.0 25.0   CHLORIDE mmol/L 101  --  102 104 105 104 105   ANION GAP mmol/L 15.0  --  15.0 11.0 12.0 13.0 11.0   CREATININE mg/dL 0.81 0.60 0.67 0.80 0.73 0.77 0.71   BUN mg/dL 14  --  13 15 15 14 13   BUN / CREAT RATIO  17.3  --  19.4 18.8 20.5 18.2 18.3   CALCIUM mg/dL 9.7  --  9.7 9.9 10.0 9.8 9.4   EGFR IF NONAFRICN AM mL/min/1.73 71  --  88 72 80 75 82   ALK PHOS U/L 56  --  61 65 64 63 53   TOTAL PROTEIN g/dL 6.6  --  7.3 7.1 7.2 7.1 6.9   ALT (SGPT) U/L 14  --  8 7 12 11 16   AST (SGOT) U/L 13  --  12 12 15 14 17   BILIRUBIN mg/dL 0.2  --  0.2 0.2 0.3 0.2 <0.2*   ALBUMIN g/dL 4.00  --  4.10 3.90 4.60 4.30 4.00   GLOBULIN gm/dL 2.6  --  3.2 3.2 2.6 2.8 2.9       Lab Results - Last 18 Months   Lab Units 02/10/20  1311 01/06/20  0952 11/15/19  1332   CEA ng/mL 3.71 4.12 3.38       Lab Results - Last 18 Months   Lab Units 05/07/20  1332 02/17/20  1121 02/10/20  1311 01/06/20  0952 11/15/19  1332   IRON mcg/dL 41  --  66 95 19*   TIBC mcg/dL 297*  --  268* 270* 289*   IRON SATURATION % 14*  --  25 35 7*   FERRITIN ng/mL 284.60*  --  368.30* 591.50* 65.15   TSH uIU/mL  --  3.440  --   --   --    FOLATE ng/mL  --   --   --   --  >20.00         PAST MEDICAL HISTORY:  ALLERGIES:  Allergies   Allergen Reactions   • Amoxicillin GI Intolerance     CURRENT MEDICATIONS:  Outpatient Encounter Medications as of 5/7/2020   Medication Sig Dispense Refill   • cetirizine (zyrTEC) 10 MG tablet Take 10 mg by mouth Daily.     • citalopram (CeleXA) 20 MG tablet Take 20 mg by mouth Daily.     • fluticasone (FLONASE) 50  MCG/ACT nasal spray 1 spray by Each Nare route Daily As Needed.  5   • Fluticasone-Umeclidin-Vilant (Trelegy Ellipta) 100-62.5-25 MCG/INH aerosol powder  Inhale 1 puff Every Morning. 1 each 11   • lidocaine-prilocaine (EMLA) 2.5-2.5 % cream Apply to port 30 minutes before access 1 each 0   • omeprazole (priLOSEC) 20 MG capsule Take 20 mg by mouth Every Morning Before Breakfast.  0   • ondansetron (ZOFRAN) 8 MG tablet Take 1 tablet by mouth 3 (Three) Times a Day As Needed for Nausea or Vomiting. 30 tablet 5   • ZOLMitriptan (ZOMIG) 2.5 MG tablet Take 2.5 mg by mouth As Needed.     • [DISCONTINUED] dexamethasone (DECADRON) 2 MG tablet Take 1 tablet by mouth 3 (Three) Times a Day With Meals. 40 tablet 0   • [DISCONTINUED] HYDROcodone-acetaminophen (NORCO) 5-325 MG per tablet Take 1 tablet by mouth Take As Directed. 1.5 tabs daily as needed for pain     • [DISCONTINUED] predniSONE (DELTASONE) 20 MG tablet Take 2 tablets po twice daily for 3 days, then take 2 tablets po once daily for 3 days, then take 1 tablet po once daily for 3 days. 21 tablet 0     No facility-administered encounter medications on file as of 5/7/2020.      Adult illnesses:  Depression  GERD  Vitamin D deficiency  Chronic fatigue syndrome  Former smoker, quit 4 years ago  Hemorrhoids with history of bright red blood per rectum.  Chronic back pain  Chronic neck pain  Bilateral shoulder and arm radiculopathy, right > left  Degenerative disc disease, C5-C7  Anxiety  Seen in [facility] ED, 03/15/2020 for chest pain.  Evaluation including labs on 03/14/2020 revealed elevated d-dimer and positive blood cultures for Propionibacterium.  CT angiogram of the chest showed near complete resolution of the perihilar right lung mass.  Chronic lung changes with bilateral upper lobe infiltrate.  Upper lobe pneumonia is the main concern though some of this may be related to radiation therapy fibrosis.  No pulmonary embolism.      Past surgeries:  Breast surgery for cyst  removal  Colonoscopy, 12/3/2018  Hysterectomy  Left subclavian Mediport placement, 11/22/2019.  Dr. Gibson    ADULT ILLNESSES:  Patient Active Problem List   Diagnosis Code   • Age-related osteoporosis without current pathological fracture M81.0   • Anxiety and depression F41.9, F32.9   • Cervical disc disease M50.90   • Hyperlipidemia E78.5   • Lumbar degenerative disc disease M51.36   • Malignant neoplasm of overlapping sites of right lung (CMS/HCC) C34.81   • Emphysema lung (CMS/HCC) J43.9   • Iron deficiency anemia D50.9   • Chemotherapy induced neutropenia (CMS/HCC) D70.1, T45.1X5A   • Antineoplastic chemotherapy induced anemia D64.81, T45.1X5A   • Encounter for consultation Z71.9   • Former smoker Z87.891   • Regional lymph node metastasis present (CMS/HCC) C77.9   • 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   • Hypomagnesemia E83.42   • History of radiation therapy Z92.3     SURGERIES:  Past Surgical History:   Procedure Laterality Date   • BREAST SURGERY      Cyst removal   • COLONOSCOPY N/A 12/3/2018    Procedure: COLONOSCOPY WITH ANESTHESIA;  Surgeon: Myah Pool MD;  Location: [facility] ENDOSCOPY;  Service: Gastroenterology   • HYSTERECTOMY     • VENOUS ACCESS DEVICE (PORT) INSERTION N/A 11/22/2019    Procedure: PLACEMENT OF SINGLE LUMEN PORT;  Surgeon: Mona Gibson MD;  Location: [facility] OR;  Service: General     HEALTH MAINTENANCE ITEMS:  Health Maintenance Due   Topic Date Due   • ZOSTER VACCINE (2 of 3) 09/15/2015   • HEPATITIS C SCREENING  10/02/2018   • MEDICARE ANNUAL WELLNESS  10/02/2018   • PNEUMOCOCCAL VACCINE (65+ HIGH RISK) (2 of 2 - PPSV23) 10/19/2018   • LIPID PANEL  11/15/2019   • COLONOSCOPY  12/03/2019       <no information>  Last Completed Colonoscopy       Status Date      COLONOSCOPY Done 12/3/2018 COLONOSCOPY     Patient has more history with this topic...          There is no immunization history on file for this patient.  Last Completed  "Mammogram       Status Date      MAMMOGRAM Done 2/5/2019 Ext Proc: CHG SCREENING DIGITAL BREAST TOMOSYNTHESIS BI            FAMILY HISTORY:  Family History   Problem Relation Age of Onset   • Colon polyps Mother         < 60 years old   • Lung cancer Mother    • No Known Problems Father    • No Known Problems Sister    • Prostate cancer Maternal Grandfather    • Breast cancer Maternal Aunt    • Colon cancer Neg Hx      SOCIAL HISTORY:  Social History     Socioeconomic History   • Marital status:      Spouse name: Not on file   • Number of children: Not on file   • Years of education: Not on file   • Highest education level: Not on file   Tobacco Use   • Smoking status: Former Smoker     Types: Cigarettes   • Smokeless tobacco: Never Used   • Tobacco comment: Quit 4 years ago   Substance and Sexual Activity   • Alcohol use: No   • Drug use: No   • Sexual activity: Defer       REVIEW OF SYSTEMS:  Review of Systems   Constitutional: Positive for fatigue (Chronic.  Multifactorial.). Negative for activity change (Manages her personal ADLs and some chores.  Is again driving), appetite change (eating well), chills, diaphoresis, fever, unexpected weight gain and unexpected weight loss.        Manages her personal ADLs, light chores, but isn't running errands.  She still lives by herself.  Says spends less than 50% up and about    Durvalumab tolerance discussed.  Had \"several\" episodes of diarrhea and had bronchitis/pneumonitis symptoms requiring Imodium at least 2 rounds of prednisone 1 mg/kg with 9-day taper called in on 3/27/2020 and again on 04/08/2020.  \"All good now.\" Lingering fatigue but no nausea no mouth sores, no vomiting, no skin changes, no neuropathy.   Eyes: Negative.    Respiratory: Positive for shortness of breath (Baseline exertional dyspnea, modulated with inhalers). Negative for cough.    Cardiovascular: Negative for chest pain (Chest pain/pleurisy, resolved after a course of tapering Decadron " "beginning 03/16/2020 and tapering prednisone (required 2 tapering rounds).).   Gastrointestinal: Negative.  Negative for abdominal distention, abdominal pain, blood in stool and diarrhea (Resolved after initial round of prednisone).   Genitourinary: Negative.    Musculoskeletal: Positive for arthralgias, back pain and neck pain.   Allergic/Immunologic: Positive for environmental allergies.   Neurological: Negative for syncope (prior to her ER visit).   Hematological: Negative for adenopathy. Does not bruise/bleed easily.   Psychiatric/Behavioral: Positive for depressed mood. The patient is nervous/anxious.          /80   Pulse 98   Temp 97.1 °F (36.2 °C)   Resp 16   Ht 167.6 cm (66\")   Wt 72.6 kg (160 lb)   LMP  (LMP Unknown)   SpO2 94%   Breastfeeding No   BMI 25.82 kg/m²  Body surface area is 1.82 meters squared.  Pain Score    05/07/20 1401   PainSc: 0-No pain       Physical Exam:  Physical Exam   Constitutional: She is oriented to person, place, and time. She appears well-developed and well-nourished. No distress.   Pleasant, cooperative, heavy-set, modestly kept elderly female.  Ambulatory.  ECOG 2 (from 2-3).  Has regained 1 pound (no weight changes at her prior visit).   HENT:   Head: Normocephalic.   Mouth/Throat: No oropharyngeal exudate.   Eyes: Pupils are equal, round, and reactive to light. EOM are normal. No scleral icterus.   Neck: Normal range of motion. Neck supple. No JVD present. No tracheal deviation present. No thyromegaly present.   Cardiovascular: Normal rate, regular rhythm and normal heart sounds. Exam reveals no friction rub.   No murmur heard.  Pulmonary/Chest: Effort normal. No stridor. No respiratory distress. She has no wheezes (Soft expiratory wheezes diffusely). She has no rales. She exhibits no tenderness.   Barrel shaped chest with diminished breath sounds globally.  Port in the left upper chest is noted.  Is well seated.   Abdominal: Soft. Bowel sounds are normal. She " exhibits no distension and no mass. There is no tenderness. There is no guarding.   Musculoskeletal: Normal range of motion. She exhibits no edema or tenderness.   Neurological: She is alert and oriented to person, place, and time. No cranial nerve deficit.   Skin: Skin is warm and dry. No erythema. There is pallor.   Psychiatric: Her behavior is normal. Judgment and thought content normal.   Vitals reviewed.      Assessment:  1.  Squamous cell carcinoma of the right lung            Tumor stage: IIIC (cT4, cN3, M0)            Bone tumor burden: 5.4 x 5.3 x 4.5 cm mass along the right major fissure that abuts the medial mediastinal pleura and partially encases the right upper lobe bronchus and right bronchus intermedius.  Pathologic pretracheal lymph node measures 1.3 cm.  Right supraclavicular metastasis.            Complications of tumor: Chest pain, dyspnea, syncope?            BRAF mutation not detected            KRAS mutation not detected            EGFR mutation not detected.            PD-L1 and ALK: Not reported apparently due to lack of specimen            Tumor status: Definitive radiation concurrent with chemotherapy completed.  Currently on maintenance durvalumab.            03/15/2020-CT angiogram of the chest showed near complete resolution of the perihilar right lung mass.  Chronic lung changes with bilateral upper lobe infiltrate.  Upper lobe pneumonia is the main concern though some of this may be related to radiation therapy fibrosis.  No pulmonary embolism.    2.  Suspected durvalumab associated grade 1-2 diarrhea and pneumonitis. symptoms.  Resolved after Imodium and prednisone 1 mg/kg daily with taper over 8 days beginning 3/27/2020 and again 04/08/2020.  3.  Seen in [facility] ED, 03/15/2020 for chest pain.  Evaluation including labs on 03/14/2020 (see below) revealing only for elevated d-dimer and positive blood cultures for Propionibacterium.  CT angiogram of the chest showed near complete  resolution of the perihilar right lung mass.  Chronic lung changes with bilateral upper lobe infiltrate.  Upper lobe pneumonia versus radiation therapy fibrosis.  No pulmonary embolism.    4.  Normocytic anemia, contribution from anemia of malignancy/anemia of chronic disease and chemo.  Hgb 12.2, 05/07/2020 (prior range: 9.8 - 11.3, 10/28/2019)  5.  Chronic fatigue syndrome  6.  COPD.  Now followed by Dr. Tobar  7.  Former smoker 40 pack years  8.  Depression  9.  Hemorrhoids with history of hematochezia  10. Chronic neck/back pains with DDD, C5-C7.  Followed by Dr. Grady.  Rx for Norco 5 prior to anterior cervical discectomy and fusion C5 in 2015 - never had surgery  11. Mild subconjunctival hemorrhage, OS.  Resolved.  12.  Positive blood culture (Propionibacterium), 03/14/2020.  Repeat blood cultures, 03/20/2020-no growth in 5 days.    RECOMMENDATIONS:   1.  Re: Discussed ER visit, 03/14/2020 for chest pain.  Had elevated d-dimer and positive blood cultures for Propionibacterium.  CT angiogram of the chest showed near complete resolution of the perihilar right lung mass.  Chronic lung changes with bilateral upper lobe infiltrate.  Upper lobe pneumonia versus radiation therapy fibrosis.  No pulmonary embolism.  The patient is apprised of the labs today, 05/07/2020 with normal WBC, resolution of anemia, normal platelets, normal CMP,  pending ferritin 284 (from 368; from 591; from 65), iron saturation 14% (from 25%; from 35%; from 7%), serum iron 41 (from 66; from 95; from 19).  Pending B12 and folate.  Previously elevated CEA (3.71; from 4.12; from 3.38).  Durvalumab tolerance discussed.  Grade 1-2 diarrhea and pleurisy/pneumonitis, resolved with Imodium and prednisone 1 mg/kg daily with taper over 8 days beginning 3/27/2020 and again 04/08/2020.  2.  Previously reviewed available molecular studies from lung biopsy done on 11/07/2019 (above) noting lack of mutation for EGFR, BRAF and KRAS.  ALK and PD-L1  not reported presumably due to lack of specimen.   3.  Reviewed encounter from Lucila Couch PA-C with Dr. Dudley, 03/12/2020.  No evidence for recurrent or metastatic disease.  RTC 3 months with CT of chest ordered at [facility].  4.  Review (Telemed visit) from Dr. Tobar, 04/02/2020.  Assessment:  Pneumonitis complicating radiation and medical therapies.  Continue Trelegy and albuterol.  Plan for PFTs when she can get in the office later in the summer.  5.  NCCN Guidelines Version 5.2019 for stage III squamous cell cancer management previously reviewed. For T3-4N1 disease after definitive chemo + radiation recommendation is Durvalumab (category 1) x 1 year.   6.  Durvalumab. Previously discussed the potential toxicities to include but not limited to (immune mediated reaction, pneumonitis interstitial lung disease, hepatitis, colitis, severe diarrhea, hypothyroidism, hyperthyroidism, thyroiditis, type 1 diabetes, hypopituitarism, thrombocytopenic purpura, nephritis, aseptic meningitis, hemolytic anemia, myocarditis, severe infection, uveitis, infusion reaction, electrolyte abnormalities, lymphopenia, anemia, skin reaction, fatigue, upper respiratory infection, musculoskeletal pain, constipation, decreased appetite, nausea, edema, urinary tract infection, abdominal pain, pyrexia, dyspnea, rash, cough).   7.  Review UpToDate management of toxicity related to durvalumab.  Withhold and/or discontinue durvalumab to manage adverse reactions (no dosage reductions are recommended).  Resume durvalumab when the toxicity is improved to grade 1 or lower and the corticosteroid dose is reduced to <10 mg/day prednisone (or equivalent).  Permanently discontinue durvalumab for persistent grade 2 or 3 adverse reactions (excluding endocrinopathies) which do not recover to grade 1 or lower within 12 weeks after the last dose or for recurrent grade 3 or 4 reactions.  If unable to reduce prednisone dose to < 10 mg/day (or equivalent)  within 12 weeks after the last durvalumab dose, discontinue durvalumab permanently.    6.  Schedule C3 on 05/11/2020; C4 on 05/25/2020, C5 on 06/08/2020 - durvalumab (plan: every 2 weeks x 24 cycles - 12 months, progression or toxicity) per administration guidelines - at [facility]                 durvalumab 10 mg/kg (WT = 85 kg; TD = 850 mg)      7.  Premeds:              Decadron 10 mg IV             Benadryl 25 mg IV             Pepcid 10 mg IV         8.  CMP, Mg++ and CBC with differential on days of durvalumab.  9.  Return to the office in 4 weeks with pre-office serum iron, Fe sat, ferritin, CBC with differential, CMP, and CEA.     MEDICAL DECISION MAKING: High Complexity   AMOUNT OF DATA: Extensive         RISK OF COMPLICATIONS: High         I spent at least 60 total minutes, face-to-face, caring for Damaris andrews.  Greater than 50% of this time involved counseling and/or coordination of care as documented within this note regarding the patient's illness(es), pros and cons of various treatment options, instructions and/or risk reduction.

## 2020-05-05 ENCOUNTER — TELEPHONE (OUTPATIENT)
Dept: ONCOLOGY | Facility: CLINIC | Age: 65
End: 2020-05-05

## 2020-05-05 DIAGNOSIS — C34.92 MALIGNANT NEOPLASM OF LEFT LUNG, UNSPECIFIED PART OF LUNG (HCC): ICD-10-CM

## 2020-05-05 DIAGNOSIS — D53.9 MACROCYTIC ANEMIA: Primary | ICD-10-CM

## 2020-05-05 RX ORDER — HYDROCODONE BITARTRATE AND ACETAMINOPHEN 7.5; 325 MG/1; MG/1
1 TABLET ORAL 2 TIMES DAILY
Qty: 60 TABLET | Refills: 0 | Status: SHIPPED | OUTPATIENT
Start: 2020-05-05 | End: 2020-06-03 | Stop reason: SDUPTHER

## 2020-05-05 NOTE — TELEPHONE ENCOUNTER
Pt called requesting to speak with Joi or Chloé in regards to appt on 5/7/20. Pt stated she has multiple questions for them.    Call pt at [phone] in regards to this.

## 2020-05-05 NOTE — TELEPHONE ENCOUNTER
Reassured Damaris that it was safe to come into cancer center and that she can wear her N-95 mask. I will add lab work prior to her appt with Dr. WALSH on may 5th so she only has to get out once instead of twice. She v/u

## 2020-05-07 ENCOUNTER — OFFICE VISIT (OUTPATIENT)
Dept: ONCOLOGY | Facility: CLINIC | Age: 65
End: 2020-05-07

## 2020-05-07 ENCOUNTER — LAB (OUTPATIENT)
Dept: LAB | Facility: HOSPITAL | Age: 65
End: 2020-05-07

## 2020-05-07 VITALS
SYSTOLIC BLOOD PRESSURE: 118 MMHG | DIASTOLIC BLOOD PRESSURE: 80 MMHG | WEIGHT: 160 LBS | HEART RATE: 98 BPM | HEIGHT: 66 IN | TEMPERATURE: 97.1 F | OXYGEN SATURATION: 94 % | BODY MASS INDEX: 25.71 KG/M2 | RESPIRATION RATE: 16 BRPM

## 2020-05-07 DIAGNOSIS — C34.81 MALIGNANT NEOPLASM OF OVERLAPPING SITES OF RIGHT LUNG (HCC): Primary | ICD-10-CM

## 2020-05-07 DIAGNOSIS — J43.9 PULMONARY EMPHYSEMA, UNSPECIFIED EMPHYSEMA TYPE (HCC): ICD-10-CM

## 2020-05-07 DIAGNOSIS — D53.9 MACROCYTIC ANEMIA: ICD-10-CM

## 2020-05-07 LAB
ALBUMIN SERPL-MCNC: 4 G/DL (ref 3.5–5.2)
ALBUMIN/GLOB SERPL: 1.5 G/DL
ALP SERPL-CCNC: 56 U/L (ref 39–117)
ALT SERPL W P-5'-P-CCNC: 14 U/L (ref 1–33)
ANION GAP SERPL CALCULATED.3IONS-SCNC: 15 MMOL/L (ref 5–15)
AST SERPL-CCNC: 13 U/L (ref 1–32)
BASOPHILS # BLD AUTO: 0.04 10*3/MM3 (ref 0–0.2)
BASOPHILS NFR BLD AUTO: 0.4 % (ref 0–1.5)
BILIRUB SERPL-MCNC: 0.2 MG/DL (ref 0.2–1.2)
BUN BLD-MCNC: 14 MG/DL (ref 8–23)
BUN/CREAT SERPL: 17.3 (ref 7–25)
CALCIUM SPEC-SCNC: 9.7 MG/DL (ref 8.6–10.5)
CHLORIDE SERPL-SCNC: 101 MMOL/L (ref 98–107)
CO2 SERPL-SCNC: 24 MMOL/L (ref 22–29)
CREAT BLD-MCNC: 0.81 MG/DL (ref 0.57–1)
DEPRECATED RDW RBC AUTO: 55.8 FL (ref 37–54)
EOSINOPHIL # BLD AUTO: 0.12 10*3/MM3 (ref 0–0.4)
EOSINOPHIL NFR BLD AUTO: 1.3 % (ref 0.3–6.2)
ERYTHROCYTE [DISTWIDTH] IN BLOOD BY AUTOMATED COUNT: 16.1 % (ref 12.3–15.4)
FERRITIN SERPL-MCNC: 284.6 NG/ML (ref 13–150)
GFR SERPL CREATININE-BSD FRML MDRD: 71 ML/MIN/1.73
GLOBULIN UR ELPH-MCNC: 2.6 GM/DL
GLUCOSE BLD-MCNC: 94 MG/DL (ref 65–99)
HCT VFR BLD AUTO: 38.8 % (ref 34–46.6)
HGB BLD-MCNC: 12.2 G/DL (ref 12–15.9)
IMM GRANULOCYTES # BLD AUTO: 0.09 10*3/MM3 (ref 0–0.05)
IMM GRANULOCYTES NFR BLD AUTO: 1 % (ref 0–0.5)
IRON 24H UR-MRATE: 41 MCG/DL (ref 37–145)
IRON SATN MFR SERPL: 14 % (ref 20–50)
LYMPHOCYTES # BLD AUTO: 0.7 10*3/MM3 (ref 0.7–3.1)
LYMPHOCYTES NFR BLD AUTO: 7.8 % (ref 19.6–45.3)
MCH RBC QN AUTO: 29.3 PG (ref 26.6–33)
MCHC RBC AUTO-ENTMCNC: 31.4 G/DL (ref 31.5–35.7)
MCV RBC AUTO: 93.3 FL (ref 79–97)
MONOCYTES # BLD AUTO: 1.2 10*3/MM3 (ref 0.1–0.9)
MONOCYTES NFR BLD AUTO: 13.4 % (ref 5–12)
NEUTROPHILS # BLD AUTO: 6.78 10*3/MM3 (ref 1.7–7)
NEUTROPHILS NFR BLD AUTO: 76.1 % (ref 42.7–76)
NRBC BLD AUTO-RTO: 0 /100 WBC (ref 0–0.2)
PLATELET # BLD AUTO: 262 10*3/MM3 (ref 140–450)
PMV BLD AUTO: 9.1 FL (ref 6–12)
POTASSIUM BLD-SCNC: 4.1 MMOL/L (ref 3.5–5.2)
PROT SERPL-MCNC: 6.6 G/DL (ref 6–8.5)
RBC # BLD AUTO: 4.16 10*6/MM3 (ref 3.77–5.28)
SODIUM BLD-SCNC: 140 MMOL/L (ref 136–145)
TIBC SERPL-MCNC: 297 MCG/DL (ref 298–536)
TRANSFERRIN SERPL-MCNC: 199 MG/DL (ref 200–360)
WBC NRBC COR # BLD: 8.93 10*3/MM3 (ref 3.4–10.8)

## 2020-05-07 PROCEDURE — 82728 ASSAY OF FERRITIN: CPT | Performed by: INTERNAL MEDICINE

## 2020-05-07 PROCEDURE — 82378 CARCINOEMBRYONIC ANTIGEN: CPT | Performed by: INTERNAL MEDICINE

## 2020-05-07 PROCEDURE — 99215 OFFICE O/P EST HI 40 MIN: CPT | Performed by: INTERNAL MEDICINE

## 2020-05-07 PROCEDURE — 83540 ASSAY OF IRON: CPT | Performed by: INTERNAL MEDICINE

## 2020-05-07 PROCEDURE — 80053 COMPREHEN METABOLIC PANEL: CPT | Performed by: INTERNAL MEDICINE

## 2020-05-07 PROCEDURE — 82746 ASSAY OF FOLIC ACID SERUM: CPT

## 2020-05-07 PROCEDURE — 84466 ASSAY OF TRANSFERRIN: CPT | Performed by: INTERNAL MEDICINE

## 2020-05-07 PROCEDURE — 82607 VITAMIN B-12: CPT

## 2020-05-07 PROCEDURE — 85025 COMPLETE CBC W/AUTO DIFF WBC: CPT | Performed by: INTERNAL MEDICINE

## 2020-05-07 PROCEDURE — 36415 COLL VENOUS BLD VENIPUNCTURE: CPT | Performed by: INTERNAL MEDICINE

## 2020-05-08 ENCOUNTER — VIRTUAL VISIT (OUTPATIENT)
Dept: PRIMARY CARE CLINIC | Age: 65
End: 2020-05-08
Payer: MEDICARE

## 2020-05-08 ENCOUNTER — TELEPHONE (OUTPATIENT)
Dept: ONCOLOGY | Facility: CLINIC | Age: 65
End: 2020-05-08

## 2020-05-08 VITALS
SYSTOLIC BLOOD PRESSURE: 118 MMHG | TEMPERATURE: 97.1 F | WEIGHT: 160 LBS | BODY MASS INDEX: 25.82 KG/M2 | DIASTOLIC BLOOD PRESSURE: 80 MMHG

## 2020-05-08 DIAGNOSIS — C77.0 SECONDARY MALIGNANT NEOPLASM OF SUPRACLAVICULAR LYMPH NODE (HCC): ICD-10-CM

## 2020-05-08 DIAGNOSIS — C77.9 REGIONAL LYMPH NODE METASTASIS PRESENT (HCC): ICD-10-CM

## 2020-05-08 DIAGNOSIS — C34.81 MALIGNANT NEOPLASM OF OVERLAPPING SITES OF RIGHT LUNG (HCC): ICD-10-CM

## 2020-05-08 LAB
CEA SERPL-MCNC: 5.81 NG/ML
FOLATE SERPL-MCNC: 16.9 NG/ML (ref 4.78–24.2)
VIT B12 BLD-MCNC: 545 PG/ML (ref 211–946)

## 2020-05-08 PROCEDURE — 99443 PR PHYS/QHP TELEPHONE EVALUATION 21-30 MIN: CPT | Performed by: NURSE PRACTITIONER

## 2020-05-08 RX ORDER — FAMOTIDINE 10 MG/ML
20 INJECTION, SOLUTION INTRAVENOUS ONCE
Status: CANCELLED | OUTPATIENT
Start: 2020-05-12

## 2020-05-08 RX ORDER — SODIUM CHLORIDE 9 MG/ML
250 INJECTION, SOLUTION INTRAVENOUS ONCE
Status: CANCELLED | OUTPATIENT
Start: 2020-05-12

## 2020-05-08 ASSESSMENT — ENCOUNTER SYMPTOMS
EYES NEGATIVE: 1
GASTROINTESTINAL NEGATIVE: 1
RESPIRATORY NEGATIVE: 1

## 2020-05-08 NOTE — TELEPHONE ENCOUNTER
----- Message from Kade Russell MD sent at 5/7/2020  8:19 PM CDT -----  Regarding: durvalumab  Joi, I made changes to the plan (again).  We will go ahead and continue her durvalumab (next cycle due on 5/11/2020).  I did move her CT scan date back a week as with her pre-office labs.  Please add a CBC and CMP order to be done on the days of her treatments.    I tried calling the patient to tell her about the change in plans but got voicemail.  Please contact her for me in the morning so I can discuss it with her.    Yue, please note the return to clinic date which was moved from 5 weeks to 4 weeks.  Thank you

## 2020-05-08 NOTE — TELEPHONE ENCOUNTER
----- Message from Charisse Seals sent at 5/8/2020 10:11 AM CDT -----  Patient said she can't come that early at 7:45 Monday because she has to have a ride. She asked if she could do anywhere around 10:00/10:30/11:00?    Also really wants her sister to come with her to appointment she's nervous about this new treatment but I told her wasn't sure since it's not her very first treatment. Figured it might also be up to Tesha Hodges downstairs for that day

## 2020-05-08 NOTE — PROGRESS NOTES
[address] [place] [address]            Phone:  [phone]  Fax:  [phone]    Gee Goldstein is a 75 y.o. female evaluated via telephone on 5/8/2020. Consent:    She and/or health care decision maker is aware that that she may receive a bill for this telephone service, depending on her insurance coverage, and has provided verbal consent to proceed: Yes      HPI  Chief Complaint   Patient presents with    Fatigue    Cancer       I communicated with the patient and/or health care decision maker about chronic conditions including chronic fatigue and carcinoma of lung. She reports seeing oncology yesterday. She is trying the treatments again. She reports that the pain has improved some. She has been on steroids and thinks that has helped some. Review of Systems   Constitutional: Negative. HENT: Negative. Eyes: Negative. Respiratory: Negative. Cardiovascular: Negative. Gastrointestinal: Negative. Endocrine: Negative. Genitourinary: Negative. Musculoskeletal: Positive for arthralgias (chronic related to cancer). Skin: Negative. Neurological: Negative. Hematological: Negative. Psychiatric/Behavioral: Negative. PLAN    Details of this discussion including any medical advice provided:     1. Squamous cell carcinoma of lung, unspecified laterality ([facility] [address]      2. Chronic fatigue syndrome      3. Anxiety and depression      4. Counseling, unspecified        Patient is to continue treatment plan with oncology as discussed. Will continue treatment with all other meds including pain meds. I affirm this is a Patient Initiated Episode with an Established Patient who has not had a related appointment within my department in the past 7 days or scheduled within the next 24 hours.       Total Time: minutes: 21-30 minutes      Note: not billable if this call serves to triage the patient into an appointment for the relevant concern      Alan Haddad         Pursuant to the emergency declaration under the [facility] waiver authority and the Coronavirus Preparedness and [employer] Act, this Virtual  Visit was conducted, with patient's consent, to reduce the patient's risk of exposure to COVID-19 and provide continuity of care for an established patient. Services were provided through a telephone discussion  to substitute for in-person clinic visit. Parties involved during telephone call include myself, ROBER Taylor and patient listed.

## 2020-05-11 ENCOUNTER — APPOINTMENT (OUTPATIENT)
Dept: ONCOLOGY | Facility: HOSPITAL | Age: 65
End: 2020-05-11

## 2020-05-11 ENCOUNTER — TELEPHONE (OUTPATIENT)
Dept: ONCOLOGY | Facility: CLINIC | Age: 65
End: 2020-05-11

## 2020-05-11 ENCOUNTER — APPOINTMENT (OUTPATIENT)
Dept: LAB | Facility: HOSPITAL | Age: 65
End: 2020-05-11

## 2020-05-11 RX ORDER — DIPHENHYDRAMINE HYDROCHLORIDE 50 MG/ML
50 INJECTION INTRAMUSCULAR; INTRAVENOUS AS NEEDED
Status: CANCELLED | OUTPATIENT
Start: 2020-05-12

## 2020-05-11 RX ORDER — FAMOTIDINE 10 MG/ML
20 INJECTION, SOLUTION INTRAVENOUS AS NEEDED
Status: CANCELLED | OUTPATIENT
Start: 2020-05-12

## 2020-05-11 NOTE — TELEPHONE ENCOUNTER
----- Message from Damaris Hu sent at 5/10/2020  2:33 PM CDT -----  Regarding: Test Results Question  Contact: [phone]  I have a question about FERRITIN resulted on 5/7/20, 2:11 PM.    This one &Immature Grans 1.0%. Monocytes Absolute 1.20.   I have dates on steroid. 3-16 Dexamethason 2mg  Stopped Dex & you called in prednisone taper on 3-27    Then another taper on 4-8    Last taper 4-24. Finished this one on 5-4. No symptoms of pneumonitis since.

## 2020-05-11 NOTE — TELEPHONE ENCOUNTER
CBC on 5/7/2021 normal.  Ferritin was over 200 though serum iron and iron saturation were low (may indicate iron underutilization) blood or blood counts are normal so reassured her that no intervention needed at this time.  Thank you

## 2020-05-12 ENCOUNTER — APPOINTMENT (OUTPATIENT)
Dept: LAB | Facility: HOSPITAL | Age: 65
End: 2020-05-12

## 2020-05-12 ENCOUNTER — INFUSION (OUTPATIENT)
Dept: ONCOLOGY | Facility: HOSPITAL | Age: 65
End: 2020-05-12

## 2020-05-12 VITALS
DIASTOLIC BLOOD PRESSURE: 66 MMHG | RESPIRATION RATE: 17 BRPM | SYSTOLIC BLOOD PRESSURE: 133 MMHG | HEIGHT: 66 IN | HEART RATE: 81 BPM | TEMPERATURE: 98.4 F | WEIGHT: 161.4 LBS | BODY MASS INDEX: 25.94 KG/M2 | OXYGEN SATURATION: 98 %

## 2020-05-12 DIAGNOSIS — C77.0 SECONDARY MALIGNANT NEOPLASM OF SUPRACLAVICULAR LYMPH NODE (HCC): ICD-10-CM

## 2020-05-12 DIAGNOSIS — C77.9 REGIONAL LYMPH NODE METASTASIS PRESENT (HCC): Primary | ICD-10-CM

## 2020-05-12 DIAGNOSIS — C34.81 MALIGNANT NEOPLASM OF OVERLAPPING SITES OF RIGHT LUNG (HCC): ICD-10-CM

## 2020-05-12 PROCEDURE — 25010000002 DURVALUMAB 50 MG/ML SOLUTION 2.4 ML VIAL: Performed by: NURSE PRACTITIONER

## 2020-05-12 PROCEDURE — 25010000002 DURVALUMAB 50 MG/ML SOLUTION 10 ML VIAL: Performed by: NURSE PRACTITIONER

## 2020-05-12 PROCEDURE — 96413 CHEMO IV INFUSION 1 HR: CPT

## 2020-05-12 PROCEDURE — 25010000002 DEXAMETHASONE SODIUM PHOSPHATE 100 MG/10ML SOLUTION: Performed by: NURSE PRACTITIONER

## 2020-05-12 PROCEDURE — 25010000003 HEPARIN LOCK FLUSH PER 10 UNITS: Performed by: INTERNAL MEDICINE

## 2020-05-12 PROCEDURE — 96367 TX/PROPH/DG ADDL SEQ IV INF: CPT

## 2020-05-12 PROCEDURE — 96375 TX/PRO/DX INJ NEW DRUG ADDON: CPT

## 2020-05-12 PROCEDURE — 25010000002 DIPHENHYDRAMINE PER 50 MG: Performed by: NURSE PRACTITIONER

## 2020-05-12 RX ORDER — FAMOTIDINE 10 MG/ML
20 INJECTION, SOLUTION INTRAVENOUS AS NEEDED
Status: DISCONTINUED | OUTPATIENT
Start: 2020-05-12 | End: 2020-05-12 | Stop reason: HOSPADM

## 2020-05-12 RX ORDER — HEPARIN SODIUM (PORCINE) LOCK FLUSH IV SOLN 100 UNIT/ML 100 UNIT/ML
500 SOLUTION INTRAVENOUS AS NEEDED
Status: CANCELLED | OUTPATIENT
Start: 2020-05-12

## 2020-05-12 RX ORDER — SODIUM CHLORIDE 0.9 % (FLUSH) 0.9 %
10 SYRINGE (ML) INJECTION AS NEEDED
Status: CANCELLED | OUTPATIENT
Start: 2020-05-12

## 2020-05-12 RX ORDER — FAMOTIDINE 10 MG/ML
20 INJECTION, SOLUTION INTRAVENOUS ONCE
Status: COMPLETED | OUTPATIENT
Start: 2020-05-12 | End: 2020-05-12

## 2020-05-12 RX ORDER — SODIUM CHLORIDE 0.9 % (FLUSH) 0.9 %
10 SYRINGE (ML) INJECTION AS NEEDED
Status: DISCONTINUED | OUTPATIENT
Start: 2020-05-12 | End: 2020-05-12 | Stop reason: HOSPADM

## 2020-05-12 RX ORDER — SODIUM CHLORIDE 9 MG/ML
250 INJECTION, SOLUTION INTRAVENOUS ONCE
Status: COMPLETED | OUTPATIENT
Start: 2020-05-12 | End: 2020-05-12

## 2020-05-12 RX ORDER — DIPHENHYDRAMINE HYDROCHLORIDE 50 MG/ML
50 INJECTION INTRAMUSCULAR; INTRAVENOUS AS NEEDED
Status: DISCONTINUED | OUTPATIENT
Start: 2020-05-12 | End: 2020-05-12 | Stop reason: HOSPADM

## 2020-05-12 RX ORDER — HEPARIN SODIUM (PORCINE) LOCK FLUSH IV SOLN 100 UNIT/ML 100 UNIT/ML
500 SOLUTION INTRAVENOUS AS NEEDED
Status: DISCONTINUED | OUTPATIENT
Start: 2020-05-12 | End: 2020-05-12 | Stop reason: HOSPADM

## 2020-05-12 RX ADMIN — FAMOTIDINE 20 MG: 10 INJECTION INTRAVENOUS at 11:15

## 2020-05-12 RX ADMIN — SODIUM CHLORIDE 720 MG: 9 INJECTION, SOLUTION INTRAVENOUS at 11:59

## 2020-05-12 RX ADMIN — SODIUM CHLORIDE, PRESERVATIVE FREE 10 ML: 5 INJECTION INTRAVENOUS at 13:18

## 2020-05-12 RX ADMIN — Medication 500 UNITS: at 13:18

## 2020-05-12 RX ADMIN — SODIUM CHLORIDE 250 ML: 9 INJECTION, SOLUTION INTRAVENOUS at 11:10

## 2020-05-12 RX ADMIN — DIPHENHYDRAMINE HYDROCHLORIDE 25 MG: 50 INJECTION, SOLUTION INTRAMUSCULAR; INTRAVENOUS at 11:18

## 2020-05-12 RX ADMIN — DEXAMETHASONE SODIUM PHOSPHATE 12 MG: 10 INJECTION, SOLUTION INTRAMUSCULAR; INTRAVENOUS at 11:38

## 2020-05-12 NOTE — PROGRESS NOTES
1048 S/w Yadiel Mcneal RN with Dr. Russell OK to use labs from last week for Imfinzi treatment today, stable.Qasim DOAN

## 2020-05-22 DIAGNOSIS — C77.0 SECONDARY MALIGNANT NEOPLASM OF SUPRACLAVICULAR LYMPH NODE (HCC): ICD-10-CM

## 2020-05-22 DIAGNOSIS — C77.9 REGIONAL LYMPH NODE METASTASIS PRESENT (HCC): ICD-10-CM

## 2020-05-22 DIAGNOSIS — C34.81 MALIGNANT NEOPLASM OF OVERLAPPING SITES OF RIGHT LUNG (HCC): ICD-10-CM

## 2020-05-22 RX ORDER — SODIUM CHLORIDE 9 MG/ML
250 INJECTION, SOLUTION INTRAVENOUS ONCE
Status: CANCELLED | OUTPATIENT
Start: 2020-05-26

## 2020-05-22 RX ORDER — FAMOTIDINE 10 MG/ML
20 INJECTION, SOLUTION INTRAVENOUS AS NEEDED
Status: CANCELLED | OUTPATIENT
Start: 2020-05-26

## 2020-05-22 RX ORDER — DIPHENHYDRAMINE HYDROCHLORIDE 50 MG/ML
50 INJECTION INTRAMUSCULAR; INTRAVENOUS AS NEEDED
Status: CANCELLED | OUTPATIENT
Start: 2020-05-26

## 2020-05-22 RX ORDER — FAMOTIDINE 10 MG/ML
20 INJECTION, SOLUTION INTRAVENOUS ONCE
Status: CANCELLED | OUTPATIENT
Start: 2020-05-26

## 2020-05-26 ENCOUNTER — APPOINTMENT (OUTPATIENT)
Dept: LAB | Facility: HOSPITAL | Age: 65
End: 2020-05-26

## 2020-05-26 ENCOUNTER — TELEPHONE (OUTPATIENT)
Dept: ONCOLOGY | Facility: CLINIC | Age: 65
End: 2020-05-26

## 2020-05-26 ENCOUNTER — APPOINTMENT (OUTPATIENT)
Dept: ONCOLOGY | Facility: HOSPITAL | Age: 65
End: 2020-05-26

## 2020-05-27 ENCOUNTER — TELEPHONE (OUTPATIENT)
Dept: ONCOLOGY | Facility: CLINIC | Age: 65
End: 2020-05-27

## 2020-05-27 NOTE — TELEPHONE ENCOUNTER
CALLED PATIENT TO LET HER KNOW THAT CURRENTLY WE ARE STILL NOT ALLOWING VISITORS SO SHE WILL HAVE TO COME TO HER APPOINTMENT BY HERSELF AND WE CAN CALL HER SISTER AND PUT HER ON SPEAKERPHONE AND DO IT THAT WAY BUT WE CANT ALLOW HER TO PHYSICALLY COME IN WITH HER. I TOLD HER THAT TWICE AS SHE CONTINUED TO ASK. SHE SAID OKAY THAT SHE WOULD JUST HAVE TO SEE HOW HER ANXIETY IS WHEN THE APPOINTMENT DAY COMES AS TO [place] OR NOT SHE CAN COME.

## 2020-05-27 NOTE — TELEPHONE ENCOUNTER
----- Message from Damaris Hu sent at 5/27/2020  1:15 PM CDT -----  Regarding: Visit Follow-Up Question  Contact: [phone]  I have follow up appt June 3rd. I had to cancel my last infusion appt due to a pulled muscle in my rib. I wanted to discuss my concerns I have about my last Imfinzi infusion. I am asking for permission to bring my sister Ashley Bach with me to this appt. She helps me with all my medical decisions & I would like her to be at this appt. she has social distanced for past 2 months. She’s very responsible. I have a lot of anxiety about coming in normally but with COVID-19 I’m very nervous& can’t think. She can keep me calm & help ask you questions. I would appreciate you allowing her to be there with me.  Thank You. Damaris Hu

## 2020-06-02 ENCOUNTER — PATIENT MESSAGE (OUTPATIENT)
Dept: PRIMARY CARE CLINIC | Age: 65
End: 2020-06-02

## 2020-06-02 ENCOUNTER — TELEPHONE (OUTPATIENT)
Dept: ONCOLOGY | Facility: CLINIC | Age: 65
End: 2020-06-02

## 2020-06-02 RX ORDER — OMEPRAZOLE 20 MG/1
20 CAPSULE, DELAYED RELEASE ORAL DAILY
Qty: 30 CAPSULE | Refills: 0 | Status: SHIPPED | OUTPATIENT
Start: 2020-06-02 | End: 2020-07-06 | Stop reason: SDUPTHER

## 2020-06-02 NOTE — TELEPHONE ENCOUNTER
PT IS UNABLE TO COME TO HER APPT TOMORROW. PULLED A MUSCLE IN HER RIB. SHE WOULD LIKE TO ASK DR PLASENCIA IF HE WOULD CONSIDER A TELEPHONE VISIT INSTEAD. IF NOT, SHE WILL HAVE TO RESCHEDULE APPT.   SHE WOULD LIKE TO RESCHEDULE FOR NEXT WEEK AFTER 1:00.    PLEASE GIVE PT A CALL [phone].

## 2020-06-02 NOTE — TELEPHONE ENCOUNTER
Called and spoke to patient. We rescheduled her follow up to next Wednesday, /10/20 since she needed an afternoon and also cancelled her treatment for Tuesday, 6/9 because she wants to talk with Dr. Russell about it.

## 2020-06-03 RX ORDER — HYDROCODONE BITARTRATE AND ACETAMINOPHEN 7.5; 325 MG/1; MG/1
1 TABLET ORAL EVERY 8 HOURS PRN
Qty: 75 TABLET | Refills: 0 | Status: SHIPPED | OUTPATIENT
Start: 2020-06-03 | End: 2020-06-29 | Stop reason: SDUPTHER

## 2020-06-05 NOTE — PROGRESS NOTES
[facility] HEMATOLOGY AND ONCOLOGY  [address]  [facility] [facility] [address] [phone]    Patient Name: Damaris Hu  Encounter Date: 06/10/2020  YOB: 1955  Patient Number: [id number]    REASON FOR VISIT: Damaris Hu is a 64-year-old female who returns in follow-up of stage III squamous cell lung carcinoma.  She received definitive radiation to the right lung (12/05/2019 through 01/27/2020 -6600 cGy in 33 fractions) concurrent with weekly chemotherapy, week 6 on 01/27/2020.  She is now on maintenance durvalumab, cycle 3 given on 05/12/2020.  She is here alone (her Sister Ashley is on the phone).      DIAGNOSTIC ABNORMALITIES:          1.   10/28/2019 patient presented to the emergency room with chest pain that has been ongoing for the past year but that recently had gotten more noticeable, more frequent, and has been associated with shortness of breath.  On the day prior to her hospital admission she apparently had a syncopal episode.  An evaluation ensued:  Labs: Glucose 112 otherwise CMP was normal with a BUN of 14 creatinine 0.91 (GFR 62) calcium 10.2, total protein 7.1 and normal liver enzymes.  Troponin T was less than 0.010, proBNP normal at 97.4, d-dimer was normal at 0.27, sed rate 10, CRP 2.02 (slightly elevated), hemoglobin 11.3, hematocrit 35.6, MCV 92.5, platelets 306,000, WBC 7.37 with 76 point 1 segs 15.2 lymphs.          2.   10/28/2019 - head without contrast.  Impression: No acute intracranial disease.          3.   10/28/2019-CT chest with contrast.  Impression: Primary lung mass centered in the right upper lobe and abuts the posterior mediastinal pleura (5.4 x 5.3 x 4.5 cm).  This also partially encases the right bronchus intermedius and right upper lobe bronchus.  This is consistent with pulmonary malignancy.  Surgical sampling recommended with bronchoscopy.  Suspected metastatic lymph node in the pretracheal region  measuring 1.3 cm in short axis.          4.   11/04/2019 - was seen by Dr. Delonte Burns, [facility] for further assessment of the lung mass.  Spirometry on that date revealed severe COPD with positive bronchodilator response.  Postbronchodilator FEV1 revealed significant improvement to moderate/severe COPD.  Plan: Navigational bronchoscopy and possible EBUS for tissue diagnosis.          5.   11/05/2019- PET scan, skull vertex to mid thighs.  Impression: Markedly hypermetabolic right perihilar tumor (49 x 37 mm) with right supraclavicular and mediastinal kyler metastasis.          6.   11/07/2019- CT chest without contrast at [facility].  Comparison, PET CT 11/5/2019.  Impression: Large, spiculated right infrahilar mass, crossing the major fissure to involve the right upper and right lower lobes with direct extension into the subcarinal space.  Mildly enlarged mediastinal lymph nodes and normal-sized right supraclavicular lymph node corresponding to the area of PET positivity.          7.   11/07/2019-bronchoscopy and EBUS guided FNA biopsy of right upper lobe lung: Positive for malignant cells consistent with squamous cell carcinoma.  EBUS guided FNA, station 7: Lymph node elements present.  No malignant cells identified.  EBUS guided FNA, station 4R: Positive for malignant cells consistent with squamous cell carcinoma.  Immunohistochemical stains performed on cell block #3.  Tumor cells positive for squamous squamous markers p63 and CK 5/6, negative for CK7, TTF-1, and CD 56.  Immunoprofile supports the above diagnosis.  Addendum: Subsequent biopsy showed metastatic disease in a supraclavicular lymph node, finding consistent with advanced stage disease.  Per protocol will proceed with tumor genomic testing on this specimen.  Due to the relatively small amount of tumor cellularity available, the entire panel of testing may not be able to be completed.  BRAF mutation not  detected, K-mayco mutation not detected, EGFR mutation not detected.          8.   11/07/2019-navigational bronchoscopy and forceps biopsy of posterior right upper lobe lung mass.  Diagnosis: Fragments of bronchial mucosa.  No evidence of granuloma or malignancy.          9.   11/08/2019- ultrasound-guided biopsy right supraclavicular lymph node (fine-needle aspirate).  Cytopathology diagnosis: Positive for malignant cells consistent with previously established diagnosis of metastatic non-small cell carcinoma.  Comment: Insufficient tumor cellularity for further testing on the specimen.         10.  11/15/2019-- labs, 11/15/2019 with stable anemia otherwise normal CBC with differential, slightly depressed ferritin (65), iron saturation 7%, serum iron 19, TIBC 289.  Repleted B12 and folate.  Received Injectafer.  CMP, CEA, serum iron, iron saturation, ferritin, B12, folate.  Slightly elevated CEA (3.38).         11.   11/21/2019- [place] [facility].  White matter changes.  Empty sella syndrome.  No metastatic lesions.      PREVIOUS INTERVENTIONS:          1.   11/19/2019-Injectafer 750 mg IV x1          2.   Definitive course of radiation to the right lung (concurrent with chemotherapy)-radiation initiated 12/5/2019 through 01/27/2020 - 6600 cGy at 180 cGy/day/33 fractions          3.   Weekly concurrent chemotherapy with carboplatin and Taxol beginning 12/09/2019 through 01/27/2020 (6 weeks)          4.   Maintenance Durvalumab beginning 02/17/2020 through 05/12/2020- C3    LABS    Lab Results - Last 18 Months   Lab Units 05/07/20  1332 03/14/20  2359 03/02/20  1131 02/17/20  1121 02/10/20  1311 02/03/20  1314 01/27/20  0922  01/16/20  1339   HEMOGLOBIN g/dL 12.2 9.8* 10.5* 11.5* 10.7* 9.8* 10.2*   < > 9.8*   HEMATOCRIT % 38.8 29.9* 33.1* 35.3 33.6* 30.3* 32.0*   < > 30.2*   MCV fL 93.3 95.5 100.0* 97.2* 97.1* 97.7* 96.7   < > 94.4   WBC 10*3/mm3 8.93 8.35 4.33 5.79 4.09 2.47* 2.91*   < > 3.27*   RDW % 16.1* 15.9*  16.8* 19.2* 19.5* 19.7* 19.4*   < > 18.4*   MPV fL 9.1 10.4 10.0 9.6 9.2 9.7 9.8   < > 10.0   PLATELETS 10*3/mm3 262 235 167 118* 177 275 303   < > 102*   IMM GRAN % % 1.0* 0.4 0.2 0.2 0.2  --  0.7*   < >  --    NEUTROS ABS 10*3/mm3 6.78 6.00 3.37 4.48 2.94 1.44* 2.19   < > 2.28   LYMPHS ABS 10*3/mm3 0.70 0.59* 0.36* 0.43* 0.43* 0.38* 0.29*   < >  --    MONOS ABS 10*3/mm3 1.20* 1.35* 0.37 0.67 0.62 0.58 0.36   < >  --    EOS ABS 10*3/mm3 0.12 0.36 0.21 0.17 0.06 0.04 0.03   < > 0.07   BASOS ABS 10*3/mm3 0.04 0.02 0.01 0.03 0.03 0.02 0.02   < > 0.03   IMMATURE GRANS (ABS) 10*3/mm3 0.09* 0.03 0.01 0.01 0.01  --  0.02   < >  --    NRBC /100 WBC 0.0 0.0 0.0 0.0 0.0  --  0.0   < >  --    NEUTROPHIL % %  --   --   --   --   --   --   --   --  61.6   MONOCYTES % %  --   --   --   --   --   --   --   --  16.2*   BASOPHIL % %  --   --   --   --   --   --   --   --  1.0   ATYP LYMPH % %  --   --   --   --   --   --   --   --  2.0   ANISOCYTOSIS   --   --   --   --   --   --   --   --  Slight/1+   GIANT PLT   --   --   --   --   --   --   --   --  Mod/2+    < > = values in this interval not displayed.       Lab Results - Last 18 Months   Lab Units 05/07/20  1332 03/15/20  0029 03/14/20  2359 03/02/20  1131 02/17/20  1121 02/10/20  1311 02/03/20  1314   GLUCOSE mg/dL 94  --  112* 107* 88 109* 88   SODIUM mmol/L 140  --  138 141 141 140 141   POTASSIUM mmol/L 4.1  --  3.8 4.1 4.1 3.9 4.0   CO2 mmol/L 24.0  --  21.0* 26.0 24.0 23.0 25.0   CHLORIDE mmol/L 101  --  102 104 105 104 105   ANION GAP mmol/L 15.0  --  15.0 11.0 12.0 13.0 11.0   CREATININE mg/dL 0.81 0.60 0.67 0.80 0.73 0.77 0.71   BUN mg/dL 14  --  13 15 15 14 13   BUN / CREAT RATIO  17.3  --  19.4 18.8 20.5 18.2 18.3   CALCIUM mg/dL 9.7  --  9.7 9.9 10.0 9.8 9.4   EGFR IF NONAFRICN AM mL/min/1.73 71  --  88 72 80 75 82   ALK PHOS U/L 56  --  61 65 64 63 53   TOTAL PROTEIN g/dL 6.6  --  7.3 7.1 7.2 7.1 6.9   ALT (SGPT) U/L 14  --  8 7 12 11 16   AST (SGOT) U/L 13  --   12 12 15 14 17   BILIRUBIN mg/dL 0.2  --  0.2 0.2 0.3 0.2 <0.2*   ALBUMIN g/dL 4.00  --  4.10 3.90 4.60 4.30 4.00   GLOBULIN gm/dL 2.6  --  3.2 3.2 2.6 2.8 2.9       Lab Results - Last 18 Months   Lab Units 05/07/20  1332 02/10/20  1311 01/06/20  0952 11/15/19  1332   CEA ng/mL 5.81 3.71 4.12 3.38       Lab Results - Last 18 Months   Lab Units 05/07/20  1332 02/17/20  1121 02/10/20  1311 01/06/20  0952 11/15/19  1332   IRON mcg/dL 41  --  66 95 19*   TIBC mcg/dL 297*  --  268* 270* 289*   IRON SATURATION % 14*  --  25 35 7*   FERRITIN ng/mL 284.60*  --  368.30* 591.50* 65.15   TSH uIU/mL  --  3.440  --   --   --    FOLATE ng/mL 16.90  --   --   --  >20.00         PAST MEDICAL HISTORY:  ALLERGIES:  Allergies   Allergen Reactions   • Amoxicillin GI Intolerance     CURRENT MEDICATIONS:  Outpatient Encounter Medications as of 6/10/2020   Medication Sig Dispense Refill   • cetirizine (zyrTEC) 10 MG tablet Take 10 mg by mouth Daily.     • citalopram (CeleXA) 20 MG tablet Take 20 mg by mouth Daily.     • fluticasone (FLONASE) 50 MCG/ACT nasal spray 1 spray by Each Nare route Daily As Needed.  5   • Fluticasone-Umeclidin-Vilant (Trelegy Ellipta) 100-62.5-25 MCG/INH aerosol powder  Inhale 1 puff Every Morning. 1 each 11   • HYDROcodone-acetaminophen (NORCO) 7.5-325 MG per tablet Take 1 tablet by mouth.     • lidocaine-prilocaine (EMLA) 2.5-2.5 % cream Apply to port 30 minutes before access 1 each 0   • omeprazole (priLOSEC) 20 MG capsule Take 20 mg by mouth Every Morning Before Breakfast.  0   • ondansetron (ZOFRAN) 8 MG tablet Take 1 tablet by mouth 3 (Three) Times a Day As Needed for Nausea or Vomiting. 30 tablet 5   • predniSONE (DELTASONE) 5 MG tablet TAKE 1 TABLET BY MOUTH EVERY 1 3 DAYS FOR 30 DAYS     • ZOLMitriptan (ZOMIG) 2.5 MG tablet Take 2.5 mg by mouth As Needed.       No facility-administered encounter medications on file as of 6/10/2020.      Adult illnesses:  Depression  GERD  Vitamin D deficiency  Chronic  fatigue syndrome  Former smoker, quit 4 years ago  Hemorrhoids with history of bright red blood per rectum.  Chronic back pain  Chronic neck pain  Bilateral shoulder and arm radiculopathy, right > left  Degenerative disc disease, C5-C7  Anxiety  Seen in [facility] ED, 03/15/2020 for chest pain.  Evaluation including labs on 03/14/2020 revealed elevated d-dimer and positive blood cultures for Propionibacterium.  CT angiogram of the chest showed near complete resolution of the perihilar right lung mass.  Chronic lung changes with bilateral upper lobe infiltrate.  Upper lobe pneumonia is the main concern though some of this may be related to radiation therapy fibrosis.  No pulmonary embolism.      Past surgeries:  Breast surgery for cyst removal  Colonoscopy, 12/3/2018  Hysterectomy  Left subclavian Mediport placement, 11/22/2019.  Dr. Gibson    ADULT ILLNESSES:  Patient Active Problem List   Diagnosis Code   • Age-related osteoporosis without current pathological fracture M81.0   • Anxiety and depression F41.9, F32.9   • Cervical disc disease M50.90   • Hyperlipidemia E78.5   • Lumbar degenerative disc disease M51.36   • Malignant neoplasm of overlapping sites of right lung (CMS/HCC) C34.81   • Emphysema lung (CMS/HCC) J43.9   • Iron deficiency anemia D50.9   • Chemotherapy induced neutropenia (CMS/HCC) D70.1, T45.1X5A   • Antineoplastic chemotherapy induced anemia D64.81, T45.1X5A   • Encounter for consultation Z71.9   • Former smoker Z87.891   • Regional lymph node metastasis present (CMS/HCC) C77.9   • 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   • Hypomagnesemia E83.42   • History of radiation therapy Z92.3     SURGERIES:  Past Surgical History:   Procedure Laterality Date   • BREAST SURGERY      Cyst removal   • COLONOSCOPY N/A 12/3/2018    Procedure: COLONOSCOPY WITH ANESTHESIA;  Surgeon: Myah Pool MD;  Location: [facility] ENDOSCOPY;  Service: Gastroenterology   • HYSTERECTOMY     •  VENOUS ACCESS DEVICE (PORT) INSERTION N/A 11/22/2019    Procedure: PLACEMENT OF SINGLE LUMEN PORT;  Surgeon: Mona Gibson MD;  Location: [facility] OR;  Service: General     HEALTH MAINTENANCE ITEMS:  Health Maintenance Due   Topic Date Due   • ZOSTER VACCINE (2 of 3) 09/15/2015   • HEPATITIS C SCREENING  10/02/2018   • MEDICARE ANNUAL WELLNESS  10/02/2018   • PNEUMOCOCCAL VACCINE (65+ HIGH RISK) (2 of 2 - PPSV23) 10/19/2018   • LIPID PANEL  11/15/2019   • COLONOSCOPY  12/03/2019       <no information>  Last Completed Colonoscopy       Status Date      COLONOSCOPY Done 12/3/2018 COLONOSCOPY     Patient has more history with this topic...          There is no immunization history on file for this patient.  Last Completed Mammogram       Status Date      MAMMOGRAM Done 2/5/2019 Ext Proc: CHG SCREENING DIGITAL BREAST TOMOSYNTHESIS BI            FAMILY HISTORY:  Family History   Problem Relation Age of Onset   • Colon polyps Mother         < 60 years old   • Lung cancer Mother    • No Known Problems Father    • No Known Problems Sister    • Prostate cancer Maternal Grandfather    • Breast cancer Maternal Aunt    • Colon cancer Neg Hx      SOCIAL HISTORY:  Social History     Socioeconomic History   • Marital status:      Spouse name: Not on file   • Number of children: Not on file   • Years of education: Not on file   • Highest education level: Not on file   Tobacco Use   • Smoking status: Former Smoker     Types: Cigarettes   • Smokeless tobacco: Never Used   • Tobacco comment: Quit 4 years ago   Substance and Sexual Activity   • Alcohol use: No   • Drug use: No   • Sexual activity: Defer       REVIEW OF SYSTEMS:  Review of Systems   Constitutional: Positive for fatigue (Chronic.  Multifactorial.). Negative for activity change (Manages her personal ADLs and some chores.  Is again driving), appetite change (eating well), chills, diaphoresis, fever, unexpected weight gain and unexpected weight loss.         "Manages her personal ADLs, light chores, but isn't running errands.  She still lives by herself.  Says spends less than 50% up and about    Durvalumab tolerance discussed.  Had no episodes of diarrhea.  No bronchitis/pneumonitis.  Has had exacerbation of diffuse arthralgias \"I've always had these pains.  Says she has been on prednisone tapering from 10 mg to 5 mg beginning 06/04/2020.  Says it helped.  Lingering fatigue but no nausea no mouth sores, no vomiting, no skin changes, no neuropathy.   Eyes: Negative.    Respiratory: Positive for shortness of breath (Baseline exertional dyspnea, modulated with inhalers - Trelegy). Negative for cough.    Cardiovascular: Negative for chest pain (Chest pain/pleurisy. Reccurent since last cycle of durvalumab, improved on prednisone - now on 5 mg/day).   Gastrointestinal: Negative.  Negative for abdominal distention, abdominal pain, blood in stool and diarrhea (Resolved after initial round of prednisone).   Genitourinary: Negative.    Musculoskeletal: Positive for arthralgias, back pain and neck pain.   Allergic/Immunologic: Positive for environmental allergies.   Neurological: Negative for syncope (prior to her ER visit).   Hematological: Negative for adenopathy. Does not bruise/bleed easily.   Psychiatric/Behavioral: Positive for depressed mood. The patient is nervous/anxious.          /70   Pulse 119   Temp 97.7 °F (36.5 °C)   Resp 16   Ht 167.6 cm (66\")   Wt 74.9 kg (165 lb 3.2 oz)   LMP  (LMP Unknown)   SpO2 97%   Breastfeeding No   BMI 26.66 kg/m²  Body surface area is 1.84 meters squared.  Pain Score    06/10/20 1353   PainSc:   6       Physical Exam:  Physical Exam   Constitutional: She is oriented to person, place, and time. She appears well-developed and well-nourished. No distress.   Pleasant, cooperative, heavy-set, modestly kept elderly female.  Ambulatory.  ECOG 2 (from 2-3).  Has regained 5 pounds (in addition to 1 pound at her prior visit). "   HENT:   Head: Normocephalic.   Mouth/Throat: No oropharyngeal exudate.   Eyes: Pupils are equal, round, and reactive to light. EOM are normal. No scleral icterus.   Neck: Normal range of motion. Neck supple. No JVD present. No tracheal deviation present. No thyromegaly present.   Cardiovascular: Regular rhythm and normal heart sounds. Exam reveals no friction rub.   No murmur heard.  Regular tachycardia   Pulmonary/Chest: Effort normal. No stridor. No respiratory distress. She has no wheezes (No wheezes today). She has no rales. She exhibits no tenderness.   Barrel shaped chest with diminished breath sounds globally.  Port in the left upper chest is noted.  Is well seated.   Abdominal: Soft. Bowel sounds are normal. She exhibits no distension and no mass. There is no tenderness. There is no guarding.   Musculoskeletal: Normal range of motion. She exhibits no edema or tenderness.   Neurological: She is alert and oriented to person, place, and time. No cranial nerve deficit.   Skin: Skin is warm and dry. No erythema. There is pallor.   Psychiatric: Her behavior is normal. Judgment and thought content normal.   Vitals reviewed.      Assessment:  1.  Squamous cell carcinoma of the right lung            Tumor stage: IIIC (cT4, cN3, M0)            Bone tumor burden: 5.4 x 5.3 x 4.5 cm mass along the right major fissure that abuts the medial mediastinal pleura and partially encases the right upper lobe bronchus and right bronchus intermedius.  Pathologic pretracheal lymph node measures 1.3 cm.  Right supraclavicular metastasis.            Complications of tumor: Chest pain, dyspnea, syncope?            BRAF mutation not detected            KRAS mutation not detected            EGFR mutation not detected.            PD-L1 and ALK: Not reported apparently due to lack of specimen            Tumor status: Definitive radiation concurrent with chemotherapy completed.  Currently on maintenance durvalumab.             03/15/2020-CT angiogram of the chest showed near complete resolution of the perihilar right lung mass.  Chronic lung changes with bilateral upper lobe infiltrate.  Upper lobe pneumonia is the main concern though some of this may be related to radiation therapy fibrosis.  No pulmonary embolism.    2.  Durvalumab associated myalgias, pleuritic chest discomfort, fatigue.  Symptomatic resolution/improvement after tapering prednisone.  3.  Seen in [facility] ED, 03/15/2020 for chest pain.  Evaluation including labs on 03/14/2020 (see below) revealing only for elevated d-dimer and positive blood cultures for Propionibacterium.  CT angiogram of the chest showed near complete resolution of the perihilar right lung mass.  Chronic lung changes with bilateral upper lobe infiltrate.  Upper lobe pneumonia versus radiation therapy fibrosis.  No pulmonary embolism.    4.  Normocytic anemia, contribution from anemia of malignancy/anemia of chronic disease and chemo.  Hgb 12.2, 05/07/2020 (prior range: 9.8 - 11.3, 10/28/2019)    5.  Chronic fatigue syndrome  6.  COPD.  Now followed by Dr. Tobar  7.  Former smoker 40 pack years  8.  Depression  9.  Hemorrhoids with history of hematochezia  10. Chronic neck/back pains with DDD, C5-C7.  Followed by Dr. Grady.  Rx for Norco 5 prior to anterior cervical discectomy and fusion C5 in 2015 - never had surgery  11. Anxiety.  Cancelled C4 durvalumab scheduled for 05/26/2020    RECOMMENDATIONS:   1. Draw preoffice labs if not done  2.  Re: Discussed the labs from 05/07/2020 with normal WBC, resolution of anemia, normal platelets, normal CMP,  pending ferritin 284 (from 368; from 591; from 65), iron saturation 14% (from 25%; from 35%; from 7%), serum iron 41 (from 66; from 95; from 19).  Pending B12 and folate.  Previously elevated CEA (3.71; from 4.12; from 3.38).  Durvalumab tolerance discussed.  Fatigue, pleuritic chest discomfort, diffuse myalgias/arthralgias that she states improved with  "prednisone taper (10 mg beginning 6/4/2020 and is currently on 5 mg daily).  Wanted to discuss the option of \"taking a break\".  We agreed to a trial of giving the drug every 4 weeks instead of every 2 weeks.  3.  Previously reviewed available molecular studies from lung biopsy done on 11/07/2019 (above) noting lack of mutation for EGFR, BRAF and KRAS.  ALK and PD-L1 not reported presumably due to lack of specimen.   4.  Reviewed encounter from Lucila Couch PA-C with Dr. Dudley, 03/12/2020.  No evidence for recurrent or metastatic disease.  RTC 3 months with CT of chest ordered at [facility].  5.  Previously reviewed (Telemed visit) from Dr. Tobar, 04/02/2020.  Assessment:  Pneumonitis complicating radiation and medical therapies.  Continue Trelegy and albuterol.  Plan for PFTs when she can get in the office later in the summer.  6.  NCCN Guidelines Version 5.2019 for stage III squamous cell cancer management previously reviewed. For T3-4N1 disease after definitive chemo + radiation recommendation is Durvalumab (category 1) x 1 year.   7.  Durvalumab. Previously discussed the potential toxicities to include but not limited to (immune mediated reaction, pneumonitis interstitial lung disease, hepatitis, colitis, severe diarrhea, hypothyroidism, hyperthyroidism, thyroiditis, type 1 diabetes, hypopituitarism, thrombocytopenic purpura, nephritis, aseptic meningitis, hemolytic anemia, myocarditis, severe infection, uveitis, infusion reaction, electrolyte abnormalities, lymphopenia, anemia, skin reaction, fatigue, upper respiratory infection, musculoskeletal pain, constipation, decreased appetite, nausea, edema, urinary tract infection, abdominal pain, pyrexia, dyspnea, rash, cough).   8.  Review UpToDate management of toxicity related to durvalumab.  Withhold and/or discontinue durvalumab to manage adverse reactions (no dosage reductions are recommended).  Resume durvalumab when the toxicity is improved to grade 1 or lower " and the corticosteroid dose is reduced to <10 mg/day prednisone (or equivalent).  Permanently discontinue durvalumab for persistent grade 2 or 3 adverse reactions (excluding endocrinopathies) which do not recover to grade 1 or lower within 12 weeks after the last dose or for recurrent grade 3 or 4 reactions.  If unable to reduce prednisone dose to < 10 mg/day (or equivalent) within 12 weeks after the last durvalumab dose, discontinue durvalumab permanently.    9.  Schedule C4 on 07/06/2020; C5 on 08/03/2020 - durvalumab (plan: every 4 weeks x 12 cycles - 12 months, progression or toxicity) per administration guidelines - at [facility]                 durvalumab 10 mg/kg (WT = 85 kg; TD = 850 mg)     .  Premeds:              Decadron 10 mg IV             Benadryl 25 mg IV             Pepcid 10 mg IV         10.  CMP, Mg++ and CBC with differential on days of durvalumab.  11.  Schedule CT of the chest with IV contrast next week at [facility].  Prior studies.  12.  Return to the office in 6 weeks with pre-office serum iron, Fe sat, ferritin, CBC with differential, CMP, and CEA.     MEDICAL DECISION MAKING: High Complexity   AMOUNT OF DATA: Extensive         RISK OF COMPLICATIONS: High         I spent at least 46 total minutes, face-to-face, caring for Damaris andrews.  Greater than 50% of this time involved counseling and/or coordination of care as documented within this note regarding the patient's illness(es), pros and cons of various treatment options, instructions and/or risk reduction.

## 2020-06-09 ENCOUNTER — APPOINTMENT (OUTPATIENT)
Dept: LAB | Facility: HOSPITAL | Age: 65
End: 2020-06-09

## 2020-06-09 ENCOUNTER — APPOINTMENT (OUTPATIENT)
Dept: ONCOLOGY | Facility: HOSPITAL | Age: 65
End: 2020-06-09

## 2020-06-10 ENCOUNTER — OFFICE VISIT (OUTPATIENT)
Dept: ONCOLOGY | Facility: CLINIC | Age: 65
End: 2020-06-10

## 2020-06-10 VITALS
RESPIRATION RATE: 16 BRPM | HEART RATE: 119 BPM | HEIGHT: 66 IN | SYSTOLIC BLOOD PRESSURE: 102 MMHG | DIASTOLIC BLOOD PRESSURE: 70 MMHG | WEIGHT: 165.2 LBS | TEMPERATURE: 97.7 F | OXYGEN SATURATION: 97 % | BODY MASS INDEX: 26.55 KG/M2

## 2020-06-10 DIAGNOSIS — C77.9 REGIONAL LYMPH NODE METASTASIS PRESENT (HCC): ICD-10-CM

## 2020-06-10 DIAGNOSIS — C34.81 MALIGNANT NEOPLASM OF OVERLAPPING SITES OF RIGHT LUNG (HCC): Primary | ICD-10-CM

## 2020-06-10 DIAGNOSIS — C34.81 MALIGNANT NEOPLASM OF OVERLAPPING SITES OF RIGHT LUNG (HCC): ICD-10-CM

## 2020-06-10 DIAGNOSIS — C77.0 SECONDARY MALIGNANT NEOPLASM OF SUPRACLAVICULAR LYMPH NODE (HCC): ICD-10-CM

## 2020-06-10 PROCEDURE — 99215 OFFICE O/P EST HI 40 MIN: CPT | Performed by: INTERNAL MEDICINE

## 2020-06-10 RX ORDER — HYDROCODONE BITARTRATE AND ACETAMINOPHEN 7.5; 325 MG/1; MG/1
1 TABLET ORAL
COMMUNITY
Start: 2020-06-03 | End: 2020-07-03

## 2020-06-10 RX ORDER — PREDNISONE 1 MG/1
5 TABLET ORAL DAILY
Qty: 30 TABLET | Refills: 0 | Status: SHIPPED | OUTPATIENT
Start: 2020-06-10 | End: 2020-09-03 | Stop reason: ALTCHOICE

## 2020-06-10 RX ORDER — PREDNISONE 1 MG/1
TABLET ORAL
COMMUNITY
Start: 2020-06-03 | End: 2020-06-10 | Stop reason: SDUPTHER

## 2020-06-16 ENCOUNTER — HOSPITAL ENCOUNTER (OUTPATIENT)
Dept: CT IMAGING | Facility: HOSPITAL | Age: 65
Discharge: HOME | End: 2020-06-16
Admitting: INTERNAL MEDICINE

## 2020-06-16 ENCOUNTER — HOSPITAL ENCOUNTER (OUTPATIENT)
Dept: RADIATION ONCOLOGY | Facility: HOSPITAL | Age: 65
Setting detail: RADIATION/ONCOLOGY SERIES
End: 2020-06-16

## 2020-06-16 DIAGNOSIS — C34.81 MALIGNANT NEOPLASM OF OVERLAPPING SITES OF RIGHT LUNG (HCC): ICD-10-CM

## 2020-06-16 LAB — CREAT BLDA-MCNC: 0.9 MG/DL (ref 0.6–1.3)

## 2020-06-16 PROCEDURE — 71260 CT THORAX DX C+: CPT

## 2020-06-16 PROCEDURE — 25010000002 IOPAMIDOL 61 % SOLUTION: Performed by: INTERNAL MEDICINE

## 2020-06-16 PROCEDURE — 82565 ASSAY OF CREATININE: CPT

## 2020-06-16 RX ADMIN — IOPAMIDOL 100 ML: 612 INJECTION, SOLUTION INTRAVENOUS at 13:09

## 2020-06-17 ENCOUNTER — OFFICE VISIT (OUTPATIENT)
Dept: RADIATION ONCOLOGY | Facility: HOSPITAL | Age: 65
End: 2020-06-17

## 2020-06-17 VITALS
SYSTOLIC BLOOD PRESSURE: 135 MMHG | BODY MASS INDEX: 26.65 KG/M2 | HEART RATE: 104 BPM | HEIGHT: 66 IN | RESPIRATION RATE: 18 BRPM | WEIGHT: 165.8 LBS | DIASTOLIC BLOOD PRESSURE: 86 MMHG

## 2020-06-17 DIAGNOSIS — C77.0 SECONDARY MALIGNANT NEOPLASM OF SUPRACLAVICULAR LYMPH NODE (HCC): ICD-10-CM

## 2020-06-17 DIAGNOSIS — Z92.3 HISTORY OF RADIATION THERAPY: ICD-10-CM

## 2020-06-17 DIAGNOSIS — C34.81 MALIGNANT NEOPLASM OF OVERLAPPING SITES OF RIGHT LUNG (HCC): Primary | ICD-10-CM

## 2020-06-17 DIAGNOSIS — Z87.891 FORMER SMOKER: ICD-10-CM

## 2020-06-17 PROCEDURE — G0463 HOSPITAL OUTPT CLINIC VISIT: HCPCS | Performed by: RADIOLOGY

## 2020-06-22 ENCOUNTER — TELEPHONE (OUTPATIENT)
Dept: ONCOLOGY | Facility: CLINIC | Age: 65
End: 2020-06-22

## 2020-06-22 NOTE — TELEPHONE ENCOUNTER
----- Message from Damaris Hu sent at 6/20/2020  1:21 PM CDT -----  Regarding: Test Results Question  Contact: [phone]  Results of CT scan you ordered?  What did you think? Could I get results in MyChart section?   Thank you  Damaris Hu

## 2020-06-22 NOTE — TELEPHONE ENCOUNTER
SPOKE WITH  YAMILETH ABOUT WHY THIS ISNT SHOWING IN CHART. REACHED OUT TO [employer] TEAM AND THEY WERE UNSURE SO THEY SENT IT TO FURTHER INVESTIGATE.    MICHELLE CALLED PATIENT AND SPOKE WITH HER ABOUT THE SCAN.

## 2020-06-22 NOTE — TELEPHONE ENCOUNTER
Spoke with Ms. Hu on phone as she was concerned that no one had called her with her scan results.  I advised the patient the Dr Russell is currently out of town.  Her scan results showed the right lung mass that was slightly larger in size but radiologist felt it was related to post treatment changes and continual follow up was recommended.  She still insisted that Dr Concepcion call her with the results.  I advised her he will return next week and I will pass this information to him and his CMA, Joi.

## 2020-06-30 NOTE — TELEPHONE ENCOUNTER
James Argue called to request a refill on her medication. Last office visit : 3/6/2020   Next office visit : 8/11/2020     Last UDS:   Amphetamine Screen, Urine   Date Value Ref Range Status   02/06/2020 neg  Final     Barbiturate Screen, Urine   Date Value Ref Range Status   02/06/2020 asya  Final     Benzodiazepine Screen, Urine   Date Value Ref Range Status   02/06/2020 neg  Final     Buprenorphine Urine   Date Value Ref Range Status   02/06/2020 neg  Final     Cocaine Metabolite Screen, Urine   Date Value Ref Range Status   02/06/2020 neg  Final     Gabapentin Screen, Urine   Date Value Ref Range Status   02/06/2020 neg  Final     MDMA, Urine   Date Value Ref Range Status   02/06/2020 neg  Final     Methamphetamine, Urine   Date Value Ref Range Status   02/06/2020 neg  Final     Opiate Scrn, Ur   Date Value Ref Range Status   02/06/2020 Psitive  Final     Oxycodone Screen, Ur   Date Value Ref Range Status   02/06/2020 neg  Final     PCP Screen, Urine   Date Value Ref Range Status   02/06/2020 neg  Final     Propoxyphene Screen, Urine   Date Value Ref Range Status   02/06/2020 neg  Final     THC Screen, Urine   Date Value Ref Range Status   02/06/2020 neg  Final     Tricyclic Antidepressants, Urine   Date Value Ref Range Status   02/06/2020 neg  Final       Last [place]: 6/30/2020  Medication Contract: 2/6/2020   Last Fill:06/03/2020    Requested Prescriptions     Pending Prescriptions Disp Refills    HYDROcodone-acetaminophen (NORCO) 7.5-325 MG per tablet 75 tablet 0     Sig: Take 1 tablet by mouth every 8 hours as needed for Pain for up to 30 days. Intended supply: 30 days         Please approve or refuse this medication.    [place]

## 2020-07-01 ENCOUNTER — TELEPHONE (OUTPATIENT)
Dept: ONCOLOGY | Facility: CLINIC | Age: 65
End: 2020-07-01

## 2020-07-01 RX ORDER — HYDROCODONE BITARTRATE AND ACETAMINOPHEN 7.5; 325 MG/1; MG/1
1 TABLET ORAL EVERY 8 HOURS PRN
Qty: 75 TABLET | Refills: 0 | Status: SHIPPED | OUTPATIENT
Start: 2020-07-03 | End: 2020-07-21 | Stop reason: SDUPTHER

## 2020-07-01 NOTE — TELEPHONE ENCOUNTER
Pt called to cancel Lab and Infusion appts for 7.6.20.    She'd also like to speak with a nurse about CT results, she still needs to review.    Phone #:  [phone]

## 2020-07-02 ENCOUNTER — TELEPHONE (OUTPATIENT)
Dept: ONCOLOGY | Facility: CLINIC | Age: 65
End: 2020-07-02

## 2020-07-02 DIAGNOSIS — C77.9 REGIONAL LYMPH NODE METASTASIS PRESENT (HCC): ICD-10-CM

## 2020-07-02 DIAGNOSIS — C77.0 SECONDARY MALIGNANT NEOPLASM OF SUPRACLAVICULAR LYMPH NODE (HCC): ICD-10-CM

## 2020-07-02 DIAGNOSIS — C34.81 MALIGNANT NEOPLASM OF OVERLAPPING SITES OF RIGHT LUNG (HCC): ICD-10-CM

## 2020-07-02 RX ORDER — FAMOTIDINE 10 MG/ML
20 INJECTION, SOLUTION INTRAVENOUS ONCE
Status: CANCELLED | OUTPATIENT
Start: 2020-08-03

## 2020-07-02 RX ORDER — SODIUM CHLORIDE 9 MG/ML
250 INJECTION, SOLUTION INTRAVENOUS ONCE
Status: CANCELLED | OUTPATIENT
Start: 2020-08-03

## 2020-07-02 RX ORDER — FAMOTIDINE 10 MG/ML
20 INJECTION, SOLUTION INTRAVENOUS AS NEEDED
Status: CANCELLED | OUTPATIENT
Start: 2020-08-03

## 2020-07-02 RX ORDER — DIPHENHYDRAMINE HYDROCHLORIDE 50 MG/ML
50 INJECTION INTRAMUSCULAR; INTRAVENOUS AS NEEDED
Status: CANCELLED | OUTPATIENT
Start: 2020-08-03

## 2020-07-02 NOTE — TELEPHONE ENCOUNTER
Phone call made to patient at  to apprise her of the CT of the chest, 06/16/2020 that showed 2.6 x 5.1 cm opacity in the right upper and right lower lobe in the area of the previously described right hilar mass.  Possible bronchiectasis.  Larger than 3/15/2020 but favored to represent posttreatment changes.  Resolution of previous mediastinal/hilar adenopathy.  Interval resolution of probable infectious/inflammatory biapical pulmonary opacities.  Emphysema.  Says that she has an appointment with Dr. Tobar on 07/16/2020 after which she will reschedule her Imfinzi treatments (has canceled her treatment for 7/6/2020).  Encouraged her to keep her appointments for treatments as scheduled.

## 2020-07-06 ENCOUNTER — VIRTUAL VISIT (OUTPATIENT)
Dept: PRIMARY CARE CLINIC | Age: 65
End: 2020-07-06
Payer: MEDICARE

## 2020-07-06 ENCOUNTER — APPOINTMENT (OUTPATIENT)
Dept: ONCOLOGY | Facility: HOSPITAL | Age: 65
End: 2020-07-06

## 2020-07-06 ENCOUNTER — TELEPHONE (OUTPATIENT)
Dept: ONCOLOGY | Facility: CLINIC | Age: 65
End: 2020-07-06

## 2020-07-06 ENCOUNTER — HOSPITAL ENCOUNTER (OUTPATIENT)
Dept: GENERAL RADIOLOGY | Age: 65
Discharge: HOME | End: 2020-07-06
Payer: MEDICARE

## 2020-07-06 ENCOUNTER — TELEPHONE (OUTPATIENT)
Dept: PRIMARY CARE CLINIC | Age: 65
End: 2020-07-06

## 2020-07-06 ENCOUNTER — APPOINTMENT (OUTPATIENT)
Dept: LAB | Facility: HOSPITAL | Age: 65
End: 2020-07-06

## 2020-07-06 PROCEDURE — 99443 PR PHYS/QHP TELEPHONE EVALUATION 21-30 MIN: CPT | Performed by: NURSE PRACTITIONER

## 2020-07-06 PROCEDURE — 71046 X-RAY EXAM CHEST 2 VIEWS: CPT

## 2020-07-06 RX ORDER — PREDNISONE 10 MG/1
TABLET ORAL
Qty: 21 TABLET | Refills: 0 | Status: SHIPPED | OUTPATIENT
Start: 2020-07-06 | End: 2020-07-15

## 2020-07-06 ASSESSMENT — PATIENT HEALTH QUESTIONNAIRE - PHQ9
1. LITTLE INTEREST OR PLEASURE IN DOING THINGS: 0
SUM OF ALL RESPONSES TO PHQ9 QUESTIONS 1 & 2: 1
2. FEELING DOWN, DEPRESSED OR HOPELESS: 1
SUM OF ALL RESPONSES TO PHQ QUESTIONS 1-9: 1
SUM OF ALL RESPONSES TO PHQ QUESTIONS 1-9: 1

## 2020-07-06 ASSESSMENT — ENCOUNTER SYMPTOMS
NAUSEA: 0
SPUTUM PRODUCTION: 0
CHEST TIGHTNESS: 1
GASTROINTESTINAL NEGATIVE: 1
HEMOPTYSIS: 0
COUGH: 0
SHORTNESS OF BREATH: 1
VOMITING: 0
EYES NEGATIVE: 1
BACK PAIN: 0
ABDOMINAL PAIN: 0

## 2020-07-06 NOTE — PROGRESS NOTES
Silas Serrano is a 75 y.o. female evaluated via telephone on 7/6/2020. Consent:  She and/or health care decision maker is aware that that she may receive a bill for this telephone service, depending on her insurance coverage, and has provided verbal consent to proceed: Yes      Documentation:  I communicated with the patient and/or health care decision maker about hx of pneumonitis, lung cancer, and immunotherapy. Details of this discussion including any medical advice provided: see below    Chest Pain    This is a recurrent problem. The current episode started in the past 7 days. The onset quality is gradual. The problem occurs constantly. The problem has been waxing and waning. Pain location: middle of back and right posterior back where radiation was. The pain is at a severity of 7/10. The pain is moderate. The quality of the pain is described as sharp. The pain does not radiate. Associated symptoms include malaise/fatigue and shortness of breath. Pertinent negatives include no abdominal pain, back pain, claudication, cough, diaphoresis, dizziness, exertional chest pressure, fever, headaches, hemoptysis, irregular heartbeat, leg pain, lower extremity edema, nausea, near-syncope, numbness, palpitations, sputum production, syncope, vomiting or weakness. The pain is aggravated by breathing, exertion, movement and walking. Treatments tried: dexamethasone. The treatment provided mild relief. Inspirational chest pain since Saturday   Dexamethasone 2 mg last night   Called Dr. Lora Spence and instructed to follow-up with PCP    No change in PMH, family, social, or surgical history unless mentioned above. Review of Systems   Constitutional: Positive for malaise/fatigue. Negative for diaphoresis and fever. HENT: Negative. Eyes: Negative. Respiratory: Positive for chest tightness and shortness of breath. Negative for cough, hemoptysis and sputum production. Cardiovascular: Positive for chest pain. Negative for palpitations, claudication, syncope and near-syncope. Gastrointestinal: Negative. Negative for abdominal pain, nausea and vomiting. Endocrine: Negative. Genitourinary: Negative. Musculoskeletal: Negative. Negative for back pain. Skin: Negative. Allergic/Immunologic: Positive for immunocompromised state. Neurological: Negative. Negative for dizziness, weakness, numbness and headaches. Psychiatric/Behavioral: Negative. Past Medical History:   Diagnosis Date    Allergic rhinitis     Anxiety     [facility]ch[facility] disease) [facility]     GERD (gastroesophageal reflux disease)     Lung cancer ([facility])     RA (rheumatoid arthritis) ([facility])        Current Outpatient Medications   Medication Sig Dispense Refill    predniSONE (DELTASONE) 10 MG tablet Take 4 tablets by mouth daily for 3 days, THEN 2 tablets daily for 3 days, THEN 1 tablet daily for 3 days. 21 tablet 0    citalopram (CELEXA) 20 MG tablet Take once daily 90 tablet 2    omeprazole (PRILOSEC) 20 MG delayed release capsule Take 1 capsule by mouth Daily 90 capsule 2    HYDROcodone-acetaminophen (NORCO) 7.5-325 MG per tablet Take 1 tablet by mouth every 8 hours as needed for Pain for up to 30 days. Intended supply: 30 days 75 tablet 0    fluticasone (FLONASE) 50 MCG/ACT nasal spray 1 spray by Nasal route daily 1 Bottle 5    cetirizine (ZYRTEC) 10 MG tablet Take 1 tablet by mouth daily 30 tablet 11    fluticasone-umeclidin-vilant (TRELEGY ELLIPTA) 100-62.5-25 MCG/INH AEPB Inhale 1 puff into the lungs daily      silver sulfADIAZINE (SILVADENE) 1 % cream Apply topically daily. 50 g 1    ZOLMitriptan (ZOMIG) 2.5 MG tablet TAKE 1 TAB. BY MOUTH AS NEEDED FOR MIGRAINES 6 tablet 3     No current facility-administered medications for this visit.         No Known Allergies    Past Surgical History:   Procedure Laterality Date    BREAST SURGERY Bilateral     Dr Raheel Moraes DIAG BI Bilateral     SKIN BIOPSY      2 mole removed by mary        Social History     Tobacco Use    Smoking status: Former Smoker     Packs/day: 1.00     Years: 20.00     Pack years: 20.00     Last attempt to quit: 10/3/2014     Years since quittin.7    Smokeless tobacco: Never Used   Substance Use Topics    Alcohol use: No    Drug use: No       No family history on file. Assessment:    ICD-10-CM    1. Shortness of breath R06.02 XR CHEST STANDARD (2 VW)     predniSONE (DELTASONE) 10 MG tablet   2. Chest pain on breathing R07.1 XR CHEST STANDARD (2 VW)     predniSONE (DELTASONE) 10 MG tablet   3. Hx of cancer of lung Z85.118 XR CHEST STANDARD (2 VW)       Plan:   1. Shortness of breath  - XR CHEST STANDARD (2 VW); Future  - predniSONE (DELTASONE) 10 MG tablet; Take 4 tablets by mouth daily for 3 days, THEN 2 tablets daily for 3 days, THEN 1 tablet daily for 3 days. Dispense: 21 tablet; Refill: 0    2. Chest pain on breathing  - XR CHEST STANDARD (2 VW); Future  - predniSONE (DELTASONE) 10 MG tablet; Take 4 tablets by mouth daily for 3 days, THEN 2 tablets daily for 3 days, THEN 1 tablet daily for 3 days. Dispense: 21 tablet; Refill: 0    3. Hx of cancer of lung  - XR CHEST STANDARD (2 VW); Future    Over 50% of the total visit time of 25 minutes was spent on counseling and or coordination of care of shortness of breath, chest pain on breathing, hx of cancer of lung, medication, imaging, and follow-up. Orders Placed This Encounter   Procedures    XR CHEST STANDARD (2 VW)     Standing Status:   Future     Number of Occurrences:   1     Standing Expiration Date:   2021     Order Specific Question:   Reason for exam:     Answer:   CHEST PAIN     Orders Placed This Encounter   Medications    predniSONE (DELTASONE) 10 MG tablet     Sig: Take 4 tablets by mouth daily for 3 days, THEN 2 tablets daily for 3 days, THEN 1 tablet daily for 3 days.      Dispense:  21 tablet     Refill:  0     There are no discontinued medications. There are no Patient Instructions on file for this visit. Patient given educational handouts and has had all questions answered. Patient voices understanding and agrees to plans along with risks and benefits of plan. Patient isinstructed to continue prior meds, diet, and exercise plans unless instructed otherwise. Patient agrees to follow up as instructed and sooner if needed. Patient agrees to go to ER if condition becomes emergent. Notesmay be completed with dictation device and spelling errors may occur. Return if symptoms worsen or fail to improve. I affirm this is a Patient Initiated Episode with an Established Patient who has not had a related appointment within my department in the past 7 days or scheduled within the next 24 hours. Total Time: minutes: 21-30 minutes    Note: not billable if this call serves to triage the patient into an appointment for the relevant concern      [address] [place], [place] [address]way were provided through a telephone visit to substitute for in-person clinic visit. Patient and provider were located at their individual homes. Pursuant to the emergency declaration under the [facility] waiver authority and the [employer] and [employer] Act, this Virtual  Visit was conducted, with patient's consent, to reduce the patient's risk of exposure to COVID-19 and provide continuity of care for an established patient.

## 2020-07-07 ENCOUNTER — TELEPHONE (OUTPATIENT)
Dept: ONCOLOGY | Facility: CLINIC | Age: 65
End: 2020-07-07

## 2020-07-07 ENCOUNTER — TELEPHONE (OUTPATIENT)
Dept: PRIMARY CARE CLINIC | Age: 65
End: 2020-07-07

## 2020-07-07 RX ORDER — CITALOPRAM 20 MG/1
TABLET ORAL
Qty: 90 TABLET | Refills: 2 | Status: SHIPPED | OUTPATIENT
Start: 2020-07-07 | End: 2020-09-28 | Stop reason: SDUPTHER

## 2020-07-07 RX ORDER — OMEPRAZOLE 20 MG/1
20 CAPSULE, DELAYED RELEASE ORAL DAILY
Qty: 90 CAPSULE | Refills: 2 | Status: SHIPPED | OUTPATIENT
Start: 2020-07-07 | End: 2020-09-28 | Stop reason: SDUPTHER

## 2020-07-07 NOTE — TELEPHONE ENCOUNTER
----- Message from Deidre Zuñiga, [address] - NP sent at 7/6/2020  3:12 PM CDT -----  Chest x ray shows no pneumonia or acute disease. Prednisone has been sent to the pharmacy.

## 2020-07-07 NOTE — TELEPHONE ENCOUNTER
"I discussed with Dr. Russell and he agreed with the dosing that the PCP had prescribed, stating \"actually the lower dosing is better.\"     I called and let the patient know and she became argumentative. Stating \" Well he had said before that the pneumonitis could wreck my lung without a high dose of steroids.\"  \" I just don't know what I am supposed to do.\" I explained that steroids could cause undesirable side affects in some cases. Pt stated \"Well I think I could die faster from pnemonitis than steroids.\" Damaris then requested for Dr. Russell to call her. I sent a message to Dr. russell to call the patient and he will call her later today.   "

## 2020-07-07 NOTE — TELEPHONE ENCOUNTER
----- Message from Damaris Hu sent at 7/6/2020  9:08 PM CDT -----  Regarding: Prescription Question  Contact: [phone]  Got chest X-ray 7/6/20 to rule out pneumonia. Primary care pa Michelle Haskins did evisit. Told her you would need X-ray. So she sent prednisone dose to treat pneumonitis. But her prescription for it is different than what you gave me. Here’s the dosage she gave:  Keep in mind hers is 10mg :  4 tablets daily for 3 days, 2 daily for 3 days, 1 daily for 3 days.   Your precipitation dosage as follows. Consider you gave me 20 mg dosage. :  2 tablets twice daily for 3 days. Then 2 tablets once daily for 3 days. Then 1 tablet for 3 days.   So instead of 80 mg a day per ur script I’m only getting 40 a day with hers. I just don’t know how much to take. I’d need more if I’m needing what you prescribe to get rid of this. Let me know asap what to do. TY

## 2020-07-07 NOTE — TELEPHONE ENCOUNTER
Phone call made to patient at [phone] to clarify her dose of prednisone which she was given by primary care-was seen by Michelle [facility].  She was seen there yesterday to rule out pneumonia.  Says that a chest x-ray done at that time was negative for pneumonia.  Was prescribed 40 mg daily x3 days then 20 mg daily for 3 days then 10 mg daily for 3 days.  She was concerned and we have given her double the dose previously.  Patient is not any more short of breath than usual.  Admits that she currently has no chest discomfort/pleuritic pain on current dose of prednisone.  I advised that she continue the prednisone as currently prescribed and to notify us if her symptoms recur.

## 2020-07-07 NOTE — TELEPHONE ENCOUNTER
----- Message from Reyes Netters, [address] - NP sent at 7/6/2020  3:12 PM CDT -----  Chest x ray shows no pneumonia or acute disease. Prednisone has been sent to the pharmacy.

## 2020-07-07 NOTE — TELEPHONE ENCOUNTER
I called and spoke with the patient and let her know that it is okay to take the prednisone as her PCP prescribed since that is the physician treating and prescribing. Pt would like Dr. Russell's opinion on that dosing. She states that her PCP has probably never treated pneumonitis and she is not her lung cancer doctor so she feels like she may not know how to properly treat her. I let her know that I would send a message to Dr. Russell and call her back.

## 2020-07-09 ENCOUNTER — TELEPHONE (OUTPATIENT)
Dept: ONCOLOGY | Facility: CLINIC | Age: 65
End: 2020-07-09

## 2020-07-09 DIAGNOSIS — C34.81 MALIGNANT NEOPLASM OF OVERLAPPING SITES OF RIGHT LUNG (HCC): Primary | ICD-10-CM

## 2020-07-09 RX ORDER — PREDNISONE 20 MG/1
TABLET ORAL
Qty: 34 TABLET | Refills: 0 | Status: SHIPPED | OUTPATIENT
Start: 2020-07-09 | End: 2021-01-27

## 2020-07-09 NOTE — TELEPHONE ENCOUNTER
Called and spoke with Ashley. I explained that Chloé is not here today. She reports that Damaris is feeling worse today. She is having to stay in her chair most of the day due to the pain of her pnemonitis. I discussed with Dr. Russell and he has prescribed Prednisone 80 mg daily for 3 days, 60 mg daily for 3 days, 40 mg daily for 3 days, the 20 mg daily for 7 days. She will need an appt with Chloé but Ashley reports that she is unsure if the patient will want to come in due to her discomfort. I let he juanchow to call me back tomorrow and let me know so that I can get her scheduled with Chloé. I also let her know that Dr. Russell is wanting her to see Dr. Tobar. She reports that Dr. Tobar may not want to see her due to the coronavirus.

## 2020-07-09 NOTE — TELEPHONE ENCOUNTER
AP PT'S SISTER CALLED WANTING TO SPEAK TO WILBERT QUIÑONES TODAY ABOUT HER SISTERS CONDITION.  SHE WANTS TO DISCUSS ANOTHER TREATMENT PLAN. SHE IS NOT GETTING ANY BETTER, WORSE. SHE IS IN PAIN.    PLEASE GIVE AP A CALL TODAY AT [employer]- [phone]  OR  [employer]- [phone] (ASK FOR AP).

## 2020-07-16 ENCOUNTER — TELEPHONE (OUTPATIENT)
Dept: RADIATION ONCOLOGY | Facility: HOSPITAL | Age: 65
End: 2020-07-16

## 2020-07-16 NOTE — TELEPHONE ENCOUNTER
"Returned Ms. Rodas, the patients sisters call.  She asked for Dr. Dudley to review her scans that her PCP ordered to see if she has pneumonitis again.  I informed her that he is on vacation now but that the report does not mention of it.  Ms. Rodas states that her sister is hurting so bad that she is unable to care for herself, \"the pain is the same pain that she did when she had pneumonitis and Dr. Dudley gave her Dexamethasone and she did better on that than the Prednisone and was requesting us to send in the Dexamethasone instead because \"it think he said it works differently\". I explained that Dexamethasone is a long-acting and is 6 times more potent than prednisone but that she is/has been on a very high dose of 60-80 mg of prednisone daily, currently on a wean from Dr. Russell.     Her sister continued to say \"its the same thing she had in the spring, its the exact same thing\". She asked if we could do a televisit, which we do not have the capability and asked if I could do a phone visit, I explained to her that I did not feel comfortable changing someone else's regimen nor treating her over the phone and if she was having that much pain she really needed to be seen to be evaluated. She said ER was out of the question due to Covid, I continued to explain that her current dose of steroids need to be continued as is, I would review her records further to see if I could be of any assistance tomorrow.  Sister verbalizes understanding.   Lucila Couch PA-C        ----- Message from Ju Petit RN sent at 7/16/2020  3:39 PM CDT -----  Regarding: Please call Ashley Bach  [phone]  Call about pneumonitis symptoms and steroids and back pain.  Note in EPIC    "

## 2020-07-16 NOTE — TELEPHONE ENCOUNTER
"Sister Ashley Bach called about patient condition.  Had issue with pneumonitis (in the spring) and stopped immunotherapy and treated with steroids.  Started back on immunotherapy.  July 4th started with \"horrific\" pain.  Had e-visit.  CXR  07- at [facility]  \"Two-view chest x-ray with no comparison.  The heart size is normal. The mediastinum is within normal limits.   The lungs are mildly hyperexpanded with no pneumonia or pneumothorax.   There is mild chronic interstitial disease with scattered calcified  granulomas.  There is no significant pleural fluid.  No congestive failure changes.  Right upper lobe scarring noted.  Normal appearance of the left subclavian port.    IMPRESSION:  1. No acute disease.\"    She is at her wit's end with this horrific pain.  She can't take this.  She can't sleep in her bed, she is sleeping in chair. She was ordered prednisone 80 mg x 3 days; 60 mg x 3 days; 40 mg x 3 days and 20 mg x 7 days.\"  \"This is not working!!  She is taking 7.5 Norco from her regular doctor.\"  \"She can't take care of herself now.\"  Looking for suggestions on what to do.  \"ER is out as an option.\"  \"She has been taking additional 5 mg Prednisone as well.\"    \"This came on as a vengeance!  She can't handle it!\"    \"She is running out of her prednisone but it is not working!\"    Ashley is sending in a picture of her Prednisone regimen.  I will attach to chart.    She thought the Dexamethasone she has taken from Dr. Dudley seems to have worked better than the Prednisone.    I informed her that Dr. Dudley is not here this week. I will forward this to Lucila Couch PA.      "

## 2020-07-17 ENCOUNTER — DOCUMENTATION (OUTPATIENT)
Dept: RADIATION ONCOLOGY | Facility: HOSPITAL | Age: 65
End: 2020-07-17

## 2020-07-17 NOTE — PROGRESS NOTES
"Ju Petit RN received the below via email from Ms. Chantal laurent. (see yesterdays note)  I reviewed her chart and medications in further detail and called the sister Ashley who states that Ms Hu \"seems to be better today, she slept good\".  I encouraged her to continue with the regimen she is currently on and if there is not improvement by Monday, she needed to be evaluated by her PCP or by someone that knows her history of medical problems, as pneumonitis or fibrosis of the lungs likely would not be so incapacitating. She verbalizes understanding.       Sent:  6:12 PM  To: Ju Petit ([facility]) <[email or web address]>  Subject: Damaris Hu : 51  Importance: High    CAUTION: This email originated from outside of the organization. Do not click links or open attachments unless you recognize the sender and know the content is safe.    Ju,  I wanted to let you know that Damaris had the Chest Scan done on  at the [facility] ordered it.       If you can't find it to review, please let me know.  Do you think the PA could review all & let me know what to do tomorrow?  She is going to run out of medications before Monday when Dr. Dudley returns.       Call me anytime at [phone] or office below.  Thank you so much for your help.      Ashley Bach,    [employer], [place], [place]  [phone]  [employer]  [email or web address]  Website: [email or web address]        "

## 2020-07-21 ENCOUNTER — TELEMEDICINE (OUTPATIENT)
Dept: PRIMARY CARE CLINIC | Age: 65
End: 2020-07-21
Payer: MEDICARE

## 2020-07-21 PROCEDURE — 99443 PR PHYS/QHP TELEPHONE EVALUATION 21-30 MIN: CPT | Performed by: NURSE PRACTITIONER

## 2020-07-21 RX ORDER — HYDROCODONE BITARTRATE AND ACETAMINOPHEN 7.5; 325 MG/1; MG/1
1 TABLET ORAL EVERY 8 HOURS PRN
Qty: 85 TABLET | Refills: 0 | Status: SHIPPED | OUTPATIENT
Start: 2020-07-21 | End: 2020-07-22 | Stop reason: SDUPTHER

## 2020-07-21 ASSESSMENT — ENCOUNTER SYMPTOMS
RESPIRATORY NEGATIVE: 1
BACK PAIN: 1
GASTROINTESTINAL NEGATIVE: 1
EYES NEGATIVE: 1

## 2020-07-21 NOTE — PROGRESS NOTES
[address] [address]            Phone:  [phone]  Fax:  [phone]    Laura Kebede is a 75 y.o. female evaluated via telephone on 7/21/2020. Consent:    She and/or health care decision maker is aware that that she may receive a bill for this telephone service, depending on her insurance coverage, and has provided verbal consent to proceed: Yes      HPI  Chief Complaint   Patient presents with    Back Pain       I communicated with the patient and/or health care decision maker about back pain. She has a known history of sciatica and she is having issues with it again. She was treated with prednisone by oncology and it has not helped much. She is still taking her pain meds as well as needed. Review of Systems   Constitutional: Negative. HENT: Negative. Eyes: Negative. Respiratory: Negative. Cardiovascular: Negative. Gastrointestinal: Negative. Endocrine: Negative. Genitourinary: Negative. Musculoskeletal: Positive for back pain. Skin: Negative. Neurological: Negative. Hematological: Negative. Psychiatric/Behavioral: Negative. PLAN    Details of this discussion including any medical advice provided:     1. Squamous cell carcinoma of lung, unspecified laterality (HCC)    - HYDROcodone-acetaminophen (NORCO) 7.5-325 MG per tablet; Take 1 tablet by mouth every 8 hours as needed for Pain for up to 30 days. Intended supply: 30 days  Dispense: 85 tablet; Refill: 0  - XR THORACIC SPINE (3 VIEWS); Future    2. Thoracic spine pain    - XR THORACIC SPINE (3 VIEWS); Future       If pain does not improve will need to complete the thoracic spine xray. Increasing Norco to see if that will help with breakthrough pain. I affirm this is a Patient Initiated Episode with an Established Patient who has not had a related appointment within my department in the past 7 days or scheduled within the next 24 hours.       Total Time: minutes: 21-30 minutes      Note: not billable if this call serves to triage the patient into an appointment for the relevant concern      Miley Moey to the emergency declaration under the [facility] waiver authority and the [employer] and [employer] Act, this Virtual  Visit was conducted, with patient's consent, to reduce the patient's risk of exposure to COVID-19 and provide continuity of care for an established patient. Services were provided through a telephone discussion  to substitute for in-person clinic visit. Parties involved during telephone call include myself, ROBER Ambrocio and patient listed.

## 2020-07-22 RX ORDER — HYDROCODONE BITARTRATE AND ACETAMINOPHEN 7.5; 325 MG/1; MG/1
1 TABLET ORAL EVERY 6 HOURS PRN
Qty: 85 TABLET | Refills: 0 | Status: SHIPPED | OUTPATIENT
Start: 2020-07-22 | End: 2020-08-12

## 2020-07-22 NOTE — TELEPHONE ENCOUNTER
Called pharmacy per patient request related to will not fill Norco  Called and directions need to be changed related to insurance  Changed to every 6 hours prn

## 2020-07-29 ENCOUNTER — TELEPHONE (OUTPATIENT)
Dept: ADMINISTRATIVE | Age: 65
End: 2020-07-29

## 2020-07-31 ENCOUNTER — TELEPHONE (OUTPATIENT)
Dept: PRIMARY CARE CLINIC | Age: 65
End: 2020-07-31

## 2020-07-31 ENCOUNTER — TELEPHONE (OUTPATIENT)
Dept: ONCOLOGY | Facility: CLINIC | Age: 65
End: 2020-07-31

## 2020-07-31 NOTE — TELEPHONE ENCOUNTER
----- Message from Kade Russell MD sent at 7/31/2020 10:54 AM CDT -----  Abnormal spine xray, 07/31/2020 - pls [facility] to confirm that they are addressing. tx u

## 2020-08-03 ENCOUNTER — APPOINTMENT (OUTPATIENT)
Dept: LAB | Facility: HOSPITAL | Age: 65
End: 2020-08-03

## 2020-08-03 ENCOUNTER — APPOINTMENT (OUTPATIENT)
Dept: ONCOLOGY | Facility: HOSPITAL | Age: 65
End: 2020-08-03

## 2020-08-04 ENCOUNTER — VIRTUAL VISIT (OUTPATIENT)
Dept: PRIMARY CARE CLINIC | Age: 65
End: 2020-08-04
Payer: MEDICARE

## 2020-08-04 PROCEDURE — 99443 PR PHYS/QHP TELEPHONE EVALUATION 21-30 MIN: CPT | Performed by: NURSE PRACTITIONER

## 2020-08-04 RX ORDER — PREDNISONE 20 MG/1
20 TABLET ORAL DAILY
Qty: 30 TABLET | Refills: 0 | Status: SHIPPED | OUTPATIENT
Start: 2020-08-04 | End: 2020-09-03

## 2020-08-04 ASSESSMENT — ENCOUNTER SYMPTOMS
GASTROINTESTINAL NEGATIVE: 1
BACK PAIN: 1
EYES NEGATIVE: 1
RESPIRATORY NEGATIVE: 1

## 2020-08-04 NOTE — PROGRESS NOTES
[address] [address]            Phone:  [phone]  Fax:  [phone]    Lashawn Roca is a 75 y.o. female evaluated via telephone on 8/4/2020. Consent:    She and/or health care decision maker is aware that that she may receive a bill for this telephone service, depending on her insurance coverage, and has provided verbal consent to proceed: Yes      HPI  Chief Complaint   Patient presents with    Back Pain       I communicated with the patient and/or health care decision maker about xray results. She has compression fracture of T7. It is not determined if it is new or old fracture. She is still having pain in that area. Review of Systems   Constitutional: Negative. HENT: Negative. Eyes: Negative. Respiratory: Negative. Cardiovascular: Negative. Gastrointestinal: Negative. Endocrine: Negative. Genitourinary: Negative. Musculoskeletal: Positive for back pain. Skin: Negative. Neurological: Negative. Hematological: Negative. Psychiatric/Behavioral: Negative. PLAN    Details of this discussion including any medical advice provided:     1. Compression fracture of T7 vertebra, initial encounter ([facility] [address]    - MRI THORACIC SPINE W WO CONTRAST; Future  - Diaz Avila, ROBER, Neurosurgery, [facility]    2. Osteopenia of lumbar spine      3. Counseling, unspecified         I affirm this is a Patient Initiated Episode with an Established Patient who has not had a related appointment within my department in the past 7 days or scheduled within the next 24 hours.       Total Time: 21-30 mins      Note: not billable if this call serves to triage the patient into an appointment for the relevant concern      Jorge Abad         Pursuant to the emergency declaration under the [facility], [facility] authority and the [employer] and [employer] Act, this Virtual  Visit was conducted, with patient's consent, to reduce the patient's risk of exposure to COVID-19 and provide continuity of care for an established patient. Services were provided through a telephone discussion  to substitute for in-person clinic visit. Parties involved during telephone call include myself, ROBER Royal and patient listed.

## 2020-08-11 ENCOUNTER — PATIENT MESSAGE (OUTPATIENT)
Dept: PRIMARY CARE CLINIC | Age: 65
End: 2020-08-11

## 2020-08-11 NOTE — TELEPHONE ENCOUNTER
From: Kimberlyn Bennett  To: ROBER Rowe  Sent: 8/11/2020 11:12 AM CDT  Subject: Prescription Question    Just needed to tell you about my pain pills. You wrote script for 85 pills on 7/22. It says take 1 tab every 6 hrs as needed for pain for 30 days. I was in extreme pain when I got it & Cloangie Ke stayed with me as I could barely stand alone. I was taking 3-4 a day. Its gotten better & I have went down this past week or so. However its left me short of getting thru until 8/22 refill date. Today I only have 18 pills left. So now Im back down to 2 & a half a day but wont have enough. MRI is 8/18. Hopefully its healing & I will be able to get over this. Taking it slow & being careful. Let me know what you think. Thanks for your patience with me.    Erica Coronado

## 2020-08-12 RX ORDER — HYDROCODONE BITARTRATE AND ACETAMINOPHEN 10; 325 MG/1; MG/1
1 TABLET ORAL EVERY 8 HOURS PRN
Qty: 90 TABLET | Refills: 0 | Status: SHIPPED | OUTPATIENT
Start: 2020-08-12 | End: 2020-09-11 | Stop reason: SDUPTHER

## 2020-08-12 NOTE — TELEPHONE ENCOUNTER
We can increase the dose up to 10 mg instead of the 7.5 mg and see if the will help. I can give you 90 tablets per month, but that is the max dose and tablets. Once we get it calmed down we can work on weaning you back down on tabs per month and mg. Just let me know if she wants to increase and pend it for me.

## 2020-08-18 ENCOUNTER — HOSPITAL ENCOUNTER (OUTPATIENT)
Dept: MRI IMAGING | Age: 65
Discharge: HOME | End: 2020-08-18
Payer: MEDICARE

## 2020-08-18 LAB
GFR AFRICAN AMERICAN: >60
GFR NON-AFRICAN AMERICAN: 55
PERFORMED ON: ABNORMAL
POC CREATININE: 1 MG/DL (ref 0.3–1.3)
POC SAMPLE TYPE: ABNORMAL

## 2020-08-18 PROCEDURE — 82565 ASSAY OF CREATININE: CPT

## 2020-08-18 PROCEDURE — 72157 MRI CHEST SPINE W/O & W/DYE: CPT

## 2020-08-18 PROCEDURE — 6360000004 HC RX CONTRAST MEDICATION: Performed by: NURSE PRACTITIONER

## 2020-08-18 PROCEDURE — A9577 INJ MULTIHANCE: HCPCS | Performed by: NURSE PRACTITIONER

## 2020-08-18 RX ADMIN — GADOBENATE DIMEGLUMINE 15 ML: 529 INJECTION, SOLUTION INTRAVENOUS at 13:52

## 2020-08-20 ENCOUNTER — OFFICE VISIT (OUTPATIENT)
Dept: NEUROSURGERY | Age: 65
End: 2020-08-20
Payer: MEDICARE

## 2020-08-20 ENCOUNTER — TELEPHONE (OUTPATIENT)
Dept: NEUROSURGERY | Age: 65
End: 2020-08-20

## 2020-08-20 VITALS
OXYGEN SATURATION: 98 % | SYSTOLIC BLOOD PRESSURE: 132 MMHG | DIASTOLIC BLOOD PRESSURE: 89 MMHG | HEIGHT: 66 IN | HEART RATE: 115 BPM | WEIGHT: 170 LBS | BODY MASS INDEX: 27.32 KG/M2

## 2020-08-20 PROCEDURE — 99203 OFFICE O/P NEW LOW 30 MIN: CPT | Performed by: NEUROLOGICAL SURGERY

## 2020-08-20 ASSESSMENT — ENCOUNTER SYMPTOMS
GASTROINTESTINAL NEGATIVE: 1
RESPIRATORY NEGATIVE: 1
BACK PAIN: 1
EYES NEGATIVE: 1

## 2020-08-20 NOTE — PROGRESS NOTES
[facility]  [facility]      Chief Complaint   Patient presents with    Referral - General     compression fracture    Back Pain       HISTORY OF PRESENT ILLNESS:    Lasha Beck is a 75 y.o. female with a history of Lung cancer s/p chemo and radiation who presents with mid thoracic back pain that started July 4th, 2020. She states that she was sitting in a recliner and just felt that something was off. The pain does not radiate. Her pain is mostly located in the mid thoracic back. The patient denies numbness. The patient has underwent a non-operative treatment course that has included:  NSAIDs  Muscle Relaxers  Opiates  Oral Steroids      Of note she does not use tobacco and does not take blood thinning medications. Past Medical History:   Diagnosis Date    Allergic rhinitis     Anxiety     [facility]ch[facility] obstructive pulmonary disease) ([facility])     GERD (gastroesophageal reflux disease)     Lung cancer (HCC)     RA (rheumatoid arthritis) ([facility])        Past Surgical History:   Procedure Laterality Date    BREAST SURGERY Bilateral     Dr Nasima Murrell Bilateral     SKIN BIOPSY      2 mole removed by mary        Current Outpatient Medications   Medication Sig Dispense Refill    HYDROcodone-acetaminophen (NORCO)  MG per tablet Take 1 tablet by mouth every 8 hours as needed for Pain for up to 30 days.  Intended supply: 30 days 90 tablet 0    predniSONE (DELTASONE) 20 MG tablet Take 1 tablet by mouth daily 30 tablet 0    albuterol sulfate HFA (VENTOLIN HFA) 108 (90 Base) MCG/ACT inhaler Inhale 2 puffs into the lungs 4 times daily as needed for Wheezing 1 Inhaler 5    diclofenac sodium (VOLTAREN) 1 % GEL Apply 2 g topically 4 times daily 2 Tube 1    citalopram (CELEXA) 20 MG tablet Take once daily 90 tablet 2    omeprazole (PRILOSEC) 20 MG delayed release capsule Take 1 capsule by mouth Daily 90 capsule 2    fluticasone (FLONASE) 50 MCG/ACT nasal spray 1 spray by Nasal route daily 1 Bottle 5    cetirizine (ZYRTEC) 10 MG tablet Take 1 tablet by mouth daily 30 tablet 11    fluticasone-umeclidin-vilant (TRELEGY ELLIPTA) 100-62.5-25 MCG/INH AEPB Inhale 1 puff into the lungs daily      silver sulfADIAZINE (SILVADENE) 1 % cream Apply topically daily. (Patient not taking: Reported on 2020) 50 g 1    ZOLMitriptan (ZOMIG) 2.5 MG tablet TAKE 1 TAB. BY MOUTH AS NEEDED FOR MIGRAINES (Patient not taking: Reported on 2020) 6 tablet 3     No current facility-administered medications for this visit. Allergies:  Amoxicillin    Social History:   Social History     Tobacco Use   Smoking Status Former Smoker    Packs/day: 1.00    Years: 20.00    Pack years: 20.00    Last attempt to quit: 10/3/2014    Years since quittin.8   Smokeless Tobacco Never Used     Social History     Substance and Sexual Activity   Alcohol Use No         Family History:   History reviewed. No pertinent family history. REVIEW OF SYSTEMS:  Constitutional: Negative. HENT: Negative. Eyes: Negative. Respiratory: Negative. Cardiovascular: Negative. Gastrointestinal: Negative. Genitourinary: Negative. Musculoskeletal: Positive for back pain. Skin: Negative. Neurological: Negative. Endo/Heme/Allergies: Negative. Psychiatric/Behavioral: Negative. PHYSICAL EXAM:  Vitals:    20 1331   BP: 132/89   Pulse: 115   SpO2: 98%     Constitutional: appears well-developed and well-nourished.    Eyes - conjunctiva normal.  Pupils react to light  Ear, nose, throat - hearing intact to finger rub, No scars, masses, or lesions over external nose or ears, no atrophy oftongue  Neck- symmetric, no masses noted, no jugular vein distension  Respiration- chest wall appears symmetric, good expansion, normal effort without use of accessory muscles  Musculoskeletal - no significant wasting of muscles noted, no bony signal in the posterior vertebral body, with    no strong evidence for underlying pathologic fractures/focal lesion. There is no pathologic marrow signal extending into the pedicles. There is an additional mild inferior endplate compression deformity    above this at T6 with minimal loss of height in the anterior column. In general, no pathologic marrow lesions are identified. Minimal bony    retropulsion at this T7 level, with no evidence for a significant    spinal stenosis. No cord signal abnormality identified. No fracture or    malalignment identified in the posterior column.         Impression    Impression:    1. Acute T7 burst fracture with approximately 90% loss of height in    the anterior column and additional 50% loss of height in the middle    column. Minimal bony retropulsion, with no significant spinal stenosis    identified. No cord signal abnormality. No fracture line identified in    the posterior column. 2. Additional mild inferior endplate compression deformity at T6, also    acute. 3. These do not appear to be pathologic fractures. The results of this exam were discussed with Ceil Males on 8/18/2020    at 1528 hours    Signed by Dr Windy Barbosa on 8/18/2020 3:28 PM    I have personally reviewed these images and my interpretation is: There is a severe T7 compression acute compression fracture with >90% loss of height  There is a also a smaller inferior endplate fracture of T6  There is small kyphotic deformity at that level as well        ASSESSMENT:    Cooper Mahan is a 75 y.o. female with a severe T7 compression fracture and severe thoracic back pain.         ICD-10-CM    1. Closed wedge compression fracture of seventh thoracic vertebra, initial encounter ([facility])  S22.060A XR THORACIC SPINE (2 VIEWS)   2. Closed wedge compression fracture of sixth thoracic vertebra, initial encounter ([facility])  S22.050A XR THORACIC SPINE (2 VIEWS)   3. Acute midline thoracic back pain  M54.6 PLAN:  We have discussed and reviewed the results of the MRI thoracic spine with Mrs. Cesar at length. We explained that she does have a severe compression fracture of T7 that is causing her pain. We discussed operative versus non-operative treatments. She is reluctant to move forward with a procedure and we totally agree given that her pain has improved since the onset in July and she is up and about doing daily activities with some moderate pain. We have educated her to take it easy and not lift anything more than about 10-15 lbs and minimize bending at the waist.    -Recommend tapering off of steroids  -Recommend speaking to her PCP regarding Forteo versus Prolia to help prevent future fractures  -Follow up 1 month with repeat XR thoracic spine        Note: A total of >50% (23 minutes) of 45 minutes was spent discussing the pathophysiology and treatment and/or coordination of care of the above diagnoses.       Wesley Pollard, DO

## 2020-08-20 NOTE — PROGRESS NOTES
Review of Systems   Constitutional: Negative. HENT: Negative. Eyes: Negative. Respiratory: Negative. Cardiovascular: Negative. Gastrointestinal: Negative. Genitourinary: Negative. Musculoskeletal: Positive for back pain. Skin: Negative. Neurological: Negative. Endo/Heme/Allergies: Negative. Psychiatric/Behavioral: Negative.

## 2020-09-03 ENCOUNTER — OFFICE VISIT (OUTPATIENT)
Dept: PULMONOLOGY | Facility: CLINIC | Age: 65
End: 2020-09-03

## 2020-09-03 DIAGNOSIS — Z92.3 HISTORY OF RADIATION THERAPY: ICD-10-CM

## 2020-09-03 DIAGNOSIS — J44.9 STAGE 3 SEVERE COPD BY GOLD CLASSIFICATION (HCC): Primary | ICD-10-CM

## 2020-09-03 DIAGNOSIS — Z87.891 FORMER SMOKER: ICD-10-CM

## 2020-09-03 DIAGNOSIS — J43.9 PULMONARY EMPHYSEMA, UNSPECIFIED EMPHYSEMA TYPE (HCC): ICD-10-CM

## 2020-09-03 DIAGNOSIS — C34.81 MALIGNANT NEOPLASM OF OVERLAPPING SITES OF RIGHT LUNG (HCC): ICD-10-CM

## 2020-09-03 PROCEDURE — 99442 PR PHYS/QHP TELEPHONE EVALUATION 11-20 MIN: CPT | Performed by: NURSE PRACTITIONER

## 2020-09-03 RX ORDER — ALBUTEROL SULFATE 90 UG/1
AEROSOL, METERED RESPIRATORY (INHALATION)
COMMUNITY
Start: 2020-07-28

## 2020-09-04 ENCOUNTER — VIRTUAL VISIT (OUTPATIENT)
Dept: PRIMARY CARE CLINIC | Age: 65
End: 2020-09-04
Payer: MEDICARE

## 2020-09-04 PROCEDURE — 99443 PR PHYS/QHP TELEPHONE EVALUATION 21-30 MIN: CPT | Performed by: NURSE PRACTITIONER

## 2020-09-04 RX ORDER — PREDNISONE 10 MG/1
15 TABLET ORAL DAILY
Qty: 45 TABLET | Refills: 0 | Status: SHIPPED | OUTPATIENT
Start: 2020-09-04 | End: 2021-06-15 | Stop reason: SDUPTHER

## 2020-09-04 RX ORDER — CETIRIZINE HYDROCHLORIDE 10 MG/1
10 TABLET ORAL DAILY
Qty: 30 TABLET | Refills: 11 | Status: SHIPPED | OUTPATIENT
Start: 2020-09-04 | End: 2020-10-11 | Stop reason: SDUPTHER

## 2020-09-04 RX ORDER — FLUTICASONE FUROATE, UMECLIDINIUM BROMIDE AND VILANTEROL TRIFENATATE 100; 62.5; 25 UG/1; UG/1; UG/1
1 POWDER RESPIRATORY (INHALATION) DAILY
Qty: 1 EACH | Refills: 2 | Status: SHIPPED | OUTPATIENT
Start: 2020-09-04 | End: 2020-12-07 | Stop reason: SDUPTHER

## 2020-09-04 ASSESSMENT — ENCOUNTER SYMPTOMS
BACK PAIN: 1
RESPIRATORY NEGATIVE: 1
GASTROINTESTINAL NEGATIVE: 1
EYES NEGATIVE: 1

## 2020-09-04 NOTE — PROGRESS NOTES
with an Established Patient who has not had a related appointment within my department in the past 7 days or scheduled within the next 24 hours. Total Time: minutes: 21-30 minutes      Note: not billable if this call serves to triage the patient into an appointment for the relevant concern      Nicole Granger to the emergency declaration under the [address] [place] [address], [facility] waiver authority and the [employer] and [employer] Act, this Virtual  Visit was conducted, with patient's consent, to reduce the patient's risk of exposure to COVID-19 and provide continuity of care for an established patient. Services were provided through a telephone discussion  to substitute for in-person clinic visit. Parties involved during telephone call include myself, ROBER Rowe and patient listed.

## 2020-09-11 RX ORDER — HYDROCODONE BITARTRATE AND ACETAMINOPHEN 10; 325 MG/1; MG/1
1 TABLET ORAL EVERY 8 HOURS PRN
Qty: 90 TABLET | Refills: 0 | Status: SHIPPED | OUTPATIENT
Start: 2020-09-11 | End: 2020-10-11 | Stop reason: SDUPTHER

## 2020-09-11 NOTE — TELEPHONE ENCOUNTER
Rodrigue Ina called to request a refill on her medication. Last office visit : 2020   Next office visit : 2020     Last UDS:   Amphetamine Screen, Urine   Date Value Ref Range Status   2020 neg  Final     Barbiturate Screen, Urine   Date Value Ref Range Status   2020 asya  Final     Benzodiazepine Screen, Urine   Date Value Ref Range Status   2020 neg  Final     Buprenorphine Urine   Date Value Ref Range Status   2020 neg  Final     Cocaine Metabolite Screen, Urine   Date Value Ref Range Status   2020 neg  Final     Gabapentin Screen, Urine   Date Value Ref Range Status   2020 neg  Final     MDMA, Urine   Date Value Ref Range Status   2020 neg  Final     Methamphetamine, Urine   Date Value Ref Range Status   2020 neg  Final     Opiate Scrn, Ur   Date Value Ref Range Status   2020 Psitive  Final     Oxycodone Screen, Ur   Date Value Ref Range Status   2020 neg  Final     PCP Screen, Urine   Date Value Ref Range Status   2020 neg  Final     Propoxyphene Screen, Urine   Date Value Ref Range Status   2020 neg  Final     THC Screen, Urine   Date Value Ref Range Status   2020 neg  Final     Tricyclic Antidepressants, Urine   Date Value Ref Range Status   2020 neg  Final       Last Cecilia Ruffin2020  Medication Contract: 2020   Last Fill: 2020    Requested Prescriptions     Pending Prescriptions Disp Refills    HYDROcodone-acetaminophen (NORCO)  MG per tablet 90 tablet 0     Sig: Take 1 tablet by mouth every 8 hours as needed for Pain for up to 30 days. Intended supply: 30 days         Please approve or refuse this medication.    Gurpreet Damon

## 2020-09-18 ENCOUNTER — PATIENT MESSAGE (OUTPATIENT)
Dept: NEUROSURGERY | Age: 65
End: 2020-09-18

## 2020-09-18 ENCOUNTER — TELEPHONE (OUTPATIENT)
Dept: ONCOLOGY | Facility: CLINIC | Age: 65
End: 2020-09-18

## 2020-09-18 NOTE — TELEPHONE ENCOUNTER
I called and spoke with the patient. I let her know that it is time to do a CT scan and that we could do a phone visit for her next appt. Pt will call us after her appt with Dr. Hagen on 09/29/2020 to get everything scheduled.

## 2020-09-18 NOTE — TELEPHONE ENCOUNTER
Patient called wanting to know if it was time for her to have a CAT scan to follow-up on her lung cancer.  She stated she had not been seen since June but is now having a lot of back problems and unable to walk much.    It has been approximately 3 months since her last scan and visit therefore I have advised her that I will discuss this with Dr. Melton's nurse and see when he wants a CT scan and visit.  She asked that it be after the 29th as she sees Dr. Hagen for her back on that date.  She will be willing to go to have the CT scan and hopefully have a telephone visit with Dr. Melton regarding the CT results as she it is hard for her to ambulate.    I advised her that we will give her a call back with a plan.    ----- Message from Carrie Ceja CMA sent at 9/18/2020  9:55 AM CDT -----  Regarding: FW: Visit Follow-Up Question  Contact: [phone]    ----- Message -----  From: Damaris Hu  Sent: 9/17/2020   4:48 PM CDT  To: [facility]  Subject: Visit Follow-Up Question                         Sarai I was wondering when I will get a CT Scan for follow up of my cancer being in remission. Could you ck to see if it’s time? I’m seeing Dr. Hagen for my back fracture. I’m still unable to do much. Slow healing. Lots of pain with it. I appreciate you letting me know.   Thank you  Damaris Hu  [phone].

## 2020-09-21 NOTE — TELEPHONE ENCOUNTER
From: Cooper Mention  To: Amita King,   Sent: 9/18/2020 2:10 PM CDT  Subject: Test Results Question    I am supposed to get spine X-ray before my next appt. Can you send an order to [facility] please. Its easier for me to get in & out of this ofc. Please let me know if you can so I can call & schedule an appt. My next appt is Sept 29th so I was gonna get it done next week.    Thank you   Kaelyn Santacruz  [phone]

## 2020-09-24 ENCOUNTER — TELEPHONE (OUTPATIENT)
Dept: NEUROSURGERY | Age: 65
End: 2020-09-24

## 2020-09-24 NOTE — TELEPHONE ENCOUNTER
Patient called and voiced that [facility] told her that they have not received her XR throacic spine order from our office and she has to have this done before her next appointment with us. I voiced to the patient that Kimberly Gave faxed the order over to them the other day but I would fax it to them again this morning. Patient voiced that she would give us a call if they do not receive that one.

## 2020-09-25 ENCOUNTER — TELEPHONE (OUTPATIENT)
Dept: NEUROSURGERY | Age: 65
End: 2020-09-25

## 2020-09-25 NOTE — TELEPHONE ENCOUNTER
Called and spoke with the patient. She voiced that she had her xray completed at [place] and will bring the disc with her to her appointment.

## 2020-09-28 RX ORDER — CITALOPRAM 20 MG/1
TABLET ORAL
Qty: 90 TABLET | Refills: 2 | Status: SHIPPED | OUTPATIENT
Start: 2020-09-28 | End: 2021-09-09 | Stop reason: SDUPTHER

## 2020-09-28 RX ORDER — OMEPRAZOLE 20 MG/1
20 CAPSULE, DELAYED RELEASE ORAL DAILY
Qty: 90 CAPSULE | Refills: 2 | Status: SHIPPED | OUTPATIENT
Start: 2020-09-28 | End: 2021-07-11 | Stop reason: SDUPTHER

## 2020-09-29 ENCOUNTER — OFFICE VISIT (OUTPATIENT)
Dept: NEUROSURGERY | Age: 65
End: 2020-09-29
Payer: MEDICARE

## 2020-09-29 VITALS
HEIGHT: 66 IN | OXYGEN SATURATION: 95 % | WEIGHT: 170 LBS | BODY MASS INDEX: 27.32 KG/M2 | HEART RATE: 110 BPM | SYSTOLIC BLOOD PRESSURE: 139 MMHG | DIASTOLIC BLOOD PRESSURE: 94 MMHG

## 2020-09-29 PROCEDURE — 99213 OFFICE O/P EST LOW 20 MIN: CPT | Performed by: NEUROLOGICAL SURGERY

## 2020-09-29 ASSESSMENT — ENCOUNTER SYMPTOMS
BACK PAIN: 1
RESPIRATORY NEGATIVE: 1
GASTROINTESTINAL NEGATIVE: 1
EYES NEGATIVE: 1

## 2020-09-29 NOTE — PROGRESS NOTES
[facility] [place] [facility]  [facility]      Chief Complaint   Patient presents with    Follow-up    Back Pain    Difficulty Walking     9/29/2020:  Mrs. Curt Bobby returns to the clinic today for follow up. Today she states that her pain is still about the same, she has not improved. She states \"I'm not doing good, I can't do anything\". She is attempting to wean down off of her oral steroids. She is now down to 10mg daily. She is accompanied by her family member that states she has absolute no quality of life. Her pain remains in the mid thoracic spine. Repeat x-rays today revealed two new smaller fractures above her prevous T7 fracture. HISTORY OF PRESENT ILLNESS:    Asiya Chakraborty is a 77 y.o. female with a history of Lung cancer s/p chemo and radiation who presents with mid thoracic back pain that started July 4th, 2020. She states that she was sitting in a recliner and just felt that something was off. The pain does not radiate. Her pain is mostly located in the mid thoracic back. The patient denies numbness. The patient has underwent a non-operative treatment course that has included:  NSAIDs  Muscle Relaxers  Opiates  Oral Steroids      Of note she does not use tobacco and does not take blood thinning medications.                Past Medical History:   Diagnosis Date    Allergic rhinitis     Anxiety     COPD (chronic obstructive pulmonary disease) (HCC)     GERD (gastroesophageal reflux disease)     Lung cancer (HCC)     RA (rheumatoid arthritis) (HCC)        Past Surgical History:   Procedure Laterality Date    BREAST SURGERY Bilateral     Dr Elvira Mann Bilateral     SKIN BIOPSY      2 mole removed by mary        Current Outpatient Medications   Medication Sig Dispense Refill    citalopram (CELEXA) 20 MG tablet Take once daily 90 tablet 2    omeprazole (PRILOSEC) 20 MG delayed release capsule Take 1 capsule by mouth Daily 90 capsule 2    HYDROcodone-acetaminophen (NORCO)  MG per tablet Take 1 tablet by mouth every 8 hours as needed for Pain for up to 30 days. Intended supply: 30 days 90 tablet 0    predniSONE (DELTASONE) 10 MG tablet Take 1.5 tablets by mouth daily (Patient taking differently: Take 10 mg by mouth daily ) 45 tablet 0    cetirizine (ZYRTEC) 10 MG tablet Take 1 tablet by mouth daily 30 tablet 11    fluticasone-umeclidin-vilant (TRELEGY ELLIPTA) 100-62.5-25 MCG/INH AEPB Inhale 1 puff into the lungs daily 1 each 2    albuterol sulfate HFA (VENTOLIN HFA) 108 (90 Base) MCG/ACT inhaler Inhale 2 puffs into the lungs 4 times daily as needed for Wheezing 1 Inhaler 5    diclofenac sodium (VOLTAREN) 1 % GEL Apply 2 g topically 4 times daily 2 Tube 1    fluticasone (FLONASE) 50 MCG/ACT nasal spray 1 spray by Nasal route daily 1 Bottle 5     No current facility-administered medications for this visit. Allergies:  Amoxicillin    Social History:   Social History     Tobacco Use   Smoking Status Former Smoker    Packs/day: 1.00    Years: 20.00    Pack years: 20.00    Last attempt to quit: 10/3/2014    Years since quittin.9   Smokeless Tobacco Never Used     Social History     Substance and Sexual Activity   Alcohol Use No         Family History:   No family history on file. REVIEW OF SYSTEMS:  Constitutional: Negative. HENT: Negative. Eyes: Negative. Respiratory: Negative. Cardiovascular: Negative. Gastrointestinal: Negative. Genitourinary: Negative. Musculoskeletal: Positive for back pain. Skin: Negative. Neurological: Negative. Endo/Heme/Allergies: Negative. Psychiatric/Behavioral: Negative. PHYSICAL EXAM:  Vitals:    20 1319   BP: (!) 139/94   Pulse: 110   SpO2: 95%     Constitutional: appears well-developed and well-nourished.    Eyes - conjunctiva normal.  Pupils react to light  Ear, nose, throat - hearing intact to finger rub, No scars, masses, or lesions over external nose or ears, no atrophy oftongue  Neck- symmetric, no masses noted, no jugular vein distension  Respiration- chest wall appears symmetric, good expansion, normal effort without use of accessory muscles  Musculoskeletal - no significant wasting of muscles noted, no bony deformities, gait no gross ataxia  Extremities- no clubbing, cyanosis oredema  Skin - warm, dry, and intact. No rash, erythema, or pallor. Psychiatric - mood, affect, and behavior appear normal.     Neurologic Examination  Awake, Alert and oriented x 4  Normal speech pattern, following commands    Motor:  RIGHT:     iliopsoas 5/5    knee flexor 5/5    knee extension 5/5    EHL/dorsiflexion 5/5    plantar flexion 5/5    LEFT:       iliopsoas 5/5    knee flexor 5/5    knee extension 5/5    EHL/dorsiflexion 5/5    plantar flexion 5/5    No deficits to light touch   Reflexes are 2+ and symmetric  No myofacial tenderness to palpation  Slightly Antalgic Gait pattern      DATA and IMAGING:    Nursing/pcp notes, imaging, labs, and vitals reviewed.      PT,OT and/or speech notes reviewed    Lab Results   Component Value Date    WBC 7.7 03/12/2019    HGB 12.8 03/12/2019    HCT 41.8 03/12/2019    MCV 96.8 03/12/2019     03/12/2019     Lab Results   Component Value Date     12/31/2018    K 3.9 12/31/2018     12/31/2018    CO2 23 12/31/2018    BUN 12 12/31/2018    CREATININE 1.0 08/18/2020    GLUCOSE 93 12/31/2018    CALCIUM 9.3 12/31/2018    PROT 7.0 12/31/2018    LABALBU 4.1 12/31/2018    BILITOT <0.2 12/31/2018    ALKPHOS 52 12/31/2018    AST 13 12/31/2018    ALT 11 12/31/2018    LABGLOM 55 (A) 08/18/2020    GFRAA >60 08/18/2020    AGRATIO 2.0 05/01/2017    GLOB 2.3 05/01/2017   No results found for: INR, PROTIME    MRI THORACIC SPINE W WO CONTRAST 8/18/2020 12:33 PM    HISTORY: S22.060A    COMPARISON : NONE    Technique: Multiplanar, multisequence MRI of the thoracic spine was    obtained with and without the use of contrast. 15 cc MultiHance IV    contrast.    Findings: The spine is imaged from C6 through L1. There is a T7 burst fracture    which is acute. Approximately 90% loss of height in the anterior    column with an additional 50% loss of height in the middle column. There is preserved T1 fat signal in the posterior vertebral body, with    no strong evidence for underlying pathologic fractures/focal lesion. There is no pathologic marrow signal extending into the pedicles. There is an additional mild inferior endplate compression deformity    above this at T6 with minimal loss of height in the anterior column. In general, no pathologic marrow lesions are identified. Minimal bony    retropulsion at this T7 level, with no evidence for a significant    spinal stenosis. No cord signal abnormality identified. No fracture or    malalignment identified in the posterior column.         Impression    Impression:    1. Acute T7 burst fracture with approximately 90% loss of height in    the anterior column and additional 50% loss of height in the middle    column. Minimal bony retropulsion, with no significant spinal stenosis    identified. No cord signal abnormality. No fracture line identified in    the posterior column. 2. Additional mild inferior endplate compression deformity at T6, also    acute. 3. These do not appear to be pathologic fractures. The results of this exam were discussed with Ruchi Rashid on 8/18/2020    at 1528 hours    Signed by Dr Keri Kocher on 8/18/2020 3:28 PM    I have personally reviewed these images and my interpretation is:   There is a severe T7 compression acute compression fracture with >90% loss of height  There is a also a smaller inferior endplate fracture of T6  There is small kyphotic deformity at that level as well      X ray of thoracic spine, Katia  T7 fracure is stable   T6 fracture has slightly progressed with new mild superior endplate fracture of T5      ASSESSMENT:    Carlos Marin is a 77 y.o. female with a severe T7 compression fracture with new T5 and T6 fractures with severe thoracic back pain. ICD-10-CM    1. Closed wedge compression fracture of seventh thoracic vertebra with delayed healing, subsequent encounter  S22.060G    2. Closed wedge compression fracture of sixth thoracic vertebra with delayed healing, subsequent encounter  S22.050G    3. Closed wedge compression fracture of fifth thoracic vertebra, initial encounter  S22.050A    4. Acute midline thoracic back pain  M54.6        PLAN:  -We have explained that Mrs. Aspen Rea now has 2 additional compression fractures of the thoracic spine. T5 and T6. We of course cannot perform a kyphoplasty of the T7 fracture given that is is vertebra plana, however, we can offer her a kyphoplasty of the T5 and T6. That being said, we informed her that this will not take away all of her back pain, however, it could help. The sister mentions a procedure with the \"Kasey System\" very similar to a kyphoplasty that involves a coil. We are actually going to look into this to see what we can do. We may end up sending her to [place] that performs this. We definitely recommend she start the Prolia injections as soon as possible.        -She will need a kyphoplasty of T5 and T6     We discussed risks, complications and expectations, including but not limited to infection, paralysis, bowel and bladder dysfunction, need for revision procedure, persistent pain, spinal fluid leak, stroke and death. In addition, the benefits of the surgery were thoroughly discussed and the patient demonstrated a deep understanding. The patient wishes to proceed with surgical intervention. The patient was counseled at length about the risks of teresa Covid-19 during their perioperative period and any recovery window from their procedure.   The patient was made aware that teresa Covid-19  may worsen their prognosis for recovering from their procedure  and lend to a higher morbidity and/or mortality risk. All material risks, benefits, and reasonable alternatives including postponing the procedure were discussed. The patient does wish to proceed with the procedure at this time.             Lachelle Walker,

## 2020-09-30 ENCOUNTER — TELEPHONE (OUTPATIENT)
Dept: ONCOLOGY | Facility: CLINIC | Age: 65
End: 2020-09-30

## 2020-09-30 ENCOUNTER — VIRTUAL VISIT (OUTPATIENT)
Dept: PRIMARY CARE CLINIC | Age: 65
End: 2020-09-30
Payer: MEDICARE

## 2020-09-30 DIAGNOSIS — C34.81 MALIGNANT NEOPLASM OF OVERLAPPING SITES OF RIGHT LUNG (HCC): Primary | ICD-10-CM

## 2020-09-30 PROCEDURE — 99443 PR PHYS/QHP TELEPHONE EVALUATION 21-30 MIN: CPT | Performed by: NURSE PRACTITIONER

## 2020-09-30 RX ORDER — DENOSUMAB 60 MG/ML
60 INJECTION SUBCUTANEOUS ONCE
Qty: 1 ML | Refills: 1 | Status: SHIPPED | OUTPATIENT
Start: 2020-09-30 | End: 2021-01-29

## 2020-09-30 ASSESSMENT — ENCOUNTER SYMPTOMS
GASTROINTESTINAL NEGATIVE: 1
RESPIRATORY NEGATIVE: 1
EYES NEGATIVE: 1
BACK PAIN: 1

## 2020-09-30 NOTE — TELEPHONE ENCOUNTER
Pt called and she has seen Dr. Hagen. She has 3 fractures in her spine. She is now ready to do the CT chest. I will put in an order. Damaris will call us and let us know when she is ready to schedule a follow up telephone visit.

## 2020-09-30 NOTE — PROGRESS NOTES
[address] [address]            Phone:  [phone]  Fax:  [phone]    Wilner Beltran is a 77 y.o. female evaluated via telephone on 9/30/2020. Consent:    She and/or health care decision maker is aware that that she may receive a bill for this telephone service, depending on her insurance coverage, and has provided verbal consent to proceed: Yes      HPI  Chief Complaint   Patient presents with    Other     compression fracture and prolia injections       I communicated with the patient and/or health care decision maker about osteoporosis and compression fracture. She had follow up with Dr. Choudhary Overall. She is a candidate for surgery, but will likely have this completed in [place]. She continues to have increased pain related to the compression fracture. She is wanting to start Prolia injections due to worsening thoracic compression fractures. Review of Systems   Constitutional: Positive for fatigue. HENT: Negative. Eyes: Negative. Respiratory: Negative. Cardiovascular: Negative. Gastrointestinal: Negative. Endocrine: Negative. Genitourinary: Negative. Musculoskeletal: Positive for back pain. Skin: Negative. Neurological: Positive for weakness. Hematological: Negative. Psychiatric/Behavioral: Negative. PLAN    Details of this discussion including any medical advice provided:     1. Compression fracture of T7 vertebra, initial encounter ([facility] [address]      2. Other osteoporosis with current pathological fracture with delayed healing, subsequent encounter    - denosumab (PROLIA) 60 MG/ML SOSY SC injection; Inject 1 mL into the skin once for 1 dose  Dispense: 1 mL; Refill: 1  - Comprehensive Metabolic Panel; Future  - Magnesium; Future  - Phosphorus; Future    3. Vitamin D deficiency    - Vitamin D 25 Hydroxy; Future    4. Anxiety and depression    - CBC Auto Differential; Future      I am starting patient on Prolia.   I am checking labs the day of the Prolia injection. Patient will need to repeat labs in 2 weeks including CMP, magnesium, phosphorus level. I affirm this is a Patient Initiated Episode with an Established Patient who has not had a related appointment within my department in the past 7 days or scheduled within the next 24 hours. Total Time: minutes: 21-30 minutes      Note: not billable if this call serves to triage the patient into an appointment for the relevant concern      Willie Ochoaion to the emergency declaration under the [facility] waiver authority and the [employer] Act, this Virtual  Visit was conducted, with patient's consent, to reduce the patient's risk of exposure to COVID-19 and provide continuity of care for an established patient. Services were provided through a telephone discussion  to substitute for in-person clinic visit. Parties involved during telephone call include myself, ROBER Win and patient listed.

## 2020-10-05 ENCOUNTER — TELEPHONE (OUTPATIENT)
Dept: NEUROSURGERY | Age: 65
End: 2020-10-05

## 2020-10-05 NOTE — TELEPHONE ENCOUNTER
----- Message from Rod Haynes DO sent at 10/3/2020  1:30 PM CDT -----  Will discuss at follow up. Ollie Griffith  ----- Message -----  From: Sienna Gutierrez  Sent: 10/2/2020  12:02 PM CDT  To: Rod Haynes DO    From   Salma Oneill  To   [facility] Neurosurgery Clinical Staff  Sent   10/1/2020  4:03 PM   Dr Ollie Griffith. Did you find out any information about Kasey procedure. You said you would let me know something today. I have some information about it. So when you have time you can call me at [phone]. Thank you   Rositaeal Crew      Going to call patient to see what info she has.

## 2020-10-12 RX ORDER — CETIRIZINE HYDROCHLORIDE 10 MG/1
10 TABLET ORAL DAILY
Qty: 30 TABLET | Refills: 11 | Status: SHIPPED | OUTPATIENT
Start: 2020-10-12 | End: 2020-11-11 | Stop reason: SDUPTHER

## 2020-10-13 RX ORDER — HYDROCODONE BITARTRATE AND ACETAMINOPHEN 10; 325 MG/1; MG/1
1 TABLET ORAL EVERY 8 HOURS PRN
Qty: 90 TABLET | Refills: 0 | Status: SHIPPED | OUTPATIENT
Start: 2020-10-13 | End: 2020-11-11 | Stop reason: SDUPTHER

## 2020-10-14 ENCOUNTER — HOSPITAL ENCOUNTER (OUTPATIENT)
Dept: CT IMAGING | Facility: HOSPITAL | Age: 65
Discharge: HOME | End: 2020-10-14
Admitting: INTERNAL MEDICINE

## 2020-10-14 DIAGNOSIS — C34.81 MALIGNANT NEOPLASM OF OVERLAPPING SITES OF RIGHT LUNG (HCC): ICD-10-CM

## 2020-10-14 LAB — CREAT BLDA-MCNC: 0.9 MG/DL (ref 0.6–1.3)

## 2020-10-14 PROCEDURE — 82565 ASSAY OF CREATININE: CPT

## 2020-10-14 PROCEDURE — 25010000002 IOPAMIDOL 61 % SOLUTION: Performed by: INTERNAL MEDICINE

## 2020-10-14 PROCEDURE — 71260 CT THORAX DX C+: CPT

## 2020-10-14 RX ADMIN — IOPAMIDOL 100 ML: 612 INJECTION, SOLUTION INTRAVENOUS at 13:42

## 2020-10-15 ENCOUNTER — TELEPHONE (OUTPATIENT)
Dept: ONCOLOGY | Facility: CLINIC | Age: 65
End: 2020-10-15

## 2020-10-15 NOTE — TELEPHONE ENCOUNTER
Notified Damaris of CT scan results.  Fracture at T7 and he wants her to see a neurosurgeon.  Damaris stated that she is already seeing Dr. Hagen about her back and are trying to decide if they are going to do kyphoplasty or not.  Also, notified Damaris that Dr. Russell wants her to see a vascular surgeon because of stenosis of the left common carotid.  Damaris stated that she can hardly walk and is having a hard time getting around and wanting to know if she can delay seeing the vascular surgeon until he back is better.  Notified her I would let Dr. Russell know and see if it is ok to delay the referral to the vascular surgeon.

## 2020-10-15 NOTE — TELEPHONE ENCOUNTER
----- Message from Kade Russell MD sent at 10/14/2020  8:21 PM CDT -----  CT chest 10/14/2020– stable/improved post therapy change.  No new lung lesions.  No fractures at T6/T7 with severe fracture of T7.  Pathologic fracture versus osteoporotic fracture.  Moderate stenosis left common carotid.  Please: 1.inform patient of the stable findings regarding her lung cancer; 2.appoint to neurosurgery Re: New fractures T6/T7 with severe fracture at T7; 3.appoint to vascular surgery/Dr. Starkey Re: The left common carotid artery stenosis.  Thank you

## 2020-10-16 ENCOUNTER — APPOINTMENT (OUTPATIENT)
Dept: CT IMAGING | Facility: HOSPITAL | Age: 65
End: 2020-10-16

## 2020-10-16 ENCOUNTER — TELEPHONE (OUTPATIENT)
Dept: ONCOLOGY | Facility: CLINIC | Age: 65
End: 2020-10-16

## 2020-10-16 DIAGNOSIS — I65.22 STENOSIS OF LEFT CAROTID ARTERY: Primary | ICD-10-CM

## 2020-10-16 NOTE — TELEPHONE ENCOUNTER
Notified Damaris that Dr. Russell wants to go ahead and refer her to vascular but it is not urgent.  Verbalized understanding.

## 2020-10-16 NOTE — TELEPHONE ENCOUNTER
----- Message from Kade Russell MD sent at 10/15/2020  9:27 PM CDT -----  Okay but a current appointment so she will have one down the road.  Thank you  ----- Message -----  From: Myah Martines RN  Sent: 10/15/2020   1:45 PM CDT  To: Kade Russell MD    Damaris is wanting to know if she can delay seeing Dr. Starkey about the left common artery stenosis until she gets her back fixed.  She is seeing Dr. Hagen about her back and maybe having kyphoplasty soon.   She is having a hard time walking.  ----- Message -----  From: Kade Russell MD  Sent: 10/14/2020   8:21 PM CDT  To: [facility]    CT chest 10/14/2020– stable/improved post therapy change.  No new lung lesions.  No fractures at T6/T7 with severe fracture of T7.  Pathologic fracture versus osteoporotic fracture.  Moderate stenosis left common carotid.  Please: 1.inform patient of the stable findings regarding her lung cancer; 2.appoint to neurosurgery Re: New fractures T6/T7 with severe fracture at T7; 3.appoint to vascular surgery/Dr. Starkey Re: The left common carotid artery stenosis.  Thank you

## 2020-11-09 NOTE — PROGRESS NOTES
"Primary Care Provider: Luz Maria Skelton APRN  Requesting Provider: Blas Ho APRN    Chief Complaint:   Chief Complaint   Patient presents with   • Back Pain     HX OF COMPRESSION FX; XRAY @ [facility] 07/29/20 & 09/24/20; mri @ [facility]     History of Present Illness  C is being seen for consultation today at the request of RONALD Joseph    HPI:  Damaris Hu is a 65 y.o. female who presents today with her sister with a complaint of thoracic back pain.  No known injury.    Sudden onset of thoracic back pain on July 4, 2020 upon standing.  Since the onset of her symptoms she has been evaluated by her PCP as well as Dr. Hagen.  Numerous images have been obtained and she was found to have compression fractures at T6 and T7.  At no point to date was she placed in a brace for conservative management.    Currently, she complains of intermittent nonradiating mid thoracic back pain.  Her discomfort worsens with standing and walking and decreases to some extent with sitting, lying down, and with use of Norco.  She denies truncal numbness or tingling, or upper or lower extremity pain or weakness.  She is currently wearing an elastic wrap around her waist, however reports this offers little to no back support. She additionally denies fevers, chills, night sweats, unexplained weight loss, saddle anesthesia, or bowel dysfunction.  She does report the sensation of \"bladder pressure\" upon standing as well as the sensation of incomplete bladder emptying after voiding, however she denies urinary incontinence.   She currently rates the severity of her symptoms 6/10.  No additional concerns at this time.    Oncology summary  Diagnosed in November 2019 with Stage IIIC (cT4, cN3, M0)Squamous cell carcinoma of the right lung, perihilar 5.4 x 5.3 x 4.5 cm mass at right major fissure, involves both the right upper and right lower lobes with direct extension into the subcarinal space, abuts medial mediastinal pleura and partially " encases RUL bronchus and bronchus intermedius with right supraclavicular and mediastinal kyler metastasis. She completed 6600 cGy in 33 fractions to the right lung on 01/27/2020 and completed 6 cycles of weekly carbo/taxol on 01/27/2020. She initiated maintenance Durvalumab on 02/17/2020 with plans for one year under the care of .     10/28/2019 - Patient presented to the [facility] emergency room with chest pain that had been ongoing for the past year but that recently had gotten more noticeable, more frequent, and has been associated with shortness of breath.  On the day prior to her hospital admission she apparently had a syncopal episode. Further evaluation was completed.      10/28/2019 - CT Head Without Contrast:  · No acute intracranial process.        10/28/2019 - Chest x-ray:  · RIGHT lung mass is highly concerning for neoplasm. Further evaluation with CT of the chest with contrast is recommended.      10/28/2019 - CT Chest With Contrast:  · Primary lung mass is centered in the RIGHT upper lobe and abuts the posterior mediastinal pleura. This also partially encases the RIGHT bronchus intermedius and RIGHT upper lobe bronchus. This is consistent with pulmonary malignancy. Surgical sampling is recommended. This could be obtained with bronchoscopy.   · Suspected metastatic lymph node in the pretracheal region measuring 1.3 cm in short axis.        11/04/2019 - Appointment with Dr. Delonte Burns, in [place] ([facility]):  · Spirometry on that date revealed severe COPD with positive bronchodilator response.    · Postbronchodilator FEV1 revealed significant improvement to moderate/severe COPD.    · Plan: Navigational bronchoscopy and possible EBUS for tissue diagnosis.     11/05/2019 - PET Scan:  · Markedly hypermetabolic RIGHT perihilar tumor ( 49 x 37 mm) with right supraclavicular and mediastinal kyler metastasis.     11/07/2019 - CT Chest without contrast at [facility]  center:  · Large right spiculated infrahilar mass, crossing the major fissure to involve both the right upper and right lower lobes with direct extension into the subcarinal space  · Mildly enlarged mediastinal lymph nodes and normal sized right supraclavicular lymph node, corresponding to areas of PET positivity     11/07/2019 - Bronchoscopy and EBUS guided FNA biopsy of right upper lobe of the lung and lymph nodes Dr. Delonte Burns:  · Bronchoscopy and EBUS guided FNA biopsy of right upper lobe lung:  ? Positive for malignant cells consistent with squamous cell carcinoma.    · EBUS guided FNA, station 7:   ? Lymph node elements present.   ? No malignant cells identified.    · EBUS guided FNA, station 4R:   ? Positive for malignant cells consistent with squamous cell carcinoma.    · Immunohistochemical stains performed on cell block #3.    · Tumor cells positive for squamous squamous markers p63 and CK 5/6, negative for CK7, TTF-1, and CD 56.    · Addendum: Subsequent biopsy showed metastatic disease in a supraclavicular lymph node, finding consistent with advanced stage disease.  Per protocol will proceed with tumor genomic testing on this specimen.  Due to the relatively small amount of tumor cellularity available, the entire panel of testing may not be able to be completed.  Results to be reported separately.     11/07/2019-Navigational bronchoscopy and forceps biopsy of posterior right upper lobe lung mass Dr. Delonte Burns:  · Fragments of bronchial mucosa.    · No evidence of granuloma or malignancy.     11/08/2019- Ultrasound-guided biopsy right supraclavicular lymph node (fine-needle aspirate) Dr. Delonte Burns:    · Cytopathology diagnosis: Positive for malignant cells consistent with previously established diagnosis of metastatic non-small cell carcinoma.    · Comment: Insufficient tumor cellularity for further testing on the specimen.       11/15/2019 - Consult with :  · Draw  baseline CBC with differential, CMP, CEA, serum iron, iron saturation, ferritin, B12, folate.   · Obtain molecular studies from lung biopsy done on 11/07/2019 (eGFR, ALK/ROS, BRAF, and PD-L1.   · Schedule MRI of brain with and without contrast  · Schedule chemotherapy beginning week 1 on 11/25/2019, week 2 on 12/02/2019, week 3 on 12/09/2019, week 4 on 12/16/2019, week 5 on 12/23/2019, week 6 on 12/30/2019:   ? Taxol 45 mg/m2 (BSA = 1.82; TD = 81 mg) per administration guidelines weekly x6 weeks with radiation.   ? Carboplatin AUC 2 (BSA = 1.82; TD = dose pending) per administration guidelines weekly x6 weeks with radiation.   · Appoint to Dr. Luis Enrique GEORGE Re: Assess for concurrent radiation (care actually discussed with Dr. Dudley over the telephone.  He agrees with need for concurrent radiation).  · Appoint to Dr. Arthur GEORGE Re: Mediport placement.   · Return to the office in 4 weeks with pre-office serum iron, Fe sat, ferritin, CBC with differential, CMP, and CEA.   · Further recommendations pending.      11/21/2019 - MRI Brain with and without contrast:  · Cerebral white matter lesions are consistent with small vessel ischemic disease. There is also empty sella syndrome  · No evidence of metastatic disease in the head.      11/22/2019 - Port placement per .     11/25/2019 - Consult with :  · Following this discussion and in consideration of the diagnostic data/evaluation of the patient, I am recommending definitive course of concurrent chemotherapy under the direction of Dr. Russell and radiation therapy to the right lung and nodes.   · We will simulation treatment fields today to begin the planning process, final course to be determined with planning although I anticipate a fractionated course of 7940-0180 cGy at 180cGy per day.      12/05/2019 - 01/27/2020 - Completed Radiation course:  · Received 6600 cGy in 33 fractions to right lung via external beam radiation therapy.     12/09/2019  - 01/27/2020 - Chemotherapy course:  · Carbo/Taxol weekly x 6     02/17/2020 - Appointment with :  · NCCN Guidelines Version 5.2019 for stage III squamous cell cancer management previously reviewed. For T3-4N1 disease after definitive chemo + radiation recommendation is Durvalumab (category 1) x 1 year.   · Durvalumab. Discussed the potential toxicities to include but not limited to (immune mediated reaction, pneumonitis interstitial lung disease, hepatitis, colitis, severe diarrhea, hypothyroidism, hyperthyroidism, thyroiditis, type 1 diabetes, hypopituitarism, thrombocytopenic purpura, nephritis, aseptic meningitis, hemolytic anemia, myocarditis, severe infection, uveitis, infusion reaction, electrolyte abnormalities, lymphopenia, anemia, skin reaction, fatigue, upper respiratory infection, musculoskeletal pain, constipation, decreased appetite, nausea, edema, urinary tract infection, abdominal pain, pyrexia, dyspnea, rash, cough). Questions answered. She agrees with a trial of therapy.   · Schedule C1 on 02/17/2020; C2 on 03/02/2020, C3 on 03/16/2020 - durvalumab (plan: every 2 weeks x 24 cycles - 12 months, progression or toxicity) per administration guidelines - at [facility]   ? durvalumab 10 mg/kg (WT = 85 kg; TD = 850 mg)   · Appoint to Dr. Tobar Re:  [place] of care for COPD  · Return to the office on 03/23/2020 with pre-office serum iron, Fe sat, ferritin, CBC with differential, CMP, and CEA.     02/17/2020 - Chemotherapy:  · Durvalumab     03/12/2020 - Appointment with :  · Return to Dr. Dudley in 3 months  · CT of chest ordered at [facility]  · Follow with Dr. Russell as scheduled     03/15/2020 - Chest x-ray:  · Right upper lobe interstitial pneumonia versus radiation fibrosis.     03/15/2020 - CT Angio Chest:  · Near complete resolution of the perihilar right lung mass.  · Chronic lung changes with bilateral upper lobe infiltrate. Upper lobe pneumonia is the main concern though some  "of this may be related to radiation therapy fibrosis.  · No pulmonary embolism.     06/10/2020 - Appointment with ion:  RECOMMENDATIONS:   · Draw preoffice labs if not done  ·  Re: Discussed the labs from 05/07/2020 with normal WBC, resolution of anemia, normal platelets, normal CMP,  pending ferritin 284 (from 368; from 591; from 65), iron saturation 14% (from 25%; from 35%; from 7%), serum iron 41 (from 66; from 95; from 19).  Pending B12 and folate.  Previously elevated CEA (3.71; from 4.12; from 3.38).  Durvalumab tolerance discussed.  Fatigue, pleuritic chest discomfort, diffuse myalgias/arthralgias that she states improved with prednisone taper (10 mg beginning 6/4/2020 and is currently on 5 mg daily).  Wanted to discuss the option of \"taking a break\".  We agreed to a trial of giving the drug every 4 weeks instead of every 2 weeks.  · Previously reviewed available molecular studies from lung biopsy done on 11/07/2019 (above) noting lack of mutation for EGFR, BRAF and KRAS.  ALK and PD-L1 not reported presumably due to lack of specimen.   · Reviewed encounter from Lucila Couch PA-C with Dr. Dudley, 03/12/2020.  No evidence for recurrent or metastatic disease.  RTC 3 months with CT of chest ordered at [facility].  · Previously reviewed (Telemed visit) from Dr. Tobar, 04/02/2020.  Assessment:  Pneumonitis complicating radiation and medical therapies.  Continue Trelegy and albuterol.  Plan for PFTs when she can get in the office later in the summer.  · NCCN Guidelines Version 5.2019 for stage III squamous cell cancer management previously reviewed. For T3-4N1 disease after definitive chemo + radiation recommendation is Durvalumab (category 1) x 1 year.   · Durvalumab. Previously discussed the potential toxicities to include but not limited to (immune mediated reaction, pneumonitis interstitial lung disease, hepatitis, colitis, severe diarrhea, hypothyroidism, hyperthyroidism, thyroiditis, type 1 " diabetes, hypopituitarism, thrombocytopenic purpura, nephritis, aseptic meningitis, hemolytic anemia, myocarditis, severe infection, uveitis, infusion reaction, electrolyte abnormalities, lymphopenia, anemia, skin reaction, fatigue, upper respiratory infection, musculoskeletal pain, constipation, decreased appetite, nausea, edema, urinary tract infection, abdominal pain, pyrexia, dyspnea, rash, cough).   · Review UpToDate management of toxicity related to durvalumab.  Withhold and/or discontinue durvalumab to manage adverse reactions (no dosage reductions are recommended).  Resume durvalumab when the toxicity is improved to grade 1 or lower and the corticosteroid dose is reduced to <10 mg/day prednisone (or equivalent).  Permanently discontinue durvalumab for persistent grade 2 or 3 adverse reactions (excluding endocrinopathies) which do not recover to grade 1 or lower within 12 weeks after the last dose or for recurrent grade 3 or 4 reactions.  If unable to reduce prednisone dose to < 10 mg/day (or equivalent) within 12 weeks after the last durvalumab dose, discontinue durvalumab permanently.  · Schedule C4 on 07/06/2020; C5 on 08/03/2020 - durvalumab (plan: every 4 weeks x 12 cycles - 12 months, progression or toxicity) per administration guidelines - at [facility]   ? durvalumab 10 mg/kg (WT = 85 kg; TD = 850 mg)   · Premeds:    ? Decadron 10 mg IV   ? Benadryl 25 mg IV   ? Pepcid 10 mg IV   · CMP, Mg++ and CBC with differential on days of durvalumab.  · Schedule CT of the chest with IV contrast next week at [facility].  Prior studies.  · Return to the office in 6 weeks with pre-office serum iron, Fe sat, ferritin, CBC with differential, CMP, and CEA.      06/16/2020 - CT chest with contrast:  · Along the right pulmonary fissure, involving the right upper lobe and right lower lobe, there is a 2.6 x 5.1 cm opacity in the area of the previously described right hilar mass. This has a tethered appearance of bronchiectasis.  Although it measures larger than 3/15/2020, is favored to represent posttreatment changes. Recommend follow-up.  · Interval resolution of probable infectious/inflammatory biapical pulmonary opacities.  · Ascending thoracic aorta measures 4 cm.  · Emphysema.  · Calcified aortic atherosclerosis.    ROS  Review of Systems   Constitutional: Positive for activity change and fatigue.   HENT: Positive for sinus pressure.    Eyes: Positive for redness and visual disturbance.   Respiratory: Positive for shortness of breath.    Cardiovascular: Negative.    Gastrointestinal: Negative.    Endocrine: Negative.    Genitourinary: Positive for frequency and urgency.   Musculoskeletal: Positive for back pain, gait problem, joint swelling, myalgias, neck pain and neck stiffness.   Skin: Negative.    Allergic/Immunologic: Positive for environmental allergies.   Neurological: Positive for dizziness, light-headedness and headaches.   Hematological: Negative.    Psychiatric/Behavioral: The patient is nervous/anxious.    All other systems reviewed and are negative.    Past Medical History:   Diagnosis Date   • Anemia    • Arthritis    • Cancer (CMS/HCC)     Right lung   • Cervical disc disease    • Depression    • Emphysema lung (CMS/HCC)    • GERD (gastroesophageal reflux disease)    • Hypomagnesemia 12/30/2019   • Lung cancer (CMS/HCC)    • Migraines    • Osteoporosis    • Vitamin D deficiency      Past Surgical History:   Procedure Laterality Date   • BREAST SURGERY      Cyst removal   • COLONOSCOPY N/A 12/3/2018    Procedure: COLONOSCOPY WITH ANESTHESIA;  Surgeon: Myah Pool MD;  Location: [facility] ENDOSCOPY;  Service: Gastroenterology   • HYSTERECTOMY     • VENOUS ACCESS DEVICE (PORT) INSERTION N/A 11/22/2019    Procedure: PLACEMENT OF SINGLE LUMEN PORT;  Surgeon: Mona Gibson MD;  Location: [facility] OR;  Service: General     Family History: family history includes Arthritis in her sister; Asthma in her sister; Birth defects in  "her mother; Breast cancer in her maternal aunt; Colon polyps in her mother; Heart disease in her mother; Hypertension in her sister; Lung cancer in her mother; No Known Problems in her father; Prostate cancer in her maternal grandfather.    Social History:  reports that she has quit smoking. Her smoking use included cigarettes. She has never used smokeless tobacco. She reports that she does not drink alcohol or use drugs.    Medications:    Current Outpatient Medications:   •  denosumab (Prolia) 60 MG/ML solution prefilled syringe syringe, Inject 60 mg under the skin into the appropriate area as directed., Disp: , Rfl:   •  albuterol sulfate  (90 Base) MCG/ACT inhaler, INHALE 2 PUFFS BY MOUTH 4 TIMES DAILY AS NEEDED FOR WHEEZING, Disp: , Rfl:   •  cetirizine (zyrTEC) 10 MG tablet, Take 10 mg by mouth Daily., Disp: , Rfl:   •  citalopram (CeleXA) 20 MG tablet, Take 20 mg by mouth Daily., Disp: , Rfl:   •  fluticasone (FLONASE) 50 MCG/ACT nasal spray, 1 spray by Each Nare route Daily As Needed., Disp: , Rfl: 5  •  Fluticasone-Umeclidin-Vilant (Trelegy Ellipta) 100-62.5-25 MCG/INH aerosol powder , Inhale 1 puff Every Morning., Disp: 1 each, Rfl: 11  •  lidocaine-prilocaine (EMLA) 2.5-2.5 % cream, Apply to port 30 minutes before access, Disp: 1 each, Rfl: 0  •  omeprazole (priLOSEC) 20 MG capsule, Take 20 mg by mouth Every Morning Before Breakfast., Disp: , Rfl: 0  •  ondansetron (ZOFRAN) 8 MG tablet, Take 1 tablet by mouth 3 (Three) Times a Day As Needed for Nausea or Vomiting., Disp: 30 tablet, Rfl: 5  •  predniSONE (DELTASONE) 20 MG tablet, Take 4 tablets daily for 3 days, the 3 tablets for 3 days, then 2 tablets for 3 days, then 1 tablet daily for 7 days., Disp: 34 tablet, Rfl: 0  •  ZOLMitriptan (ZOMIG) 2.5 MG tablet, Take 2.5 mg by mouth As Needed., Disp: , Rfl:     Allergies:  Amoxicillin    Objective   /78   Ht 167.6 cm (66\")   LMP  (LMP Unknown)   BMI 26.76 kg/m²   Physical Exam  Vitals signs " and nursing note reviewed.   Constitutional:       General: She is not in acute distress.     Appearance: Normal appearance. She is well-developed and well-groomed. She is obese. She is not ill-appearing, toxic-appearing or diaphoretic.   HENT:      Head: Normocephalic and atraumatic.      Right Ear: Hearing normal.      Left Ear: Hearing normal.   Eyes:      Extraocular Movements: EOM normal.      Conjunctiva/sclera: Conjunctivae normal.      Pupils: Pupils are equal, round, and reactive to light.   Neck:      Musculoskeletal: Full passive range of motion without pain and neck supple.      Trachea: Trachea normal.   Cardiovascular:      Rate and Rhythm: Normal rate and regular rhythm.   Pulmonary:      Effort: Pulmonary effort is normal. No tachypnea, bradypnea, accessory muscle usage or respiratory distress.   Abdominal:      Palpations: Abdomen is soft.   Skin:     General: Skin is warm and dry.   Neurological:      Mental Status: She is alert and oriented to person, place, and time.      GCS: GCS eye subscore is 4. GCS verbal subscore is 5. GCS motor subscore is 6.      Gait: Gait is intact.      Deep Tendon Reflexes:      Reflex Scores:       Tricep reflexes are 3+ on the right side and 3+ on the left side.       Bicep reflexes are 3+ on the right side and 3+ on the left side.       Brachioradialis reflexes are 3+ on the right side and 3+ on the left side.       Patellar reflexes are 3+ on the right side and 3+ on the left side.       Achilles reflexes are 3+ on the right side and 3+ on the left side.  Psychiatric:         Speech: Speech normal.         Behavior: Behavior normal. Behavior is cooperative.       Neurologic Exam     Mental Status   Oriented to person, place, and time.   Attention: normal. Concentration: normal.   Speech: speech is normal   Level of consciousness: alert    Cranial Nerves     CN II   Visual fields full to confrontation.     CN III, IV, VI   Pupils are equal, round, and reactive to  light.  Extraocular motions are normal.     CN V   Facial sensation intact.     CN VII   Facial expression full, symmetric.     CN VIII   CN VIII normal.     CN IX, X   CN IX normal.     CN XI   CN XI normal.     Motor Exam   Muscle bulk: normal  Overall muscle tone: normal  Right arm tone: normal  Left arm tone: normal  Right arm pronator drift: absent  Left arm pronator drift: absent  Right leg tone: normal  Left leg tone: normal    Strength   Right deltoid: 5/5  Left deltoid: 5/5  Right biceps: 5/5  Left biceps: 5/5  Right triceps: 5/5  Left triceps: 5/5  Right wrist extension: 5/5  Left wrist extension: 5/5  Right iliopsoas: 5/5  Left iliopsoas: 5/5  Right quadriceps: 5/5  Left quadriceps: 5/5  Right anterior tibial: 5/5  Left anterior tibial: 5/5  Right posterior tibial: 5/5  Left posterior tibial: 5/5  Right EHL 5/5  Left EHL 5/5       Sensory Exam   Right arm light touch: normal  Left arm light touch: normal  Right leg light touch: normal  Left leg light touch: normal    Gait, Coordination, and Reflexes     Gait  Gait: normal    Tremor   Resting tremor: absent  Intention tremor: absent  Action tremor: absent    Reflexes   Right brachioradialis: 3+  Left brachioradialis: 3+  Right biceps: 3+  Left biceps: 3+  Right triceps: 3+  Left triceps: 3+  Right patellar: 3+  Left patellar: 3+  Right achilles: 3+  Left achilles: 3+  Right : 4+  Left : 4+  Right plantar: normal  Left plantar: normal  Right Aleman: absent  Left Aleman: absent  Right ankle clonus: absent  Left ankle clonus: absent  Right pendular knee jerk: absent  Left pendular knee jerk: absent    Imaging: (independent review and interpretation)  8/18/2020           ASSESSMENT and PLAN  Damaris Hu is a 65 y.o. female with a significant medical history of lung cancer, vitamin D deficiency, migraine headaches, emphysema, hyperlipidemia, anxiety, depression, and she is a former smoker.  She presents today with a complaint of mid thoracic back  pain.  Physical exam findings of global hyperreflexia otherwise neurologically intact.  Her imaging shows an acute or subacute fracture with minimal vertebral height loss at C6 and a near vertebral plana at C7.    TREATMENT RECOMMENDATIONS ...  Compression fractures of T6 and T7  For further evaluation we will proceed today by obtaining x-rays of the thoracic spine upright and supine to assess fracture stability.  Additionally, due to unresolved thoracic pain we will obtain a repeat MRI of the thoracic spine.  As a means of first-line conservative management for compression fractures, an Rx has been provided for a TLSO brace.  I recommended she wear the brace at all times while out of bed except while bathing.  She may continue her current narcotic pain medication as prescribed by her PCP.  Avoid NSAIDs.  I additionally recommended she avoid lifting greater than 8 pounds, bending, or twisting.  We will have her return for reassessment after imaging.  I advised the patient to call to return sooner for new or worsening complaints of weakness, paresthesias, gait disturbances, or any additional concerns.  Treatment options discussed in detail with Damaris and she voiced understanding.  Ms. Hu agrees with this plan of care.    At high risk for osteoporosis  At high risk for fragility fracture  The  following recommendations are based on the AACE clinical practice guidelines for the diagnostic and treatment of postmenopausal osteoporosis  AMERICAN ASSOCIATION OF CLINICAL ENDOCRINOLOGISTS/ AMERICAN COLLEGE OF ENDOCRINOLOGY CLINICAL PRACTICE GUIDELINES FOR THE DIAGNOSIS AND TREATMENT OF POSTMENOPAUSAL OSTEOPOROSIS-- 2020 UPDATE     Fracture risk assessment and osteoporosis diagnosis   • Evaluate all postmenopausal women age ? 50 for osteoporosis risk (Grade B)   • Detailed history, physical exam, and clinical fracture risk assessment tool (FRAX) (Grade B)     Damaris has the following risk factors for osteoporosis:  General:  Premature menopause < age 55 , Age 65 and over, Chronic obstructive pulmonary disease and History of fragility fracture  Endocrine or metabolic causes: None  Nutritional/GI conditions: Vitamin D deficiency  Drugs: Chemotherapy/immune suppressants, Glucocorticoids, Proton pump inhibitors (Protonix, Nexium, Prilosec) and Selective serotonin reuptake inhibitors (Lexapro, Celexa, Paxil, Prozac, Zoloft)  Disorders of collagen metabolism:None    • Consider bone mineral density (BMD) testing based on clinical fracture risk profile (Grade B)    Indications for bone mineral density testing:  All women 65 years of age and older, All postmenopausal women with history of Fracture(s) without major trauma, Long-term systemic cortical therapy (>3 months), Early menopause, Current or former smoker and Secondary osteoporosis    FRAX Fracture Risk Assessment Tool (https://www.jhonny.ac.uk/FRAX/tool.aspx?country=9)  Based on the FRAX score Damaris has a ten year probability of a major osteoporotic fracture of 38% and a hip fracture of 12%.    *FRAX scores ?3% for hip fracture or ?20% for major osteoporotic fracture in the [place] are recommended to consider osteoporosis treatment.  *Osteoporosis may be diagnosed based on presence of fragility fractures in the absence of other metabolic bone disorders and even with a normal bone mineral density (T-score). (Grade B)    Scheduled for DEXA scan in the near future.    Recommended Laboratory testing to assess for causes of secondary osteoporosis (Grade B)  • CBC, CMP, 25-hydroxyvitamin D, Calcium, intact parathyroid hormone (PTH), and phosphate.    Previously labs:  Lab Results   Component Value Date    WBC 8.93 05/07/2020    RBC 4.16 05/07/2020    HGB 12.2 05/07/2020    HCT 38.8 05/07/2020     05/07/2020     Lab Results   Component Value Date    GLUCOSE 94 05/07/2020    BUN 14 05/07/2020    CREATININE 0.90 10/14/2020     05/07/2020    K 4.1 05/07/2020    ALT 14 05/07/2020    AST  13 05/07/2020    ALKPHOS 56 05/07/2020    EGFRIFNONA 55 (A) 08/18/2020     CrCl cannot be calculated (Unknown ideal weight.).  Lab Results   Component Value Date    CALCIUM 9.7 05/07/2020     We will discuss labs and DEXA scan results at Damaris 's follow-up appointment and discuss treatment options if indicated.    Summary of Orders  DEXA: Scheduled for DEXA scan in the near future.  Counseling information detailing:  • Limiting alcohol intake to no more than 2 units per day (Grade B)  • Avoid or stop smoking (Grade B)  • Maintain an active lifestyle, including weightbearing, balance, and resistance exercises (Grade A)  • Reducing risk of falls, particularly among the elderly (Grade B)    Obese Class I: 30-34.9kg/m2  Body mass index is 26.76 kg/m².  Information on the DASH diet provided in the AVS.  We will continue to provided diet and exercise information with the goal of weight loss at each scheduled appointment.     Diagnoses and all orders for this visit:    1. Closed wedge compression fracture of T6 vertebra, initial encounter (CMS/[facility]) (Primary)  -     Miscellaneous DME  -     XR Spine Thoracic 2 View; Future  -     MRI Thoracic Spine Without Contrast; Future    2. Closed wedge compression fracture of T7 vertebra, initial encounter (CMS/[facility])  -     Miscellaneous DME  -     XR Spine Thoracic 2 View; Future  -     MRI Thoracic Spine Without Contrast; Future    3. Osteoporosis with current pathological fracture, unspecified osteoporosis type, initial encounter    4. Class 1 obesity due to excess calories with serious comorbidity and body mass index (BMI) of 33.0 to 33.9 in adult      Return in about 2 weeks (around 11/24/2020) for Follow-up with Blas Ho on Dr. Chinchilla day.    Thank you for this Consultation and the opportunity to participate in Damaris's care.    Sincerely,  Blas Ho, RONALD    Level of Risk: Moderate due to: undiagnosed new problem  MDM: Moderate Complexity  (Mod = 01261, High = 01241)

## 2020-11-10 ENCOUNTER — OFFICE VISIT (OUTPATIENT)
Dept: NEUROSURGERY | Facility: CLINIC | Age: 65
End: 2020-11-10

## 2020-11-10 ENCOUNTER — HOSPITAL ENCOUNTER (OUTPATIENT)
Dept: GENERAL RADIOLOGY | Facility: HOSPITAL | Age: 65
Discharge: HOME | End: 2020-11-10
Admitting: NURSE PRACTITIONER

## 2020-11-10 VITALS — BODY MASS INDEX: 26.76 KG/M2 | DIASTOLIC BLOOD PRESSURE: 78 MMHG | HEIGHT: 66 IN | SYSTOLIC BLOOD PRESSURE: 124 MMHG

## 2020-11-10 DIAGNOSIS — S22.050A CLOSED WEDGE COMPRESSION FRACTURE OF T6 VERTEBRA, INITIAL ENCOUNTER (HCC): ICD-10-CM

## 2020-11-10 DIAGNOSIS — S22.060A CLOSED WEDGE COMPRESSION FRACTURE OF T7 VERTEBRA, INITIAL ENCOUNTER (HCC): ICD-10-CM

## 2020-11-10 DIAGNOSIS — E66.09 CLASS 1 OBESITY DUE TO EXCESS CALORIES WITH SERIOUS COMORBIDITY AND BODY MASS INDEX (BMI) OF 33.0 TO 33.9 IN ADULT: ICD-10-CM

## 2020-11-10 DIAGNOSIS — S22.050A CLOSED WEDGE COMPRESSION FRACTURE OF T6 VERTEBRA, INITIAL ENCOUNTER (HCC): Primary | ICD-10-CM

## 2020-11-10 DIAGNOSIS — M80.00XA OSTEOPOROSIS WITH CURRENT PATHOLOGICAL FRACTURE, UNSPECIFIED OSTEOPOROSIS TYPE, INITIAL ENCOUNTER: ICD-10-CM

## 2020-11-10 PROCEDURE — 99215 OFFICE O/P EST HI 40 MIN: CPT | Performed by: NURSE PRACTITIONER

## 2020-11-10 PROCEDURE — 72070 X-RAY EXAM THORAC SPINE 2VWS: CPT

## 2020-11-10 RX ORDER — DENOSUMAB 60 MG/ML
60 INJECTION SUBCUTANEOUS
COMMUNITY
Start: 2020-09-30 | End: 2021-03-04 | Stop reason: ALTCHOICE

## 2020-11-10 RX ORDER — HYDROCODONE BITARTRATE AND ACETAMINOPHEN 10; 325 MG/1; MG/1
1 TABLET ORAL
COMMUNITY
Start: 2020-10-13 | End: 2020-11-12

## 2020-11-10 NOTE — PATIENT INSTRUCTIONS
"DASH Eating Plan  DASH stands for \"Dietary Approaches to Stop Hypertension.\" The DASH eating plan is a healthy eating plan that has been shown to reduce high blood pressure (hypertension). It may also reduce your risk for type 2 diabetes, heart disease, and stroke. The DASH eating plan may also help with weight loss.  What are tips for following this plan?    General guidelines  · Avoid eating more than 2,300 mg (milligrams) of salt (sodium) a day. If you have hypertension, you may need to reduce your sodium intake to 1,500 mg a day.  · Limit alcohol intake to no more than 1 drink a day for nonpregnant women and 2 drinks a day for men. One drink equals 12 oz of beer, 5 oz of wine, or 1½ oz of hard liquor.  · Work with your health care provider to maintain a healthy body weight or to lose weight. Ask what an ideal weight is for you.  · Get at least 30 minutes of exercise that causes your heart to beat faster (aerobic exercise) most days of the week. Activities may include walking, swimming, or biking.  · Work with your health care provider or diet and nutrition specialist (dietitian) to adjust your eating plan to your individual calorie needs.  Reading food labels    · Check food labels for the amount of sodium per serving. Choose foods with less than 5 percent of the Daily Value of sodium. Generally, foods with less than 300 mg of sodium per serving fit into this eating plan.  · To find whole grains, look for the word \"whole\" as the first word in the ingredient list.  Shopping  · Buy products labeled as \"low-sodium\" or \"no salt added.\"  · Buy fresh foods. Avoid canned foods and premade or frozen meals.  Cooking  · Avoid adding salt when cooking. Use salt-free seasonings or herbs instead of table salt or sea salt. Check with your health care provider or pharmacist before using salt substitutes.  · Do not yoon foods. Cook foods using healthy methods such as baking, boiling, grilling, and broiling instead.  · Cook with " heart-healthy oils, such as olive, canola, soybean, or sunflower oil.  Meal planning  · Eat a balanced diet that includes:  ? 5 or more servings of fruits and vegetables each day. At each meal, try to fill half of your plate with fruits and vegetables.  ? Up to 6-8 servings of whole grains each day.  ? Less than 6 oz of lean meat, poultry, or fish each day. A 3-oz serving of meat is about the same size as a deck of cards. One egg equals 1 oz.  ? 2 servings of low-fat dairy each day.  ? A serving of nuts, seeds, or beans 5 times each week.  ? Heart-healthy fats. Healthy fats called Omega-3 fatty acids are found in foods such as flaxseeds and coldwater fish, like sardines, salmon, and mackerel.  · Limit how much you eat of the following:  ? Canned or prepackaged foods.  ? Food that is high in trans fat, such as fried foods.  ? Food that is high in saturated fat, such as fatty meat.  ? Sweets, desserts, sugary drinks, and other foods with added sugar.  ? Full-fat dairy products.  · Do not salt foods before eating.  · Try to eat at least 2 vegetarian meals each week.  · Eat more home-cooked food and less restaurant, buffet, and fast food.  · When eating at a restaurant, ask that your food be prepared with less salt or no salt, if possible.  What foods are recommended?  The items listed may not be a complete list. Talk with your dietitian about what dietary choices are best for you.  Grains  Whole-grain or whole-wheat bread. Whole-grain or whole-wheat pasta. Brown rice. Oatmeal. Quinoa. Bulgur. Whole-grain and low-sodium cereals. Doris bread. Low-fat, low-sodium crackers. Whole-wheat flour tortillas.  Vegetables  Fresh or frozen vegetables (raw, steamed, roasted, or grilled). Low-sodium or reduced-sodium tomato and vegetable juice. Low-sodium or reduced-sodium tomato sauce and tomato paste. Low-sodium or reduced-sodium canned vegetables.  Fruits  All fresh, dried, or frozen fruit. Canned fruit in natural juice (without  added sugar).  Meat and other protein foods  Skinless chicken or turkey. Ground chicken or turkey. Pork with fat trimmed off. Fish and seafood. Egg whites. Dried beans, peas, or lentils. Unsalted nuts, nut butters, and seeds. Unsalted canned beans. Lean cuts of beef with fat trimmed off. Low-sodium, lean deli meat.  Dairy  Low-fat (1%) or fat-free (skim) milk. Fat-free, low-fat, or reduced-fat cheeses. Nonfat, low-sodium ricotta or cottage cheese. Low-fat or nonfat yogurt. Low-fat, low-sodium cheese.  Fats and oils  Soft margarine without trans fats. Vegetable oil. Low-fat, reduced-fat, or light mayonnaise and salad dressings (reduced-sodium). Canola, safflower, olive, soybean, and sunflower oils. Avocado.  Seasoning and other foods  Herbs. Spices. Seasoning mixes without salt. Unsalted popcorn and pretzels. Fat-free sweets.  What foods are not recommended?  The items listed may not be a complete list. Talk with your dietitian about what dietary choices are best for you.  Grains  Baked goods made with fat, such as croissants, muffins, or some breads. Dry pasta or rice meal packs.  Vegetables  Creamed or fried vegetables. Vegetables in a cheese sauce. Regular canned vegetables (not low-sodium or reduced-sodium). Regular canned tomato sauce and paste (not low-sodium or reduced-sodium). Regular tomato and vegetable juice (not low-sodium or reduced-sodium). Pickles. Olives.  Fruits  Canned fruit in a light or heavy syrup. Fried fruit. Fruit in cream or butter sauce.  Meat and other protein foods  Fatty cuts of meat. Ribs. Fried meat. Orozco. Sausage. Bologna and other processed lunch meats. Salami. Fatback. Hotdogs. Bratwurst. Salted nuts and seeds. Canned beans with added salt. Canned or smoked fish. Whole eggs or egg yolks. Chicken or turkey with skin.  Dairy  Whole or 2% milk, cream, and half-and-half. Whole or full-fat cream cheese. Whole-fat or sweetened yogurt. Full-fat cheese. Nondairy creamers. Whipped toppings.  Processed cheese and cheese spreads.  Fats and oils  Butter. Stick margarine. Lard. Shortening. Ghee. Orozco fat. Tropical oils, such as coconut, palm kernel, or palm oil.  Seasoning and other foods  Salted popcorn and pretzels. Onion salt, garlic salt, seasoned salt, table salt, and sea salt. Worcestershire sauce. Tartar sauce. Barbecue sauce. Teriyaki sauce. Soy sauce, including reduced-sodium. Steak sauce. Canned and packaged gravies. Fish sauce. Oyster sauce. Cocktail sauce. Horseradish that you find on the shelf. Ketchup. Mustard. Meat flavorings and tenderizers. Bouillon cubes. Hot sauce and Tabasco sauce. Premade or packaged marinades. Premade or packaged taco seasonings. Relishes. Regular salad dressings.  Where to find more information:  · National Heart, Lung, and Blood Estill: www.nhlbi.nih.gov  · American Heart Association: www.heart.org  Summary  · The DASH eating plan is a healthy eating plan that has been shown to reduce high blood pressure (hypertension). It may also reduce your risk for type 2 diabetes, heart disease, and stroke.  · With the DASH eating plan, you should limit salt (sodium) intake to 2,300 mg a day. If you have hypertension, you may need to reduce your sodium intake to 1,500 mg a day.  · When on the DASH eating plan, aim to eat more fresh fruits and vegetables, whole grains, lean proteins, low-fat dairy, and heart-healthy fats.  · Work with your health care provider or diet and nutrition specialist (dietitian) to adjust your eating plan to your individual calorie needs.  This information is not intended to replace advice given to you by your health care provider. Make sure you discuss any questions you have with your health care provider.  Document Released: 12/06/2012 Document Revised: 11/30/2018 Document Reviewed: 12/11/2017  Elsevier Patient Education © 2020 [employer]      Osteoporosis    Osteoporosis is thinning and loss of density in your bones. Osteoporosis makes bones  more brittle and fragile and more likely to break (fracture). Over time, osteoporosis can cause your bones to become so weak that they fracture after a minor fall. Bones in the hip, wrist, and spine are most likely to fracture due to osteoporosis.  What are the causes?  The exact cause of this condition is not known.  What increases the risk?  You may be at greater risk for osteoporosis if you:  · Have a family history of the condition.  · Have poor nutrition.  · Use steroid medicines, such as prednisone.  · Are female.  · Are age 50 or older.  · Smoke or have a history of smoking.  · Are not physically active (are sedentary).  · Are white () or of  descent.  · Have a small body frame.  · Take certain medicines, such as antiseizure medicines.  What are the signs or symptoms?  A fracture might be the first sign of osteoporosis, especially if the fracture results from a fall or injury that usually would not cause a bone to break. Other signs and symptoms include:  · Pain in the neck or low back.  · Stooped posture.  · Loss of height.  How is this diagnosed?  This condition may be diagnosed based on:  · Your medical history.  · A physical exam.  · A bone mineral density test, also called a DXA or DEXA test (dual-energy X-ray absorptiometry test). This test uses X-rays to measure the amount of minerals in your bones.  How is this treated?  The goal of treatment is to strengthen your bones and lower your risk for a fracture. Treatment may involve:  · Making lifestyle changes, such as:  ? Including foods with more calcium and vitamin D in your diet.  ? Doing weight-bearing and muscle-strengthening exercises.  ? Stopping tobacco use.  ? Limiting alcohol intake.  · Taking medicine to slow the process of bone loss or to increase bone density.  · Taking daily supplements of calcium and vitamin D.  · Taking hormone replacement medicines, such as estrogen for women and testosterone for men.  · Monitoring your  levels of calcium and vitamin D.  Follow these instructions at home:    Activity  · Exercise as told by your health care provider. Ask your health care provider what exercises and activities are safe for you. You should do:  ? Exercises that make you work against gravity (weight-bearing exercises), such as torri chi, yoga, or walking.  ? Exercises to strengthen muscles, such as lifting weights.  Lifestyle  · Limit alcohol intake to no more than 1 drink a day for nonpregnant women and 2 drinks a day for men. One drink equals 12 oz of beer, 5 oz of wine, or 1½ oz of hard liquor.  · Do not use any products that contain nicotine or tobacco, such as cigarettes and e-cigarettes. If you need help quitting, ask your health care provider.  Preventing falls  · Use devices to help you move around (mobility aids) as needed, such as canes, walkers, scooters, or crutches.  · Keep rooms well-lit and clutter-free.  · Remove tripping hazards from walkways, including cords and throw rugs.  · Install grab bars in bathrooms and safety rails on stairs.  · Use rubber mats in the bathroom and other areas that are often wet or slippery.  · Wear closed-toe shoes that fit well and support your feet. Wear shoes that have rubber soles or low heels.  · Review your medicines with your health care provider. Some medicines can cause dizziness or changes in blood pressure, which can increase your risk of falling.  General instructions  · Include calcium and vitamin D in your diet. Calcium is important for bone health, and vitamin D helps your body to absorb calcium. Good sources of calcium and vitamin D include:  ? Certain fatty fish, such as salmon and tuna.  ? Products that have calcium and vitamin D added to them (fortified products), such as fortified cereals.  ? Egg yolks.  ? Cheese.  ? Liver.  · Take over-the-counter and prescription medicines only as told by your health care provider.  · Keep all follow-up visits as told by your health care  provider. This is important.  Contact a health care provider if:  · You have never been screened for osteoporosis and you are:  ? A woman who is age 65 or older.  ? A man who is age 70 or older.  Get help right away if:  · You fall or injure yourself.  Summary  · Osteoporosis is thinning and loss of density in your bones. This makes bones more brittle and fragile and more likely to break (fracture),even with minor falls.  · The goal of treatment is to strengthen your bones and reduce your risk for a fracture.  · Include calcium and vitamin D in your diet. Calcium is important for bone health, and vitamin D helps your body to absorb calcium.  · Talk with your health care provider about screening for osteoporosis if you are a woman who is age 65 or older, or a man who is age 70 or older.  This information is not intended to replace advice given to you by your health care provider. Make sure you discuss any questions you have with your health care provider.  Document Released: 09/27/2006 Document Revised: 11/30/2018 Document Reviewed: 10/12/2018  Elsevier Patient Education © 2020 Elsevier Inc.      Advance Care Planning and Advance Directives     You make decisions on a daily basis - decisions about where you want to live, your career, your home, your life. Perhaps one of the most important decisions you face is your choice for future medical care. Take time to talk with your family and your healthcare team and start planning today.  Advance Care Planning is a process that can help you:  · Understand possible future healthcare decisions in light of your own experiences  · Reflect on those decision in light of your goals and values  · Discuss your decisions with those closest to you and the healthcare professionals that care for you  · Make a plan by creating a document that reflects your wishes    Surrogate Decision Maker  In the event of a medical emergency, which has left you unable to communicate or to make your own  decisions, you would need someone to make decisions for you.  It is important to discuss your preferences for medical treatment with this person while you are in good health.     Qualities of a surrogate decision maker:  • Willing to take on this role and responsibility  • Knows what you want for future medical care  • Willing to follow your wishes even if they don't agree with them  • Able to make difficult medical decisions under stressful circumstances    Advance Directives  These are legal documents you can create that will guide your healthcare team and decision maker(s) when needed. These documents can be stored in the electronic medical record.    · Living Will - a legal document to guide your care if you have a terminal condition or a serious illness and are unable to communicate. States vary by statute in document names/types, but most forms may include one or more of the following:        -  Directions regarding life-prolonging treatments        -  Directions regarding artificially provided nutrition/hydration        -  Choosing a healthcare decision maker        -  Direction regarding organ/tissue donation    · Durable Power of  for Healthcare - this document names an -in-fact to make medical decisions for you, but it may also allow this person to make personal and financial decisions for you. Please seek the advice of an  if you need this type of document.    **Advance Directives are not required and no one may discriminate against you if you do not sign one.    Medical Orders  Many states allow specific forms/orders signed by your physician to record your wishes for medical treatment in your current state of health. This form, signed in personal communication with your physician, addresses resuscitation and other medical interventions that you may or may not want.      For more information or to schedule a time with a [facility] Advance Care Planning Facilitator contact:  [facility] or call [phone] and someone will contact you directly.

## 2020-11-12 ENCOUNTER — TELEPHONE (OUTPATIENT)
Dept: NEUROSURGERY | Facility: CLINIC | Age: 65
End: 2020-11-12

## 2020-11-12 RX ORDER — CETIRIZINE HYDROCHLORIDE 10 MG/1
10 TABLET ORAL DAILY
Qty: 30 TABLET | Refills: 11 | Status: SHIPPED | OUTPATIENT
Start: 2020-11-12 | End: 2020-12-07 | Stop reason: SDUPTHER

## 2020-11-12 RX ORDER — HYDROCODONE BITARTRATE AND ACETAMINOPHEN 10; 325 MG/1; MG/1
1 TABLET ORAL EVERY 8 HOURS PRN
Qty: 90 TABLET | Refills: 0 | Status: SHIPPED | OUTPATIENT
Start: 2020-11-12 | End: 2020-12-12 | Stop reason: SDUPTHER

## 2020-11-12 NOTE — TELEPHONE ENCOUNTER
Chad Rainer called to request a refill on her medication. Last office visit : 9/30/2020   Next office visit : 11/25/2020     Last UDS:   Amphetamine Screen, Urine   Date Value Ref Range Status   02/06/2020 neg  Final     Barbiturate Screen, Urine   Date Value Ref Range Status   02/06/2020 asya  Final     Benzodiazepine Screen, Urine   Date Value Ref Range Status   02/06/2020 neg  Final     Buprenorphine Urine   Date Value Ref Range Status   02/06/2020 neg  Final     Cocaine Metabolite Screen, Urine   Date Value Ref Range Status   02/06/2020 neg  Final     Gabapentin Screen, Urine   Date Value Ref Range Status   02/06/2020 neg  Final     MDMA, Urine   Date Value Ref Range Status   02/06/2020 neg  Final     Methamphetamine, Urine   Date Value Ref Range Status   02/06/2020 neg  Final     Opiate Scrn, Ur   Date Value Ref Range Status   02/06/2020 Psitive  Final     Oxycodone Screen, Ur   Date Value Ref Range Status   02/06/2020 neg  Final     PCP Screen, Urine   Date Value Ref Range Status   02/06/2020 neg  Final     Propoxyphene Screen, Urine   Date Value Ref Range Status   02/06/2020 neg  Final     THC Screen, Urine   Date Value Ref Range Status   02/06/2020 neg  Final     Tricyclic Antidepressants, Urine   Date Value Ref Range Status   02/06/2020 neg  Final       Last Ella Tanner: 10/19/2020  Medication Contract: 2/6/2020   Last Fill: 10/13/2020    Requested Prescriptions     Pending Prescriptions Disp Refills    cetirizine (ZYRTEC) 10 MG tablet 30 tablet 11     Sig: Take 1 tablet by mouth daily    HYDROcodone-acetaminophen (NORCO)  MG per tablet 90 tablet 0     Sig: Take 1 tablet by mouth every 8 hours as needed for Pain for up to 30 days. Intended supply: 30 days         Please approve or refuse this medication.    Tika Huff

## 2020-11-12 NOTE — TELEPHONE ENCOUNTER
Caller: Damaris Hu    Relationship: Self    Best call back number: [phone]    What orders are you requesting (i.e. lab or imaging): MRI    In what timeframe would the patient need to come in: BEFORE 11/23/20    Where will you receive your lab/imaging services:  IMAGING ON [facility]    Additional notes: PATIENT ALREADY SCHEDULED PRIOR TO CALL BUT REQUESTING FOR PREFERRED LOCATION STATED ABOVE. ORDER IN CHART AT TIME OF CALL.    PATIENT CAN BE CONTACTED WITH AN UPDATE.

## 2020-11-18 ENCOUNTER — TELEPHONE (OUTPATIENT)
Dept: GASTROENTEROLOGY | Facility: CLINIC | Age: 65
End: 2020-11-18

## 2020-11-18 ENCOUNTER — TELEPHONE (OUTPATIENT)
Dept: NEUROSURGERY | Facility: CLINIC | Age: 65
End: 2020-11-18

## 2020-11-18 NOTE — TELEPHONE ENCOUNTER
Pt called stating she did not want to come to her visit due to the increase of covid patient. She also stated she has not had her MRI yet either but has had her xray. She would like a call with her xray results if possible and would reschedule her f/u appt when she felt safe from covid.

## 2020-11-18 NOTE — TELEPHONE ENCOUNTER
Pt just called me-she had to wait on recall colon (see previous telephone encounters) and she rec'd another letter about it recently. Pt is still undergoing treatment for compression fractures in her back and needs more time to recover before having any procedures.     She wants me to change her recall to 3/2021-she thinks that will give her long enough to feel up to having a colon. She was advised to call our office if she developed problems or felt ready to have procedure. Otherwise, I will call her in March to regroup-changed recall to 3/1/2021.

## 2020-11-19 ENCOUNTER — TELEPHONE (OUTPATIENT)
Dept: NEUROSURGERY | Facility: CLINIC | Age: 65
End: 2020-11-19

## 2020-11-19 DIAGNOSIS — F41.9 ANXIETY AND DEPRESSION: ICD-10-CM

## 2020-11-19 DIAGNOSIS — M80.80XG OTHER OSTEOPOROSIS WITH CURRENT PATHOLOGICAL FRACTURE WITH DELAYED HEALING, SUBSEQUENT ENCOUNTER: ICD-10-CM

## 2020-11-19 DIAGNOSIS — F32.A ANXIETY AND DEPRESSION: ICD-10-CM

## 2020-11-19 DIAGNOSIS — E55.9 VITAMIN D DEFICIENCY: ICD-10-CM

## 2020-11-19 LAB
ALBUMIN SERPL-MCNC: 4.6 G/DL (ref 3.5–5.2)
ALP BLD-CCNC: 65 U/L (ref 35–104)
ALT SERPL-CCNC: 15 U/L (ref 5–33)
ANION GAP SERPL CALCULATED.3IONS-SCNC: 9 MMOL/L (ref 7–19)
AST SERPL-CCNC: 17 U/L (ref 5–32)
BASOPHILS ABSOLUTE: 0.1 K/UL (ref 0–0.2)
BASOPHILS RELATIVE PERCENT: 0.7 % (ref 0–1)
BILIRUB SERPL-MCNC: 0.3 MG/DL (ref 0.2–1.2)
BUN BLDV-MCNC: 14 MG/DL (ref 8–23)
CALCIUM SERPL-MCNC: 10.1 MG/DL (ref 8.8–10.2)
CHLORIDE BLD-SCNC: 103 MMOL/L (ref 98–111)
CO2: 25 MMOL/L (ref 22–29)
CREAT SERPL-MCNC: 0.8 MG/DL (ref 0.5–0.9)
EOSINOPHILS ABSOLUTE: 0.4 K/UL (ref 0–0.6)
EOSINOPHILS RELATIVE PERCENT: 5.8 % (ref 0–5)
GFR AFRICAN AMERICAN: >59
GFR NON-AFRICAN AMERICAN: >60
GLUCOSE BLD-MCNC: 92 MG/DL (ref 74–109)
HCT VFR BLD CALC: 40.7 % (ref 37–47)
HEMOGLOBIN: 12.5 G/DL (ref 12–16)
IMMATURE GRANULOCYTES #: 0 K/UL
LYMPHOCYTES ABSOLUTE: 0.7 K/UL (ref 1.1–4.5)
LYMPHOCYTES RELATIVE PERCENT: 9.7 % (ref 20–40)
MAGNESIUM: 1.9 MG/DL (ref 1.6–2.4)
MCH RBC QN AUTO: 29.2 PG (ref 27–31)
MCHC RBC AUTO-ENTMCNC: 30.7 G/DL (ref 33–37)
MCV RBC AUTO: 95.1 FL (ref 81–99)
MONOCYTES ABSOLUTE: 0.7 K/UL (ref 0–0.9)
MONOCYTES RELATIVE PERCENT: 10.7 % (ref 0–10)
NEUTROPHILS ABSOLUTE: 4.9 K/UL (ref 1.5–7.5)
NEUTROPHILS RELATIVE PERCENT: 72.8 % (ref 50–65)
PDW BLD-RTO: 13 % (ref 11.5–14.5)
PHOSPHORUS: 3.8 MG/DL (ref 2.5–4.5)
PLATELET # BLD: 261 K/UL (ref 130–400)
PMV BLD AUTO: 10 FL (ref 9.4–12.3)
POTASSIUM SERPL-SCNC: 4.1 MMOL/L (ref 3.5–5)
RBC # BLD: 4.28 M/UL (ref 4.2–5.4)
SODIUM BLD-SCNC: 137 MMOL/L (ref 136–145)
TOTAL PROTEIN: 7.1 G/DL (ref 6.6–8.7)
VITAMIN D 25-HYDROXY: 51.8 NG/ML
WBC # BLD: 6.7 K/UL (ref 4.8–10.8)

## 2020-11-19 NOTE — TELEPHONE ENCOUNTER
TRIED TO CONTACT PT RE: MRI SCHEDULING;  PT REQUESTED TO HAVE MRI AT THE [facility] LOCATION , HOWEVER MRI'S ARE NOT OFFERED AT THAT LOCATION, AND PT WOULD NOT SCHEDULE MRI WITH ONECALL;  PT WILL NEED TO CONTACT SCHEDULING TO GET MRI SCHEDULED HERE AT [facility]    LEFT V/M FOR CALL BACK

## 2020-11-19 NOTE — TELEPHONE ENCOUNTER
PT RETURNED CALL THAT I LEFT ABOUT MRI SCHEDULING;   PT DOES NOT WANT TO SCHEDULE MRI UNTIL SHE SPEAKS TO SOMEONE ABOUT HER XRAY RESULTS

## 2020-11-19 NOTE — TELEPHONE ENCOUNTER
"Returned patient's call. She states that she is not interested in \"all the tests and appointments\" we have scheduled for her because she is scared to get out of her house with the current COVID virus. She wanted to know results of her XR. Explained again, we do not call with XR results unless there is abnormalities on them that have not already been discussed with her. She declines to schedule the MRI due to COVID and the fact that [facility] does not perform MRI's. She has also canceled her F/U appointment with our office. Explained I would be canceling order for MRI and she could call us if/when she decided to proceed.   "

## 2020-11-20 NOTE — TELEPHONE ENCOUNTER
Spoke with patient she stated she still does not want to make an appt at this time due to covid and will call to reschedule when ready.

## 2020-11-25 ENCOUNTER — VIRTUAL VISIT (OUTPATIENT)
Dept: PRIMARY CARE CLINIC | Age: 65
End: 2020-11-25
Payer: MEDICARE

## 2020-11-25 PROCEDURE — 99443 PR PHYS/QHP TELEPHONE EVALUATION 21-30 MIN: CPT | Performed by: NURSE PRACTITIONER

## 2020-11-25 ASSESSMENT — ENCOUNTER SYMPTOMS
RESPIRATORY NEGATIVE: 1
BACK PAIN: 1
EYES NEGATIVE: 1
GASTROINTESTINAL NEGATIVE: 1

## 2020-11-25 NOTE — PROGRESS NOTES
[address] [address]            Phone:  [phone]  Fax:  [phone]    Su Bartlett is a 77 y.o. female evaluated via telephone on 11/25/2020. Consent:    She and/or health care decision maker is aware that that she may receive a bill for this telephone service, depending on her insurance coverage, and has provided verbal consent to proceed: Yes      HPI  Chief Complaint   Patient presents with    Osteoporosis    Other     chronic pain from cancer       I communicated with the patient and/or health care decision maker about osteoporosis, compression fracture, chronic fatigue, and cancer. Overall she reports that things are going good. She had a DEXA scan the other day and still shows worsening osteoprosis. Review of Systems   Constitutional: Positive for fatigue (improving). HENT: Negative. Eyes: Negative. Respiratory: Negative. Cardiovascular: Negative. Gastrointestinal: Negative. Endocrine: Negative. Genitourinary: Negative. Musculoskeletal: Positive for back pain. Skin: Negative. Neurological: Negative. Hematological: Negative. Psychiatric/Behavioral: Negative. PLAN    Details of this discussion including any medical advice provided:     1. Other osteoporosis with current pathological fracture with delayed healing, subsequent encounter      2. Anxiety and depression      3. Compression fracture of T7 vertebra, initial encounter ([facility])      4. Stage 3 severe COPD by GOLD classification ([address]      5. Hx of cancer of lung      We discussed starting Prolia injections. Patient is wanting to hold off at this time and possibly start in January. We will need to recheck labs after start of medication and prior to next injection. She is to keep follow-up as scheduled with oncology and neurosurgery.        I affirm this is a Patient Initiated Episode with an Established Patient who has not had a related appointment within my department in the past 7 days or scheduled within the next 24 hours. Total Time: minutes: 21-30 minutes      Note: not billable if this call serves to triage the patient into an appointment for the relevant concern      Wallace Ravi to the emergency declaration under the [facility], [facility] waiver authority and the [employer] Act, this Virtual  Visit was conducted, with patient's consent, to reduce the patient's risk of exposure to COVID-19 and provide continuity of care for an established patient. Services were provided through a telephone discussion  to substitute for in-person clinic visit. Parties involved during telephone call include myself, ROBER Austin and patient listed.

## 2020-12-07 ENCOUNTER — PATIENT MESSAGE (OUTPATIENT)
Dept: RADIATION ONCOLOGY | Facility: HOSPITAL | Age: 65
End: 2020-12-07

## 2020-12-08 RX ORDER — FLUTICASONE FUROATE, UMECLIDINIUM BROMIDE AND VILANTEROL TRIFENATATE 100; 62.5; 25 UG/1; UG/1; UG/1
1 POWDER RESPIRATORY (INHALATION) DAILY
Qty: 1 EACH | Refills: 2 | Status: SHIPPED | OUTPATIENT
Start: 2020-12-08 | End: 2021-03-12

## 2020-12-08 RX ORDER — CETIRIZINE HYDROCHLORIDE 10 MG/1
10 TABLET ORAL DAILY
Qty: 30 TABLET | Refills: 11 | Status: SHIPPED | OUTPATIENT
Start: 2020-12-08 | End: 2021-12-19

## 2020-12-13 NOTE — TELEPHONE ENCOUNTER
Eleanor Dixon called to request a refill on her medication. Last office visit : 11/25/2020   Next office visit : 1/29/2021     Last UDS:   Amphetamine Screen, Urine   Date Value Ref Range Status   02/06/2020 neg  Final     Barbiturate Screen, Urine   Date Value Ref Range Status   02/06/2020 asya  Final     Benzodiazepine Screen, Urine   Date Value Ref Range Status   02/06/2020 neg  Final     Buprenorphine Urine   Date Value Ref Range Status   02/06/2020 neg  Final     Cocaine Metabolite Screen, Urine   Date Value Ref Range Status   02/06/2020 neg  Final     Gabapentin Screen, Urine   Date Value Ref Range Status   02/06/2020 neg  Final     MDMA, Urine   Date Value Ref Range Status   02/06/2020 neg  Final     Methamphetamine, Urine   Date Value Ref Range Status   02/06/2020 neg  Final     Opiate Scrn, Ur   Date Value Ref Range Status   02/06/2020 Psitive  Final     Oxycodone Screen, Ur   Date Value Ref Range Status   02/06/2020 neg  Final     PCP Screen, Urine   Date Value Ref Range Status   02/06/2020 neg  Final     Propoxyphene Screen, Urine   Date Value Ref Range Status   02/06/2020 neg  Final     THC Screen, Urine   Date Value Ref Range Status   02/06/2020 neg  Final     Tricyclic Antidepressants, Urine   Date Value Ref Range Status   02/06/2020 neg  Final       Last Nancy Decatur: 10-19  Medication Contract: 2-6-2020   Last Fill: 11-    Requested Prescriptions     Pending Prescriptions Disp Refills    HYDROcodone-acetaminophen (NORCO)  MG per tablet 90 tablet 0     Sig: Take 1 tablet by mouth every 8 hours as needed for Pain for up to 30 days. Intended supply: 30 days               Please approve or refuse this medication.    Anjelica Hagen LPN

## 2020-12-15 RX ORDER — HYDROCODONE BITARTRATE AND ACETAMINOPHEN 10; 325 MG/1; MG/1
1 TABLET ORAL EVERY 8 HOURS PRN
Qty: 90 TABLET | Refills: 0 | Status: SHIPPED | OUTPATIENT
Start: 2020-12-15 | End: 2021-01-12 | Stop reason: SDUPTHER

## 2020-12-16 DIAGNOSIS — Z92.3 HISTORY OF RADIATION THERAPY: ICD-10-CM

## 2020-12-16 DIAGNOSIS — C34.81 MALIGNANT NEOPLASM OF OVERLAPPING SITES OF RIGHT LUNG (HCC): Primary | ICD-10-CM

## 2020-12-16 DIAGNOSIS — Z87.891 FORMER SMOKER: ICD-10-CM

## 2020-12-16 DIAGNOSIS — C77.0 SECONDARY MALIGNANT NEOPLASM OF SUPRACLAVICULAR LYMPH NODE (HCC): ICD-10-CM

## 2021-01-12 DIAGNOSIS — S22.060A COMPRESSION FRACTURE OF T7 VERTEBRA, INITIAL ENCOUNTER (HCC): ICD-10-CM

## 2021-01-12 DIAGNOSIS — C34.90 SQUAMOUS CELL CARCINOMA OF LUNG, UNSPECIFIED LATERALITY (HCC): ICD-10-CM

## 2021-01-13 RX ORDER — HYDROCODONE BITARTRATE AND ACETAMINOPHEN 10; 325 MG/1; MG/1
1 TABLET ORAL EVERY 8 HOURS PRN
Qty: 90 TABLET | Refills: 0 | Status: SHIPPED | OUTPATIENT
Start: 2021-01-13 | End: 2021-01-19 | Stop reason: SDUPTHER

## 2021-01-13 NOTE — TELEPHONE ENCOUNTER
Riaz Hanley called to request a refill on her medication. Last office visit : 11/25/2020   Next office visit : 1/29/2021     Last UDS:   Amphetamine Screen, Urine   Date Value Ref Range Status   02/06/2020 neg  Final     Barbiturate Screen, Urine   Date Value Ref Range Status   02/06/2020 asya  Final     Benzodiazepine Screen, Urine   Date Value Ref Range Status   02/06/2020 neg  Final     Buprenorphine Urine   Date Value Ref Range Status   02/06/2020 neg  Final     Cocaine Metabolite Screen, Urine   Date Value Ref Range Status   02/06/2020 neg  Final     Gabapentin Screen, Urine   Date Value Ref Range Status   02/06/2020 neg  Final     MDMA, Urine   Date Value Ref Range Status   02/06/2020 neg  Final     Methamphetamine, Urine   Date Value Ref Range Status   02/06/2020 neg  Final     Opiate Scrn, Ur   Date Value Ref Range Status   02/06/2020 Psitive  Final     Oxycodone Screen, Ur   Date Value Ref Range Status   02/06/2020 neg  Final     PCP Screen, Urine   Date Value Ref Range Status   02/06/2020 neg  Final     Propoxyphene Screen, Urine   Date Value Ref Range Status   02/06/2020 neg  Final     THC Screen, Urine   Date Value Ref Range Status   02/06/2020 neg  Final     Tricyclic Antidepressants, Urine   Date Value Ref Range Status   02/06/2020 neg  Final       Last Gelacio Lisandro: 10-19-20  Medication Contract: 02-06-20   Last Fill: 12-15-20    Requested Prescriptions     Pending Prescriptions Disp Refills    HYDROcodone-acetaminophen (NORCO)  MG per tablet 90 tablet 0     Sig: Take 1 tablet by mouth every 8 hours as needed for Pain for up to 30 days. Intended supply: 30 days         Please approve or refuse this medication.    Jonathon Goldstein MA

## 2021-01-17 ENCOUNTER — PATIENT MESSAGE (OUTPATIENT)
Dept: PRIMARY CARE CLINIC | Age: 66
End: 2021-01-17

## 2021-01-17 DIAGNOSIS — S22.060A COMPRESSION FRACTURE OF T7 VERTEBRA, INITIAL ENCOUNTER (HCC): ICD-10-CM

## 2021-01-17 DIAGNOSIS — C34.90 SQUAMOUS CELL CARCINOMA OF LUNG, UNSPECIFIED LATERALITY (HCC): ICD-10-CM

## 2021-01-18 NOTE — TELEPHONE ENCOUNTER
From: Merly Headings  To: Claire ReddyndriaROBER  Sent: 1/17/2021 1:05 PM CST  Subject: Prescription Question    Barbara Nap my pain pills were called in last week at [facility]. However they never called to tell me they were ready. So I called Saturday & they said [facility] been on back order for awhile & didnt know when theyd get them. I called Mayte on [employer] he said they have them but Id have to get you to cancel [facility] & call it in to [facility]. Can you do this for me because Im about out. Thank you   Meryl Wisdom.

## 2021-01-18 NOTE — TELEPHONE ENCOUNTER
Jolanda Pallas called to request a refill on her medication. Last office visit : 2020   Next office visit : 2021     Last UDS:   Amphetamine Screen, Urine   Date Value Ref Range Status   2020 neg  Final     Barbiturate Screen, Urine   Date Value Ref Range Status   2020 asya  Final     Benzodiazepine Screen, Urine   Date Value Ref Range Status   2020 neg  Final     Buprenorphine Urine   Date Value Ref Range Status   2020 neg  Final     Cocaine Metabolite Screen, Urine   Date Value Ref Range Status   2020 neg  Final     Gabapentin Screen, Urine   Date Value Ref Range Status   2020 neg  Final     MDMA, Urine   Date Value Ref Range Status   2020 neg  Final     Methamphetamine, Urine   Date Value Ref Range Status   2020 neg  Final     Opiate Scrn, Ur   Date Value Ref Range Status   2020 Psitive  Final     Oxycodone Screen, Ur   Date Value Ref Range Status   2020 neg  Final     PCP Screen, Urine   Date Value Ref Range Status   2020 neg  Final     Propoxyphene Screen, Urine   Date Value Ref Range Status   2020 neg  Final     THC Screen, Urine   Date Value Ref Range Status   2020 neg  Final     Tricyclic Antidepressants, Urine   Date Value Ref Range Status   2020 neg  Final       Last Jannifer Buck: 2021  Medication Contract:  2020  Last Fill: 2021 but was unable to be filled Rx cancelled 2021 due to medication being out of stock. Requested Prescriptions     Pending Prescriptions Disp Refills    HYDROcodone-acetaminophen (NORCO)  MG per tablet 90 tablet 0     Sig: Take 1 tablet by mouth every 8 hours as needed for Pain for up to 30 days. Intended supply: 30 days         Please approve or refuse this medication.    Pascual England

## 2021-01-19 ENCOUNTER — HOSPITAL ENCOUNTER (OUTPATIENT)
Dept: CT IMAGING | Facility: HOSPITAL | Age: 66
Discharge: HOME | End: 2021-01-19
Admitting: RADIOLOGY

## 2021-01-19 DIAGNOSIS — Z92.3 HISTORY OF RADIATION THERAPY: ICD-10-CM

## 2021-01-19 DIAGNOSIS — C34.81 MALIGNANT NEOPLASM OF OVERLAPPING SITES OF RIGHT LUNG (HCC): ICD-10-CM

## 2021-01-19 DIAGNOSIS — Z87.891 FORMER SMOKER: ICD-10-CM

## 2021-01-19 DIAGNOSIS — C77.0 SECONDARY MALIGNANT NEOPLASM OF SUPRACLAVICULAR LYMPH NODE (HCC): ICD-10-CM

## 2021-01-19 LAB — CREAT BLDA-MCNC: 1.1 MG/DL (ref 0.6–1.3)

## 2021-01-19 PROCEDURE — 71260 CT THORAX DX C+: CPT

## 2021-01-19 PROCEDURE — 82565 ASSAY OF CREATININE: CPT

## 2021-01-19 PROCEDURE — 25010000002 IOPAMIDOL 61 % SOLUTION: Performed by: RADIOLOGY

## 2021-01-19 RX ORDER — HYDROCODONE BITARTRATE AND ACETAMINOPHEN 10; 325 MG/1; MG/1
1 TABLET ORAL EVERY 8 HOURS PRN
Qty: 90 TABLET | Refills: 0 | Status: SHIPPED | OUTPATIENT
Start: 2021-01-19 | End: 2021-02-16 | Stop reason: SDUPTHER

## 2021-01-19 RX ADMIN — IOPAMIDOL 100 ML: 612 INJECTION, SOLUTION INTRAVENOUS at 14:45

## 2021-01-22 ENCOUNTER — TELEPHONE (OUTPATIENT)
Dept: RADIATION ONCOLOGY | Facility: HOSPITAL | Age: 66
End: 2021-01-22

## 2021-01-22 NOTE — TELEPHONE ENCOUNTER
Called Ms Hu after reviewing her CT scan which demonstrates left upper lobe pulmonary nodule now measures 8 mm previously 5 mm and the left upper lobe 4 mm pulmonary nodule, previously punctate on 10/14/2020.  I did tell her the results and asked her to keep her appointment on wednesday so that Dr. Dudley could review the images against her previous radiation treatment fields for a better evaluation, we would be ordering another scan in 3 months to follow. She verbalizes understanding.         ----- Message from Ivonne Cristina RN sent at 1/22/2021 11:37 AM CST -----  Regarding: FW: Test Results Question  Contact: [phone]  I looked at the scan and it looks like the lesion has grown??  She is due to see us in follow-up next Wednesday.  Ivonne        ----- Message from Manuel -----  From: Damaris Hu  Sent: 1/21/2021   5:51 PM CST  To: [facility] Pad Clinical Pool  Subject: Test Results Question                            Was wondering if you had results of my chest CT scan I had done Tuesday 19th? I’m a little anxious or I would wait until my next appt.   Thank you,  Damaris Hu  [phone]

## 2021-01-26 ENCOUNTER — HOSPITAL ENCOUNTER (OUTPATIENT)
Dept: RADIATION ONCOLOGY | Facility: HOSPITAL | Age: 66
Setting detail: RADIATION/ONCOLOGY SERIES
End: 2021-01-26

## 2021-01-27 ENCOUNTER — OFFICE VISIT (OUTPATIENT)
Dept: RADIATION ONCOLOGY | Facility: HOSPITAL | Age: 66
End: 2021-01-27

## 2021-01-27 VITALS
HEIGHT: 66 IN | BODY MASS INDEX: 28.45 KG/M2 | SYSTOLIC BLOOD PRESSURE: 140 MMHG | DIASTOLIC BLOOD PRESSURE: 90 MMHG | WEIGHT: 177 LBS

## 2021-01-27 DIAGNOSIS — C34.81 MALIGNANT NEOPLASM OF OVERLAPPING SITES OF RIGHT LUNG (HCC): Primary | ICD-10-CM

## 2021-01-27 DIAGNOSIS — C77.9 REGIONAL LYMPH NODE METASTASIS PRESENT (HCC): ICD-10-CM

## 2021-01-27 DIAGNOSIS — Z87.891 FORMER SMOKER: ICD-10-CM

## 2021-01-27 DIAGNOSIS — J43.9 PULMONARY EMPHYSEMA, UNSPECIFIED EMPHYSEMA TYPE (HCC): ICD-10-CM

## 2021-01-27 DIAGNOSIS — C77.0 SECONDARY MALIGNANT NEOPLASM OF SUPRACLAVICULAR LYMPH NODE (HCC): ICD-10-CM

## 2021-01-27 DIAGNOSIS — Z92.3 HISTORY OF RADIATION THERAPY: ICD-10-CM

## 2021-01-27 PROCEDURE — G0463 HOSPITAL OUTPT CLINIC VISIT: HCPCS | Performed by: RADIOLOGY

## 2021-01-27 RX ORDER — POLYETHYLENE GLYCOL 3350 17 G/17G
17 POWDER, FOR SOLUTION ORAL DAILY
COMMUNITY
End: 2021-08-12 | Stop reason: ALTCHOICE

## 2021-01-27 RX ORDER — HYDROCODONE BITARTRATE AND ACETAMINOPHEN 10; 325 MG/1; MG/1
1 TABLET ORAL EVERY 8 HOURS PRN
COMMUNITY
Start: 2021-01-19 | End: 2021-02-18

## 2021-01-28 ENCOUNTER — TELEPHONE (OUTPATIENT)
Dept: VASCULAR SURGERY | Facility: CLINIC | Age: 66
End: 2021-01-28

## 2021-01-28 NOTE — TELEPHONE ENCOUNTER
Patient called to cancel her new patient appointment with Dr. Starkey. Pt stated she has some other health issues to come up. She stated Dr. Dudley said this appointment could wait for a little while.

## 2021-01-29 ENCOUNTER — VIRTUAL VISIT (OUTPATIENT)
Dept: PRIMARY CARE CLINIC | Age: 66
End: 2021-01-29
Payer: MEDICARE

## 2021-01-29 DIAGNOSIS — Z13.6 SCREENING FOR CARDIOVASCULAR CONDITION: ICD-10-CM

## 2021-01-29 DIAGNOSIS — Z71.89 ADVANCE CARE PLANNING: ICD-10-CM

## 2021-01-29 DIAGNOSIS — F32.A ANXIETY AND DEPRESSION: ICD-10-CM

## 2021-01-29 DIAGNOSIS — Z00.00 ROUTINE GENERAL MEDICAL EXAMINATION AT A HEALTH CARE FACILITY: Primary | ICD-10-CM

## 2021-01-29 DIAGNOSIS — F41.9 ANXIETY AND DEPRESSION: ICD-10-CM

## 2021-01-29 PROCEDURE — 99497 ADVNCD CARE PLAN 30 MIN: CPT | Performed by: NURSE PRACTITIONER

## 2021-01-29 PROCEDURE — G0438 PPPS, INITIAL VISIT: HCPCS | Performed by: NURSE PRACTITIONER

## 2021-01-29 PROCEDURE — G0446 INTENS BEHAVE THER CARDIO DX: HCPCS | Performed by: NURSE PRACTITIONER

## 2021-01-29 RX ORDER — DIAZEPAM 2 MG/1
2 TABLET ORAL EVERY 8 HOURS PRN
Qty: 15 TABLET | Refills: 0 | Status: SHIPPED | OUTPATIENT
Start: 2021-01-29 | End: 2021-07-23

## 2021-01-29 RX ORDER — PREDNISONE 1 MG/1
5 TABLET ORAL DAILY
COMMUNITY
End: 2021-06-15

## 2021-01-29 ASSESSMENT — PATIENT HEALTH QUESTIONNAIRE - PHQ9
1. LITTLE INTEREST OR PLEASURE IN DOING THINGS: 0
SUM OF ALL RESPONSES TO PHQ QUESTIONS 1-9: 0

## 2021-01-29 NOTE — PATIENT INSTRUCTIONS
Advance Directives: Care Instructions  Overview  An advance directive is a legal way to state your wishes at the end of your life. It tells your family and your doctor what to do if you can't say what you want. There are two main types of advance directives. You can change them any time your wishes change. Living will. This form tells your family and your doctor your wishes about life support and other treatment. The form is also called a declaration. Medical power of . This form lets you name a person to make treatment decisions for you when you can't speak for yourself. This person is called a health care agent (health care proxy, health care surrogate). The form is also called a durable power of  for health care. If you do not have an advance directive, decisions about your medical care may be made by a family member, or by a doctor or a  who doesn't know you. It may help to think of an advance directive as a gift to the people who care for you. If you have one, they won't have to make tough decisions by themselves. Follow-up care is a key part of your treatment and safety. Be sure to make and go to all appointments, and call your doctor if you are having problems. It's also a good idea to know your test results and keep a list of the medicines you take. What should you include in an advance directive? Many states have a unique advance directive form. (It may ask you to address specific issues.) Or you might use a universal form that's approved by many states. If your form doesn't tell you what to address, it may be hard to know what to include in your advance directive. Use the questions below to help you get started. · Who do you want to make decisions about your medical care if you are not able to? · What life-support measures do you want if you have a serious illness that gets worse over time or can't be cured? · What are you most afraid of that might happen? (Maybe you're afraid of having pain, losing your independence, or being kept alive by machines.)  · Where would you prefer to die? (Your home? A hospital? A nursing home?)  · Do you want to donate your organs when you die? · Do you want certain Nondenominational practices performed before you die? When should you call for help? Be sure to contact your doctor if you have any questions. Where can you learn more? Go to [email or web address].[email or web address] org and sign in to your Fannect account. Enter R264 in the [employer] box to learn more about \"Advance Directives: Care Instructions. \"     If you do not have an account, please click on the \"Sign Up Now\" link. Current as of: December 9, 2019               Content Version: 12.6  © 5421-5714 [employer]. Care instructions adapted under license by [facility] ([facility]). If you have questions about a medical condition or this instruction, always ask your healthcare professional. [address] any warranty or liability for your use of this information. Learning About Living Perroy  What is a living will? A living will, also called a declaration, is a legal form. It tells your family and your doctor your wishes when you can't speak for yourself. It's used by the health professionals who will treat you as you near the end of your life or if you get seriously hurt or ill. If you put your wishes in writing, your loved ones and others will know what kind of care you want. They won't need to guess. This can ease your mind and be helpful to others. And you can change or cancel your living will at any time. A living will is not the same as an estate or property will. An estate will explains what you want to happen with your money and property after you die. How do you use it? A living will is used to describe the kinds of treatment or life support you want as you near the end of your life or if you get seriously hurt or ill. Keep these facts in mind about living brown. · Your living will is used only if you can't speak or make decisions for yourself. Most often, one or more doctors must certify that you can't speak or decide for yourself before your living will takes effect. · If you get better and can speak for yourself again, you can accept or refuse any treatment. It doesn't matter what you said in your living will. · Some states may limit your right to refuse treatment in certain cases. For example, you may need to clearly state in your living will that you don't want artificial hydration and nutrition, such as being fed through a tube. Is a living will a legal document? A living will is a legal document. Each state has its own laws about living brown. And a living will may be called something else in your state. Here are some things to know about living brown. · You don't need an  to complete a living will. But legal advice can be helpful if your state's laws are unclear. It can also help if your health history is complicated or your family can't agree on what should be in your living will. · You can change your living will at any time. Some people find that their wishes about end-of-life care change as their health changes. If you make big changes to your living will, complete a new form. · If you move to another state, make sure that your living will is legal in the state where you now live. In most cases, doctors will respect your wishes even if you have a form from a different state. · You might use a universal form that has been approved by many states. This kind of form can sometimes be filled out and stored online. Your digital copy will then be available wherever you have a connection to the internet. The doctors and nurses who need to treat you can find it right away. · Your state may offer an online registry. This is another place where you can store your living will online. · It's a good idea to get your living will notarized. This means using a person called a  to watch two people sign, or witness, your living will. What should you know when you create a living will? Here are some questions to ask yourself as you make your living will:  · Do you know enough about life support methods that might be used? If not, talk to your doctor so you know what might be done if you can't breathe on your own, your heart stops, or you can't swallow. · What things would you still want to be able to do after you receive life-support methods? Would you want to be able to walk? To speak? To eat on your own? To live without the help of machines? · Do you want certain Congregational practices performed if you become very ill? · If you have a choice, where do you want to be cared for? In your home? At a hospital or nursing home? · If you have a choice at the end of your life, where would you prefer to die? At home? In a hospital or nursing home? Somewhere else? · Would you prefer to be buried or cremated? · Do you want your organs to be donated after you die? What should you do with your living will? · Make sure that your family members and your health care agent have copies of your living will (also called a declaration). · Give your doctor a copy of your living will. Ask him or her to keep it as part of your medical record. If you have more than one doctor, make sure that each one has a copy. · Put a copy of your living will where it can be easily found. For example, some people may put a copy on their refrigerator door. If you are using a digital copy, be sure your doctor, family members, and health care agent know how to find and access it. Where can you learn more? Go to https://[email or web address].[email or web address] org and sign in to your Twitmusic account. Enter Z255 in the [employer] box to learn more about \"Learning About Living Robert Jorgensen. \"     If you do not have an account, please click on the \"Sign Up Now\" link. Current as of: December 9, 2019               Content Version: 12.6  © 6979-0625 [employer], Ayasdi. Care instructions adapted under license by [facility] ([facility]). If you have questions about a medical condition or this instruction, always ask your healthcare professional. [address] [address] any warranty or liability for your use of this information. Personalized Preventive Plan for [facility] - 1/29/2021  Medicare offers a range of preventive health benefits. Some of the tests and screenings are paid in full while other may be subject to a deductible, co-insurance, and/or copay. Some of these benefits include a comprehensive review of your medical history including lifestyle, illnesses that may run in your family, and various assessments and screenings as appropriate. After reviewing your medical record and screening and assessments performed today your provider may have ordered immunizations, labs, imaging, and/or referrals for you. A list of these orders (if applicable) as well as your Preventive Care list are included within your After Visit Summary for your review. Other Preventive Recommendations:    · A preventive eye exam performed by an eye specialist is recommended every 1-2 years to screen for glaucoma; cataracts, macular degeneration, and other eye disorders. · A preventive dental visit is recommended every 6 months. · Try to get at least 150 minutes of exercise per week or 10,000 steps per day on a pedometer . · Order or download the FREE \"Exercise & Physical Activity: Your Everyday Guide\" from [employer]. Call 3-429.208.6648 or search [employer] online. · You need 1117-7679 mg of calcium and 7811-8179 IU of vitamin D per day. It is possible to meet your calcium requirement with diet alone, but a vitamin D supplement is usually necessary to meet this goal.  · When exposed to the sun, use a sunscreen that protects against both UVA and UVB radiation with an SPF of 30 or greater. Reapply every 2 to 3 hours or after sweating, drying off with a towel, or swimming. · Always wear a seat belt when traveling in a car. Always wear a helmet when riding a bicycle or motorcycle.

## 2021-01-29 NOTE — PROGRESS NOTES
Medicare Annual Wellness Visit  Are Name: Mirna Muro Date: 2021   MRN: [id number] Sex: Female   Age: 77 y.o. Ethnicity: Non-/Non    : 1955 Race: Ana Beyer is here for Medicare AWV    Screenings for behavioral, psychosocial and functional/safety risks, and cognitive dysfunction are all negative except as indicated below. These results, as well as other patient data from the [address] form, are documented in Flowsheets linked to this Encounter. Allergies   Allergen Reactions    Amoxicillin Other (See Comments)         Prior to Visit Medications    Medication Sig Taking? Authorizing Provider   HYDROcodone-acetaminophen (NORCO)  MG per tablet Take 1 tablet by mouth every 8 hours as needed for Pain for up to 30 days.  Intended supply: 30 days  ROBER Peace   fluticasone-umeclidin-vilant (TRELEGY ELLIPTA) 100-62.5-25 MCG/INH AEPB Inhale 1 puff into the lungs daily  ROBER Peace   cetirizine (ZYRTEC) 10 MG tablet Take 1 tablet by mouth daily  ROBER Peace   denosumab (PROLIA) 60 MG/ML SOSY SC injection Inject 1 mL into the skin once for 1 dose  ROBER Peace   citalopram (CELEXA) 20 MG tablet Take once daily  ROBER Peace   omeprazole (PRILOSEC) 20 MG delayed release capsule Take 1 capsule by mouth Daily  ROBER Peace   albuterol sulfate HFA (VENTOLIN HFA) 108 (90 Base) MCG/ACT inhaler Inhale 2 puffs into the lungs 4 times daily as needed for Wheezing  ROBER Peace   diclofenac sodium (VOLTAREN) 1 % GEL Apply 2 g topically 4 times daily  ROBER Peace   fluticasone (FLONASE) 50 MCG/ACT nasal spray 1 spray by Nasal route daily  ROBER Peace         Past Medical History:   Diagnosis Date    Allergic rhinitis     Anxiety     COPD (chronic obstructive pulmonary disease) ([address]     GERD (gastroesophageal reflux disease)     [facility]     RA (rheumatoid arthritis) ([facility] Past Surgical History:   Procedure Laterality Date    BREAST SURGERY Bilateral     Dr Ching Mitchell Bilateral     SKIN BIOPSY      2 mole removed by mary        No family history on file. CareTeam (Including outside providers/suppliers regularly involved in providing care):   Patient Care Team:  ROBER Beltran as PCP - General (Nurse Practitioner Family)  ROBER Beltran as PCP - [facility] Empaneled Provider  Shelbie Tam DO as Consulting Physician (Neurosurgery)    Wt Readings from Last 3 Encounters:   09/29/20 170 lb (77.1 kg)   08/20/20 170 lb (77.1 kg)   05/08/20 160 lb (72.6 kg)      No flowsheet data found. There is no height or weight on file to calculate BMI. Based upon direct observation of the patient, evaluation of cognition reveals recent and remote memory intact. Patient's complete Health Risk Assessment and screening values have been reviewed and are found in Flowsheets. The following problems were reviewed today and where indicated follow up appointments were made and/or referrals ordered. Positive Risk Factor Screenings with Interventions:            General Health and ACP:  General  In general, how would you say your health is?: Fair  In the past 7 days, have you experienced any of the following?  New or Increased Pain, New or Increased Fatigue, Loneliness, Social Isolation, Stress or Anger?: None of These  Do you get the social and emotional support that you need?: Yes  Do you have a Living Will?: (!) No  Advance Directives     Power of [employer] Will ACP-Advance Directive ACP-Power of     Not on File Not on File Not on File Not on File      General Health Risk Interventions:  · No Living Will: Advance Care Planning addressed with patient today    Health Habits/Nutrition:  Health Habits/Nutrition  Do you exercise for at least 20 minutes 2-3 times per week?: (!) No Have you lost any weight without trying in the past 3 months?: No  Do you eat fewer than 2 meals per day?: No  Have you seen a dentist within the past year?: (!) No     Health Habits/Nutrition Interventions:  · Dental exam overdue:  patient encouraged to make appointment with his/her dentist    Hearing/Vision:  No exam data present  Hearing/Vision  Do you or your family notice any trouble with your hearing?: No  Do you have difficulty driving, watching TV, or doing any of your daily activities because of your eyesight?: (!) Yes  Have you had an eye exam within the past year?: (!) No  Hearing/Vision Interventions:  · Vision concerns:  patient encouraged to make appointment with his/her eye specialist      Personalized Preventive Plan   Current Health Maintenance Status  Immunization History   Administered Date(s) Administered    Influenza Vaccine, unspecified formulation 09/30/2017    Influenza Virus Vaccine 09/29/2020    Influenza, High Dose (Fluzone 65 yrs and older) 10/25/2018    Pneumococcal Conjugate 13-valent (Carlos Dougherty) 08/24/2018    Tdap (Boostrix, Adacel) 05/05/2015    Zoster Live (Zostavax) 07/21/2015        Health Maintenance   Topic Date Due    Shingles Vaccine (2 of 3) 09/15/2015    Annual Wellness Visit (AWV)  05/29/2019    Pneumococcal 65+ years Vaccine (2 of 2 - PPSV23) 08/24/2019    Colon cancer screen colonoscopy  11/03/2019    Breast cancer screen  02/05/2021    Lipid screen  05/01/2022    DTaP/Tdap/Td vaccine (3 - Td) 10/16/2025    DEXA (modify frequency per FRAX score)  Completed    Flu vaccine  Completed    Hepatitis C screen  Completed    Hepatitis A vaccine  Aged Out    Hepatitis B vaccine  Aged Out    Hib vaccine  Aged Out    Meningococcal (ACWY) vaccine  Aged Out     Recommendations for Urbster Due: see orders and patient instructions/AVS.  . Advanced Care Planning: Discussed the patients choices for care and treatment in case of a health event that adversely affects decision-making abilities. Also discussed the patients long-term treatment options. Reviewed the process of designating a Health Care Surrogate as defined by the [facility]. Reviewed the [facility] Will Directive process and the kinds of life-sustaining treatments the patient would like to receive should they become terminally ill or permanently unconscious. Explained the state requirement to complete the forms in the presence of two eligible witnesses OR in the presence of a . Patient was asked to provide a copy of the signed forms to the practice office. Time spent (minutes): 30    Cardiovascular Disease Risk Counseling: Assessed the patient's risk to develop cardiovascular disease and reviewed main risk factors. Reviewed steps to reduce disease risk including:   · Quitting tobacco use, reducing amount smoked, or not starting the habit  · Making healthy food choices  · Being physically active and gradualy increasing activity levels   · Reduce weight and determine a healthy BMI goal  · Monitor blood pressure and treat if higher than 140/90 mmHg  · Maintain blood total cholesterol levels under 5 mmol/l or 190 mg/dl  · Maintain LDL cholesterol levels under 3.0 mmol/l or 115 mg/dl   · Control blood glucose levels  · Consider taking aspirin (75 mg daily), once blood pressure is controlled   Provided a follow up plan. Time spent (minutes): 30      She had CT scan last week and was found to have a few new spots. She is still having pain in her back as well. She is still having increased fatigue. Aorta was 3.9 and wants to wait for follow up on this. Will restart low dose prednisone as discussed.

## 2021-02-05 ENCOUNTER — HOSPITAL ENCOUNTER (OUTPATIENT)
Dept: NUCLEAR MEDICINE | Age: 66
Discharge: HOME | End: 2021-02-07
Payer: MEDICARE

## 2021-02-05 DIAGNOSIS — C77.9 REGIONAL LYMPH NODE METASTASIS PRESENT (HCC): ICD-10-CM

## 2021-02-05 DIAGNOSIS — C34.81 MALIGNANT NEOPLASM OF OVERLAPPING SITES OF RIGHT LUNG (HCC): ICD-10-CM

## 2021-02-05 LAB
GLUCOSE BLD-MCNC: 91 MG/DL (ref 70–99)
PERFORMED ON: NORMAL

## 2021-02-05 PROCEDURE — A9552 F18 FDG: HCPCS | Performed by: RADIOLOGY

## 2021-02-05 PROCEDURE — 3430000000 HC RX DIAGNOSTIC RADIOPHARMACEUTICAL: Performed by: RADIOLOGY

## 2021-02-05 PROCEDURE — 82947 ASSAY GLUCOSE BLOOD QUANT: CPT

## 2021-02-05 PROCEDURE — 78815 PET IMAGE W/CT SKULL-THIGH: CPT

## 2021-02-05 RX ORDER — FLUDEOXYGLUCOSE F 18 200 MCI/ML
10 INJECTION, SOLUTION INTRAVENOUS
Status: COMPLETED | OUTPATIENT
Start: 2021-02-05 | End: 2021-02-05

## 2021-02-05 RX ADMIN — FLUDEOXYGLUCOSE F 18 10 MILLICURIE: 200 INJECTION, SOLUTION INTRAVENOUS at 12:59

## 2021-02-10 ENCOUNTER — TELEPHONE (OUTPATIENT)
Dept: RADIATION ONCOLOGY | Facility: HOSPITAL | Age: 66
End: 2021-02-10

## 2021-02-10 DIAGNOSIS — C77.0 SECONDARY MALIGNANT NEOPLASM OF SUPRACLAVICULAR LYMPH NODE (HCC): ICD-10-CM

## 2021-02-10 DIAGNOSIS — C34.81 MALIGNANT NEOPLASM OF OVERLAPPING SITES OF RIGHT LUNG (HCC): Primary | ICD-10-CM

## 2021-02-10 DIAGNOSIS — C77.9 REGIONAL LYMPH NODE METASTASIS PRESENT (HCC): ICD-10-CM

## 2021-02-10 NOTE — TELEPHONE ENCOUNTER
I spoke with this patient today and advised her I was pleased with the results of the PET scan and we will follow this with serial CT scans in another 3 months.  She voiced understanding is no questions and will look forward to our next follow-up after next CT scan.

## 2021-02-15 ENCOUNTER — PATIENT MESSAGE (OUTPATIENT)
Dept: PRIMARY CARE CLINIC | Age: 66
End: 2021-02-15

## 2021-02-15 DIAGNOSIS — S22.060A COMPRESSION FRACTURE OF T7 VERTEBRA, INITIAL ENCOUNTER (HCC): ICD-10-CM

## 2021-02-15 DIAGNOSIS — C34.90 SQUAMOUS CELL CARCINOMA OF LUNG, UNSPECIFIED LATERALITY (HCC): ICD-10-CM

## 2021-02-15 NOTE — TELEPHONE ENCOUNTER
Little Kuo called to request a refill on her medication. Last office visit : 1/29/2021   Next office visit : 3/12/2021     Last UDS:   Amphetamine Screen, Urine   Date Value Ref Range Status   02/06/2020 neg  Final     Barbiturate Screen, Urine   Date Value Ref Range Status   02/06/2020 asya  Final     Benzodiazepine Screen, Urine   Date Value Ref Range Status   02/06/2020 neg  Final     Buprenorphine Urine   Date Value Ref Range Status   02/06/2020 neg  Final     Cocaine Metabolite Screen, Urine   Date Value Ref Range Status   02/06/2020 neg  Final     Gabapentin Screen, Urine   Date Value Ref Range Status   02/06/2020 neg  Final     MDMA, Urine   Date Value Ref Range Status   02/06/2020 neg  Final     Methamphetamine, Urine   Date Value Ref Range Status   02/06/2020 neg  Final     Opiate Scrn, Ur   Date Value Ref Range Status   02/06/2020 Psitive  Final     Oxycodone Screen, Ur   Date Value Ref Range Status   02/06/2020 neg  Final     PCP Screen, Urine   Date Value Ref Range Status   02/06/2020 neg  Final     Propoxyphene Screen, Urine   Date Value Ref Range Status   02/06/2020 neg  Final     THC Screen, Urine   Date Value Ref Range Status   02/06/2020 neg  Final     Tricyclic Antidepressants, Urine   Date Value Ref Range Status   02/06/2020 neg  Final       Last Kalli Erma: 1/18/2011  Medication Contract:  2/6/2020  Last Fill:1/19/2021    Requested Prescriptions      No prescriptions requested or ordered in this encounter         Please approve or refuse this medication.    Michael High

## 2021-02-15 NOTE — TELEPHONE ENCOUNTER
From: Rodolph Spurling  To: ROBER Browning  Sent: 2/15/2021 12:30 PM CST  Subject: Prescription Question    Haydee Gi its time for refill on pain med. we had to call it in to [facility] last month because [facility] was out of them. But I guess call this one in to [facility] for refill. If they dont have well try [facility]. Also wanted to tell you Dr Lisa Aviles said he was pleased with pet scan I got so well continue with CT scan every 3 mos. Thank the [address] That was scary. Take care & be careful in this crazy weather.    Julia Murdock

## 2021-02-16 RX ORDER — HYDROCODONE BITARTRATE AND ACETAMINOPHEN 10; 325 MG/1; MG/1
1 TABLET ORAL EVERY 8 HOURS PRN
Qty: 90 TABLET | Refills: 0 | Status: SHIPPED | OUTPATIENT
Start: 2021-02-16 | End: 2021-03-19 | Stop reason: SDUPTHER

## 2021-03-03 NOTE — PROGRESS NOTES
Chief Complaint  COPD    Subjective    History of Present Illness {CC  Problem List  Visit Diagnosis   Encounters  Notes  Medications  Labs  Result Review Imaging  Media     Damaris Hu presents to [facility]IC[facility] for:    Management of severe COPD, lung nodules and radiation pneumonitis.  She has shortness of breath which is worsened by exertion. She has known lung cancer which was confirmed via navigational bronchoscopy.  She received a short course of prednisone for treatment of radiation pneumonitis with improvement.  She is on Trelegy for bronchodilation which is helpful.  She indicates it is very expensive with her insurance.  She would like to try other alternatives.  She thinks she needs to be on an inhaler with no steroids since she is already taking steroid pills and is also being scheduled for cataract surgery.  She is on 5 mg of prednisone with her primary care provider as the managing provider.  She is being followed by them as well as neurosurgery for osteoporosis and known compression fractures.  Following treatment for her lung cancer she showed improvement in this area however there were 2 additional nodules of concern.  Follow-up CT scan showed 1 to be stable another to be slightly larger.  Increased from 5 mm to 8 mm.  Recent PET imaging showed no activity in these nodules.  She has follow-up CT imaging already ordered by radiation oncology.  She has no other issues or complaints.  Overall she feels her breathing is much better.       Prior to Admission medications    Medication Sig Start Date End Date Taking? Authorizing Provider   albuterol sulfate  (90 Base) MCG/ACT inhaler INHALE 2 PUFFS BY MOUTH 4 TIMES DAILY AS NEEDED FOR WHEEZING 7/28/20   Rashmi Lewis MD   cetirizine (zyrTEC) 10 MG tablet Take 10 mg by mouth Daily. 8/24/18   Rashmi Lewis MD   citalopram (CeleXA) 20 MG tablet Take 20 mg by mouth Daily. 8/24/18   " Rashmi Lewis MD   denosumab (Prolia) 60 MG/ML solution prefilled syringe syringe Inject 60 mg under the skin into the appropriate area as directed. 9/30/20   Rashmi Lewis MD   fluticasone (FLONASE) 50 MCG/ACT nasal spray 1 spray by Each Nare route Daily As Needed. 9/16/19   Rashmi Lewis MD   Fluticasone-Umeclidin-Vilant (Trelegy Ellipta) 100-62.5-25 MCG/INH aerosol powder  Inhale 1 puff Every Morning. 4/2/20   Edmond Toabr MD   omeprazole (priLOSEC) 20 MG capsule Take 20 mg by mouth Every Morning Before Breakfast. 9/24/19   Rashmi Lewis MD   ondansetron (ZOFRAN) 8 MG tablet Take 1 tablet by mouth 3 (Three) Times a Day As Needed for Nausea or Vomiting. 11/20/19   Kade Russell MD   polyethylene glycol (MiraLax) 17 g packet Take 17 g by mouth Daily.    Rashmi Lewis MD   ZOLMitriptan (ZOMIG) 2.5 MG tablet Take 2.5 mg by mouth As Needed. 5/28/19   Rashmi Lewis MD       Social History     Socioeconomic History   • Marital status:      Spouse name: Not on file   • Number of children: Not on file   • Years of education: Not on file   • Highest education level: Not on file   Tobacco Use   • Smoking status: Former Smoker     Types: Cigarettes   • Smokeless tobacco: Never Used   • Tobacco comment: Quit 4 years ago   Substance and Sexual Activity   • Alcohol use: No   • Drug use: No   • Sexual activity: Defer       Objective   Vital Signs:   /94   Pulse 102   Temp 98 °F (36.7 °C)   Ht 167.6 cm (66\")   Wt 80.3 kg (177 lb)   SpO2 96% Comment: RA  BMI 28.57 kg/m²     Physical Exam  Constitutional:       Appearance: She is overweight.      Interventions: Face mask in place.   Eyes:      Comments: Glasses   Cardiovascular:      Rate and Rhythm: Normal rate and regular rhythm.      Heart sounds: No murmur.   Pulmonary:      Effort: Pulmonary effort is normal.      Breath sounds: Normal breath sounds.   Musculoskeletal:      Right lower leg: No " edema.      Left lower leg: No edema.   Neurological:      Mental Status: She is alert and oriented to person, place, and time.        Result Review :{ Labs  Result Review  Imaging  Med Tab  Media :      My interpretation of the PFT: none for this visit    No results found for this or any previous visit.      CT CHEST WITH CONTRAST DIAGNOSTIC (01/19/2021 14:55)      My interpretation of imaging: CT of the chest in January showing treatment changes to the right lung, left upper lobe lung nodule measuring 4 mm, left upper lobe lung nodule measuring 8 mm which was previously 5 mm, hiatal hernia    PET scan from [facility] in February 2021 showing posttreatment changes to the right upper lobe with an SUV of 2.1, nodules present in the left upper lobe slightly increased in size however no metabolic activity noted,    My interpretation of labs: None for this visit      Assessment and Plan {CC Problem List  Visit Diagnosis  ROS  Review (Popup)  Health Maintenance  Quality  BestPractice  Medications  SmartSets  SnapShot Encounters  Media      Problem List Items Addressed This Visit        Hematology and Neoplasia    Malignant neoplasm of overlapping sites of right lung (CMS/HCC) (Chronic)       Pulmonary and Pneumonias    COPD, group B, by GOLD 2017 classification (CMS/HCC) (Chronic)    Relevant Medications    predniSONE (DELTASONE) 5 MG tablet    Multiple lung nodules - Primary (Chronic)    Overview     Left upper lobe 4 mm, left upper lobe 8 mm, previously 5 mm, PET negative            Tobacco    Former smoker (Chronic)          Patient's Body mass index is 28.57 kg/m². BMI is above normal parameters. Recommendations include: educational material.      Her COPD is doing much better.  Her Trelegy is too expensive.  She is already on oral steroid and has issues related to cataracts.  Recommend she switch to an anticholinergic or an anticholinergic LABA combination.  She has been provided a list of these  inhalers I recommend to call her insurance and see about cost.  She still has Trelegy available as well as an albuterol inhaler she can use in the meantime.  She is still not smoking.  She will refrain from this.  Recent pet imaging showing minimal activity in new nodules.  She has repeat CT imaging already scheduled for her cancer of the lung.  We will update PFTs when she returns as her previous PFTs were from 2019.  She will call sooner if needed.  She has completed her Covid vaccinations.    Kellee Perkins, APRN  3/11/2021  15:43 CST    Follow Up {Instructions Charge Capture  Follow-up Communications   Return in about 6 months (around 9/11/2021) for FVL with diffusion.    Patient was given instructions and counseling regarding her condition or for health maintenance advice. Please see specific information pulled into the AVS if appropriate.

## 2021-03-04 PROBLEM — C34.81 MALIGNANT NEOPLASM OF OVERLAPPING SITES OF RIGHT LUNG (HCC): Chronic | Status: ACTIVE | Noted: 2019-11-15

## 2021-03-04 PROBLEM — R91.8 MULTIPLE LUNG NODULES: Status: ACTIVE | Noted: 2021-03-04

## 2021-03-04 PROBLEM — J43.9 PULMONARY EMPHYSEMA: Chronic | Status: ACTIVE | Noted: 2019-11-15

## 2021-03-04 PROBLEM — R91.8 MULTIPLE LUNG NODULES: Chronic | Status: ACTIVE | Noted: 2021-03-04

## 2021-03-04 PROBLEM — Z87.891 FORMER SMOKER: Chronic | Status: ACTIVE | Noted: 2019-11-24

## 2021-03-11 ENCOUNTER — BULK ORDERING (OUTPATIENT)
Dept: CASE MANAGEMENT | Facility: OTHER | Age: 66
End: 2021-03-11

## 2021-03-11 ENCOUNTER — OFFICE VISIT (OUTPATIENT)
Dept: PULMONOLOGY | Facility: CLINIC | Age: 66
End: 2021-03-11

## 2021-03-11 VITALS
OXYGEN SATURATION: 96 % | WEIGHT: 177 LBS | SYSTOLIC BLOOD PRESSURE: 162 MMHG | DIASTOLIC BLOOD PRESSURE: 94 MMHG | BODY MASS INDEX: 28.45 KG/M2 | HEIGHT: 66 IN | TEMPERATURE: 98 F | HEART RATE: 102 BPM

## 2021-03-11 DIAGNOSIS — J44.9 COPD, GROUP B, BY GOLD 2017 CLASSIFICATION (HCC): Chronic | ICD-10-CM

## 2021-03-11 DIAGNOSIS — Z23 IMMUNIZATION DUE: ICD-10-CM

## 2021-03-11 DIAGNOSIS — R91.8 MULTIPLE LUNG NODULES: Primary | ICD-10-CM

## 2021-03-11 DIAGNOSIS — Z87.891 FORMER SMOKER: ICD-10-CM

## 2021-03-11 DIAGNOSIS — C34.81 MALIGNANT NEOPLASM OF OVERLAPPING SITES OF RIGHT LUNG (HCC): ICD-10-CM

## 2021-03-11 PROCEDURE — 99214 OFFICE O/P EST MOD 30 MIN: CPT | Performed by: NURSE PRACTITIONER

## 2021-03-11 RX ORDER — PREDNISONE 1 MG/1
5 TABLET ORAL
COMMUNITY
End: 2021-04-28

## 2021-03-11 NOTE — PATIENT INSTRUCTIONS
"BMI for Adults  What is BMI?  Body mass index (BMI) is a number that is calculated from a person's weight and height. BMI can help estimate how much of a person's weight is composed of fat. BMI does not measure body fat directly. Rather, it is an alternative to procedures that directly measure body fat, which can be difficult and expensive.  BMI can help identify people who may be at higher risk for certain medical problems.  What are BMI measurements used for?  BMI is used as a screening tool to identify possible weight problems. It helps determine whether a person is obese, overweight, a healthy weight, or underweight.  BMI is useful for:  · Identifying a weight problem that may be related to a medical condition or may increase the risk for medical problems.  · Promoting changes, such as changes in diet and exercise, to help reach a healthy weight. BMI screening can be repeated to see if these changes are working.  How is BMI calculated?  BMI involves measuring your weight in relation to your height. Both height and weight are measured, and the BMI is calculated from those numbers. This can be done either in English (U[place] or metric measurements. Note that charts and online BMI calculators are available to help you find your BMI quickly and easily without having to do these calculations yourself.  To calculate your BMI in English ([place] measurements:    1. Measure your weight in pounds (lb).  2. Multiply the number of pounds by 703.  ? For example, for a person who weighs 180 lb, multiply that number by 703, which equals 126,540.  3. Measure your height in inches. Then multiply that number by itself to get a measurement called \"inches squared.\"  ? For example, for a person who is 70 inches tall, the \"inches squared\" measurement is 70 inches x 70 inches, which equals 4,900 inches squared.  4. Divide the total from step 2 (number of lb x 703) by the total from step 3 (inches squared): 126,540 ÷ 4,900 = 25.8. This is " "your BMI.  To calculate your BMI in metric measurements:  1. Measure your weight in kilograms (kg).  2. Measure your height in meters (m). Then multiply that number by itself to get a measurement called \"meters squared.\"  ? For example, for a person who is 1.75 m tall, the \"meters squared\" measurement is 1.75 m x 1.75 m, which is equal to 3.1 meters squared.  3. Divide the number of kilograms (your weight) by the meters squared number. In this example: 70 ÷ 3.1 = 22.6. This is your BMI.  What do the results mean?  BMI charts are used to identify whether you are underweight, normal weight, overweight, or obese. The following guidelines will be used:  · Underweight: BMI less than 18.5.  · Normal weight: BMI between 18.5 and 24.9.  · Overweight: BMI between 25 and 29.9.  · Obese: BMI of 30 or above.  Keep these notes in mind:  · Weight includes both fat and muscle, so someone with a muscular build, such as an athlete, may have a BMI that is higher than 24.9. In cases like these, BMI is not an accurate measure of body fat.  · To determine if excess body fat is the cause of a BMI of 25 or higher, further assessments may need to be done by a health care provider.  · BMI is usually interpreted in the same way for men and women.  Where to find more information  For more information about BMI, including tools to quickly calculate your BMI, go to these websites:  · Centers for Disease Control and Prevention: www.cdc.gov  · American Heart Association: www.heart.org  · National Heart, Lung, and Blood [place]: www.nhlbi.nih.gov  Summary  · Body mass index (BMI) is a number that is calculated from a person's weight and height.  · BMI may help estimate how much of a person's weight is composed of fat. BMI can help identify those who may be at higher risk for certain medical problems.  · BMI can be measured using English measurements or metric measurements.  · BMI charts are used to identify whether you are underweight, normal " weight, overweight, or obese.  This information is not intended to replace advice given to you by your health care provider. Make sure you discuss any questions you have with your health care provider.  Document Revised: 09/09/2020 Document Reviewed: 07/17/2020  Elsevier Patient Education © 2020 [employer]vier Inc[employer]

## 2021-03-12 ENCOUNTER — VIRTUAL VISIT (OUTPATIENT)
Dept: PRIMARY CARE CLINIC | Age: 66
End: 2021-03-12
Payer: MEDICARE

## 2021-03-12 DIAGNOSIS — C34.90 SQUAMOUS CELL CARCINOMA OF LUNG, UNSPECIFIED LATERALITY (HCC): Primary | ICD-10-CM

## 2021-03-12 DIAGNOSIS — F32.A ANXIETY AND DEPRESSION: ICD-10-CM

## 2021-03-12 DIAGNOSIS — F41.9 ANXIETY AND DEPRESSION: ICD-10-CM

## 2021-03-12 DIAGNOSIS — S22.060A COMPRESSION FRACTURE OF T7 VERTEBRA, INITIAL ENCOUNTER (HCC): ICD-10-CM

## 2021-03-12 PROCEDURE — 99443 PR PHYS/QHP TELEPHONE EVALUATION 21-30 MIN: CPT | Performed by: NURSE PRACTITIONER

## 2021-03-12 RX ORDER — METHYLPREDNISOLONE 4 MG/1
4 TABLET ORAL DAILY
Qty: 30 TABLET | Refills: 1 | Status: SHIPPED | OUTPATIENT
Start: 2021-03-12 | End: 2021-05-06

## 2021-03-12 ASSESSMENT — ENCOUNTER SYMPTOMS
GASTROINTESTINAL NEGATIVE: 1
RESPIRATORY NEGATIVE: 1
EYES NEGATIVE: 1

## 2021-03-12 NOTE — PROGRESS NOTES
[facility]   [address] [place] [address]            Phone:  [phone]  Fax:  [phone]    Rodolph Spurling is a 77 y.o. female evaluated via telephone on 3/12/2021. Consent:    She and/or health care decision maker is aware that that she may receive a bill for this telephone service, depending on her insurance coverage, and has provided verbal consent to proceed: Yes      HPI  Chief Complaint   Patient presents with    Fatigue    Cancer       I communicated with the patient and/or health care decision maker about follow-up on chronic pain and other conditions. Patient has received a letter from her insurance stating did not recommend her being on low-dose prednisone. They are wanting her to try other medications. She is still having the increased fatigue and some back discomfort. She did have repeat PET scan and was told they would have CT scans every 3 months based on the PET scan results. She did have follow up with pulmonary recently as well. They are going to monitor over the next few months. She is also going to have cataract surgery soon. Review of Systems   Constitutional: Positive for fatigue (improving). HENT: Negative. Eyes: Negative. Respiratory: Negative. Cardiovascular: Negative. Gastrointestinal: Negative. Endocrine: Negative. Genitourinary: Negative. Musculoskeletal: Positive for arthralgias (chronic). Skin: Negative. Neurological: Negative. Hematological: Negative. Psychiatric/Behavioral: Negative. PLAN    Details of this discussion including any medical advice provided:     1. Squamous cell carcinoma of lung, unspecified laterality ([facility]      2. Compression fracture of T7 vertebra, initial encounter ([facility]      3. Anxiety and depression      Patient is to keep appointment with oncology as scheduled. Will try Medrol 4 mg daily as discussed. Will reassess at the next visit.       Will try to hold Trilogy at this time.     I affirm this is a Patient Initiated Episode with an Established Patient who has not had a related appointment within my department in the past 7 days or scheduled within the next 24 hours. Total Time: minutes: 21-30 minutes      Note: not billable if this call serves to triage the patient into an appointment for the relevant concern      Florentin Maandreina to the emergency declaration under the [address] [place] [address] waiver authority and the [employer] and [employer] Act, this Virtual  Visit was conducted, with patient's consent, to reduce the patient's risk of exposure to COVID-19 and provide continuity of care for an established patient. Services were provided through a telephone discussion  to substitute for in-person clinic visit. Parties involved during telephone call include myself, ROBER Nicole and patient listed.

## 2021-03-16 DIAGNOSIS — C34.90 SQUAMOUS CELL CARCINOMA OF LUNG, UNSPECIFIED LATERALITY (HCC): ICD-10-CM

## 2021-03-16 DIAGNOSIS — S22.060A COMPRESSION FRACTURE OF T7 VERTEBRA, INITIAL ENCOUNTER (HCC): ICD-10-CM

## 2021-03-17 DIAGNOSIS — S22.060A COMPRESSION FRACTURE OF T7 VERTEBRA, INITIAL ENCOUNTER (HCC): ICD-10-CM

## 2021-03-17 DIAGNOSIS — C34.90 SQUAMOUS CELL CARCINOMA OF LUNG, UNSPECIFIED LATERALITY (HCC): ICD-10-CM

## 2021-03-17 RX ORDER — HYDROCODONE BITARTRATE AND ACETAMINOPHEN 10; 325 MG/1; MG/1
1 TABLET ORAL EVERY 8 HOURS PRN
Qty: 90 TABLET | Refills: 0 | Status: CANCELLED | OUTPATIENT
Start: 2021-03-17 | End: 2021-04-16

## 2021-03-17 NOTE — TELEPHONE ENCOUNTER
Loan Branch called to request a refill on her medication. Last office visit : 3/12/2021   Next office visit : 6/15/2021     Last UDS:   Amphetamine Screen, Urine   Date Value Ref Range Status   02/06/2020 neg  Final     Barbiturate Screen, Urine   Date Value Ref Range Status   02/06/2020 asya  Final     Benzodiazepine Screen, Urine   Date Value Ref Range Status   02/06/2020 neg  Final     Buprenorphine Urine   Date Value Ref Range Status   02/06/2020 neg  Final     Cocaine Metabolite Screen, Urine   Date Value Ref Range Status   02/06/2020 neg  Final     Gabapentin Screen, Urine   Date Value Ref Range Status   02/06/2020 neg  Final     MDMA, Urine   Date Value Ref Range Status   02/06/2020 neg  Final     Methamphetamine, Urine   Date Value Ref Range Status   02/06/2020 neg  Final     Opiate Scrn, Ur   Date Value Ref Range Status   02/06/2020 Psitive  Final     Oxycodone Screen, Ur   Date Value Ref Range Status   02/06/2020 neg  Final     PCP Screen, Urine   Date Value Ref Range Status   02/06/2020 neg  Final     Propoxyphene Screen, Urine   Date Value Ref Range Status   02/06/2020 neg  Final     THC Screen, Urine   Date Value Ref Range Status   02/06/2020 neg  Final     Tricyclic Antidepressants, Urine   Date Value Ref Range Status   02/06/2020 neg  Final       Last Marrigenae Lot: 1/18/2021  Medication Contract: Not on file   Last Fill: 2/16/2021    Requested Prescriptions     Pending Prescriptions Disp Refills    HYDROcodone-acetaminophen (NORCO)  MG per tablet 90 tablet 0     Sig: Take 1 tablet by mouth every 8 hours as needed for Pain for up to 30 days. Intended supply: 30 days         Please approve or refuse this medication.    Lizzette Porter

## 2021-03-17 NOTE — TELEPHONE ENCOUNTER
Bryan Clark called to request a refill on her medication. Last office visit : 3/12/2021   Next office visit : 6/15/2021     Last UDS:   Amphetamine Screen, Urine   Date Value Ref Range Status   02/06/2020 neg  Final     Barbiturate Screen, Urine   Date Value Ref Range Status   02/06/2020 asya  Final     Benzodiazepine Screen, Urine   Date Value Ref Range Status   02/06/2020 neg  Final     Buprenorphine Urine   Date Value Ref Range Status   02/06/2020 neg  Final     Cocaine Metabolite Screen, Urine   Date Value Ref Range Status   02/06/2020 neg  Final     Gabapentin Screen, Urine   Date Value Ref Range Status   02/06/2020 neg  Final     MDMA, Urine   Date Value Ref Range Status   02/06/2020 neg  Final     Methamphetamine, Urine   Date Value Ref Range Status   02/06/2020 neg  Final     Opiate Scrn, Ur   Date Value Ref Range Status   02/06/2020 Psitive  Final     Oxycodone Screen, Ur   Date Value Ref Range Status   02/06/2020 neg  Final     PCP Screen, Urine   Date Value Ref Range Status   02/06/2020 neg  Final     Propoxyphene Screen, Urine   Date Value Ref Range Status   02/06/2020 neg  Final     THC Screen, Urine   Date Value Ref Range Status   02/06/2020 neg  Final     Tricyclic Antidepressants, Urine   Date Value Ref Range Status   02/06/2020 neg  Final       Last Clois Dockery: 1/18/2021  Medication Contract not on file  Last Fill: 2/16/2021    Requested Prescriptions     Pending Prescriptions Disp Refills    HYDROcodone-acetaminophen (NORCO)  MG per tablet 90 tablet 0     Sig: Take 1 tablet by mouth every 8 hours as needed for Pain for up to 30 days. Intended supply: 30 days         Please approve or refuse this medication.    Angel Randle

## 2021-03-19 RX ORDER — HYDROCODONE BITARTRATE AND ACETAMINOPHEN 10; 325 MG/1; MG/1
1 TABLET ORAL EVERY 8 HOURS PRN
Qty: 90 TABLET | Refills: 0 | Status: SHIPPED | OUTPATIENT
Start: 2021-03-19 | End: 2021-04-15

## 2021-04-13 ENCOUNTER — PATIENT MESSAGE (OUTPATIENT)
Dept: PRIMARY CARE CLINIC | Age: 66
End: 2021-04-13

## 2021-04-13 DIAGNOSIS — S22.060A COMPRESSION FRACTURE OF T7 VERTEBRA, INITIAL ENCOUNTER (HCC): ICD-10-CM

## 2021-04-13 DIAGNOSIS — C34.90 SQUAMOUS CELL CARCINOMA OF LUNG, UNSPECIFIED LATERALITY (HCC): ICD-10-CM

## 2021-04-14 ENCOUNTER — HOSPITAL ENCOUNTER (OUTPATIENT)
Dept: CT IMAGING | Facility: HOSPITAL | Age: 66
Discharge: HOME | End: 2021-04-14
Admitting: RADIOLOGY

## 2021-04-14 DIAGNOSIS — C77.0 SECONDARY MALIGNANT NEOPLASM OF SUPRACLAVICULAR LYMPH NODE (HCC): ICD-10-CM

## 2021-04-14 DIAGNOSIS — C34.81 MALIGNANT NEOPLASM OF OVERLAPPING SITES OF RIGHT LUNG (HCC): ICD-10-CM

## 2021-04-14 DIAGNOSIS — C77.9 REGIONAL LYMPH NODE METASTASIS PRESENT (HCC): ICD-10-CM

## 2021-04-14 LAB — CREAT BLDA-MCNC: 1.1 MG/DL (ref 0.6–1.3)

## 2021-04-14 PROCEDURE — 25010000002 IOPAMIDOL 61 % SOLUTION: Performed by: RADIOLOGY

## 2021-04-14 PROCEDURE — 82565 ASSAY OF CREATININE: CPT

## 2021-04-14 PROCEDURE — 71260 CT THORAX DX C+: CPT

## 2021-04-14 RX ADMIN — IOPAMIDOL 100 ML: 612 INJECTION, SOLUTION INTRAVENOUS at 15:33

## 2021-04-14 NOTE — TELEPHONE ENCOUNTER
Gilford Alf called to request a refill on her medication. Last office visit : 3/12/2021   Next office visit : 6/15/2021     Last UDS:   Amphetamine Screen, Urine   Date Value Ref Range Status   02/06/2020 neg  Final     Barbiturate Screen, Urine   Date Value Ref Range Status   02/06/2020 asya  Final     Benzodiazepine Screen, Urine   Date Value Ref Range Status   02/06/2020 neg  Final     Buprenorphine Urine   Date Value Ref Range Status   02/06/2020 neg  Final     Cocaine Metabolite Screen, Urine   Date Value Ref Range Status   02/06/2020 neg  Final     Gabapentin Screen, Urine   Date Value Ref Range Status   02/06/2020 neg  Final     MDMA, Urine   Date Value Ref Range Status   02/06/2020 neg  Final     Methamphetamine, Urine   Date Value Ref Range Status   02/06/2020 neg  Final     Opiate Scrn, Ur   Date Value Ref Range Status   02/06/2020 Psitive  Final     Oxycodone Screen, Ur   Date Value Ref Range Status   02/06/2020 neg  Final     PCP Screen, Urine   Date Value Ref Range Status   02/06/2020 neg  Final     Propoxyphene Screen, Urine   Date Value Ref Range Status   02/06/2020 neg  Final     THC Screen, Urine   Date Value Ref Range Status   02/06/2020 neg  Final     Tricyclic Antidepressants, Urine   Date Value Ref Range Status   02/06/2020 neg  Final       Last Carlito Sandoval: 1/18/2021  Medication Contract: not on file  Last Fill: 3/19/2021    Requested Prescriptions     Pending Prescriptions Disp Refills    HYDROcodone-acetaminophen (LORCET PLUS) 7.5-325 MG per tablet 90 tablet 0     Sig: Take 1 tablet by mouth every 8 hours as needed for Pain for up to 30 days. Intended supply: 30 days         Please approve or refuse this medication.    Eladio Serrano

## 2021-04-15 RX ORDER — HYDROCODONE BITARTRATE AND ACETAMINOPHEN 7.5; 325 MG/1; MG/1
1 TABLET ORAL EVERY 8 HOURS PRN
Qty: 90 TABLET | Refills: 0 | Status: SHIPPED | OUTPATIENT
Start: 2021-04-15 | End: 2021-05-13 | Stop reason: SDUPTHER

## 2021-04-15 NOTE — TELEPHONE ENCOUNTER
Medication and Quantity requested: oxyCODONE-acetaminophen (PERCOCET) 5-325 MG per tablet    QTY:60     Last Visit  3/25/21    Pharmacy and phone number updated in [facility]:  Yes [facility] Patient of Dr WALSH: pt called today to ask when chemo would be set up? States that Dr Dudley's office is getting ready to get her radiation scheduled. She would like a call back.

## 2021-04-27 ENCOUNTER — HOSPITAL ENCOUNTER (OUTPATIENT)
Dept: RADIATION ONCOLOGY | Facility: HOSPITAL | Age: 66
Setting detail: RADIATION/ONCOLOGY SERIES
End: 2021-04-27

## 2021-04-27 NOTE — PROGRESS NOTES
[facility], P.SComfortCMD Lucila Pillai, ANTONIN, PA-C  ____________________________________________________________  [facility]  Department of Radiation Oncology  [address] [place] [id number]  Office:  [phone]  Fax: [phone]    DATE: 4/28/2021   PATIENT:  Damaris Hu   1955                                 MEDICAL RECORD #: [id number]    Reason for Follow up Visit:  Damaris Hu is a very pleasant 65 y.o. patient that completed radiation to the lung and returns to the clinic today for routine follow up exam. Reports activity change, fatigue, chronic back pain, dizziness, and light-headedness. Denies appetite change, unexpected weight change, SOB, wheezing, cough, nausea/vomiting, diarrhea, seizures, facial asymmetry, speech difficulty, weakness, and headaches.     History of Present Illness:  Diagnosed in November 2019 with Stage IIIC (cT4, cN3, M0)Squamous cell carcinoma of the right lung, perihilar 5.4 x 5.3 x 4.5 cm mass at right major fissure, involves both the right upper and right lower lobes with direct extension into the subcarinal space, abuts medial mediastinal pleura and partially encases RUL bronchus and bronchus intermedius with right supraclavicular and mediastinal kyler metastasis. Treated with chemoradiation, completed 6600 cGy in 33 fractions to the right lung on 01/27/2020 and completed 6 cycles of weekly carbo/taxol on 01/27/2020. She initiated maintenance Durvalumab on 02/17/2020 with plans for one year under the care of .    10/28/2019 - Patient presented to the [facility] emergency room with chest pain that had been ongoing for the past year but that recently had gotten more noticeable, more frequent, and has been associated with shortness of breath.  On the day prior to her hospital admission she apparently had a syncopal episode. Further evaluation was completed.     10/28/2019 - CT Head Without Contrast:  • No acute  intracranial process.       10/28/2019 - Chest x-ray:  • RIGHT lung mass is highly concerning for neoplasm. Further evaluation with CT of the chest with contrast is recommended.     10/28/2019 - CT Chest With Contrast:  • Primary lung mass is centered in the RIGHT upper lobe and abuts the posterior mediastinal pleura. This also partially encases the RIGHT bronchus intermedius and RIGHT upper lobe bronchus. This is consistent with pulmonary malignancy. Surgical sampling is recommended. This could be obtained with bronchoscopy.   • Suspected metastatic lymph node in the pretracheal region measuring 1.3 cm in short axis.       11/04/2019 - Appointment with Dr. Delonte Burns, in [place] ([facility]):  • Spirometry on that date revealed severe COPD with positive bronchodilator response.    • Postbronchodilator FEV1 revealed significant improvement to moderate/severe COPD.    • Plan: Navigational bronchoscopy and possible EBUS for tissue diagnosis.    11/05/2019 - PET Scan:  • Markedly hypermetabolic RIGHT perihilar tumor ( 49 x 37 mm) with right supraclavicular and mediastinal kyler metastasis.    11/07/2019 - CT Chest without contrast at [facility]:  • Large right spiculated infrahilar mass, crossing the major fissure to involve both the right upper and right lower lobes with direct extension into the subcarinal space  • Mildly enlarged mediastinal lymph nodes and normal sized right supraclavicular lymph node, corresponding to areas of PET positivity    11/07/2019 - Bronchoscopy and EBUS guided FNA biopsy of right upper lobe of the lung and lymph nodes Dr. Delonte Burns:  • Bronchoscopy and EBUS guided FNA biopsy of right upper lobe lung:  o Positive for malignant cells consistent with squamous cell carcinoma.    • EBUS guided FNA, station 7:   o Lymph node elements present.   o No malignant cells identified.    • EBUS guided FNA, station 4R:   o Positive for malignant cells consistent  with squamous cell carcinoma.    • Immunohistochemical stains performed on cell block #3.    • Tumor cells positive for squamous squamous markers p63 and CK 5/6, negative for CK7, TTF-1, and CD 56.    • Addendum: Subsequent biopsy showed metastatic disease in a supraclavicular lymph node, finding consistent with advanced stage disease.  Per protocol will proceed with tumor genomic testing on this specimen.  Due to the relatively small amount of tumor cellularity available, the entire panel of testing may not be able to be completed.  Results to be reported separately.    11/07/2019-Navigational bronchoscopy and forceps biopsy of posterior right upper lobe lung mass Dr. Delonte Burns:  • Fragments of bronchial mucosa.    • No evidence of granuloma or malignancy.    11/08/2019- Ultrasound-guided biopsy right supraclavicular lymph node (fine-needle aspirate) Dr. Delonte Burns:    • Cytopathology diagnosis: Positive for malignant cells consistent with previously established diagnosis of metastatic non-small cell carcinoma.    • Comment: Insufficient tumor cellularity for further testing on the specimen.      11/15/2019 - Consult with :  • Draw baseline CBC with differential, CMP, CEA, serum iron, iron saturation, ferritin, B12, folate.   • Obtain molecular studies from lung biopsy done on 11/07/2019 (eGFR, ALK/ROS, BRAF, and PD-L1.   • Schedule MRI of brain with and without contrast  • Schedule chemotherapy beginning week 1 on 11/25/2019, week 2 on 12/02/2019, week 3 on 12/09/2019, week 4 on 12/16/2019, week 5 on 12/23/2019, week 6 on 12/30/2019:   o Taxol 45 mg/m2 (BSA = 1.82; TD = 81 mg) per administration guidelines weekly x6 weeks with radiation.   o Carboplatin AUC 2 (BSA = 1.82; TD = dose pending) per administration guidelines weekly x6 weeks with radiation.   • Appoint to Dr. Dudley ASAP Re: Assess for concurrent radiation (care actually discussed with Dr. Dudley over the telephone.  He  agrees with need for concurrent radiation).  • Appoint to Dr. Gibson ASA Re: Mediport placement.   • Return to the office in 4 weeks with pre-office serum iron, Fe sat, ferritin, CBC with differential, CMP, and CEA.   • Further recommendations pending.      11/21/2019 - MRI Brain with and without contrast:  • Cerebral white matter lesions are consistent with small vessel ischemic disease. There is also empty sella syndrome  • No evidence of metastatic disease in the head.     11/22/2019 - Port placement per .    11/25/2019 - Consult with :  • Following this discussion and in consideration of the diagnostic data/evaluation of the patient, I am recommending definitive course of concurrent chemotherapy under the direction of Dr. Russell and radiation therapy to the right lung and nodes.   • We will simulation treatment fields today to begin the planning process, final course to be determined with planning although I anticipate a fractionated course of 2695-7451 cGy at 180cGy per day.     12/05/2019 - 01/27/2020 - Completed Radiation course:  • Received 6600 cGy in 33 fractions to right lung via external beam radiation therapy.    12/09/2019 - 01/27/2020 - Chemotherapy course:  • Carbo/Taxol weekly x 6    02/17/2020 - Appointment with :  • NCCN Guidelines Version 5.2019 for stage III squamous cell cancer management previously reviewed. For T3-4N1 disease after definitive chemo + radiation recommendation is Durvalumab (category 1) x 1 year.   • Durvalumab. Discussed the potential toxicities to include but not limited to (immune mediated reaction, pneumonitis interstitial lung disease, hepatitis, colitis, severe diarrhea, hypothyroidism, hyperthyroidism, thyroiditis, type 1 diabetes, hypopituitarism, thrombocytopenic purpura, nephritis, aseptic meningitis, hemolytic anemia, myocarditis, severe infection, uveitis, infusion reaction, electrolyte abnormalities, lymphopenia, anemia, skin  reaction, fatigue, upper respiratory infection, musculoskeletal pain, constipation, decreased appetite, nausea, edema, urinary tract infection, abdominal pain, pyrexia, dyspnea, rash, cough). Questions answered. She agrees with a trial of therapy.   • Schedule C1 on 02/17/2020; C2 on 03/02/2020, C3 on 03/16/2020 - durvalumab (plan: every 2 weeks x 24 cycles - 12 months, progression or toxicity) per administration guidelines - at [facility]   o durvalumab 10 mg/kg (WT = 85 kg; TD = 850 mg)   • Appoint to Dr. Tobar Re:  [place] of care for COPD  • Return to the office on 03/23/2020 with pre-office serum iron, Fe sat, ferritin, CBC with differential, CMP, and CEA.    02/17/2020 - Chemotherapy:  • Durvalumab    03/12/2020 - Appointment with :  • Return to Dr. Dudley in 3 months  • CT of chest ordered at [facility]  • Follow with Dr. Russell as scheduled    03/15/2020 - Chest x-ray:  • Right upper lobe interstitial pneumonia versus radiation fibrosis.    03/15/2020 - CT Angio Chest:  • Near complete resolution of the perihilar right lung mass.  • Chronic lung changes with bilateral upper lobe infiltrate. Upper lobe pneumonia is the main concern though some of this may be related to radiation therapy fibrosis.  • No pulmonary embolism.    06/10/2020 - Appointment with :  RECOMMENDATIONS:   • Draw preoffice labs if not done  •  Re: Discussed the labs from 05/07/2020 with normal WBC, resolution of anemia, normal platelets, normal CMP,  pending ferritin 284 (from 368; from 591; from 65), iron saturation 14% (from 25%; from 35%; from 7%), serum iron 41 (from 66; from 95; from 19).  Pending B12 and folate.  Previously elevated CEA (3.71; from 4.12; from 3.38).  Durvalumab tolerance discussed.  Fatigue, pleuritic chest discomfort, diffuse myalgias/arthralgias that she states improved with prednisone taper (10 mg beginning 6/4/2020 and is currently on 5 mg daily).  Wanted to discuss the option of  "\"taking a break\".  We agreed to a trial of giving the drug every 4 weeks instead of every 2 weeks.  • Previously reviewed available molecular studies from lung biopsy done on 11/07/2019 (above) noting lack of mutation for EGFR, BRAF and KRAS.  ALK and PD-L1 not reported presumably due to lack of specimen.   • Reviewed encounter from Lucila Couch PA-C with Dr. Dudley, 03/12/2020.  No evidence for recurrent or metastatic disease.  RTC 3 months with CT of chest ordered at [facility].  • Previously reviewed (Telemed visit) from Dr. Tobar, 04/02/2020.  Assessment:  Pneumonitis complicating radiation and medical therapies.  Continue Trelegy and albuterol.  Plan for PFTs when she can get in the office later in the summer.  • NCCN Guidelines Version 5.2019 for stage III squamous cell cancer management previously reviewed. For T3-4N1 disease after definitive chemo + radiation recommendation is Durvalumab (category 1) x 1 year.   • Durvalumab. Previously discussed the potential toxicities to include but not limited to (immune mediated reaction, pneumonitis interstitial lung disease, hepatitis, colitis, severe diarrhea, hypothyroidism, hyperthyroidism, thyroiditis, type 1 diabetes, hypopituitarism, thrombocytopenic purpura, nephritis, aseptic meningitis, hemolytic anemia, myocarditis, severe infection, uveitis, infusion reaction, electrolyte abnormalities, lymphopenia, anemia, skin reaction, fatigue, upper respiratory infection, musculoskeletal pain, constipation, decreased appetite, nausea, edema, urinary tract infection, abdominal pain, pyrexia, dyspnea, rash, cough).   • Review UpToDate management of toxicity related to durvalumab.  Withhold and/or discontinue durvalumab to manage adverse reactions (no dosage reductions are recommended).  Resume durvalumab when the toxicity is improved to grade 1 or lower and the corticosteroid dose is reduced to <10 mg/day prednisone (or equivalent).  Permanently discontinue durvalumab for " persistent grade 2 or 3 adverse reactions (excluding endocrinopathies) which do not recover to grade 1 or lower within 12 weeks after the last dose or for recurrent grade 3 or 4 reactions.  If unable to reduce prednisone dose to < 10 mg/day (or equivalent) within 12 weeks after the last durvalumab dose, discontinue durvalumab permanently.  • Schedule C4 on 07/06/2020; C5 on 08/03/2020 - durvalumab (plan: every 4 weeks x 12 cycles - 12 months, progression or toxicity) per administration guidelines - at [facility]   o durvalumab 10 mg/kg (WT = 85 kg; TD = 850 mg)   • Premeds:    o Decadron 10 mg IV   o Benadryl 25 mg IV   o Pepcid 10 mg IV   • CMP, Mg++ and CBC with differential on days of durvalumab.  • Schedule CT of the chest with IV contrast next week at [facility].  Prior studies.  • Return to the office in 6 weeks with pre-office serum iron, Fe sat, ferritin, CBC with differential, CMP, and CEA.     06/16/2020 - CT chest with contrast:  • Along the right pulmonary fissure, involving the right upper lobe and right lower lobe, there is a 2.6 x 5.1 cm opacity in the area of the previously described right hilar mass. This has a tethered appearance of bronchiectasis. Although it measures larger than 3/15/2020, is favored to represent posttreatment changes. Recommend follow-up.  • Interval resolution of probable infectious/inflammatory biapical pulmonary opacities.  • Ascending thoracic aorta measures 4 cm.  • Emphysema.  • Calcified aortic atherosclerosis.    06/17/2020 - Appointment with :  • Follow with Dr. Russell for trial of immunotherapy  • Return to Dr. Dudley in 6 months, call us if you need us    10/14/2020 - CT Chest with contrast:  • As previously noted there is consolidation with volume loss at the juncture of the posterior segment of the right upper lobe and superior segment of the right lower lobe. There is associated nodular thickening associated with the major fissure. Overall this shows interval  improvement and I would favor this to represent post therapy change. Previously noted consolidation within the superior segment of the right lower lobe also shows interval improvement with improved aeration of the superior segment. No new lung lesions are demonstrated.  • No evidence of enlarged mediastinal or axillary lymphadenopathy. A small pericardial effusion is present showing slight interval increase. Coronary calcifications are present.  • New fractures at the T6 and T7 level with a relatively severe fracture at the T7 level and a mild gibbus deformity. These vertebral bodies are relatively sclerotic but that may simply be related to the compression. Pathologic fractures are difficult to entirely exclude but I suspect this may be on the basis of osteoporosis as the patient is relatively osteopenic.  • There is atheromatous calcification and plaquing involving the origin of the left common carotid artery and proximal left subclavian artery. There is at least a moderate stenosis at the origin of the left common carotid artery similar in appearance to the previous study of June.    01/19/2021 - CT Chest with contrast:  • Left upper lobe pulmonary nodule now measures 8 mm on axial series 3, image 34, previously 5 mm on 10/14/2020.  • Left upper lobe 4 mm pulmonary nodule on axial series 3, image 44, previously punctate on 10/14/2020.  • No pleural effusion or pneumothorax.  • Redemonstration of height loss at T6 and T7 with focal kyphosis, similar compared to prior.   Impression:  • Mildly increased size of 2 left upper lobe pulmonary nodules as described in detail above.  • Stable posttreatment changes at the right lung.  • Ascending thoracic aorta measures up to 3.9 cm.  • Trace pericardial fluid.  • Similar height loss of T6 and T7 with kyphosis.  • Incidental findings of the abdomen include low-density of the liver, stable subcentimeter hypodensity in the spleen, punctate nonobstructing left upper pole  calculus.    01/27/2021 - Appointment with :  • Damaris Hu is status post completion of radiation therapy to the lung and presents to our clinic today for surveillance exam and to review imaging.  Diagnosed in November 2019 with Stage IIIC (cT4, cN3, M0)Squamous cell carcinoma of the right lung, perihilar 5.4 x 5.3 x 4.5 cm mass at right major fissure, involves both the right upper and right lower lobes with direct extension into the subcarinal space, abuts medial mediastinal pleura and partially encases RUL bronchus and bronchus intermedius with right supraclavicular and mediastinal kyler metastasis. She completed 6600 cGy in 33 fractions to the right lung on 01/27/2020 and completed 6 cycles of weekly carbo/taxol on 01/27/2020. She initiated maintenance Durvalumab on 02/17/2020 with plans for one year under the care of .  • CT-scan of the chest on 01/19/2021 revealed left upper lobe pulmonary nodule now measures 8 mm on axial series 3, image 34, previously 5 mm on 10/14/2020 and left upper lobe 4 mm pulmonary nodule on axial series 3, image 44, previously punctate on 10/14/2020.  • Ordered PET Scan for further evaluation of upper left lung nodules. May need biopsy in [place]. Will follow up pending PET scan results.   Plan:  • Three lung nodules in upper left lung, we ordered PET scan, they will call you  • You may need biopsy in [place]    02/05/2021 - PET Scan - [facility]:  • There are posttreatment changes noted within the right upper lung abutting the fissure posteriorly. These demonstrate minimal uptake of the radiopharmaceutical with a maximum SUV of 2.1.   • The 8 mm nodule within the apical posterior segment of the left upper lobe demonstrates minimal metabolic activity with an SUV measured at 1.0.   • The smaller 4 mm nodule does not demonstrate any visible uptake.   Impression  • Post therapy changes within the right upper lung are demonstrated. These demonstrate minimal  metabolic activity with an SUV measured at maximum of 2.1.   • 2 small nodules within the left upper lobe which have demonstrated interval increase in size from previous CT exam do not demonstrate any significant metabolic activity. I do not see any discrete activity associated with the smaller lesion but that may simply be related to its size. The 8 mm lesion should be sufficiently large to adequately evaluate and demonstrates a maximum SUV of 1.0. This would certainly suggest benignity. This could represent an inflammatory process but close radiographic follow-up is recommended. Unfortunately, this lesion is too small for potential percutaneous sampling. Short interval follow-up repeat CT imaging will be suggested. That can BE obtained without IV contrast.   • No foci of abnormal metabolic activity are identified to suggest regional or local recurrence/metastatic disease.   • Small pericardial effusion. Coronary calcifications are present.  Diverticular disease is also noted of the sigmoid colon without evidence of diverticulitis.     03/11/2021 - Appointment with Kellee Perkins APRN:  • Return in about 6 months (around 9/11/2021) for FVL with diffusion.    04/14/2021 - CT Chest:  • Hyperexpanded lungs.  • Nodules within the left upper lobe show increased size now measuring 11 x 9 mm compared with 8 x 7 mm and 6 x 6 mm compared with 4 x 4 mm.  • 2 tiny nodules within the left lung apex also show increased size measuring 5 mm compared with 3 mm and 4 mm compared with 2 mm.  • Medial right lower lobe nodule measures 8 x 8 mm compared with 6 x 5 mm.  • Unchanged appearance of anterior wedge compression of T6 and T7 associated with thoracic kyphosis.   Impression:  • Progression of disease with increased size of 4 nodules within the left upper lobe and one nodule within the right lower lobe.    History obtained from  PATIENT and CHART    PAST MEDICAL HISTORY  Past Medical History:   Diagnosis Date   • Anemia    •  Arthritis    • Cancer (CMS/HCC)     Right lung   • Cervical disc disease    • Depression    • Emphysema lung (CMS/HCC)    • GERD (gastroesophageal reflux disease)    • Hypomagnesemia 12/30/2019   • Lung cancer (CMS/HCC)    • Migraines    • Osteoporosis    • Vitamin D deficiency       PAST SURGICAL HISTORY  Past Surgical History:   Procedure Laterality Date   • BREAST SURGERY      Cyst removal   • COLONOSCOPY N/A 12/3/2018    Procedure: COLONOSCOPY WITH ANESTHESIA;  Surgeon: Myah Pool MD;  Location: [facility] ENDOSCOPY;  Service: Gastroenterology   • HYSTERECTOMY     • VENOUS ACCESS DEVICE (PORT) INSERTION N/A 11/22/2019    Procedure: PLACEMENT OF SINGLE LUMEN PORT;  Surgeon: Mona Gibson MD;  Location: [facility] OR;  Service: General      FAMILY HISTORY  family history includes Arthritis in her sister; Asthma in her sister; Birth defects in her mother; Breast cancer in her maternal aunt; Colon polyps in her mother; Heart disease in her mother; Hypertension in her sister; Lung cancer in her mother; No Known Problems in her father; Prostate cancer in her maternal grandfather.    SOCIAL HISTORY  Social History     Tobacco Use   • Smoking status: Former Smoker     Types: Cigarettes   • Smokeless tobacco: Never Used   • Tobacco comment: Quit 4 years ago   Substance Use Topics   • Alcohol use: No   • Drug use: No      Amoxicillin     MEDICATIONS  Current Outpatient Medications   Medication Sig Dispense Refill   • albuterol sulfate  (90 Base) MCG/ACT inhaler INHALE 2 PUFFS BY MOUTH 4 TIMES DAILY AS NEEDED FOR WHEEZING     • cetirizine (zyrTEC) 10 MG tablet Take 10 mg by mouth Daily.     • citalopram (CeleXA) 20 MG tablet Take 20 mg by mouth Daily.     • fluticasone (FLONASE) 50 MCG/ACT nasal spray 1 spray by Each Nare route Daily As Needed.  5   • HYDROcodone-acetaminophen (NORCO) 7.5-325 MG per tablet Take 1 tablet by mouth Every 8 (Eight) Hours As Needed.     • methylPREDNISolone (MEDROL) 4 MG tablet Take 4  "mg by mouth Daily.     • omeprazole (priLOSEC) 20 MG capsule Take 20 mg by mouth Every Morning Before Breakfast.  0   • polyethylene glycol (MiraLax) 17 g packet Take 17 g by mouth Daily.     • ZOLMitriptan (ZOMIG) 2.5 MG tablet Take 2.5 mg by mouth As Needed.       No current facility-administered medications for this visit.      Current outpatient and discharge medications have been reconciled for the patient.  Reviewed by: Gibran Cross MD    The following portions of the patient's history were reviewed and updated as appropriate: allergies, current medications, past family history, past medical history, past social history, past surgical history and problem list.    REVIEW OF SYSTEMS  Review of Systems   Constitutional: Positive for activity change and fatigue. Negative for appetite change, chills, diaphoresis, fever and unexpected weight change.   HENT: Negative.    Eyes: Negative.         Bilateral cataract surgeries   Respiratory: Negative for apnea, cough, choking, chest tightness, shortness of breath, wheezing and stridor.    Cardiovascular: Negative.    Gastrointestinal: Negative.    Endocrine: Negative.    Genitourinary: Negative.         Has urgency with urine   Musculoskeletal: Negative.         Chronic back pain  Taking Hydrocodone 7.5 mg    Skin: Negative.    Allergic/Immunologic: Negative.    Neurological: Positive for dizziness and light-headedness. Negative for tremors, seizures, syncope, facial asymmetry, speech difficulty, weakness, numbness and headaches.   Hematological: Negative.    Psychiatric/Behavioral: Negative.         Anxiety      PHYSICAL EXAM  VITAL SIGNS:   Vitals:    04/28/21 1308   BP: 159/95   Pulse: 104   Resp: 20   SpO2: 99%   Weight: 80.3 kg (177 lb)   Height: 167.6 cm (66\")   PainSc:   8   PainLoc: Back      General:  Alert and oriented, well developed, Vitals reviewed.  Head:  Normocephalic, without obvious abnormality  Eyes: Conjunctivae are clear,  Sclera is " non-icteric. PERRLA. No signs of nystagmus. Eyelids are normal in appearance without swelling or lesions.   Ears: external ear and canal are normal   Nose/Sinuses:  Nares normal externally, mucosa pink  Mouth/Throat:  Oral mucosa is pink and moist with good dentition. Tongue normal in appearance without lesions and with good symmetrical movement. The pharynx is normal in appearance without tonsillar swelling or exudates.   Neck: Supple without adenopathy. Trachea is midline. Thyroid gland is normal without masses. No JVD.   Cardiac: The external chest is normal in appearance, HR and rhythm are normal. No murmurs, gallops, or rubs are auscultated.  Respiratory: Chest wall is symmetric and without deformity,. non-tender. No signs of respiratory distress. Lung sounds are clear in all lobes bilaterally without rales, ronchi, or wheezes.   Abdomen: Soft, symmetric, and non-tender without distention.   Extremities:  LOMBARDO well, Pulses palpable. Steady gait noted.   Spine: Neck and back are without deformity or external skin changes. No tenderness noted on palpation of the spinous processes. Sensation to the upper and lower extremities is normal bilaterally.  strength is normal bilaterally. No gait abnormalities are appreciated.   Neurologic:  Awake, alert and oriented to person, place, and time with normal speech. Memory is normal and thought process is intact.   Psych: Appropriate mood and affect. Good judgement and insight.      Skin: No suspicious lesions or rashes of concern    Performance Status: ECOG (1) Restricted in physically strenuous activity, ambulatory and able to do work of light nature    Clinical Quality Measures  -Pain Documented  Damaris Hu reports a pain score of 8.  Given her pain assessment as noted, treatment options were discussed and the following options were decided upon as a follow-up plan to address the patient's pain: continuation of current treatment plan for pain and use of non-medical  modalities (ice, heat, stretching and/or behavior modifications)  Pain Medications             citalopram (CeleXA) 20 MG tablet Take 20 mg by mouth Daily.    HYDROcodone-acetaminophen (NORCO) 7.5-325 MG per tablet Take 1 tablet by mouth Every 8 (Eight) Hours As Needed.    methylPREDNISolone (MEDROL) 4 MG tablet Take 4 mg by mouth Daily.        -Advanced Care Planning Advance Care Planning   ACP discussion was held with the patient during this visit. Patient does not have an advance directive, information provided.    -Body Mass Index Screening and Follow-Up Plan  Patient's Body mass index is 28.57 kg/m². BMI is above normal parameters. Recommendations include: educational material.  -Tobacco Use: Screening and Cessation Intervention Social History    Tobacco Use      Smoking status: Former Smoker        Types: Cigarettes      Smokeless tobacco: Never Used      Tobacco comment: Quit 4 years ago     ASSESSMENT AND PLAN  1. Malignant neoplasm of overlapping sites of right lung (CMS/HCC)    2. Regional lymph node metastasis present (CMS/HCC)    3. 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 History of radiation therapy    5. Former smoker      Orders Placed This Encounter   Procedures   • CT Chest With Contrast Diagnostic     RECOMMENDATIONS: Damaris Hu is status post completion of radiation therapy to the lung and presents to our clinic today for surveillance exam and to review imaging. Diagnosed in November 2019 with Stage IIIC (cT4, cN3, M0)Squamous cell carcinoma of the right lung, perihilar 5.4 x 5.3 x 4.5 cm mass at right major fissure, involves both the right upper and right lower lobes with direct extension into the subcarinal space, abuts medial mediastinal pleura and partially encases RUL bronchus and bronchus intermedius with right supraclavicular and mediastinal kyler metastasis. She completed 6600 cGy in 33 fractions to the right lung on 01/27/2020 and completed 6 cycles of  weekly carbo/taxol on 01/27/2020. She initiated maintenance Durvalumab on 02/17/2020 with plans for one year under the care of .    01/19/2021 - CT Chest with contrast revealed the DONY pulmonary nodule now measures 8 mm previously 5 mm and another DONY 4 mm nodule noted. PET scan on 02/05/2021 revealed low SUV, max 2.1 in the nodules.     Follow up CT on 04/14/2021 revealed slight increase in nodule size:    · DONY 11mm compared with 8 mm   · DONY 6 mm compared with 4 mm.    · Left lung apex: 5 mm compared with 3 mm   · Left lung apex; 4 mm compared with 2 mm.   · RLL 8 mm compared with 6 mm.    We discussed the CT scan in detail. I offered her the following three options which we discussed in detail.  Repeat PET scan vs referral for attempt at tissue biopsy at this time, vs repeat CT scan in 3 months along with pros and cons of decision pathway. Due to location and largest lesion being only 11 mm, I explained there is moderate probability of inconclusive results on PET and biopsy, she elected to repeat CT scan in 3 months instead of pursuing biopsy or PET scan at this time.     We will continue routine follow-up/surveillance as discussed in 3 months with follow up CT scan before visit and I have instructed her to continue to see the other health care providers as per their scheduling.    Patient Instructions   Repeat CT of chest in 3 months, will decide next step at that time.  Follow up visit with us 1-2 days after your scan    Todays appointment time was spent in counseling, coordination of care and surveillance related to patients diagnosis as well as radiation therapy possible and probable after effects.  I saw patient in follow-up with Lucila Couch PA-C while covering for Dr. Anastacio Dudley, radiation oncologist.  Gibran Cross MD  04/28/2021

## 2021-04-28 ENCOUNTER — OFFICE VISIT (OUTPATIENT)
Dept: RADIATION ONCOLOGY | Facility: HOSPITAL | Age: 66
End: 2021-04-28

## 2021-04-28 VITALS
RESPIRATION RATE: 20 BRPM | HEIGHT: 66 IN | BODY MASS INDEX: 28.45 KG/M2 | SYSTOLIC BLOOD PRESSURE: 159 MMHG | OXYGEN SATURATION: 99 % | HEART RATE: 104 BPM | DIASTOLIC BLOOD PRESSURE: 95 MMHG | WEIGHT: 177 LBS

## 2021-04-28 DIAGNOSIS — C77.9 REGIONAL LYMPH NODE METASTASIS PRESENT (HCC): ICD-10-CM

## 2021-04-28 DIAGNOSIS — Z92.3 HISTORY OF RADIATION THERAPY: ICD-10-CM

## 2021-04-28 DIAGNOSIS — C34.81 MALIGNANT NEOPLASM OF OVERLAPPING SITES OF RIGHT LUNG (HCC): Primary | ICD-10-CM

## 2021-04-28 DIAGNOSIS — C77.0 SECONDARY MALIGNANT NEOPLASM OF SUPRACLAVICULAR LYMPH NODE (HCC): ICD-10-CM

## 2021-04-28 DIAGNOSIS — Z87.891 FORMER SMOKER: ICD-10-CM

## 2021-04-28 PROCEDURE — G0463 HOSPITAL OUTPT CLINIC VISIT: HCPCS | Performed by: RADIOLOGY

## 2021-04-28 RX ORDER — METHYLPREDNISOLONE 4 MG/1
4 TABLET ORAL DAILY
COMMUNITY
Start: 2021-02-08 | End: 2021-08-12 | Stop reason: ALTCHOICE

## 2021-04-28 RX ORDER — HYDROCODONE BITARTRATE AND ACETAMINOPHEN 7.5; 325 MG/1; MG/1
1 TABLET ORAL EVERY 8 HOURS PRN
COMMUNITY
Start: 2021-04-01 | End: 2021-05-15

## 2021-04-28 NOTE — PATIENT INSTRUCTIONS
Repeat CT of chest in 3 months, will decide next step at that time.  Follow up visit with us 1-2 days after your scan

## 2021-05-06 RX ORDER — METHYLPREDNISOLONE 4 MG/1
TABLET ORAL
Qty: 30 TABLET | Refills: 0 | Status: SHIPPED | OUTPATIENT
Start: 2021-05-06 | End: 2021-06-15

## 2021-05-13 DIAGNOSIS — C34.90 SQUAMOUS CELL CARCINOMA OF LUNG, UNSPECIFIED LATERALITY (HCC): ICD-10-CM

## 2021-05-13 DIAGNOSIS — S22.060A COMPRESSION FRACTURE OF T7 VERTEBRA, INITIAL ENCOUNTER (HCC): ICD-10-CM

## 2021-05-14 RX ORDER — HYDROCODONE BITARTRATE AND ACETAMINOPHEN 7.5; 325 MG/1; MG/1
1 TABLET ORAL EVERY 8 HOURS PRN
Qty: 90 TABLET | Refills: 0 | Status: SHIPPED | OUTPATIENT
Start: 2021-05-14 | End: 2021-06-13

## 2021-05-14 NOTE — TELEPHONE ENCOUNTER
Ashanti Hobbs called to request a refill on her medication. Last office visit : 3/12/2021   Next office visit : 6/15/2021     Last UDS:   Amphetamine Screen, Urine   Date Value Ref Range Status   02/06/2020 neg  Final     Barbiturate Screen, Urine   Date Value Ref Range Status   02/06/2020 asya  Final     Benzodiazepine Screen, Urine   Date Value Ref Range Status   02/06/2020 neg  Final     Buprenorphine Urine   Date Value Ref Range Status   02/06/2020 neg  Final     Cocaine Metabolite Screen, Urine   Date Value Ref Range Status   02/06/2020 neg  Final     Gabapentin Screen, Urine   Date Value Ref Range Status   02/06/2020 neg  Final     MDMA, Urine   Date Value Ref Range Status   02/06/2020 neg  Final     Methamphetamine, Urine   Date Value Ref Range Status   02/06/2020 neg  Final     Opiate Scrn, Ur   Date Value Ref Range Status   02/06/2020 Psitive  Final     Oxycodone Screen, Ur   Date Value Ref Range Status   02/06/2020 neg  Final     PCP Screen, Urine   Date Value Ref Range Status   02/06/2020 neg  Final     Propoxyphene Screen, Urine   Date Value Ref Range Status   02/06/2020 neg  Final     THC Screen, Urine   Date Value Ref Range Status   02/06/2020 neg  Final     Tricyclic Antidepressants, Urine   Date Value Ref Range Status   02/06/2020 neg  Final       Last Emily Fruit: 1/18/2021  Medication Contract: Not on file   Last Fill: 4/15/2021    Requested Prescriptions     Pending Prescriptions Disp Refills    HYDROcodone-acetaminophen (LORCET PLUS) 7.5-325 MG per tablet 90 tablet 0     Sig: Take 1 tablet by mouth every 8 hours as needed for Pain for up to 30 days. Intended supply: 30 days         Please approve or refuse this medication.    Sveta Jarvis

## 2021-05-20 ENCOUNTER — TELEPHONE (OUTPATIENT)
Dept: PRIMARY CARE CLINIC | Age: 66
End: 2021-05-20

## 2021-05-20 DIAGNOSIS — M50.90 CERVICAL DISC DISEASE: ICD-10-CM

## 2021-05-20 DIAGNOSIS — M51.36 LUMBAR DEGENERATIVE DISC DISEASE: ICD-10-CM

## 2021-05-20 DIAGNOSIS — S22.060A COMPRESSION FRACTURE OF T7 VERTEBRA, INITIAL ENCOUNTER (HCC): Primary | ICD-10-CM

## 2021-06-14 ENCOUNTER — TELEPHONE (OUTPATIENT)
Dept: NEUROSURGERY | Facility: CLINIC | Age: 66
End: 2021-06-14

## 2021-06-14 NOTE — TELEPHONE ENCOUNTER
Patient's sister called and left a voicemail for me on Thursday afternoon (6/10/2021) after I had left for the day.  She stated the patient wants to see Dr Bright b/sadi that is who Dr Dudley told her she needed to see.  However, she has seen Blas in the past for the back pain/fracture.  The sister states the patient has extreme anxiety and doesn't like going to doctors.  I explained that I would have to send this to the providers to see if they would agree to switching the patient's care since our office policy is not to switch when it's a service that they provide.  She said the patient is doing fine but wants to see Dr Bright to discuss kyphoplasty b/c Dr Dudley told her she would probably greatly benefit from this.    I will send to Dr Bright & Dr Chinchilla and then contact the sister once I know something.      taylor cantu CMA

## 2021-06-15 ENCOUNTER — OFFICE VISIT (OUTPATIENT)
Dept: PRIMARY CARE CLINIC | Age: 66
End: 2021-06-15
Payer: MEDICARE

## 2021-06-15 VITALS
SYSTOLIC BLOOD PRESSURE: 136 MMHG | RESPIRATION RATE: 18 BRPM | WEIGHT: 178 LBS | TEMPERATURE: 98.8 F | HEART RATE: 93 BPM | DIASTOLIC BLOOD PRESSURE: 84 MMHG | BODY MASS INDEX: 28.61 KG/M2 | HEIGHT: 66 IN | OXYGEN SATURATION: 98 %

## 2021-06-15 DIAGNOSIS — E55.9 VITAMIN D DEFICIENCY: ICD-10-CM

## 2021-06-15 DIAGNOSIS — M80.80XG OTHER OSTEOPOROSIS WITH CURRENT PATHOLOGICAL FRACTURE WITH DELAYED HEALING, SUBSEQUENT ENCOUNTER: ICD-10-CM

## 2021-06-15 DIAGNOSIS — C34.90 SQUAMOUS CELL CARCINOMA OF LUNG, UNSPECIFIED LATERALITY (HCC): ICD-10-CM

## 2021-06-15 DIAGNOSIS — F41.9 ANXIETY AND DEPRESSION: ICD-10-CM

## 2021-06-15 DIAGNOSIS — F32.A ANXIETY AND DEPRESSION: ICD-10-CM

## 2021-06-15 DIAGNOSIS — R30.0 DYSURIA: ICD-10-CM

## 2021-06-15 DIAGNOSIS — S22.060A COMPRESSION FRACTURE OF T7 VERTEBRA, INITIAL ENCOUNTER (HCC): Primary | ICD-10-CM

## 2021-06-15 DIAGNOSIS — Z79.899 DRUG THERAPY: ICD-10-CM

## 2021-06-15 DIAGNOSIS — R53.83 FATIGUE, UNSPECIFIED TYPE: ICD-10-CM

## 2021-06-15 DIAGNOSIS — J44.9 STAGE 3 SEVERE COPD BY GOLD CLASSIFICATION (HCC): ICD-10-CM

## 2021-06-15 DIAGNOSIS — Z13.220 LIPID SCREENING: ICD-10-CM

## 2021-06-15 LAB
ALBUMIN SERPL-MCNC: 4.4 G/DL (ref 3.5–5.2)
ALCOHOL URINE: NORMAL
ALP BLD-CCNC: 51 U/L (ref 35–104)
ALT SERPL-CCNC: 12 U/L (ref 5–33)
AMPHETAMINE SCREEN, URINE: NORMAL
ANION GAP SERPL CALCULATED.3IONS-SCNC: 9 MMOL/L (ref 7–19)
APPEARANCE FLUID: CLEAR
AST SERPL-CCNC: 14 U/L (ref 5–32)
BARBITURATE SCREEN, URINE: NORMAL
BASOPHILS ABSOLUTE: 0 K/UL (ref 0–0.2)
BASOPHILS RELATIVE PERCENT: 0.4 % (ref 0–1)
BENZODIAZEPINE SCREEN, URINE: NORMAL
BILIRUB SERPL-MCNC: 0.4 MG/DL (ref 0.2–1.2)
BILIRUBIN, POC: NORMAL
BLOOD URINE, POC: NORMAL
BUN BLDV-MCNC: 14 MG/DL (ref 8–23)
BUPRENORPHINE URINE: NORMAL
CALCIUM SERPL-MCNC: 9.9 MG/DL (ref 8.8–10.2)
CHLORIDE BLD-SCNC: 103 MMOL/L (ref 98–111)
CHOLESTEROL, FASTING: 228 MG/DL (ref 160–199)
CLARITY, POC: NORMAL
CO2: 28 MMOL/L (ref 22–29)
COCAINE METABOLITE SCREEN URINE: NORMAL
COLOR, POC: YELLOW
CREAT SERPL-MCNC: 0.8 MG/DL (ref 0.5–0.9)
EOSINOPHILS ABSOLUTE: 0.2 K/UL (ref 0–0.6)
EOSINOPHILS RELATIVE PERCENT: 1.5 % (ref 0–5)
FENTANYL SCREEN, URINE: NORMAL
GABAPENTIN SCREEN, URINE: NORMAL
GFR AFRICAN AMERICAN: >59
GFR NON-AFRICAN AMERICAN: >60
GLUCOSE BLD-MCNC: 97 MG/DL (ref 74–109)
GLUCOSE URINE, POC: NORMAL
HCT VFR BLD CALC: 40.9 % (ref 37–47)
HDLC SERPL-MCNC: 73 MG/DL (ref 65–121)
HEMOGLOBIN: 13 G/DL (ref 12–16)
IMMATURE GRANULOCYTES #: 0 K/UL
KETONES, POC: NORMAL
LDL CHOLESTEROL CALCULATED: 116 MG/DL
LEUKOCYTE EST, POC: NORMAL
LYMPHOCYTES ABSOLUTE: 0.5 K/UL (ref 1.1–4.5)
LYMPHOCYTES RELATIVE PERCENT: 5.2 % (ref 20–40)
MAGNESIUM: 2.2 MG/DL (ref 1.6–2.4)
MCH RBC QN AUTO: 31 PG (ref 27–31)
MCHC RBC AUTO-ENTMCNC: 31.8 G/DL (ref 33–37)
MCV RBC AUTO: 97.4 FL (ref 81–99)
MDMA URINE: NORMAL
METHADONE SCREEN, URINE: NORMAL
METHAMPHETAMINE, URINE: NORMAL
MONOCYTES ABSOLUTE: 0.8 K/UL (ref 0–0.9)
MONOCYTES RELATIVE PERCENT: 8.1 % (ref 0–10)
NEUTROPHILS ABSOLUTE: 8.3 K/UL (ref 1.5–7.5)
NEUTROPHILS RELATIVE PERCENT: 84.5 % (ref 50–65)
NITRITE, POC: NORMAL
OPIATE SCREEN URINE: NORMAL
OXYCODONE SCREEN URINE: NORMAL
PDW BLD-RTO: 13.6 % (ref 11.5–14.5)
PH, POC: 6
PHENCYCLIDINE SCREEN URINE: NORMAL
PLATELET # BLD: 234 K/UL (ref 130–400)
PMV BLD AUTO: 9.3 FL (ref 9.4–12.3)
POTASSIUM SERPL-SCNC: 4.2 MMOL/L (ref 3.5–5)
PROPOXYPHENE SCREEN, URINE: NORMAL
PROTEIN, POC: NORMAL
RBC # BLD: 4.2 M/UL (ref 4.2–5.4)
SODIUM BLD-SCNC: 140 MMOL/L (ref 136–145)
SPECIFIC GRAVITY, POC: 1.02
SYNTHETIC CANNABINOIDS(K2) SCREEN, URINE: NORMAL
THC SCREEN, URINE: NORMAL
TOTAL PROTEIN: 6.9 G/DL (ref 6.6–8.7)
TRAMADOL SCREEN URINE: NORMAL
TRICYCLIC ANTIDEPRESSANTS, UR: NORMAL
TRIGLYCERIDE, FASTING: 194 MG/DL (ref 0–149)
TSH REFLEX FT4: 2.14 UIU/ML (ref 0.35–5.5)
UROBILINOGEN, POC: 0.2
VITAMIN B-12: 904 PG/ML (ref 211–946)
VITAMIN D 25-HYDROXY: 51.5 NG/ML
WBC # BLD: 9.9 K/UL (ref 4.8–10.8)

## 2021-06-15 PROCEDURE — 99214 OFFICE O/P EST MOD 30 MIN: CPT | Performed by: NURSE PRACTITIONER

## 2021-06-15 PROCEDURE — 80305 DRUG TEST PRSMV DIR OPT OBS: CPT | Performed by: NURSE PRACTITIONER

## 2021-06-15 PROCEDURE — 81002 URINALYSIS NONAUTO W/O SCOPE: CPT | Performed by: NURSE PRACTITIONER

## 2021-06-15 RX ORDER — FLUTICASONE FUROATE, UMECLIDINIUM BROMIDE AND VILANTEROL TRIFENATATE 100; 62.5; 25 UG/1; UG/1; UG/1
1 POWDER RESPIRATORY (INHALATION) DAILY
COMMUNITY
End: 2021-07-23

## 2021-06-15 RX ORDER — HYDROCODONE BITARTRATE AND ACETAMINOPHEN 10; 325 MG/1; MG/1
1 TABLET ORAL EVERY 8 HOURS PRN
Qty: 90 TABLET | Refills: 0 | Status: CANCELLED | OUTPATIENT
Start: 2021-06-15 | End: 2021-07-15

## 2021-06-15 RX ORDER — HYDROCODONE BITARTRATE AND ACETAMINOPHEN 10; 325 MG/1; MG/1
1 TABLET ORAL EVERY 8 HOURS PRN
Qty: 90 TABLET | Refills: 0 | Status: SHIPPED | OUTPATIENT
Start: 2021-06-15 | End: 2021-07-11 | Stop reason: SDUPTHER

## 2021-06-15 RX ORDER — PREDNISONE 10 MG/1
10 TABLET ORAL DAILY
Qty: 30 TABLET | Refills: 1 | Status: SHIPPED | OUTPATIENT
Start: 2021-06-15 | End: 2021-07-11 | Stop reason: SDUPTHER

## 2021-06-15 SDOH — ECONOMIC STABILITY: FOOD INSECURITY: WITHIN THE PAST 12 MONTHS, YOU WORRIED THAT YOUR FOOD WOULD RUN OUT BEFORE YOU GOT MONEY TO BUY MORE.: NEVER TRUE

## 2021-06-15 SDOH — ECONOMIC STABILITY: FOOD INSECURITY: WITHIN THE PAST 12 MONTHS, THE FOOD YOU BOUGHT JUST DIDN'T LAST AND YOU DIDN'T HAVE MONEY TO GET MORE.: NEVER TRUE

## 2021-06-15 ASSESSMENT — ENCOUNTER SYMPTOMS
EYES NEGATIVE: 1
GASTROINTESTINAL NEGATIVE: 1
BACK PAIN: 1
RESPIRATORY NEGATIVE: 1

## 2021-06-15 ASSESSMENT — SOCIAL DETERMINANTS OF HEALTH (SDOH): HOW HARD IS IT FOR YOU TO PAY FOR THE VERY BASICS LIKE FOOD, HOUSING, MEDICAL CARE, AND HEATING?: NOT HARD AT ALL

## 2021-06-15 NOTE — PROGRESS NOTES
mouth daily 30 tablet 1    HYDROcodone-acetaminophen (NORCO)  MG per tablet Take 1 tablet by mouth every 8 hours as needed for Pain for up to 30 days. Intended supply: 30 days 90 tablet 0    cetirizine (ZYRTEC) 10 MG tablet Take 1 tablet by mouth daily 30 tablet 11    citalopram (CELEXA) 20 MG tablet Take once daily 90 tablet 2    omeprazole (PRILOSEC) 20 MG delayed release capsule Take 1 capsule by mouth Daily 90 capsule 2    albuterol sulfate HFA (VENTOLIN HFA) 108 (90 Base) MCG/ACT inhaler Inhale 2 puffs into the lungs 4 times daily as needed for Wheezing 1 Inhaler 5    fluticasone (FLONASE) 50 MCG/ACT nasal spray 1 spray by Nasal route daily 1 Bottle 5    diclofenac sodium (VOLTAREN) 1 % GEL Apply 2 g topically 4 times daily 2 Tube 1     No current facility-administered medications for this visit. Allergies   Allergen Reactions    Amoxicillin Other (See Comments)       History reviewed. No pertinent family history. Subjective:      Review of Systems   Constitutional: Positive for fatigue. HENT: Negative. Eyes: Negative. Respiratory: Negative. Cardiovascular: Negative. Gastrointestinal: Negative. Endocrine: Negative. Genitourinary: Negative. Musculoskeletal: Positive for arthralgias and back pain. Skin: Negative. Neurological: Negative. Hematological: Negative. Psychiatric/Behavioral: The patient is nervous/anxious. Objective:     Physical Exam  Vitals and nursing note reviewed. Constitutional:       Appearance: Normal appearance. She is normal weight. She is ill-appearing. Comments: generalized wekaness   HENT:      Head: Normocephalic and atraumatic. Jaw: There is normal jaw occlusion. Right Ear: Hearing, tympanic membrane, ear canal and external ear normal.      Left Ear: Hearing, tympanic membrane, ear canal and external ear normal.      Nose: Nose normal.      Mouth/Throat:      Lips: Pink.       Mouth: Mucous membranes are moist.      Pharynx: Oropharynx is clear. Eyes:      General: Lids are normal.      Extraocular Movements: Extraocular movements intact. Conjunctiva/sclera: Conjunctivae normal.      Pupils: Pupils are equal, round, and reactive to light. Neck:      Thyroid: No thyromegaly. Trachea: Trachea normal.   Cardiovascular:      Rate and Rhythm: Normal rate and regular rhythm. Pulses: Normal pulses. Dorsalis pedis pulses are 2+ on the right side and 2+ on the left side. Posterior tibial pulses are 2+ on the right side and 2+ on the left side. Heart sounds: Normal heart sounds. No murmur heard. Pulmonary:      Effort: Pulmonary effort is normal.      Breath sounds: Normal breath sounds and air entry. Abdominal:      General: Bowel sounds are normal.      Palpations: Abdomen is soft. Musculoskeletal:      Cervical back: Full passive range of motion without pain, normal range of motion and neck supple. Thoracic back: Tenderness present. Decreased range of motion. Lumbar back: Tenderness present. Decreased range of motion. Right knee: Decreased range of motion. Tenderness present. Left knee: Decreased range of motion. Tenderness present. Right lower leg: No edema. Left lower leg: No edema. Lymphadenopathy:      Cervical: No cervical adenopathy. Skin:     General: Skin is warm and dry. Capillary Refill: Capillary refill takes less than 2 seconds. Neurological:      General: No focal deficit present. Mental Status: She is alert and oriented to person, place, and time. Mental status is at baseline. Psychiatric:         Attention and Perception: Attention normal.         Mood and Affect: Mood normal.         Speech: Speech normal.         Behavior: Behavior normal.         Thought Content:  Thought content normal.         Cognition and Memory: Cognition normal.         Judgment: Judgment normal.         /84   Pulse 93   Temp 98.8 °F (37.1 °C) (Temporal)   Resp 18   Ht 5' 6\" (1.676 m)   Wt 178 lb (80.7 kg)   SpO2 98%   BMI 28.73 kg/m²     Assessment:      Diagnosis Orders   1. Compression fracture of T7 vertebra, initial encounter ([facility])  HYDROcodone-acetaminophen (NORCO)  MG per tablet   2. Drug therapy  POCT Rapid Drug Screen   3. Dysuria  POCT Urinalysis no Micro   4. Anxiety and depression  TSH WITH REFLEX TO FT4    Magnesium   5. Stage 3 severe COPD by GOLD classification ([address]     6. Other osteoporosis with current pathological fracture with delayed healing, subsequent encounter     7. Squamous cell carcinoma of lung, unspecified laterality ([facility])  CBC Auto Differential    Comprehensive Metabolic Panel    Lipid, Fasting    HYDROcodone-acetaminophen (NORCO)  MG per tablet   8. Lipid screening  Lipid, Fasting   9.  Fatigue, unspecified type  Vitamin B12    Vitamin B6   10. Vitamin D deficiency  Vitamin D 25 Hydroxy       Results for orders placed or performed in visit on 06/15/21   POCT Rapid Drug Screen   Result Value Ref Range    Alcohol, Urine -     Amphetamine Screen, Urine -     Barbiturate Screen, Urine -     Benzodiazepine Screen, Urine -     Buprenorphine Urine -     Cocaine Metabolite Screen, Urine -     FENTANYL SCREEN, URINE -     Gabapentin Screen, Urine -     MDMA, Urine -     Methadone Screen, Urine -     Methamphetamine, Urine -     Opiate Scrn, Ur -     Oxycodone Screen, Ur +     PCP Screen, Urine -     Propoxyphene Screen, Urine -     Synthetic Cannabinoids (K2) Screen, Urine -     THC Screen, Urine -     Tramadol Scrn, Ur -     Tricyclic Antidepressants, Urine -    POCT Urinalysis no Micro   Result Value Ref Range    Color, UA yellow     Clarity, UA clera     Glucose, UA POC -     Bilirubin, UA -     Ketones, UA -     Spec Grav, UA 1.020     Blood, UA POC trace-intact     pH, UA 6.0     Protein, UA POC -     Urobilinogen, UA 0.2     Leukocytes, UA -     Nitrite, UA -     Appearance, Fluid Clear Clear, Slightly Cloudy       Plan:     I am checking labs due to increased fatigue. Patient to follow up with neurosurgery for compression fracture as scheduled. UA did show some blood. Will repeat UA in 1 month. Patient ok to bring sample by office for poct. Patient to restart Trelegy as discussed. More than 50% of the time was spent counseling and coordinating care for a total time of 33 mins face to face. Return in about 3 months (around 9/15/2021) for Refill with UDS, Follow up chronic conditions. Orders Placed This Encounter   Procedures    CBC Auto Differential     Standing Status:   Future     Number of Occurrences:   1     Standing Expiration Date:   6/15/2022    Comprehensive Metabolic Panel     Standing Status:   Future     Number of Occurrences:   1     Standing Expiration Date:   6/15/2022    TSH WITH REFLEX TO FT4     Standing Status:   Future     Number of Occurrences:   1     Standing Expiration Date:   6/15/2022    Magnesium     Standing Status:   Future     Number of Occurrences:   1     Standing Expiration Date:   6/15/2022    Lipid, Fasting     Standing Status:   Future     Standing Expiration Date:   6/15/2022    Vitamin D 25 Hydroxy     Standing Status:   Future     Number of Occurrences:   1     Standing Expiration Date:   6/15/2022    Vitamin B12     Standing Status:   Future     Number of Occurrences:   1     Standing Expiration Date:   6/15/2022    Vitamin B6     Standing Status:   Future     Number of Occurrences:   1     Standing Expiration Date:   6/15/2022    POCT Rapid Drug Screen    POCT Urinalysis no Micro       Orders Placed This Encounter   Medications    predniSONE (DELTASONE) 10 MG tablet     Sig: Take 1 tablet by mouth daily     Dispense:  30 tablet     Refill:  1    HYDROcodone-acetaminophen (NORCO)  MG per tablet     Sig: Take 1 tablet by mouth every 8 hours as needed for Pain for up to 30 days.  Intended supply: 30 days     Dispense:  90 tablet Refill:  0     Reduce doses taken as pain becomes manageable            Patient offered educational handouts and has had all questions answered. Patient voices understanding and agrees to plans along with risks and benefits of plan. Patient is instructed to continue prior meds, diet, and exercise plans as instructed. Patient agrees to follow up as instructed and sooner if needed. Patient agrees to go to ER if condition becomes emergent. EMR Dragon/transcription disclaimer: Some of this encounter note is an electronic transcription/translation of spoken language to printed text. The electronic translation of spoken language may permit erroneous, or at times, nonsensical words or phrases to be inadvertently transcribed.  Although I have reviewed the note for such errors, some may still exist.    Electronically signed by ROBER Arias on 6/15/2021 at 9:03 PM

## 2021-06-20 LAB — VITAMIN B6: 119.6 NMOL/L (ref 20–125)

## 2021-06-21 ENCOUNTER — TELEPHONE (OUTPATIENT)
Dept: PRIMARY CARE CLINIC | Age: 66
End: 2021-06-21

## 2021-06-21 DIAGNOSIS — E78.00 ELEVATED CHOLESTEROL: Primary | ICD-10-CM

## 2021-06-21 RX ORDER — ATORVASTATIN CALCIUM 20 MG/1
20 TABLET, FILM COATED ORAL DAILY
Qty: 30 TABLET | Refills: 3 | Status: SHIPPED | OUTPATIENT
Start: 2021-06-21 | End: 2021-08-12 | Stop reason: SDUPTHER

## 2021-06-21 NOTE — TELEPHONE ENCOUNTER
Pt has been notified and VU. Labs have been placed. Cedric Velasco called to request a refill on her medication.       Last office visit : 6/15/2021   Next office visit : 9/21/2021     Requested Prescriptions     Signed Prescriptions Disp Refills    atorvastatin (LIPITOR) 20 MG tablet 30 tablet 3     Sig: Take 1 tablet by mouth daily     Authorizing Provider: Toby Ghosh     Ordering User: Pricilla Welch

## 2021-06-21 NOTE — TELEPHONE ENCOUNTER
----- Message from ROBER Jernigan sent at 6/18/2021 12:53 PM CDT -----  Please let patient know that labs have returned. All were stable except for her lipid panel. Her cholesterol level was higher. She is not on any medications for her cholesterol. I would recommend her to be on something. If willing Lipitor 20 mg daily with repeat lipid and CMP in 6 weeks.

## 2021-07-11 DIAGNOSIS — C34.90 SQUAMOUS CELL CARCINOMA OF LUNG, UNSPECIFIED LATERALITY (HCC): ICD-10-CM

## 2021-07-11 DIAGNOSIS — S22.060A COMPRESSION FRACTURE OF T7 VERTEBRA, INITIAL ENCOUNTER (HCC): ICD-10-CM

## 2021-07-12 RX ORDER — PREDNISONE 10 MG/1
10 TABLET ORAL DAILY
Qty: 30 TABLET | Refills: 1 | Status: SHIPPED | OUTPATIENT
Start: 2021-07-12 | End: 2021-10-17

## 2021-07-12 RX ORDER — OMEPRAZOLE 20 MG/1
20 CAPSULE, DELAYED RELEASE ORAL DAILY
Qty: 90 CAPSULE | Refills: 2 | Status: SHIPPED | OUTPATIENT
Start: 2021-07-12 | End: 2021-09-06 | Stop reason: SDUPTHER

## 2021-07-12 NOTE — TELEPHONE ENCOUNTER
Eleanor Dixon called to request a refill on her medication. Last office visit : 6/15/2021   Next office visit : 9/21/2021     Last UDS:   Amphetamine Screen, Urine   Date Value Ref Range Status   06/15/2021 -  Final     Barbiturate Screen, Urine   Date Value Ref Range Status   06/15/2021 -  Final     Benzodiazepine Screen, Urine   Date Value Ref Range Status   06/15/2021 -  Final     Buprenorphine Urine   Date Value Ref Range Status   06/15/2021 -  Final     Cocaine Metabolite Screen, Urine   Date Value Ref Range Status   06/15/2021 -  Final     Gabapentin Screen, Urine   Date Value Ref Range Status   06/15/2021 -  Final     MDMA, Urine   Date Value Ref Range Status   06/15/2021 -  Final     Methamphetamine, Urine   Date Value Ref Range Status   06/15/2021 -  Final     Opiate Scrn, Ur   Date Value Ref Range Status   06/15/2021 -  Final     Oxycodone Screen, Ur   Date Value Ref Range Status   06/15/2021 +  Final     PCP Screen, Urine   Date Value Ref Range Status   06/15/2021 -  Final     Propoxyphene Screen, Urine   Date Value Ref Range Status   06/15/2021 -  Final     THC Screen, Urine   Date Value Ref Range Status   06/15/2021 -  Final     Tricyclic Antidepressants, Urine   Date Value Ref Range Status   06/15/2021 -  Final       Last Nancy Zamudio: 7/12/2021  Medication Contract: 6/15/2021   Last Fill: 5/14/2021    Requested Prescriptions     Pending Prescriptions Disp Refills    HYDROcodone-acetaminophen (NORCO)  MG per tablet 90 tablet 0     Sig: Take 1 tablet by mouth every 8 hours as needed for Pain for up to 30 days.  Intended supply: 30 days     Signed Prescriptions Disp Refills    omeprazole (PRILOSEC) 20 MG delayed release capsule 90 capsule 2     Sig: Take 1 capsule by mouth Daily     Authorizing Provider: Almita Shipmna     Ordering User: MARCEL Castro    predniSONE (DELTASONE) 10 MG tablet 30 tablet 1     Sig: Take 1 tablet by mouth daily     Authorizing Provider: Almita Shipman     Ordering User: Altari Mahmood         Please approve or refuse this medication.    David Sykes

## 2021-07-13 RX ORDER — HYDROCODONE BITARTRATE AND ACETAMINOPHEN 10; 325 MG/1; MG/1
1 TABLET ORAL EVERY 8 HOURS PRN
Qty: 90 TABLET | Refills: 0 | Status: SHIPPED | OUTPATIENT
Start: 2021-07-13 | End: 2021-07-15 | Stop reason: SDUPTHER

## 2021-07-15 DIAGNOSIS — C34.90 SQUAMOUS CELL CARCINOMA OF LUNG, UNSPECIFIED LATERALITY (HCC): ICD-10-CM

## 2021-07-15 DIAGNOSIS — S22.060A COMPRESSION FRACTURE OF T7 VERTEBRA, INITIAL ENCOUNTER (HCC): ICD-10-CM

## 2021-07-15 RX ORDER — HYDROCODONE BITARTRATE AND ACETAMINOPHEN 10; 325 MG/1; MG/1
1 TABLET ORAL EVERY 8 HOURS PRN
Qty: 90 TABLET | Refills: 0 | Status: SHIPPED | OUTPATIENT
Start: 2021-07-15 | End: 2021-08-06 | Stop reason: SDUPTHER

## 2021-07-15 NOTE — TELEPHONE ENCOUNTER
Su Bartlett called to request a refill on her medication. Last office visit : 6/15/2021   Next office visit : 9/21/2021     Last UDS:   Amphetamine Screen, Urine   Date Value Ref Range Status   06/15/2021 -  Final     Barbiturate Screen, Urine   Date Value Ref Range Status   06/15/2021 -  Final     Benzodiazepine Screen, Urine   Date Value Ref Range Status   06/15/2021 -  Final     Buprenorphine Urine   Date Value Ref Range Status   06/15/2021 -  Final     Cocaine Metabolite Screen, Urine   Date Value Ref Range Status   06/15/2021 -  Final     Gabapentin Screen, Urine   Date Value Ref Range Status   06/15/2021 -  Final     MDMA, Urine   Date Value Ref Range Status   06/15/2021 -  Final     Methamphetamine, Urine   Date Value Ref Range Status   06/15/2021 -  Final     Opiate Scrn, Ur   Date Value Ref Range Status   06/15/2021 -  Final     Oxycodone Screen, Ur   Date Value Ref Range Status   06/15/2021 +  Final     PCP Screen, Urine   Date Value Ref Range Status   06/15/2021 -  Final     Propoxyphene Screen, Urine   Date Value Ref Range Status   06/15/2021 -  Final     THC Screen, Urine   Date Value Ref Range Status   06/15/2021 -  Final     Tricyclic Antidepressants, Urine   Date Value Ref Range Status   06/15/2021 -  Final       Last Jason Moran: 7/14/2021  Medication Contract: not on file   Last Fill: 07/14/2021 but called and cancelled Rx as they were out of stock pt requested it be sent to [facility]. I called spoke with Pharmacist and cancelled Rx at [facility]. Requested Prescriptions     Pending Prescriptions Disp Refills    HYDROcodone-acetaminophen (NORCO)  MG per tablet 90 tablet 0     Sig: Take 1 tablet by mouth every 8 hours as needed for Pain for up to 30 days. Intended supply: 30 days         Please approve or refuse this medication.    Althia Tequila

## 2021-07-16 ENCOUNTER — TELEPHONE (OUTPATIENT)
Dept: PRIMARY CARE CLINIC | Age: 66
End: 2021-07-16

## 2021-07-16 DIAGNOSIS — R31.9 HEMATURIA, UNSPECIFIED TYPE: Primary | ICD-10-CM

## 2021-07-16 NOTE — TELEPHONE ENCOUNTER
Pt sister dropped off urine to be tested yesterday and it showed trace intact blood in urine still. Per VO from ROBER Murdock she would like to refer pt to Urology due to continued blood in urine and pt hx of cancer. I have placed referral attempted to notify pt by telephone and LM for CB.

## 2021-07-23 DIAGNOSIS — F32.A ANXIETY AND DEPRESSION: ICD-10-CM

## 2021-07-23 DIAGNOSIS — F41.9 ANXIETY AND DEPRESSION: ICD-10-CM

## 2021-07-23 RX ORDER — DIAZEPAM 2 MG/1
2 TABLET ORAL EVERY 8 HOURS PRN
Qty: 15 TABLET | Refills: 0 | Status: SHIPPED | OUTPATIENT
Start: 2021-07-23 | End: 2021-12-08

## 2021-07-23 RX ORDER — FLUTICASONE FUROATE, UMECLIDINIUM BROMIDE AND VILANTEROL TRIFENATATE 100; 62.5; 25 UG/1; UG/1; UG/1
POWDER RESPIRATORY (INHALATION)
Qty: 60 EACH | Refills: 0 | Status: SHIPPED | OUTPATIENT
Start: 2021-07-23 | End: 2021-08-27

## 2021-07-26 ENCOUNTER — HOSPITAL ENCOUNTER (OUTPATIENT)
Dept: CT IMAGING | Facility: HOSPITAL | Age: 66
Discharge: HOME | End: 2021-07-26
Admitting: RADIOLOGY

## 2021-07-26 ENCOUNTER — APPOINTMENT (OUTPATIENT)
Dept: CT IMAGING | Facility: HOSPITAL | Age: 66
End: 2021-07-26

## 2021-07-26 DIAGNOSIS — C77.0 SECONDARY MALIGNANT NEOPLASM OF SUPRACLAVICULAR LYMPH NODE (HCC): ICD-10-CM

## 2021-07-26 DIAGNOSIS — C77.9 REGIONAL LYMPH NODE METASTASIS PRESENT (HCC): ICD-10-CM

## 2021-07-26 DIAGNOSIS — Z87.891 FORMER SMOKER: ICD-10-CM

## 2021-07-26 DIAGNOSIS — C34.81 MALIGNANT NEOPLASM OF OVERLAPPING SITES OF RIGHT LUNG (HCC): ICD-10-CM

## 2021-07-26 DIAGNOSIS — Z92.3 HISTORY OF RADIATION THERAPY: ICD-10-CM

## 2021-07-26 LAB — CREAT BLDA-MCNC: 1 MG/DL (ref 0.6–1.3)

## 2021-07-26 PROCEDURE — 25010000002 IOPAMIDOL 61 % SOLUTION: Performed by: RADIOLOGY

## 2021-07-26 PROCEDURE — 82565 ASSAY OF CREATININE: CPT

## 2021-07-26 PROCEDURE — 71260 CT THORAX DX C+: CPT

## 2021-07-26 RX ADMIN — IOPAMIDOL 100 ML: 612 INJECTION, SOLUTION INTRAVENOUS at 14:48

## 2021-07-28 ENCOUNTER — HOSPITAL ENCOUNTER (OUTPATIENT)
Dept: RADIATION ONCOLOGY | Facility: HOSPITAL | Age: 66
Setting detail: RADIATION/ONCOLOGY SERIES
End: 2021-07-28

## 2021-07-28 DIAGNOSIS — C34.81 MALIGNANT NEOPLASM OF OVERLAPPING SITES OF RIGHT LUNG (HCC): Primary | ICD-10-CM

## 2021-07-28 DIAGNOSIS — C77.9 REGIONAL LYMPH NODE METASTASIS PRESENT (HCC): ICD-10-CM

## 2021-07-28 DIAGNOSIS — C77.0 SECONDARY MALIGNANT NEOPLASM OF SUPRACLAVICULAR LYMPH NODE (HCC): ICD-10-CM

## 2021-07-29 ENCOUNTER — TELEPHONE (OUTPATIENT)
Dept: RADIATION ONCOLOGY | Facility: HOSPITAL | Age: 66
End: 2021-07-29

## 2021-07-29 NOTE — TELEPHONE ENCOUNTER
----- Message from Anastacio Dudley III, MD sent at 7/28/2021  8:27 PM CDT -----  Let her know I have made referral to Dr. Russell and put her on schedule for me to see her next week.  She does have progression of lung mets and will need systemic therapy.  ----- Message -----  From: Ivonne Cristina RN  Sent: 7/28/2021   3:30 PM CDT  To: Anastacio Dudley III, MD    Her most recent CT shows progression of disease.....  Do you want me to call and let her know or would you like to contact her?    Ivonne  ----- Message -----  From: Melanie Orozco  Sent: 7/28/2021   2:04 PM CDT  To: [facility] Clinical Pool    Patient is requesting results of CT  Phone [phone]    Thank you

## 2021-08-01 NOTE — PROGRESS NOTES
[facility] Lucila Pillai BSN, PA-C  ____________________________________________________________  [facility]  Department of Radiation Oncology  [address] [place] [id number]  Office:  [phone]  Fax: [phone]    DATE: 08/02/2021   PATIENT:  Damaris Hu   1955                                 MEDICAL RECORD #: [id number]    Reason for Follow up Visit:  Damaris Hu is a very pleasant 65 y.o. patient that completed radiation to the lung and returns to the clinic today for routine follow up exam. Reports fatigue, SOB, back pain, and light-headedness. Denies activity change, unexpected weight change, cough, chest tightness, wheezing, stridor, nauseas/vomiitng, diarrhea, dizziness, facial asymmetry, weakness, numbness, and headaches.     History of Present Illness:  Diagnosed in November 2019 with Stage IIIC (cT4, cN3, M0) Squamous cell carcinoma of the right lung, perihilar 5.4 x 5.3 x 4.5 cm mass at right major fissure, involves both the right upper and right lower lobes with direct extension into the subcarinal space, abuts medial mediastinal pleura and partially encases RUL bronchus and bronchus intermedius with right supraclavicular and mediastinal kyler metastasis. She completed 6600 cGy in 33 fractions to the right lung on 01/27/2020 and completed 6 cycles of weekly carbo/taxol on 01/27/2020. She initiated maintenance Durvalumab on 02/17/2020 with plans for one year under the care of .     10/28/2019 - Patient presented to the [facility] emergency room with chest pain that had been ongoing for the past year but that recently had gotten more noticeable, more frequent, and has been associated with shortness of breath.  On the day prior to her hospital admission she apparently had a syncopal episode. Further evaluation was completed.     10/28/2019 - CT Head Without Contrast:  • No acute intracranial process.       10/28/2019 -  Chest x-ray:  • RIGHT lung mass is highly concerning for neoplasm. Further evaluation with CT of the chest with contrast is recommended.     10/28/2019 - CT Chest With Contrast:  • Primary lung mass is centered in the RIGHT upper lobe and abuts the posterior mediastinal pleura. This also partially encases the RIGHT bronchus intermedius and RIGHT upper lobe bronchus. This is consistent with pulmonary malignancy. Surgical sampling is recommended. This could be obtained with bronchoscopy.   • Suspected metastatic lymph node in the pretracheal region measuring 1.3 cm in short axis.       11/04/2019 - Appointment with Dr. Delonte Burns, in [place] ([facility]):  • Spirometry on that date revealed severe COPD with positive bronchodilator response.    • Postbronchodilator FEV1 revealed significant improvement to moderate/severe COPD.    • Plan: Navigational bronchoscopy and possible EBUS for tissue diagnosis.    11/05/2019 - PET Scan:  • Markedly hypermetabolic RIGHT perihilar tumor ( 49 x 37 mm) with right supraclavicular and mediastinal kyler metastasis.    11/07/2019 - CT Chest without contrast at [facility]:  • Large right spiculated infrahilar mass, crossing the major fissure to involve both the right upper and right lower lobes with direct extension into the subcarinal space  • Mildly enlarged mediastinal lymph nodes and normal sized right supraclavicular lymph node, corresponding to areas of PET positivity    11/07/2019 - Bronchoscopy and EBUS guided FNA biopsy of right upper lobe of the lung and lymph nodes Dr. Delonte Burns:  • Bronchoscopy and EBUS guided FNA biopsy of right upper lobe lung:  o Positive for malignant cells consistent with squamous cell carcinoma.    • EBUS guided FNA, station 7:   o Lymph node elements present.   o No malignant cells identified.    • EBUS guided FNA, station 4R:   o Positive for malignant cells consistent with squamous cell carcinoma.     • Immunohistochemical stains performed on cell block #3.    • Tumor cells positive for squamous squamous markers p63 and CK 5/6, negative for CK7, TTF-1, and CD 56.    • Addendum: Subsequent biopsy showed metastatic disease in a supraclavicular lymph node, finding consistent with advanced stage disease.  Per protocol will proceed with tumor genomic testing on this specimen.  Due to the relatively small amount of tumor cellularity available, the entire panel of testing may not be able to be completed.  Results to be reported separately.    11/07/2019-Navigational bronchoscopy and forceps biopsy of posterior right upper lobe lung mass Dr. Delonte Burns:  • Fragments of bronchial mucosa.    • No evidence of granuloma or malignancy.    11/08/2019- Ultrasound-guided biopsy right supraclavicular lymph node (fine-needle aspirate) Dr. Delonte Burns:    • Cytopathology diagnosis: Positive for malignant cells consistent with previously established diagnosis of metastatic non-small cell carcinoma.    • Comment: Insufficient tumor cellularity for further testing on the specimen.      11/15/2019 - Consult with :  • Draw baseline CBC with differential, CMP, CEA, serum iron, iron saturation, ferritin, B12, folate.   • Obtain molecular studies from lung biopsy done on 11/07/2019 (eGFR, ALK/ROS, BRAF, and PD-L1.   • Schedule MRI of brain with and without contrast  • Schedule chemotherapy beginning week 1 on 11/25/2019, week 2 on 12/02/2019, week 3 on 12/09/2019, week 4 on 12/16/2019, week 5 on 12/23/2019, week 6 on 12/30/2019:   o Taxol 45 mg/m2 (BSA = 1.82; TD = 81 mg) per administration guidelines weekly x6 weeks with radiation.   o Carboplatin AUC 2 (BSA = 1.82; TD = dose pending) per administration guidelines weekly x6 weeks with radiation.   • Appoint to Dr. Dudley ASAP Re: Assess for concurrent radiation (care actually discussed with Dr. Dudley over the telephone.  He agrees with need for concurrent  radiation).  • Appoint to Dr. Gibson ASAP Re: Mediport placement.   • Return to the office in 4 weeks with pre-office serum iron, Fe sat, ferritin, CBC with differential, CMP, and CEA.   • Further recommendations pending.      11/21/2019 - MRI Brain with and without contrast:  • Cerebral white matter lesions are consistent with small vessel ischemic disease. There is also empty sella syndrome  • No evidence of metastatic disease in the head.     11/22/2019 - Port placement per .    11/25/2019 - Consult with :  • Following this discussion and in consideration of the diagnostic data/evaluation of the patient, I am recommending definitive course of concurrent chemotherapy under the direction of Dr. Russell and radiation therapy to the right lung and nodes.   • We will simulation treatment fields today to begin the planning process, final course to be determined with planning although I anticipate a fractionated course of 5940-8030 cGy at 180cGy per day.     12/05/2019 - 01/27/2020 - Completed Radiation course:  • Received 6600 cGy in 33 fractions to right lung via external beam radiation therapy.    12/09/2019 - 01/27/2020 - Chemotherapy course:  • Carbo/Taxol weekly x 6    02/17/2020 - Appointment with :  • NCCN Guidelines Version 5.2019 for stage III squamous cell cancer management previously reviewed. For T3-4N1 disease after definitive chemo + radiation recommendation is Durvalumab (category 1) x 1 year.   • Durvalumab. Discussed the potential toxicities to include but not limited to (immune mediated reaction, pneumonitis interstitial lung disease, hepatitis, colitis, severe diarrhea, hypothyroidism, hyperthyroidism, thyroiditis, type 1 diabetes, hypopituitarism, thrombocytopenic purpura, nephritis, aseptic meningitis, hemolytic anemia, myocarditis, severe infection, uveitis, infusion reaction, electrolyte abnormalities, lymphopenia, anemia, skin reaction, fatigue, upper  "respiratory infection, musculoskeletal pain, constipation, decreased appetite, nausea, edema, urinary tract infection, abdominal pain, pyrexia, dyspnea, rash, cough). Questions answered. She agrees with a trial of therapy.   • Schedule C1 on 02/17/2020; C2 on 03/02/2020, C3 on 03/16/2020 - durvalumab (plan: every 2 weeks x 24 cycles - 12 months, progression or toxicity) per administration guidelines - at [facility]   o durvalumab 10 mg/kg (WT = 85 kg; TD = 850 mg)   • Appoint to Dr. Tobar Re:  [place] of care for COPD  • Return to the office on 03/23/2020 with pre-office serum iron, Fe sat, ferritin, CBC with differential, CMP, and CEA.    02/17/2020 - Chemotherapy:  • Durvalumab    03/12/2020 - Appointment with :  • Return to Dr. Dudley in 3 months  • CT of chest ordered at [facility]  • Follow with Dr. Russell as scheduled    03/15/2020 - Chest x-ray:  • Right upper lobe interstitial pneumonia versus radiation fibrosis.    03/15/2020 - CT Angio Chest:  • Near complete resolution of the perihilar right lung mass.  • Chronic lung changes with bilateral upper lobe infiltrate. Upper lobe pneumonia is the main concern though some of this may be related to radiation therapy fibrosis.  • No pulmonary embolism.    06/10/2020 - Appointment with :  RECOMMENDATIONS:   • Draw preoffice labs if not done  •  Re: Discussed the labs from 05/07/2020 with normal WBC, resolution of anemia, normal platelets, normal CMP,  pending ferritin 284 (from 368; from 591; from 65), iron saturation 14% (from 25%; from 35%; from 7%), serum iron 41 (from 66; from 95; from 19).  Pending B12 and folate.  Previously elevated CEA (3.71; from 4.12; from 3.38).  Durvalumab tolerance discussed.  Fatigue, pleuritic chest discomfort, diffuse myalgias/arthralgias that she states improved with prednisone taper (10 mg beginning 6/4/2020 and is currently on 5 mg daily).  Wanted to discuss the option of \"taking a break\".  We agreed to " a trial of giving the drug every 4 weeks instead of every 2 weeks.  • Previously reviewed available molecular studies from lung biopsy done on 11/07/2019 (above) noting lack of mutation for EGFR, BRAF and KRAS.  ALK and PD-L1 not reported presumably due to lack of specimen.   • Reviewed encounter from Lucila Couch PA-C with Dr. Dudley, 03/12/2020.  No evidence for recurrent or metastatic disease.  RTC 3 months with CT of chest ordered at [facility].  • Previously reviewed (Telemed visit) from Dr. Tobar, 04/02/2020.  Assessment:  Pneumonitis complicating radiation and medical therapies.  Continue Trelegy and albuterol.  Plan for PFTs when she can get in the office later in the summer.  • NCCN Guidelines Version 5.2019 for stage III squamous cell cancer management previously reviewed. For T3-4N1 disease after definitive chemo + radiation recommendation is Durvalumab (category 1) x 1 year.   • Durvalumab. Previously discussed the potential toxicities to include but not limited to (immune mediated reaction, pneumonitis interstitial lung disease, hepatitis, colitis, severe diarrhea, hypothyroidism, hyperthyroidism, thyroiditis, type 1 diabetes, hypopituitarism, thrombocytopenic purpura, nephritis, aseptic meningitis, hemolytic anemia, myocarditis, severe infection, uveitis, infusion reaction, electrolyte abnormalities, lymphopenia, anemia, skin reaction, fatigue, upper respiratory infection, musculoskeletal pain, constipation, decreased appetite, nausea, edema, urinary tract infection, abdominal pain, pyrexia, dyspnea, rash, cough).   • Review UpToDate management of toxicity related to durvalumab.  Withhold and/or discontinue durvalumab to manage adverse reactions (no dosage reductions are recommended).  Resume durvalumab when the toxicity is improved to grade 1 or lower and the corticosteroid dose is reduced to <10 mg/day prednisone (or equivalent).  Permanently discontinue durvalumab for persistent grade 2 or 3 adverse  reactions (excluding endocrinopathies) which do not recover to grade 1 or lower within 12 weeks after the last dose or for recurrent grade 3 or 4 reactions.  If unable to reduce prednisone dose to < 10 mg/day (or equivalent) within 12 weeks after the last durvalumab dose, discontinue durvalumab permanently.  • Schedule C4 on 07/06/2020; C5 on 08/03/2020 - durvalumab (plan: every 4 weeks x 12 cycles - 12 months, progression or toxicity) per administration guidelines - at [facility]   o durvalumab 10 mg/kg (WT = 85 kg; TD = 850 mg)   • Premeds:    o Decadron 10 mg IV   o Benadryl 25 mg IV   o Pepcid 10 mg IV   • CMP, Mg++ and CBC with differential on days of durvalumab.  • Schedule CT of the chest with IV contrast next week at [facility].  Prior studies.  • Return to the office in 6 weeks with pre-office serum iron, Fe sat, ferritin, CBC with differential, CMP, and CEA.     06/16/2020 - CT chest with contrast:  • Along the right pulmonary fissure, involving the right upper lobe and right lower lobe, there is a 2.6 x 5.1 cm opacity in the area of the previously described right hilar mass. This has a tethered appearance of bronchiectasis. Although it measures larger than 3/15/2020, is favored to represent posttreatment changes. Recommend follow-up.  • Interval resolution of probable infectious/inflammatory biapical pulmonary opacities.  • Ascending thoracic aorta measures 4 cm.  • Emphysema.  • Calcified aortic atherosclerosis.    06/17/2020 - Appointment with :  • Follow with Dr. Russell for trial of immunotherapy  • Return to Dr. Dudley in 6 months, call us if you need us    10/14/2020 - CT Chest with contrast:  • As previously noted there is consolidation with volume loss at the juncture of the posterior segment of the right upper lobe and superior segment of the right lower lobe. There is associated nodular thickening associated with the major fissure. Overall this shows interval improvement and I would favor this to  represent post therapy change. Previously noted consolidation within the superior segment of the right lower lobe also shows interval improvement with improved aeration of the superior segment. No new lung lesions are demonstrated.  • No evidence of enlarged mediastinal or axillary lymphadenopathy. A small pericardial effusion is present showing slight interval increase. Coronary calcifications are present.  • New fractures at the T6 and T7 level with a relatively severe fracture at the T7 level and a mild gibbus deformity. These vertebral bodies are relatively sclerotic but that may simply be related to the compression. Pathologic fractures are difficult to entirely exclude but I suspect this may be on the basis of osteoporosis as the patient is relatively osteopenic.  • There is atheromatous calcification and plaquing involving the origin of the left common carotid artery and proximal left subclavian artery. There is at least a moderate stenosis at the origin of the left common carotid artery similar in appearance to the previous study of June.    01/19/2021 - CT Chest with contrast:  • Left upper lobe pulmonary nodule now measures 8 mm on axial series 3, image 34, previously 5 mm on 10/14/2020.  • Left upper lobe 4 mm pulmonary nodule on axial series 3, image 44, previously punctate on 10/14/2020.  • No pleural effusion or pneumothorax.  • Redemonstration of height loss at T6 and T7 with focal kyphosis, similar compared to prior.   Impression:  • Mildly increased size of 2 left upper lobe pulmonary nodules as described in detail above.  • Stable posttreatment changes at the right lung.  • Ascending thoracic aorta measures up to 3.9 cm.  • Trace pericardial fluid.  • Similar height loss of T6 and T7 with kyphosis.  • Incidental findings of the abdomen include low-density of the liver, stable subcentimeter hypodensity in the spleen, punctate nonobstructing left upper pole calculus.    01/27/2021 - Appointment with  :  • Damaris Hu is status post completion of radiation therapy to the lung and presents to our clinic today for surveillance exam and to review imaging.  Diagnosed in November 2019 with Stage IIIC (cT4, cN3, M0)Squamous cell carcinoma of the right lung, perihilar 5.4 x 5.3 x 4.5 cm mass at right major fissure, involves both the right upper and right lower lobes with direct extension into the subcarinal space, abuts medial mediastinal pleura and partially encases RUL bronchus and bronchus intermedius with right supraclavicular and mediastinal kyler metastasis. She completed 6600 cGy in 33 fractions to the right lung on 01/27/2020 and completed 6 cycles of weekly carbo/taxol on 01/27/2020. She initiated maintenance Durvalumab on 02/17/2020 with plans for one year under the care of .  • CT-scan of the chest on 01/19/2021 revealed left upper lobe pulmonary nodule now measures 8 mm on axial series 3, image 34, previously 5 mm on 10/14/2020 and left upper lobe 4 mm pulmonary nodule on axial series 3, image 44, previously punctate on 10/14/2020.  • Ordered PET Scan for further evaluation of upper left lung nodules. May need biopsy in [place]. Will follow up pending PET scan results.   Plan:  • Three lung nodules in upper left lung, we ordered PET scan, they will call you  • You may need biopsy in [place]    02/05/2021 - PET Scan - [facility]:  • There are posttreatment changes noted within the right upper lung abutting the fissure posteriorly. These demonstrate minimal uptake of the radiopharmaceutical with a maximum SUV of 2.1.   • The 8 mm nodule within the apical posterior segment of the left upper lobe demonstrates minimal metabolic activity with an SUV measured at 1.0.   • The smaller 4 mm nodule does not demonstrate any visible uptake.   Impression  • Post therapy changes within the right upper lung are demonstrated. These demonstrate minimal metabolic activity with an SUV measured at maximum  of 2.1.   • 2 small nodules within the left upper lobe which have demonstrated interval increase in size from previous CT exam do not demonstrate any significant metabolic activity. I do not see any discrete activity associated with the smaller lesion but that may simply be related to its size. The 8 mm lesion should be sufficiently large to adequately evaluate and demonstrates a maximum SUV of 1.0. This would certainly suggest benignity. This could represent an inflammatory process but close radiographic follow-up is recommended. Unfortunately, this lesion is too small for potential percutaneous sampling. Short interval follow-up repeat CT imaging will be suggested. That can BE obtained without IV contrast.   • No foci of abnormal metabolic activity are identified to suggest regional or local recurrence/metastatic disease.   • Small pericardial effusion. Coronary calcifications are present.  Diverticular disease is also noted of the sigmoid colon without evidence of diverticulitis.     03/11/2021 - Appointment with Kellee Perkins APRN:  • Return in about 6 months (around 9/11/2021) for FVL with diffusion.    04/14/2021 - CT Chest:  • Hyperexpanded lungs.  • Nodules within the left upper lobe show increased size now measuring 11 x 9 mm compared with 8 x 7 mm and 6 x 6 mm compared with 4 x 4 mm.  • 2 tiny nodules within the left lung apex also show increased size measuring 5 mm compared with 3 mm and 4 mm compared with 2 mm.  • Medial right lower lobe nodule measures 8 x 8 mm compared with 6 x 5 mm.  • Unchanged appearance of anterior wedge compression of T6 and T7 associated with thoracic kyphosis.   Impression:  • Progression of disease with increased size of 4 nodules within the left upper lobe and one nodule within the right lower lobe.    04/28/2021 - Appointment with  (Covering for ):  • We discussed the CT scan in detail. I offered her the following three options which we discussed in  detail.  Repeat PET scan vs referral for attempt at tissue biopsy at this time, vs repeat CT scan in 3 months along with pros and cons of decision pathway. Due to location and largest lesion being only 11 mm, I explained there is moderate probability of inconclusive results on PET and biopsy, she elected to repeat CT scan in 3 months instead of pursuing biopsy or PET scan at this time.   • We will continue routine follow-up/surveillance as discussed in 3 months with follow up CT scan before visit and I have instructed her to continue to see the other health care providers as per their scheduling.  Plan:  • Repeat CT of chest in 3 months, will decide next step at that time.  • Follow up visit with us 1-2 days after your scan    07/26/2021 - CT Chest with contrast:  • Previous noted lung nodules have increased in size.   • Series 3image 24 left upper lobe a 7 mm nodule is present previously this was 3.3 mm.   • In the left upper lobe on image 40 a 12.5 cm nodule is present previously this was 9.8 cm.   • On image 50 a 7.6 mm nodule is present previously this was 4 mm.  • The right lower lobe a 10 mm nodules on image 77. Previously this was 7 mm. On image 132 a 6 mm nodule is present previously this was 4 mm.  Impression:  • Progression of metastatic disease with increase in size of multiple nodules as described above.    History obtained from  PATIENT and CHART    PAST MEDICAL HISTORY  Past Medical History:   Diagnosis Date   • Anemia    • Arthritis    • Cancer (CMS/HCC)     Right lung   • Cervical disc disease    • Depression    • Emphysema lung (CMS/HCC)    • GERD (gastroesophageal reflux disease)    • Hypomagnesemia 12/30/2019   • Lung cancer (CMS/HCC)    • Migraines    • Osteoporosis    • Vitamin D deficiency       PAST SURGICAL HISTORY  Past Surgical History:   Procedure Laterality Date   • BREAST SURGERY      Cyst removal   • COLONOSCOPY N/A 12/3/2018    Procedure: COLONOSCOPY WITH ANESTHESIA;  Surgeon: Yrn  Myah DORANTES MD;  Location: [facility] ENDOSCOPY;  Service: Gastroenterology   • HYSTERECTOMY     • VENOUS ACCESS DEVICE (PORT) INSERTION N/A 11/22/2019    Procedure: PLACEMENT OF SINGLE LUMEN PORT;  Surgeon: Mona Gibson MD;  Location: [facility] OR;  Service: General      FAMILY HISTORY  family history includes Arthritis in her sister; Asthma in her sister; Birth defects in her mother; Breast cancer in her maternal aunt; Colon polyps in her mother; Heart disease in her mother; Hypertension in her sister; Lung cancer in her mother; No Known Problems in her father; Prostate cancer in her maternal grandfather.    SOCIAL HISTORY  Social History     Tobacco Use   • Smoking status: Former Smoker     Types: Cigarettes   • Smokeless tobacco: Never Used   • Tobacco comment: Quit 4 years ago   Substance Use Topics   • Alcohol use: No   • Drug use: No      Amoxicillin     MEDICATIONS  Current Outpatient Medications   Medication Sig Dispense Refill   • albuterol sulfate  (90 Base) MCG/ACT inhaler INHALE 2 PUFFS BY MOUTH 4 TIMES DAILY AS NEEDED FOR WHEEZING     • atorvastatin (LIPITOR) 20 MG tablet Take 20 mg by mouth Daily.     • Calcium Carbonate-Vit D-Min (GNP Calcium 1200) 4782-6775 MG-UNIT chewable tablet Chew 1 tablet/day Daily.     • cetirizine (zyrTEC) 10 MG tablet Take 10 mg by mouth Daily.     • citalopram (CeleXA) 20 MG tablet Take 20 mg by mouth Daily.     • diazePAM (VALIUM) 2 MG tablet TAKE 1 TABLET BY MOUTH EVERY 8 HOURS AS NEEDED FOR ANXIETY UP TO 10 DAYS     • fluticasone (FLONASE) 50 MCG/ACT nasal spray 1 spray by Each Nare route Daily As Needed.  5   • HYDROcodone-acetaminophen (NORCO)  MG per tablet Take 1 tablet by mouth Every 8 (Eight) Hours As Needed. for pain     • omeprazole (priLOSEC) 20 MG capsule Take 20 mg by mouth Every Morning Before Breakfast.  0   • predniSONE (DELTASONE) 10 MG tablet Take 10 mg by mouth.     • Trelegy Ellipta 100-62.5-25 MCG/INH inhaler Inhale 1 puff Daily.     •  "methylPREDNISolone (MEDROL) 4 MG tablet Take 4 mg by mouth Daily.     • polyethylene glycol (MiraLax) 17 g packet Take 17 g by mouth Daily.     • ZOLMitriptan (ZOMIG) 2.5 MG tablet Take 2.5 mg by mouth As Needed.       No current facility-administered medications for this visit.      Current outpatient and discharge medications have been reconciled for the patient.  Reviewed by: Anastacio Dudley III, MD    The following portions of the patient's history were reviewed and updated as appropriate: allergies, current medications, past family history, past medical history, past social history, past surgical history and problem list.    REVIEW OF SYSTEMS  Review of Systems   Constitutional: Positive for fatigue. Negative for activity change, appetite change, chills, diaphoresis, fever and unexpected weight change.   HENT: Negative.    Eyes: Negative.    Respiratory: Positive for shortness of breath. Negative for apnea, cough, choking, chest tightness, wheezing and stridor.         Has inhalers   Cardiovascular: Negative.    Gastrointestinal: Negative.         GERD  Constipation with pain med/ uses Miralax as needed   Endocrine: Negative.    Musculoskeletal: Positive for back pain. Negative for arthralgias, gait problem, joint swelling, myalgias, neck pain and neck stiffness.        Compression fractures   Skin: Negative.    Allergic/Immunologic: Negative.    Neurological: Positive for light-headedness. Negative for dizziness, tremors, seizures, syncope, facial asymmetry, speech difficulty, weakness, numbness and headaches.   Hematological: Negative.    Psychiatric/Behavioral: Negative.         Anxious  Depression      PHYSICAL EXAM  VITAL SIGNS:   Vitals:    08/02/21 1341   BP: 176/93   Pulse: 111   Resp: 22   SpO2: 97%  Comment: Room Air   Weight: 83 kg (183 lb)   Height: 167.6 cm (66\")   PainSc:   6   PainLoc: Back      Physical Exam    General:  Alert and oriented, in no acute distress, well-developed, vitals " reviewed.  Head:  Normocephalic, without obvious abnormality    Nose/Sinuses:  Nares normal externally, no sinus tenderness.  Mouth/Throat:  Mucosa moist, without erythema  Neck: No evidence of adenopathy in the cervical or supraclavicular areas.  Eyes: No gross abnormalities   Ears: Ears intact with no external abnormalities noted  Chest:  Respiratory efforts are normal and unlabored, chest is clear to auscultation.  Cardiovascular: Regular rate and rhythm without murmurs, rubs, or gallops.   Abdomen:  Soft, non-tender, normal bowel sounds;   Extremities:  LOMBARDO well, warm to touch, no cyanosis or edema.  Skin: No suspicious lesions or rashes of concern  Neurologic: non focal exam, strength and sensation grossly normal  Psych: Mood and affect are appropriate    Performance Status: ECOG (0) Fully active, able to carry on all predisease performance without restriction    Clinical Quality Measures  -Pain Documented  Damaris Hu reports a pain score of 6. Given her pain assessment as noted, treatment options were discussed and the following options were decided upon as a follow-up plan to address the patient's pain: continuation of current treatment plan for pain and use of non-medical modalities (ice, heat, stretching and/or behavior modifications).  Pain Medications             citalopram (CeleXA) 20 MG tablet Take 20 mg by mouth Daily.    HYDROcodone-acetaminophen (NORCO)  MG per tablet Take 1 tablet by mouth Every 8 (Eight) Hours As Needed. for pain    predniSONE (DELTASONE) 10 MG tablet Take 10 mg by mouth.    methylPREDNISolone (MEDROL) 4 MG tablet Take 4 mg by mouth Daily.        -Advanced Care Planning Advance Care Planning   ACP discussion was held with the patient during this visit. Patient does not have an advance directive, information provided.    -Body Mass Index Screening and Follow-Up Plan  Patient's Body mass index is 29.54 kg/m². indicating that she is overweight (BMI 25-29.9). Obesity-related  health conditions include the following: none.     -Tobacco Use: Screening and Cessation Intervention Social History    Tobacco Use      Smoking status: Former Smoker        Types: Cigarettes      Smokeless tobacco: Never Used      Tobacco comment: Quit 4 years ago     ASSESSMENT AND PLAN  1. Malignant neoplasm of overlapping sites of right lung (CMS/HCC)    2. Regional lymph node metastasis present (CMS/HCC)    3. 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 COPD, group B, by GOLD 2017 classification (CMS/HCC)    5. History of radiation therapy    6. Former smoker      RECOMMENDATIONS: Damaris Hu is status post completion of radiation therapy to the lung and presents to our clinic today for surveillance exam and to review imaging. 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 She completed 6600 cGy in 33 fractions to the right lung on 01/27/2020 and completed 6 cycles of weekly carbo/taxol on 01/27/2020. She initiated maintenance Durvalumab on 02/17/2020 with plans for one year under the care of .    CT-scan of the chest on 07/26/2021 revealed previous noted lung nodules have increased in size. Series 3image 24 left upper lobe a 7 mm nodule is present previously this was 3.3 mm. In the left upper lobe on image 40 a 12.5 cm nodule is present previously this was 9.8 cm. On image 50 a 7.6 mm nodule is present previously this was 4 mm. The right lower lobe a 10 mm nodules on image 77. Previously this was 7 mm. On image 132 a 6 mm nodule is present previously this was 4 mm.    Referred to  for further  recommendations regarding progression of disease.     Patient Instructions   1) We have referred you to Dr. Russell, they will call you.    Todays appointment time was spent in counseling, coordination of care and surveillance related to patients diagnosis as well as radiation therapy possible and probable after effects.   Anastacio Dudley III, MD  08/02/2021

## 2021-08-02 ENCOUNTER — OFFICE VISIT (OUTPATIENT)
Dept: RADIATION ONCOLOGY | Facility: HOSPITAL | Age: 66
End: 2021-08-02

## 2021-08-02 ENCOUNTER — HOSPITAL ENCOUNTER (OUTPATIENT)
Dept: RADIATION ONCOLOGY | Facility: HOSPITAL | Age: 66
Setting detail: RADIATION/ONCOLOGY SERIES
End: 2021-08-02

## 2021-08-02 VITALS
OXYGEN SATURATION: 97 % | HEART RATE: 111 BPM | RESPIRATION RATE: 22 BRPM | HEIGHT: 66 IN | BODY MASS INDEX: 29.41 KG/M2 | WEIGHT: 183 LBS | DIASTOLIC BLOOD PRESSURE: 93 MMHG | SYSTOLIC BLOOD PRESSURE: 176 MMHG

## 2021-08-02 DIAGNOSIS — Z87.891 FORMER SMOKER: ICD-10-CM

## 2021-08-02 DIAGNOSIS — J44.9 COPD, GROUP B, BY GOLD 2017 CLASSIFICATION (HCC): Chronic | ICD-10-CM

## 2021-08-02 DIAGNOSIS — C34.81 MALIGNANT NEOPLASM OF OVERLAPPING SITES OF RIGHT LUNG (HCC): Primary | ICD-10-CM

## 2021-08-02 DIAGNOSIS — C77.0 SECONDARY MALIGNANT NEOPLASM OF SUPRACLAVICULAR LYMPH NODE (HCC): ICD-10-CM

## 2021-08-02 DIAGNOSIS — Z92.3 HISTORY OF RADIATION THERAPY: ICD-10-CM

## 2021-08-02 DIAGNOSIS — C77.9 REGIONAL LYMPH NODE METASTASIS PRESENT (HCC): ICD-10-CM

## 2021-08-02 PROCEDURE — G0463 HOSPITAL OUTPT CLINIC VISIT: HCPCS | Performed by: RADIOLOGY

## 2021-08-02 RX ORDER — DIAZEPAM 2 MG/1
TABLET ORAL
COMMUNITY
Start: 2021-07-23

## 2021-08-02 RX ORDER — ATORVASTATIN CALCIUM 20 MG/1
20 TABLET, FILM COATED ORAL DAILY
COMMUNITY
Start: 2021-07-25 | End: 2022-01-31 | Stop reason: ALTCHOICE

## 2021-08-02 RX ORDER — GINSENG 250 MG
1 CAPSULE ORAL DAILY
COMMUNITY

## 2021-08-02 RX ORDER — HYDROCODONE BITARTRATE AND ACETAMINOPHEN 10; 325 MG/1; MG/1
1 TABLET ORAL EVERY 8 HOURS PRN
COMMUNITY
Start: 2021-06-15

## 2021-08-02 RX ORDER — PREDNISONE 10 MG/1
10 TABLET ORAL
COMMUNITY
Start: 2021-07-12 | End: 2021-08-11

## 2021-08-06 DIAGNOSIS — C34.90 SQUAMOUS CELL CARCINOMA OF LUNG, UNSPECIFIED LATERALITY (HCC): ICD-10-CM

## 2021-08-06 DIAGNOSIS — S22.060A COMPRESSION FRACTURE OF T7 VERTEBRA, INITIAL ENCOUNTER (HCC): ICD-10-CM

## 2021-08-06 RX ORDER — HYDROCODONE BITARTRATE AND ACETAMINOPHEN 10; 325 MG/1; MG/1
1 TABLET ORAL EVERY 8 HOURS PRN
Qty: 90 TABLET | Refills: 0 | Status: SHIPPED | OUTPATIENT
Start: 2021-08-06 | End: 2021-09-09 | Stop reason: SDUPTHER

## 2021-08-06 NOTE — TELEPHONE ENCOUNTER
Lorenzo Zheng called to request a refill on her medication. Last office visit : 6/15/2021   Next office visit : 9/21/2021     Last UDS:   Amphetamine Screen, Urine   Date Value Ref Range Status   06/15/2021 -  Final     Barbiturate Screen, Urine   Date Value Ref Range Status   06/15/2021 -  Final     Benzodiazepine Screen, Urine   Date Value Ref Range Status   06/15/2021 -  Final     Buprenorphine Urine   Date Value Ref Range Status   06/15/2021 -  Final     Cocaine Metabolite Screen, Urine   Date Value Ref Range Status   06/15/2021 -  Final     Gabapentin Screen, Urine   Date Value Ref Range Status   06/15/2021 -  Final     MDMA, Urine   Date Value Ref Range Status   06/15/2021 -  Final     Methamphetamine, Urine   Date Value Ref Range Status   06/15/2021 -  Final     Opiate Scrn, Ur   Date Value Ref Range Status   06/15/2021 -  Final     Oxycodone Screen, Ur   Date Value Ref Range Status   06/15/2021 +  Final     PCP Screen, Urine   Date Value Ref Range Status   06/15/2021 -  Final     Propoxyphene Screen, Urine   Date Value Ref Range Status   06/15/2021 -  Final     THC Screen, Urine   Date Value Ref Range Status   06/15/2021 -  Final     Tricyclic Antidepressants, Urine   Date Value Ref Range Status   06/15/2021 -  Final       Last Jessica Showers: 7-14-21  Medication Contract: 6-15-21   Last Fill: 7-15-21    Requested Prescriptions     Pending Prescriptions Disp Refills    HYDROcodone-acetaminophen (NORCO)  MG per tablet 90 tablet 0     Sig: Take 1 tablet by mouth every 8 hours as needed for Pain for up to 30 days. Intended supply: 30 days         Please approve or refuse this medication.    Olman Fine MA

## 2021-08-09 NOTE — PROGRESS NOTES
[facility] HEMATOLOGY AND ONCOLOGY  [address]  [place] [address] [phone]    Patient Name: Damaris Hu  Encounter Date: 08/12/2021  YOB: 1955  Patient Number: [id number]     REASON FOR VISIT: Damaris Hu is a 65-year-old female who returns in follow-up of stage IIIC (cT4, cN3) squamous cell lung carcinoma.  She received definitive radiation to the right lung (12/05/2019 through 01/27/2020 -6600 cGy in 33 fractions) concurrent with weekly chemotherapy, week 6 on 01/27/2020.  She received maintenance durvalumab, cycle 3 given on 05/12/2020 but then was lost to follow-up and is only now resurfacing after being referred back by Dr. Dudley who last saw her on 08/02/2021 complaining of shortness of breath, back pain and lightheadedness.  Interval CT of the chest, 07/26/2021 revealed previous noted lung nodules increased in size.  She is here with her sister Ashley (and another sister is on the phone).    I have reviewed the HPI and verified with the patient the accuracy of it. No changes to interval history since the information was documented. Kade Russell MD 08/12/21     DIAGNOSTIC ABNORMALITIES:          1.   10/28/2019 patient presented to the emergency room with chest pain that has been ongoing for the past year but that recently had gotten more noticeable, more frequent, and has been associated with shortness of breath.  On the day prior to her hospital admission she apparently had a syncopal episode.  An evaluation ensued:  Labs: Glucose 112 otherwise CMP was normal with a BUN of 14 creatinine 0.91 (GFR 62) calcium 10.2, total protein 7.1 and normal liver enzymes.  Troponin T was less than 0.010, proBNP normal at 97.4, d-dimer was normal at 0.27, sed rate 10, CRP 2.02 (slightly elevated), hemoglobin 11.3, hematocrit 35.6, MCV 92.5, platelets 306,000, WBC 7.37 with 76 point 1 segs 15.2 lymphs.          2.    10/28/2019 - head without contrast.  Impression: No acute intracranial disease.          3.   10/28/2019-CT chest with contrast.  Impression: Primary lung mass centered in the right upper lobe and abuts the posterior mediastinal pleura (5.4 x 5.3 x 4.5 cm).  This also partially encases the right bronchus intermedius and right upper lobe bronchus.  This is consistent with pulmonary malignancy.  Surgical sampling recommended with bronchoscopy.  Suspected metastatic lymph node in the pretracheal region measuring 1.3 cm in short axis.          4.   11/04/2019 - was seen by Dr. Delonte Burns, [facility] for further assessment of the lung mass.  Spirometry on that date revealed severe COPD with positive bronchodilator response.  Postbronchodilator FEV1 revealed significant improvement to moderate/severe COPD.  Plan: Navigational bronchoscopy and possible EBUS for tissue diagnosis.          5.   11/05/2019- PET scan, skull vertex to mid thighs.  Impression: Markedly hypermetabolic right perihilar tumor (49 x 37 mm) with right supraclavicular and mediastinal kyler metastasis.          6.   11/07/2019- CT chest without contrast [facility].  Comparison, PET CT 11/5/2019.  Impression: Large, spiculated right infrahilar mass, crossing the major fissure to involve the right upper and right lower lobes with direct extension into the subcarinal space.  Mildly enlarged mediastinal lymph nodes and normal-sized right supraclavicular lymph node corresponding to the area of PET positivity.          7.   11/07/2019-bronchoscopy and EBUS guided FNA biopsy of right upper lobe lung: Positive for malignant cells consistent with squamous cell carcinoma.  EBUS guided FNA, station 7: Lymph node elements present.  No malignant cells identified.  EBUS guided FNA, station 4R: Positive for malignant cells consistent with squamous cell carcinoma.  Immunohistochemical stains performed on cell block #3.   Tumor cells positive for squamous squamous markers p63 and CK 5/6, negative for CK7, TTF-1, and CD 56.  Immunoprofile supports the above diagnosis.  Addendum: Subsequent biopsy showed metastatic disease in a supraclavicular lymph node, finding consistent with advanced stage disease.  Per protocol will proceed with tumor genomic testing on this specimen.  Due to the relatively small amount of tumor cellularity available, the entire panel of testing may not be able to be completed.  BRAF mutation not detected, K-mayco mutation not detected, EGFR mutation not detected.          8.   11/07/2019-navigational bronchoscopy and forceps biopsy of posterior right upper lobe lung mass.  Diagnosis: Fragments of bronchial mucosa.  No evidence of granuloma or malignancy.          9.   11/08/2019- ultrasound-guided biopsy right supraclavicular lymph node (fine-needle aspirate).  Cytopathology diagnosis: Positive for malignant cells consistent with previously established diagnosis of metastatic non-small cell carcinoma.  Comment: Insufficient tumor cellularity for further testing on the specimen.         10.  11/15/2019-- labs, 11/15/2019 with stable anemia otherwise normal CBC with differential, slightly depressed ferritin (65), iron saturation 7%, serum iron 19, TIBC 289.  Repleted B12 and folate.  Received Injectafer.  CMP, CEA, serum iron, iron saturation, ferritin, B12, folate.  Slightly elevated CEA (3.38).         11.   11/21/2019- [facility].  White matter changes.  Empty sella syndrome.  No metastatic lesions.      PREVIOUS INTERVENTIONS:          1.   11/19/2019-Injectafer 750 mg IV x1          2.   Definitive course of radiation to the right lung (concurrent with chemotherapy)-radiation initiated 12/5/2019 through 01/27/2020 - 6600 cGy at 180 cGy/day/33 fractions          3.   Weekly concurrent chemotherapy with carboplatin and Taxol beginning 12/09/2019 through 01/27/2020 (6 weeks)          4.   Maintenance  Durvalumab beginning 02/17/2020 through 05/12/2020- C3    LABS    Lab Results - Last 18 Months   Lab Units 06/15/21  1252 11/19/20  1535 05/07/20  1332 03/14/20  2359 03/02/20  1131 02/17/20  1121   HEMOGLOBIN g/dL 13.0 12.5 12.2 9.8* 10.5* 11.5*   HEMATOCRIT % 40.9 40.7 38.8 29.9* 33.1* 35.3   MCV fL 97.4 95.1 93.3 95.5 100.0* 97.2*   WBC K/uL 9.9 6.7 8.93 8.35 4.33 5.79   RDW % 13.6 13.0 16.1* 15.9* 16.8* 19.2*   MPV fL 9.3* 10.0 9.1 10.4 10.0 9.6   PLATELETS K/uL 234 261 262 235 167 118*   IMM GRAN % %  --   --  1.0* 0.4 0.2 0.2   NEUTROS ABS K/uL 8.3* 4.9 6.78 6.00 3.37 4.48   LYMPHS ABS K/uL 0.5* 0.7* 0.70 0.59* 0.36* 0.43*   MONOS ABS K/uL 0.80 0.70 1.20* 1.35* 0.37 0.67   EOS ABS K/uL 0.20 0.40 0.12 0.36 0.21 0.17   BASOS ABS K/uL 0.00 0.10 0.04 0.02 0.01 0.03   IMMATURE GRANS (ABS) K/uL 0.0 0.0 0.09* 0.03 0.01 0.01   NRBC /100 WBC  --   --  0.0 0.0 0.0 0.0       Lab Results - Last 18 Months   Lab Units 07/26/21  1434 06/15/21  1252 04/14/21  1521 01/19/21  1445 11/19/20  1535 10/14/20  1341 08/18/20  1349 06/16/20  1312 05/07/20  1332 03/15/20  0029 03/14/20  2359 03/02/20  1131 03/02/20  1131 02/17/20  1121 02/17/20  1121   GLUCOSE mg/dL  --  97  --   --  92  --   --   --  94  --  112*  --  107*  --  88   SODIUM mmol/L  --  140  --   --  137  --   --   --  140  --  138  --  141  --  141   POTASSIUM mmol/L  --  4.2  --   --  4.1  --   --   --  4.1  --  3.8  --  4.1  --  4.1   TOTAL CO2 mmol/L  --  28  --   --  25  --   --   --   --   --   --   --   --   --   --    CO2 mmol/L  --   --   --   --   --   --   --   --  24.0  --  21.0*  --  26.0  --  24.0   CHLORIDE mmol/L  --  103  --   --  103  --   --   --  101  --  102  --  104  --  105   ANION GAP mmol/L  --  9  --   --  9  --   --   --  15.0  --  15.0  --  11.0  --  12.0   CREATININE mg/dL 1.00 0.8 1.10 1.10 0.8 0.90  --    < > 0.81   < > 0.67   < > 0.80   < > 0.73   BUN mg/dL  --  14  --   --  14  --   --   --  14  --  13  --  15  --  15   BUN / CREAT RATIO    --   --   --   --   --   --   --   --  17.3  --  19.4  --  18.8  --  20.5   CALCIUM mg/dL  --  9.9  --   --  10.1  --   --   --  9.7  --  9.7  --  9.9  --  10.0   EGFR IF NONAFRICN AM   --  >60  --   --  >60  --  55*  --  71  --  88  --  72   < > 80   ALK PHOS U/L  --  51  --   --  65  --   --   --  56  --  61  --  65  --  64   TOTAL PROTEIN g/dL  --  6.9  --   --  7.1  --   --   --  6.6  --  7.3  --  7.1  --  7.2   ALT (SGPT) U/L  --  12  --   --  15  --   --   --  14  --  8  --  7  --  12   AST (SGOT) U/L  --  14  --   --  17  --   --   --  13  --  12  --  12  --  15   BILIRUBIN mg/dL  --  0.4  --   --  0.3  --   --   --  0.2  --  0.2  --  0.2  --  0.3   ALBUMIN g/dL  --  4.4  --   --  4.6  --   --   --  4.00  --  4.10  --  3.90  --  4.60   GLOBULIN gm/dL  --   --   --   --   --   --   --   --  2.6  --  3.2  --  3.2  --  2.6    < > = values in this interval not displayed.       Lab Results - Last 18 Months   Lab Units 05/07/20  1332   CEA ng/mL 5.81       Lab Results - Last 18 Months   Lab Units 05/07/20  1332 02/17/20  1121   IRON mcg/dL 41  --    TIBC mcg/dL 297*  --    IRON SATURATION % 14*  --    FERRITIN ng/mL 284.60*  --    TSH uIU/mL  --  3.440   FOLATE ng/mL 16.90  --          PAST MEDICAL HISTORY:  ALLERGIES:  Allergies   Allergen Reactions   • Amoxicillin GI Intolerance     CURRENT MEDICATIONS:  Outpatient Encounter Medications as of 8/12/2021   Medication Sig Dispense Refill   • albuterol sulfate  (90 Base) MCG/ACT inhaler INHALE 2 PUFFS BY MOUTH 4 TIMES DAILY AS NEEDED FOR WHEEZING     • atorvastatin (LIPITOR) 20 MG tablet Take 20 mg by mouth Daily.     • Calcium Carbonate-Vit D-Min (GNP Calcium 1200) 6954-2613 MG-UNIT chewable tablet Chew 1 tablet/day Daily.     • cetirizine (zyrTEC) 10 MG tablet Take 10 mg by mouth Daily.     • citalopram (CeleXA) 20 MG tablet Take 20 mg by mouth Daily.     • fluticasone (FLONASE) 50 MCG/ACT nasal spray 1 spray by Each Nare route Daily As Needed.  5   •  HYDROcodone-acetaminophen (NORCO)  MG per tablet Take 1 tablet by mouth Every 8 (Eight) Hours As Needed. for pain     • omeprazole (priLOSEC) 20 MG capsule Take 20 mg by mouth Every Morning Before Breakfast.  0   • predniSONE (DELTASONE) 10 MG tablet Take 10 mg by mouth Daily.     • Trelegy Ellipta 100-62.5-25 MCG/INH inhaler Inhale 1 puff Daily.     • diazePAM (VALIUM) 2 MG tablet TAKE 1 TABLET BY MOUTH EVERY 8 HOURS AS NEEDED FOR ANXIETY UP TO 10 DAYS     • [] predniSONE (DELTASONE) 10 MG tablet Take 10 mg by mouth.     • [DISCONTINUED] methylPREDNISolone (MEDROL) 4 MG tablet Take 4 mg by mouth Daily.     • [DISCONTINUED] polyethylene glycol (MiraLax) 17 g packet Take 17 g by mouth Daily.     • [DISCONTINUED] ZOLMitriptan (ZOMIG) 2.5 MG tablet Take 2.5 mg by mouth As Needed.       No facility-administered encounter medications on file as of 2021.     Adult illnesses:  Depression  GERD  Vitamin D deficiency  Chronic fatigue syndrome  Former smoker, quit 4 years ago  Hemorrhoids with history of bright red blood per rectum.  Chronic back pain  Chronic neck pain  Bilateral shoulder and arm radiculopathy, right > left  Degenerative disc disease, C5-C7  Anxiety  Seen in [facility] ED, 03/15/2020 for chest pain.  Evaluation including labs on 2020 revealed elevated d-dimer and positive blood cultures for Propionibacterium.  CT angiogram of the chest showed near complete resolution of the perihilar right lung mass.  Chronic lung changes with bilateral upper lobe infiltrate.  Upper lobe pneumonia is the main concern though some of this may be related to radiation therapy fibrosis.  No pulmonary embolism.      Past surgeries:  Breast surgery for cyst removal  Colonoscopy, 12/3/2018  Hysterectomy  Left subclavian Mediport placement, 2019.  Dr. Gibson  Cataracts bilaterally, 2021    ADULT ILLNESSES:  Patient Active Problem List   Diagnosis Code   • Age-related osteoporosis without current  pathological fracture M81.0   • Anxiety and depression F41.9, F32.9   • Cervical disc disease M50.90   • Hyperlipidemia E78.5   • Lumbar degenerative disc disease M51.36   • Malignant neoplasm of overlapping sites of right lung (CMS/HCC) C34.81   • COPD, group B, by GOLD 2017 classification (CMS/HCC) J44.9   • Iron deficiency anemia D50.9   • Chemotherapy induced neutropenia (CMS/HCC) D70.1, T45.1X5A   • Antineoplastic chemotherapy induced anemia D64.81, T45.1X5A   • Former smoker Z87.891   • Regional lymph node metastasis present (CMS/HCC) C77.9   • 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   • Hypomagnesemia E83.42   • History of radiation therapy Z92.3   • Multiple lung nodules R91.8     SURGERIES:  Past Surgical History:   Procedure Laterality Date   • BREAST SURGERY      Cyst removal   • COLONOSCOPY N/A 12/3/2018    Procedure: COLONOSCOPY WITH ANESTHESIA;  Surgeon: Myah Pool MD;  Location: [facility] ENDOSCOPY;  Service: Gastroenterology   • HYSTERECTOMY     • VENOUS ACCESS DEVICE (PORT) INSERTION N/A 11/22/2019    Procedure: PLACEMENT OF SINGLE LUMEN PORT;  Surgeon: Mona Gibson MD;  Location: [facility] OR;  Service: General     HEALTH MAINTENANCE ITEMS:  Health Maintenance Due   Topic Date Due   • ZOSTER VACCINE (2 of 3) 09/15/2015   • HEPATITIS C SCREENING  Never done   • ANNUAL WELLNESS VISIT  Never done   • Pneumococcal Vaccine 65+ (2 of 4 - PPSV23) 10/19/2018   • COLORECTAL CANCER SCREENING  12/03/2019   • MAMMOGRAM  02/05/2021   • DXA SCAN  02/05/2021       <no information>  Last Completed Colonoscopy     This patient has no relevant Health Maintenance data.        Immunization History   Administered Date(s) Administered   • COVID-19 (MODERNA) 03/01/2021   • FLUAD TRI 65YR+ 09/29/2020   • Fluzone High Dose =>65 Years ([employer] ONLY) 10/25/2018, 10/24/2019   • Pneumococcal Conjugate 13-Valent (PCV13) 08/24/2018   • Tdap 05/05/2015, 10/16/2015   • Zostavax 07/21/2015      Last Completed Mammogram     This patient has no relevant Health Maintenance data.            FAMILY HISTORY:  Family History   Problem Relation Age of Onset   • Colon polyps Mother         < 60 years old   • Lung cancer Mother    • Birth defects Mother    • Heart disease Mother    • No Known Problems Father    • Hypertension Sister    • Arthritis Sister    • Asthma Sister    • Prostate cancer Maternal Grandfather    • Breast cancer Maternal Aunt    • Colon cancer Neg Hx      SOCIAL HISTORY:  Social History     Socioeconomic History   • Marital status:      Spouse name: Not on file   • Number of children: Not on file   • Years of education: Not on file   • Highest education level: Not on file   Tobacco Use   • Smoking status: Former Smoker     Types: Cigarettes   • Smokeless tobacco: Never Used   • Tobacco comment: Quit 4 years ago   Substance and Sexual Activity   • Alcohol use: No   • Drug use: No   • Sexual activity: Defer       REVIEW OF SYSTEMS:  Review of Systems   Constitutional: Positive for fatigue (Chronic. ). Negative for activity change (Manages her personal ADLs and some chores.  Is no longer driving), appetite change (eating well), chills, diaphoresis, fever, unexpected weight gain and unexpected weight loss.        Manages her personal ADLs, light chores, but isn't running errands.  She still lives by herself.  Says spends less than 50% up and about       Eyes: Negative.    Respiratory: Positive for shortness of breath (Baseline exertional dyspnea, modulated with inhalers - Trelegy). Negative for cough.    Cardiovascular: Negative for chest pain (Chest pain/pleurisy. Reccurent since last cycle of durvalumab, improved on prednisone - now on 5 mg/day).   Gastrointestinal: Negative.  Negative for abdominal distention, abdominal pain, blood in stool and diarrhea.   Genitourinary: Positive for hematuria (microscopic.  Has an appointment with [facility]).   Musculoskeletal: Positive for  "arthralgias, back pain (compression fractures.  ) and neck pain.   Allergic/Immunologic: Positive for environmental allergies.   Neurological: Positive for dizziness (postural). Negative for syncope.   Hematological: Negative for adenopathy. Does not bruise/bleed easily.   Psychiatric/Behavioral: Positive for depressed mood. The patient is nervous/anxious.          /78   Pulse 99   Temp 97 °F (36.1 °C)   Resp 16   Ht 167.6 cm (66\")   Wt 83.2 kg (183 lb 6.4 oz)   LMP  (LMP Unknown)   SpO2 96%   Breastfeeding No   BMI 29.60 kg/m²  Body surface area is 1.93 meters squared.  Pain Score    08/12/21 1419   PainSc: 0-No pain       Physical Exam:  Physical Exam   Constitutional: She is oriented to person, place, and time. She appears well-developed and well-nourished. No distress.   Pleasant, cooperative, heavy-set, modestly kept elderly female.  Ambulatory.  ECOG 2 (from 2-3).      Has regained 23 pounds (in addition to 6 pounds at her 2 prior visits).   HENT:   Head: Normocephalic.   Mouth/Throat: No oropharyngeal exudate.   Eyes: Pupils are equal, round, and reactive to light. No scleral icterus.   Neck: No JVD present. No tracheal deviation present. No thyromegaly present.   Cardiovascular: Regular rhythm and normal heart sounds. Exam reveals no friction rub.   No murmur heard.  Pulmonary/Chest: Effort normal. No stridor. No respiratory distress. She has no wheezes (No wheezes today). She has no rales. She exhibits no tenderness.   Barrel shaped chest with diminished breath sounds globally.  Port in the left upper chest is noted.  Is well seated.   Abdominal: Soft. Bowel sounds are normal. She exhibits no distension and no mass. There is no abdominal tenderness. There is no guarding.   Musculoskeletal: Normal range of motion. No tenderness.   Neurological: She is alert and oriented to person, place, and time. No cranial nerve deficit.   Skin: Skin is warm and dry. No erythema. There is pallor. "   Psychiatric: Her behavior is normal. Judgment and thought content normal.   Vitals reviewed.      Assessment:  1.  Squamous cell carcinoma of the right lung            Tumor stage: IIIC (cT4, cN3, M0)            Bone tumor burden: 5.4 x 5.3 x 4.5 cm mass along the right major fissure that abuts the medial mediastinal pleura and partially encases the right upper lobe bronchus and right bronchus intermedius.  Pathologic pretracheal lymph node measures 1.3 cm.  Right supraclavicular metastasis.            Complications of tumor: Chest pain, dyspnea, syncope?            BRAF mutation not detected            KRAS mutation not detected            EGFR mutation not detected.            PD-L1 and ALK: Not reported apparently due to lack of specimen            Tumor status: Definitive radiation concurrent with chemotherapy completed.  Completed only 3 cycles of maintenance durvalumab.            --03/15/2020-CT angiogram of the chest showed near complete resolution of the perihilar right lung mass.  Chronic lung changes with bilateral upper lobe infiltrate.  Upper lobe pneumonia is the main concern though some of this may be related to radiation therapy fibrosis.  No pulmonary embolism.                 --07/26/2021-CT chest.  Progression of metastatic disease with increasing size of multiple nodules.    2.  Normocytic anemia, contribution from anemia of malignancy/anemia of chronic disease and chemo.    --Hgb 13, 06/15/2021 (prior range: 9.8 - 11.3, 10/28/2019)    3.  T6, and T7 fractures, relatively severe at T7.  Pathologic fracture is difficult to entirely exclude on CT chest, 10/14/2020.  4.  Chronic fatigue syndrome  5.  COPD.  Now followed by Dr. Tobar  6.  Former smoker 40 pack years  7.  Depression  8.  Hemorrhoids with history of hematochezia  9.  Chronic neck/back pains with DDD, C5-C7.  Followed by Dr. Grady.  Rx for Norco 5 prior to anterior cervical discectomy and fusion C5 in 2015 - never had surgery  10.  Anxiety.  Cancelled C4 durvalumab scheduled for 05/26/2020  11.  Self-directed.  Was lost to follow-up after her last office appointment on 6/10/2020.    RECOMMENDATIONS:   1. Draw preoffice CBC w diff, CMP, CEA, Covid antibodies  2.  Re: Discussed the labs from 06/15/2020 with normal WBC, resolution of anemia, normal platelets, normal CMP.  Previously elevated CEA (3.71; from 4.12; from 3.38).    3.  Review encounter with Dr. Dudley, 08/02/2021.    --Interval CT scans from 10/14/2020 revealed new fractures at T6 and T7 with relatively severe fracture at T7.  Pathologic fracture is difficult to exclude.  --01/19/ 2021-CT chest.  Mildly increased size of 2 left upper lobe pulmonary nodules.  Similar height loss of T6 and T7 with kyphosis.  --02/05/2021-PET scan at [facility].  Post therapy changes within the right upper lung demonstrated with SUV 2.1.  2 small nodules within the left upper lobe demonstrated interval increase in size from previous CT exam do not demonstrate any significant metabolic activity.  Lesion too small for percutaneous biopsy.  --04/14/2021-CT chest.  Progression of disease with increased size of 4 nodules within the left upper lobe and one nodule within the right lower lobe.  --04/28/2021-appointment with Dr. Cross (covering for Dr. Dudley).  Discussed CT scan in detail.  Options offered: Repeat CT scan versus referral for attempted tissue biopsy versus repeat CT scan in 3 months.  Plan: Follow-up CT in 3 months.  --07/26/2021-CT chest with contrast: Previous noted lung nodules have increased in size.  Left upper lobe nodule 7 mm, previously 3.3 mm.  Another in the left upper lobe 12.5 cm (mm?), previously 9.8 cm (mm?).  Right lower lobe 10 mm nodule previously 7 mm.    4.  Durvalumab tolerance discussed.  Fatigue, pleuritic chest discomfort, diffuse myalgias/arthralgias that she states improved with prednisone taper (10 mg beginning 6/4/2020 and is currently on 5 mg daily).  Wanted to  "discuss the option of \"taking a break\".  We agreed to a trial of giving the drug every 4 weeks instead of every 2 weeks but she unilaterally decided to stop therapy.  5.  Reappoint to pulmonary - Dr. Tobar Re: Needs rebiopsy and please send specimens for molecular studies: EGFR, BRAF and KRAS.  ALK, ROS1, NTRK and PD-L1.   6.   Review NCCN guidelines version 5.2021 non-small cell lung cancer after definitive therapy and metastatic recurrence-establish histologic subtype with adequate tissue for molecular testing (rebiopsy if appropriate).  Integrate palliative care.  If histologic subtype is unchanged (squamous cell carcinoma) consider molecular testing (EGFR, ALK, KRAS, ROS1, BRAF, NTRK 1/2/3, met exon 14, RET, PD-L1 testing    7.  Schedule brain MRI with and without contrast, and PET scan neck to thighs at [facility].   8.  Port flush every 8 weeks - due today  9.  Return to the office in 5 weeks with pre-office serum iron, Fe sat, ferritin, CBC with differential, CMP, and CEA.     MEDICAL DECISION MAKING: High Complexity   AMOUNT OF DATA: Extensive         RISK OF COMPLICATIONS: High         I spent at least 59 total minutes, face-to-face, caring for Damaris today.  Greater than 50% of this time involved counseling and/or coordination of care as documented within this note regarding the patient's illness(es), pros and cons of various treatment options, instructions and/or risk reduction.  "

## 2021-08-12 ENCOUNTER — OFFICE VISIT (OUTPATIENT)
Dept: ONCOLOGY | Facility: CLINIC | Age: 66
End: 2021-08-12

## 2021-08-12 ENCOUNTER — LAB (OUTPATIENT)
Dept: LAB | Facility: HOSPITAL | Age: 66
End: 2021-08-12

## 2021-08-12 VITALS
DIASTOLIC BLOOD PRESSURE: 78 MMHG | HEIGHT: 66 IN | HEART RATE: 99 BPM | SYSTOLIC BLOOD PRESSURE: 142 MMHG | TEMPERATURE: 97 F | OXYGEN SATURATION: 96 % | WEIGHT: 183.4 LBS | BODY MASS INDEX: 29.47 KG/M2 | RESPIRATION RATE: 16 BRPM

## 2021-08-12 DIAGNOSIS — C34.81 MALIGNANT NEOPLASM OF OVERLAPPING SITES OF RIGHT LUNG (HCC): Primary | Chronic | ICD-10-CM

## 2021-08-12 DIAGNOSIS — E78.00 ELEVATED CHOLESTEROL: ICD-10-CM

## 2021-08-12 DIAGNOSIS — C34.81 MALIGNANT NEOPLASM OF OVERLAPPING SITES OF RIGHT LUNG (HCC): ICD-10-CM

## 2021-08-12 PROBLEM — Z45.2 ENCOUNTER FOR CARE RELATED TO VASCULAR ACCESS PORT: Status: ACTIVE | Noted: 2021-08-12

## 2021-08-12 LAB
ALBUMIN SERPL-MCNC: 5.1 G/DL (ref 3.5–5.2)
ALBUMIN/GLOB SERPL: 2.3 G/DL
ALP SERPL-CCNC: 55 U/L (ref 39–117)
ALT SERPL W P-5'-P-CCNC: 18 U/L (ref 1–33)
ANION GAP SERPL CALCULATED.3IONS-SCNC: 10 MMOL/L (ref 5–15)
AST SERPL-CCNC: 18 U/L (ref 1–32)
BASOPHILS # BLD AUTO: 0.03 10*3/MM3 (ref 0–0.2)
BASOPHILS NFR BLD AUTO: 0.3 % (ref 0–1.5)
BILIRUB SERPL-MCNC: 0.3 MG/DL (ref 0–1.2)
BUN SERPL-MCNC: 14 MG/DL (ref 8–23)
BUN/CREAT SERPL: 16.3 (ref 7–25)
CALCIUM SPEC-SCNC: 10.3 MG/DL (ref 8.6–10.5)
CHLORIDE SERPL-SCNC: 103 MMOL/L (ref 98–107)
CO2 SERPL-SCNC: 26 MMOL/L (ref 22–29)
CREAT SERPL-MCNC: 0.86 MG/DL (ref 0.57–1)
DEPRECATED RDW RBC AUTO: 46.3 FL (ref 37–54)
EOSINOPHIL # BLD AUTO: 0.04 10*3/MM3 (ref 0–0.4)
EOSINOPHIL NFR BLD AUTO: 0.4 % (ref 0.3–6.2)
ERYTHROCYTE [DISTWIDTH] IN BLOOD BY AUTOMATED COUNT: 13 % (ref 12.3–15.4)
GFR SERPL CREATININE-BSD FRML MDRD: 66 ML/MIN/1.73
GLOBULIN UR ELPH-MCNC: 2.2 GM/DL
GLUCOSE SERPL-MCNC: 119 MG/DL (ref 65–99)
HCT VFR BLD AUTO: 43.6 % (ref 34–46.6)
HGB BLD-MCNC: 13.5 G/DL (ref 12–15.9)
IMM GRANULOCYTES # BLD AUTO: 0.03 10*3/MM3 (ref 0–0.05)
IMM GRANULOCYTES NFR BLD AUTO: 0.3 % (ref 0–0.5)
LYMPHOCYTES # BLD AUTO: 0.62 10*3/MM3 (ref 0.7–3.1)
LYMPHOCYTES NFR BLD AUTO: 5.9 % (ref 19.6–45.3)
MCH RBC QN AUTO: 30.3 PG (ref 26.6–33)
MCHC RBC AUTO-ENTMCNC: 31 G/DL (ref 31.5–35.7)
MCV RBC AUTO: 98 FL (ref 79–97)
MONOCYTES # BLD AUTO: 0.47 10*3/MM3 (ref 0.1–0.9)
MONOCYTES NFR BLD AUTO: 4.5 % (ref 5–12)
NEUTROPHILS NFR BLD AUTO: 88.6 % (ref 42.7–76)
NEUTROPHILS NFR BLD AUTO: 9.32 10*3/MM3 (ref 1.7–7)
NRBC BLD AUTO-RTO: 0 /100 WBC (ref 0–0.2)
PLATELET # BLD AUTO: 261 10*3/MM3 (ref 140–450)
PMV BLD AUTO: 9.4 FL (ref 6–12)
POTASSIUM SERPL-SCNC: 4.8 MMOL/L (ref 3.5–5.2)
PROT SERPL-MCNC: 7.3 G/DL (ref 6–8.5)
RBC # BLD AUTO: 4.45 10*6/MM3 (ref 3.77–5.28)
SODIUM SERPL-SCNC: 139 MMOL/L (ref 136–145)
WBC # BLD AUTO: 10.51 10*3/MM3 (ref 3.4–10.8)

## 2021-08-12 PROCEDURE — 85025 COMPLETE CBC W/AUTO DIFF WBC: CPT

## 2021-08-12 PROCEDURE — 86769 SARS-COV-2 COVID-19 ANTIBODY: CPT

## 2021-08-12 PROCEDURE — 99215 OFFICE O/P EST HI 40 MIN: CPT | Performed by: INTERNAL MEDICINE

## 2021-08-12 PROCEDURE — 80053 COMPREHEN METABOLIC PANEL: CPT

## 2021-08-12 PROCEDURE — 82378 CARCINOEMBRYONIC ANTIGEN: CPT

## 2021-08-12 PROCEDURE — 36415 COLL VENOUS BLD VENIPUNCTURE: CPT

## 2021-08-12 RX ORDER — PREDNISONE 10 MG/1
15 TABLET ORAL DAILY
COMMUNITY

## 2021-08-12 RX ORDER — ATORVASTATIN CALCIUM 20 MG/1
20 TABLET, FILM COATED ORAL DAILY
Qty: 90 TABLET | Refills: 1 | Status: SHIPPED | OUTPATIENT
Start: 2021-08-12 | End: 2022-02-23

## 2021-08-13 LAB
CEA SERPL-MCNC: 5.87 NG/ML
SARS-COV-2 AB SERPL QL IA: NEGATIVE

## 2021-08-18 NOTE — PROGRESS NOTES
"Background:  Pt with hx lung cancer dx 2019 by radial ebus, radiation pneumonitis, severe copd on pft in [place].   Chief Complaint  Multiple lung nodules (needing biopsy per pt ) and Stage 3 severe COPD by GOLD classification    Subjective    History of Present Illness       Damaris Hu presents to [facility].  She had her third vaccine for coronavirus. She is about to need more chemotherapy.  No resp complaints.  She is concerned about the nodules with growth.  Dr. Russell is concerned for getting a biopsy.     Objective     Vital Signs:   /88   Pulse 70   Ht 167.6 cm (66\")   Wt 82.8 kg (182 lb 9.6 oz)   SpO2 96%   BMI 29.47 kg/m²   Physical Exam  Constitutional:       General: She is not in acute distress.     Appearance: She is well-developed. She is not ill-appearing or toxic-appearing.   HENT:      Head: Atraumatic.   Eyes:      General: No scleral icterus.     Conjunctiva/sclera: Conjunctivae normal.   Cardiovascular:      Rate and Rhythm: Normal rate and regular rhythm.      Heart sounds: S1 normal and S2 normal.   Pulmonary:      Effort: Pulmonary effort is normal.      Breath sounds: Normal breath sounds.   Abdominal:      General: There is no distension.   Musculoskeletal:         General: No deformity.      Cervical back: Neck supple.   Skin:     Coloration: Skin is not pale.      Findings: No rash.   Neurological:      Mental Status: She is alert.        Result Review  Data Reviewed:{ Labs  Result Review  Imaging  Media :23}     CT Chest With Contrast Diagnostic (07/26/2021 14:47) My interpretation of radiograph: multiple lung nodules bilat L>R               Assessment and Plan  {CC Problem List  Visit Diagnosis  ROS  Review (Popup)  Health Maintenance  Quality  BestPractice  Medications  SmartSets  SnapShot Encounters  Media :23}   Problem List Items Addressed This Visit        Pulmonary Problems    Malignant neoplasm of " overlapping sites of right lung (CMS/HCC) (Chronic)       Other    History of radiation therapy      Other Visit Diagnoses     Stage 3 severe COPD by GOLD classification (CMS/HCC)    -  Primary      she is stable from respiratory standpoint.  I don't think I can reach any of the areas of concern with the [employer] navigational system.  Robotic bronchoscopy might have a better yield.  Will discuss with IP and refer for robotic if that looks feasible.  Continue trelegy    Follow Up {Instructions Charge Capture  Follow-up Communications :23}   Return in about 4 months (around 12/19/2021).  Patient was given instructions and counseling regarding her condition or for health maintenance advice. Please see specific information pulled into the AVS if appropriate.    Electronically signed by Edmond Tobar MD, 8/19/2021, 14:52 CDT

## 2021-08-19 ENCOUNTER — OFFICE VISIT (OUTPATIENT)
Dept: PULMONOLOGY | Facility: CLINIC | Age: 66
End: 2021-08-19

## 2021-08-19 VITALS
HEART RATE: 70 BPM | HEIGHT: 66 IN | DIASTOLIC BLOOD PRESSURE: 88 MMHG | BODY MASS INDEX: 29.35 KG/M2 | OXYGEN SATURATION: 96 % | SYSTOLIC BLOOD PRESSURE: 146 MMHG | WEIGHT: 182.6 LBS

## 2021-08-19 DIAGNOSIS — Z92.3 HISTORY OF RADIATION THERAPY: ICD-10-CM

## 2021-08-19 DIAGNOSIS — J44.9 STAGE 3 SEVERE COPD BY GOLD CLASSIFICATION (HCC): Primary | ICD-10-CM

## 2021-08-19 DIAGNOSIS — C34.81 MALIGNANT NEOPLASM OF OVERLAPPING SITES OF RIGHT LUNG (HCC): ICD-10-CM

## 2021-08-19 PROCEDURE — 99214 OFFICE O/P EST MOD 30 MIN: CPT | Performed by: INTERNAL MEDICINE

## 2021-08-22 ENCOUNTER — DOCUMENTATION (OUTPATIENT)
Dept: PULMONOLOGY | Facility: CLINIC | Age: 66
End: 2021-08-22

## 2021-08-22 DIAGNOSIS — R91.8 MULTIPLE LUNG NODULES: Primary | ICD-10-CM

## 2021-08-24 ENCOUNTER — HOSPITAL ENCOUNTER (OUTPATIENT)
Dept: NUCLEAR MEDICINE | Age: 66
Discharge: HOME | End: 2021-08-26
Payer: MEDICARE

## 2021-08-24 ENCOUNTER — HOSPITAL ENCOUNTER (OUTPATIENT)
Dept: MRI IMAGING | Age: 66
Discharge: HOME | End: 2021-08-24
Payer: MEDICARE

## 2021-08-24 DIAGNOSIS — C34.81 MALIGNANT NEOPLASM OF OVERLAPPING SITES OF RIGHT LUNG (HCC): ICD-10-CM

## 2021-08-24 LAB
GFR AFRICAN AMERICAN: >60
GFR NON-AFRICAN AMERICAN: 55
GLUCOSE BLD-MCNC: 95 MG/DL (ref 70–99)
PERFORMED ON: ABNORMAL
PERFORMED ON: NORMAL
POC CREATININE: 1 MG/DL (ref 0.3–1.3)
POC SAMPLE TYPE: ABNORMAL

## 2021-08-24 PROCEDURE — 70553 MRI BRAIN STEM W/O & W/DYE: CPT

## 2021-08-24 PROCEDURE — A9552 F18 FDG: HCPCS | Performed by: INTERNAL MEDICINE

## 2021-08-24 PROCEDURE — 82565 ASSAY OF CREATININE: CPT

## 2021-08-24 PROCEDURE — 82947 ASSAY GLUCOSE BLOOD QUANT: CPT

## 2021-08-24 PROCEDURE — 6360000004 HC RX CONTRAST MEDICATION: Performed by: INTERNAL MEDICINE

## 2021-08-24 PROCEDURE — A9577 INJ MULTIHANCE: HCPCS | Performed by: INTERNAL MEDICINE

## 2021-08-24 PROCEDURE — 78815 PET IMAGE W/CT SKULL-THIGH: CPT

## 2021-08-24 PROCEDURE — 3430000000 HC RX DIAGNOSTIC RADIOPHARMACEUTICAL: Performed by: INTERNAL MEDICINE

## 2021-08-24 RX ORDER — FLUDEOXYGLUCOSE F 18 200 MCI/ML
10 INJECTION, SOLUTION INTRAVENOUS
Status: COMPLETED | OUTPATIENT
Start: 2021-08-24 | End: 2021-08-24

## 2021-08-24 RX ADMIN — GADOBENATE DIMEGLUMINE 15 ML: 529 INJECTION, SOLUTION INTRAVENOUS at 13:21

## 2021-08-24 RX ADMIN — FLUDEOXYGLUCOSE F 18 10 MILLICURIE: 200 INJECTION, SOLUTION INTRAVENOUS at 15:56

## 2021-08-27 RX ORDER — FLUTICASONE FUROATE, UMECLIDINIUM BROMIDE AND VILANTEROL TRIFENATATE 100; 62.5; 25 UG/1; UG/1; UG/1
POWDER RESPIRATORY (INHALATION)
Qty: 60 EACH | Refills: 0 | Status: SHIPPED | OUTPATIENT
Start: 2021-08-27 | End: 2021-10-06

## 2021-08-30 ENCOUNTER — TELEPHONE (OUTPATIENT)
Dept: ONCOLOGY | Facility: CLINIC | Age: 66
End: 2021-08-30

## 2021-08-31 ENCOUNTER — APPOINTMENT (OUTPATIENT)
Dept: MRI IMAGING | Facility: HOSPITAL | Age: 66
End: 2021-08-31

## 2021-08-31 ENCOUNTER — APPOINTMENT (OUTPATIENT)
Dept: CT IMAGING | Facility: HOSPITAL | Age: 66
End: 2021-08-31

## 2021-08-31 ENCOUNTER — HOSPITAL ENCOUNTER (OUTPATIENT)
Dept: CT IMAGING | Facility: HOSPITAL | Age: 66
End: 2021-08-31

## 2021-09-01 ENCOUNTER — TELEPHONE (OUTPATIENT)
Dept: ONCOLOGY | Facility: CLINIC | Age: 66
End: 2021-09-01

## 2021-09-02 ENCOUNTER — TELEPHONE (OUTPATIENT)
Dept: ONCOLOGY | Facility: CLINIC | Age: 66
End: 2021-09-02

## 2021-09-02 DIAGNOSIS — C34.81 MALIGNANT NEOPLASM OF OVERLAPPING SITES OF RIGHT LUNG (HCC): Primary | ICD-10-CM

## 2021-09-02 NOTE — TELEPHONE ENCOUNTER
Call from dr flores who performed batsheva bronch but retrieved only non-diagnostic biopsies. Then discussed w dr kirkland and we agreed to order PFTs and refer pt to CT surgery for possible wedge resection/biopsy of any accessible peripheral lung nodule.

## 2021-09-02 NOTE — TELEPHONE ENCOUNTER
Called and spoke with patient Damaris Hu regarding request for melanie PFT and referral to CT for possible wedge biopsy of pulmonary nodule.     Pt is unsure she will be able to perform the PFT due to compression fractures   T6  T7  She says the pain is excruciating and especially when she takes a deep breath, can you advise?

## 2021-09-02 NOTE — TELEPHONE ENCOUNTER
----- Message from Kade Russell MD sent at 9/2/2021  1:23 PM CDT -----  Findings discussed with Dr. Lebron and Dr. Tobar.  Please order pulmonary function tests and refer patient to CT surgery in assessment for possible wedge biopsy of any accessible pulmonary nodule.  Thank you

## 2021-09-04 NOTE — PROGRESS NOTES
[facility] ON[facility] HEMATOLOGY AND ONCOLOGY  [address]  [place] [address] [phone]    Patient Name: Damaris Hu  Encounter Date: 09/08/2021  YOB: 1955  Patient Number: [id number]       REASON FOR VISIT: Damaris Hu is a 65-year-old female who returns in follow-up of stage IIIC (cT4, cN3) squamous cell lung carcinoma.  She received definitive radiation to the right lung (12/05/2019 through 01/27/2020 -6600 cGy in 33 fractions) concurrent with weekly chemotherapy, week 6 on 01/27/2020.  She received maintenance durvalumab, cycle 3 given on 05/12/2020 but then was lost to follow-up. She returned with shortness of breath, back pain and lightheadedness.  CT of the chest, 07/26/2021 revealed previous noted lung nodules increased in size.  She is here with her daughter, Pepper (previously with her sister Ashley).    Pertinent interval history:  Phone discussion with Dr. Hopson, 09/01/2021.  Navigational bronchoscopy/EBUS on 08/30/2021 retrieved only nondiagnostic biopsies.  Discussed on the same day with Dr. Tobar.  We agreed to order PFTs and refer to CT surgery for possible wedge resection/biopsy of lung nodule.    I have reviewed the HPI and verified with the patient the accuracy of it. No changes to interval history since the information was documented. Kade Russell MD 09/08/21     DIAGNOSTIC ABNORMALITIES:          1.   10/28/2019 patient presented to the emergency room with chest pain that has been ongoing for the past year but that recently had gotten more noticeable, more frequent, and has been associated with shortness of breath.  On the day prior to her hospital admission she apparently had a syncopal episode.  An evaluation ensued:  Labs: Glucose 112 otherwise CMP was normal with a BUN of 14 creatinine 0.91 (GFR 62) calcium 10.2, total protein 7.1 and normal liver enzymes.  Troponin T was less than 0.010, proBNP normal  at 97.4, d-dimer was normal at 0.27, sed rate 10, CRP 2.02 (slightly elevated), hemoglobin 11.3, hematocrit 35.6, MCV 92.5, platelets 306,000, WBC 7.37 with 76 point 1 segs 15.2 lymphs.          2.   10/28/2019 - head without contrast.  Impression: No acute intracranial disease.          3.   10/28/2019-CT chest with contrast.  Impression: Primary lung mass centered in the right upper lobe and abuts the posterior mediastinal pleura (5.4 x 5.3 x 4.5 cm).  This also partially encases the right bronchus intermedius and right upper lobe bronchus.  This is consistent with pulmonary malignancy.  Surgical sampling recommended with bronchoscopy.  Suspected metastatic lymph node in the pretracheal region measuring 1.3 cm in short axis.          4.   11/04/2019 - was seen by Dr. Delonte Burns, [facility] for further assessment of the lung mass.  Spirometry on that date revealed severe COPD with positive bronchodilator response.  Postbronchodilator FEV1 revealed significant improvement to moderate/severe COPD.  Plan: Navigational bronchoscopy and possible EBUS for tissue diagnosis.          5.   11/05/2019- PET scan, skull vertex to mid thighs.  Impression: Markedly hypermetabolic right perihilar tumor (49 x 37 mm) with right supraclavicular and mediastinal kyler metastasis.          6.   11/07/2019- CT chest without contrast at [facility].  Comparison, PET CT 11/5/2019.  Impression: Large, spiculated right infrahilar mass, crossing the major fissure to involve the right upper and right lower lobes with direct extension into the subcarinal space.  Mildly enlarged mediastinal lymph nodes and normal-sized right supraclavicular lymph node corresponding to the area of PET positivity.          7.   11/07/2019-bronchoscopy and EBUS guided FNA biopsy of right upper lobe lung: Positive for malignant cells consistent with squamous cell carcinoma.  EBUS guided FNA, station 7: Lymph node  elements present.  No malignant cells identified.  EBUS guided FNA, station 4R: Positive for malignant cells consistent with squamous cell carcinoma.  Immunohistochemical stains performed on cell block #3.  Tumor cells positive for squamous squamous markers p63 and CK 5/6, negative for CK7, TTF-1, and CD 56.  Immunoprofile supports the above diagnosis.  Addendum: Subsequent biopsy showed metastatic disease in a supraclavicular lymph node, finding consistent with advanced stage disease.  Per protocol will proceed with tumor genomic testing on this specimen.  Due to the relatively small amount of tumor cellularity available, the entire panel of testing may not be able to be completed.  BRAF mutation not detected, K-mayco mutation not detected, EGFR mutation not detected.          8.   11/07/2019-navigational bronchoscopy and forceps biopsy of posterior right upper lobe lung mass.  Diagnosis: Fragments of bronchial mucosa.  No evidence of granuloma or malignancy.          9.   11/08/2019- ultrasound-guided biopsy right supraclavicular lymph node (fine-needle aspirate).  Cytopathology diagnosis: Positive for malignant cells consistent with previously established diagnosis of metastatic non-small cell carcinoma.  Comment: Insufficient tumor cellularity for further testing on the specimen.         10.  11/15/2019-- labs, 11/15/2019 with stable anemia otherwise normal CBC with differential, slightly depressed ferritin (65), iron saturation 7%, serum iron 19, TIBC 289.  Repleted B12 and folate.  Received Injectafer.  CMP, CEA, serum iron, iron saturation, ferritin, B12, folate.  Slightly elevated CEA (3.38).         11.   11/21/2019- brain MRI at [facility].  White matter changes.  Empty sella syndrome.  No metastatic lesions.      PREVIOUS INTERVENTIONS:          1.   11/19/2019-Injectafer 750 mg IV x1          2.   Definitive course of radiation to the right lung (concurrent with chemotherapy)-radiation initiated 12/5/2019  through 01/27/2020 - 6600 cGy at 180 cGy/day/33 fractions          3.   Weekly concurrent chemotherapy with carboplatin and Taxol beginning 12/09/2019 through 01/27/2020 (6 weeks)          4.   Maintenance Durvalumab beginning 02/17/2020 through 05/12/2020- C3    LABS    Lab Results - Last 18 Months   Lab Units 08/12/21  1538 06/15/21  1252 11/19/20  1535 05/07/20  1332 03/14/20  2359   HEMOGLOBIN g/dL 13.5 13.0 12.5 12.2 9.8*   HEMATOCRIT % 43.6 40.9 40.7 38.8 29.9*   MCV fL 98.0* 97.4 95.1 93.3 95.5   WBC 10*3/mm3 10.51 9.9 6.7 8.93 8.35   RDW % 13.0 13.6 13.0 16.1* 15.9*   MPV fL 9.4 9.3* 10.0 9.1 10.4   PLATELETS 10*3/mm3 261 234 261 262 235   IMM GRAN % % 0.3  --   --  1.0* 0.4   NEUTROS ABS 10*3/mm3 9.32* 8.3* 4.9 6.78 6.00   LYMPHS ABS 10*3/mm3 0.62* 0.5* 0.7* 0.70 0.59*   MONOS ABS 10*3/mm3 0.47 0.80 0.70 1.20* 1.35*   EOS ABS 10*3/mm3 0.04 0.20 0.40 0.12 0.36   BASOS ABS 10*3/mm3 0.03 0.00 0.10 0.04 0.02   IMMATURE GRANS (ABS) 10*3/mm3 0.03 0.0 0.0 0.09* 0.03   NRBC /100 WBC 0.0  --   --  0.0 0.0       Lab Results - Last 18 Months   Lab Units 08/24/21  1302 08/12/21  1538 07/26/21  1434 06/15/21  1252 04/14/21  1521 01/19/21  1445 11/19/20  1535 10/14/20  1341 08/18/20  1349 06/16/20  1312 05/07/20  1332 03/15/20  0029 03/14/20  2359   GLUCOSE mg/dL  --  119*  --  97  --   --  92  --   --   --  94  --  112*   SODIUM mmol/L  --  139  --  140  --   --  137  --   --   --  140  --  138   POTASSIUM mmol/L  --  4.8  --  4.2  --   --  4.1  --   --   --  4.1  --  3.8   TOTAL CO2 mmol/L  --   --   --  28  --   --  25  --   --   --   --   --   --    CO2 mmol/L  --  26.0  --   --   --   --   --   --   --   --  24.0  --  21.0*   CHLORIDE mmol/L  --  103  --  103  --   --  103  --   --   --  101  --  102   ANION GAP mmol/L  --  10.0  --  9  --   --  9  --   --   --  15.0  --  15.0   CREATININE mg/dL  --  0.86 1.00 0.8 1.10 1.10 0.8   < >  --    < > 0.81   < > 0.67   BUN mg/dL  --  14  --  14  --   --  14  --   --   --   14  --  13   BUN / CREAT RATIO   --  16.3  --   --   --   --   --   --   --   --  17.3  --  19.4   CALCIUM mg/dL  --  10.3  --  9.9  --   --  10.1  --   --   --  9.7  --  9.7   EGFR IF NONAFRICN AM  55* 66  --  >60  --   --  >60  --  55*  --  71   < > 88   ALK PHOS U/L  --  55  --  51  --   --  65  --   --   --  56  --  61   TOTAL PROTEIN g/dL  --  7.3  --  6.9  --   --  7.1  --   --   --  6.6  --  7.3   ALT (SGPT) U/L  --  18  --  12  --   --  15  --   --   --  14  --  8   AST (SGOT) U/L  --  18  --  14  --   --  17  --   --   --  13  --  12   BILIRUBIN mg/dL  --  0.3  --  0.4  --   --  0.3  --   --   --  0.2  --  0.2   ALBUMIN g/dL  --  5.10  --  4.4  --   --  4.6  --   --   --  4.00  --  4.10   GLOBULIN gm/dL  --  2.2  --   --   --   --   --   --   --   --  2.6  --  3.2    < > = values in this interval not displayed.       Lab Results - Last 18 Months   Lab Units 08/12/21  1538 05/07/20  1332   CEA ng/mL 5.87 5.81       Lab Results - Last 18 Months   Lab Units 05/07/20  1332   IRON mcg/dL 41   TIBC mcg/dL 297*   IRON SATURATION % 14*   FERRITIN ng/mL 284.60*   FOLATE ng/mL 16.90         PAST MEDICAL HISTORY:  ALLERGIES:  Allergies   Allergen Reactions   • Amoxicillin GI Intolerance     CURRENT MEDICATIONS:  Outpatient Encounter Medications as of 9/8/2021   Medication Sig Dispense Refill   • albuterol sulfate  (90 Base) MCG/ACT inhaler INHALE 2 PUFFS BY MOUTH 4 TIMES DAILY AS NEEDED FOR WHEEZING     • atorvastatin (LIPITOR) 20 MG tablet Take 20 mg by mouth Daily.     • Calcium Carbonate-Vit D-Min (GNP Calcium 1200) 6884-4369 MG-UNIT chewable tablet Chew 1 tablet/day Daily.     • cetirizine (zyrTEC) 10 MG tablet Take 10 mg by mouth Daily.     • citalopram (CeleXA) 20 MG tablet Take 20 mg by mouth Daily.     • diazePAM (VALIUM) 2 MG tablet TAKE 1 TABLET BY MOUTH EVERY 8 HOURS AS NEEDED FOR ANXIETY UP TO 10 DAYS     • fluticasone (FLONASE) 50 MCG/ACT nasal spray 1 spray by Each Nare route Daily As Needed.  5    • HYDROcodone-acetaminophen (NORCO)  MG per tablet Take 1 tablet by mouth Every 8 (Eight) Hours As Needed. for pain     • omeprazole (priLOSEC) 20 MG capsule Take 20 mg by mouth Every Morning Before Breakfast.  0   • predniSONE (DELTASONE) 10 MG tablet Take 10 mg by mouth Daily.     • Trelegy Ellipta 100-62.5-25 MCG/INH inhaler Inhale 1 puff Daily.       No facility-administered encounter medications on file as of 9/8/2021.     Adult illnesses:  Depression  GERD  Vitamin D deficiency  Chronic fatigue syndrome  Former smoker, quit 4 years ago  Hemorrhoids with history of bright red blood per rectum.  Chronic back pain  Chronic neck pain  Bilateral shoulder and arm radiculopathy, right > left  Degenerative disc disease, C5-C7  Anxiety  Seen in [facility] ED, 03/15/2020 for chest pain.  Evaluation including labs on 03/14/2020 revealed elevated d-dimer and positive blood cultures for Propionibacterium.  CT angiogram of the chest showed near complete resolution of the perihilar right lung mass.  Chronic lung changes with bilateral upper lobe infiltrate.  Upper lobe pneumonia is the main concern though some of this may be related to radiation therapy fibrosis.  No pulmonary embolism.      Past surgeries:  Breast surgery for cyst removal  Colonoscopy, 12/3/2018  Hysterectomy  Left subclavian Mediport placement, 11/22/2019.  Dr. Gibson  Cataracts bilaterally, 04/2021    ADULT ILLNESSES:  Patient Active Problem List   Diagnosis Code   • Age-related osteoporosis without current pathological fracture M81.0   • Anxiety and depression F41.9, F32.9   • Cervical disc disease M50.90   • Hyperlipidemia E78.5   • Lumbar degenerative disc disease M51.36   • Malignant neoplasm of overlapping sites of right lung (CMS/HCC) C34.81   • COPD, group B, by GOLD 2017 classification (CMS/HCC) J44.9   • Iron deficiency anemia D50.9   • Chemotherapy induced neutropenia (CMS/HCC) D70.1, T45.1X5A   • Antineoplastic chemotherapy induced anemia  D64.81, T45.1X5A   • Former smoker Z87.891   • Regional lymph node metastasis present (CMS/HCC) C77.9   • 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   • Hypomagnesemia E83.42   • History of radiation therapy Z92.3   • Multiple lung nodules R91.8   • Encounter for care related to vascular access port Z45.2     SURGERIES:  Past Surgical History:   Procedure Laterality Date   • BREAST SURGERY      Cyst removal   • COLONOSCOPY N/A 12/3/2018    Procedure: COLONOSCOPY WITH ANESTHESIA;  Surgeon: Myah Pool MD;  Location: [facility] ENDOSCOPY;  Service: Gastroenterology   • HYSTERECTOMY     • LUNG BIOPSY  10/2019    [facility]   • VENOUS ACCESS DEVICE (PORT) INSERTION N/A 11/22/2019    Procedure: PLACEMENT OF SINGLE LUMEN PORT;  Surgeon: Mona Gibson MD;  Location: [facility] OR;  Service: General     HEALTH MAINTENANCE ITEMS:  Health Maintenance Due   Topic Date Due   • ZOSTER VACCINE (2 of 3) 09/15/2015   • HEPATITIS C SCREENING  Never done   • ANNUAL WELLNESS VISIT  Never done   • Pneumococcal Vaccine 65+ (2 of 4 - PPSV23) 10/19/2018   • COLORECTAL CANCER SCREENING  12/03/2019   • MAMMOGRAM  02/05/2021   • DXA SCAN  02/05/2021       <no information>  Last Completed Colonoscopy     This patient has no relevant Health Maintenance data.        Immunization History   Administered Date(s) Administered   • COVID-19 (MODERNA) 03/01/2021, 03/29/2021   • FLUAD TRI 65YR+ 09/29/2020   • Fluzone High Dose =>65 Years ([employer] ONLY) 10/25/2018, 10/24/2019   • Pneumococcal Conjugate 13-Valent (PCV13) 08/24/2018   • Tdap 05/05/2015, 10/16/2015   • Zostavax 07/21/2015     Last Completed Mammogram     This patient has no relevant Health Maintenance data.            FAMILY HISTORY:  Family History   Problem Relation Age of Onset   • Colon polyps Mother         < 60 years old   • Lung cancer Mother    • Birth defects Mother    • Heart disease Mother    • No Known Problems Father    • Hypertension Sister     • Arthritis Sister    • Asthma Sister    • Prostate cancer Maternal Grandfather    • Breast cancer Maternal Aunt    • Hypertension Sister    • Hypertension Sister    • Colon cancer Neg Hx      SOCIAL HISTORY:  Social History     Socioeconomic History   • Marital status:      Spouse name: Not on file   • Number of children: Not on file   • Years of education: Not on file   • Highest education level: Not on file   Tobacco Use   • Smoking status: Former Smoker     Packs/day: 1.00     Years: 30.00     Pack years: 30.00     Types: Cigarettes     Start date: 1973     Quit date: 2015     Years since quittin.2   • Smokeless tobacco: Never Used   • Tobacco comment: Quit 4 years ago   Substance and Sexual Activity   • Alcohol use: No   • Drug use: No   • Sexual activity: Not Currently     Birth control/protection: None       REVIEW OF SYSTEMS:  Review of Systems   Constitutional: Positive for fatigue (Chronic. ). Negative for activity change (Manages her personal ADLs and some chores.  Is no longer driving), appetite change (still eating well), chills, diaphoresis, fever, unexpected weight gain and unexpected weight loss.        Manages her personal ADLs, light chores, but isn't running errands.  She still lives by herself.  Says she still spends < 50% up and about    Has received Covid vaccines x 3   Eyes: Negative.    Respiratory: Positive for shortness of breath (Baseline exertional dyspnea, modulated with inhalers - Trelegy). Negative for cough.    Cardiovascular: Negative for chest pain (Chest pain/pleurisy. Recurrent since last cycle of durvalumab, improved on prednisone - now on 10 mg/day).   Gastrointestinal: Negative.  Negative for abdominal distention, abdominal pain, blood in stool and diarrhea.   Genitourinary: Positive for hematuria (microscopic.  Followed by [facility]).   Musculoskeletal: Positive for arthralgias, back pain (compression fractures mid-back) and neck pain.  "  Allergic/Immunologic: Positive for environmental allergies.   Neurological: Positive for dizziness (postural). Negative for syncope.   Hematological: Negative for adenopathy. Does not bruise/bleed easily.   Psychiatric/Behavioral: Positive for depressed mood. The patient is nervous/anxious.        /82   Pulse 93   Temp 97.6 °F (36.4 °C)   Resp 16   Ht 167.6 cm (66\")   Wt 82.3 kg (181 lb 8 oz)   LMP  (LMP Unknown)   SpO2 96%   Breastfeeding No   BMI 29.29 kg/m²  Body surface area is 1.92 meters squared.  Pain Score    09/08/21 1353   PainSc:   4   PainLoc: Back       Physical Exam:  Physical Exam   Constitutional: She is oriented to person, place, and time. She appears well-developed and well-nourished. No distress.   Pleasant, cooperative, heavy-set, modestly kept elderly female.  Ambulatory.  ECOG 2 -3 (from 2).      Has lost 2 pounds (had gained 29 pounds at her 3 prior visits).   HENT:   Head: Normocephalic.   Mouth/Throat: No oropharyngeal exudate.   Eyes: Pupils are equal, round, and reactive to light. No scleral icterus.   Neck: No JVD present. No tracheal deviation present. No thyromegaly present.   Cardiovascular: Regular rhythm and normal heart sounds. Exam reveals no friction rub.   No murmur heard.  Pulmonary/Chest: Effort normal. No stridor. No respiratory distress. She has no wheezes (No wheezes today). She has no rales. She exhibits no tenderness.   Barrel shaped chest with diminished breath sounds globally.  Port in the left upper chest is noted.  Is well seated.   Abdominal: Soft. Bowel sounds are normal. She exhibits no distension and no mass. There is no abdominal tenderness. There is no guarding.   Musculoskeletal: Normal range of motion. No tenderness.   Neurological: She is alert and oriented to person, place, and time. No cranial nerve deficit.   Skin: Skin is warm and dry. No erythema. There is pallor.   Psychiatric: Her behavior is normal. Judgment and thought content normal. "   Vitals reviewed.      Assessment:  1.  Squamous cell carcinoma of the right lung            Tumor stage: IIIC (cT4, cN3, M0)            Bone tumor burden: 5.4 x 5.3 x 4.5 cm mass along the right major fissure that abuts the medial mediastinal pleura and partially encases the right upper lobe bronchus and right bronchus intermedius.  Pathologic pretracheal lymph node measures 1.3 cm.  Right supraclavicular metastasis.            Complications of tumor: Chest pain, dyspnea, syncope?            BRAF mutation not detected            KRAS mutation not detected            EGFR mutation not detected.            PD-L1 and ALK: Not reported apparently due to lack of specimen            Tumor status: Definitive radiation concurrent with chemotherapy completed.  Completed only 3 cycles of maintenance durvalumab.            --03/15/2020-CT angiogram of the chest showed near complete resolution of the perihilar right lung mass.  Chronic lung changes with bilateral upper lobe infiltrate.  Upper lobe pneumonia is the main concern though some of this may be related to radiation therapy fibrosis.  No pulmonary embolism.            --07/26/2021-CT chest.  Progression of metastatic disease with increasing size of multiple nodules.           --08/24/2021-  MRI brain.  No mets           --08/24/2021-PET scan-increasing size scattered bilateral pulmonary nodules compared to PET/CT of 2/5/2021.  Large left upper lobe pulmonary nodule measures 11 mm with SUV 6.34.  Additional nodules measure less than 1 cm with borderline or absent uptake.  Findings worrisome for intrathoracic recurrence with pulmonary metastasis.           --08/30/2021-navigational biopsy/EBUS: Left upper lobe transbronchial biopsy-negative for malignancy.    2.  Normocytic anemia, contribution from anemia of malignancy/anemia of chronic disease and chemo.    --Hgb 13.5, 08/12/2021 (prior range: 9.8 - 11.3, 10/28/2019)    3.  T6, and T7 fractures, relatively severe at  T7.  Pathologic fracture is difficult to entirely exclude on CT chest, 10/14/2020.  4.  Chronic fatigue syndrome  5.  COPD.  Now followed by Dr. Tobar  6.  Former smoker 40 pack years  7.  Depression  8.  Hemorrhoids with history of hematochezia  9.  Chronic neck/back pains with DDD, C5-C7.  Followed by Dr. Grady.  Rx for Norco 5 prior to anterior cervical discectomy and fusion C5 in 2015 - never had surgery  10. Anxiety.  Cancelled C4 durvalumab scheduled for 05/26/2020  11.  Self-directed.  Was lost to follow-up after her last office appointment on 6/10/2020.    RECOMMENDATIONS:   1.    Re: Discussed the labs from 08/12/2020 with normal WBC, resolution of anemia, normal platelets, normal CMP.  CEA 5.87 (from 3.71; 4.12; 3.38).    2.   Apprised of brain MRI and PET scan, 08/24/2021 (above). No brain mets.  Increasing bilateral pulmonary nodules.    4.   Review encounter with Dr. Tobar, 08/19/2021.  Assessment/plan: Unable to reach areas of concern with very navigational system.  Robotic bronchoscopy might have a better yield.  Referred to Dr. Hopson.  5.   Discussed with Dr. Hopson, 09/01/2021.  Navigational bronchoscopy/EBUS, 08/30/2021 retrieved only nondiagnostic biopsies.  Discussed on the same day with Dr. Tobar.  We agreed to order PFTs and refer to CT surgery for possible wedge resection/biopsy of lung nodule.  6.   Keep appointment with CT surgery (Dr. Leggett), 09/20/2021 Re: Assess for open lung biopsy.  Send specimens for molecular studies: EGFR, BRAF and KRAS.  ALK, ROS1, NTRK and PD-L1.   7.   Previously reviewed encounter with Dr. Dudley, 08/02/2021.    --Interval CT scans from 10/14/2020 revealed new fractures at T6 and T7 with relatively severe fracture at T7.  Pathologic fracture is difficult to exclude.  --01/19/ 2021-CT chest.  Mildly increased size of 2 left upper lobe pulmonary nodules.  Similar height loss of T6 and T7 with kyphosis.  --02/05/2021-PET scan at [facility].  Post therapy  changes within the right upper lung demonstrated with SUV 2.1.  2 small nodules within the left upper lobe demonstrated interval increase in size from previous CT exam do not demonstrate any significant metabolic activity.  Lesion too small for percutaneous biopsy.  --04/14/2021-CT chest.  Progression of disease with increased size of 4 nodules within the left upper lobe and one nodule within the right lower lobe.  --04/28/2021-appointment with Dr. Cross (covering for Dr. Dudley).  Discussed CT scan in detail.  Options offered: Repeat CT scan versus referral for attempted tissue biopsy versus repeat CT scan in 3 months.  Plan: Follow-up CT in 3 months.  --07/26/2021-CT chest with contrast: Previous noted lung nodules have increased in size.  Left upper lobe nodule 7 mm, previously 3.3 mm.  Another in the left upper lobe 12.5 cm (mm?), previously 9.8 cm (mm?).  Right lower lobe 10 mm nodule previously 7 mm.    8.   Review NCCN guidelines version 5.2021 non-small cell lung cancer after definitive therapy and metastatic recurrence-establish histologic subtype with adequate tissue for molecular testing (rebiopsy if appropriate).  Integrate palliative care.  If histologic subtype is unchanged (squamous cell carcinoma) consider molecular testing (EGFR, ALK, KRAS, ROS1, BRAF, NTRK 1/2/3, met exon 14, RET, PD-L1 testing    9.   Port flush every 8 weeks   10.  Return to the office in 5 weeks with pre-office serum iron, Fe sat, ferritin, CBC with differential, CMP, and CEA.     MEDICAL DECISION MAKING: High Complexity   AMOUNT OF DATA: Extensive         RISK OF COMPLICATIONS: High         I spent at least 55 total minutes, face-to-face, caring for Damaris andrews.  Greater than 50% of this time involved counseling and/or coordination of care as documented within this note regarding the patient's illness(es), pros and cons of various treatment options, instructions and/or risk reduction.

## 2021-09-06 RX ORDER — OMEPRAZOLE 20 MG/1
20 CAPSULE, DELAYED RELEASE ORAL DAILY
Qty: 90 CAPSULE | Refills: 2 | Status: SHIPPED | OUTPATIENT
Start: 2021-09-06 | End: 2022-10-18

## 2021-09-08 ENCOUNTER — TELEPHONE (OUTPATIENT)
Dept: ONCOLOGY | Facility: CLINIC | Age: 66
End: 2021-09-08

## 2021-09-08 ENCOUNTER — OFFICE VISIT (OUTPATIENT)
Dept: ONCOLOGY | Facility: CLINIC | Age: 66
End: 2021-09-08

## 2021-09-08 VITALS
WEIGHT: 181.5 LBS | OXYGEN SATURATION: 96 % | RESPIRATION RATE: 16 BRPM | HEART RATE: 93 BPM | DIASTOLIC BLOOD PRESSURE: 82 MMHG | BODY MASS INDEX: 29.17 KG/M2 | HEIGHT: 66 IN | TEMPERATURE: 97.6 F | SYSTOLIC BLOOD PRESSURE: 128 MMHG

## 2021-09-08 DIAGNOSIS — C77.0 SECONDARY MALIGNANT NEOPLASM OF SUPRACLAVICULAR LYMPH NODE (HCC): Primary | ICD-10-CM

## 2021-09-08 PROCEDURE — 99215 OFFICE O/P EST HI 40 MIN: CPT | Performed by: INTERNAL MEDICINE

## 2021-09-08 NOTE — TELEPHONE ENCOUNTER
This message was sent to Dr Russell for re-consideration, I spoke with patient regarding PFT, patient was unsure if she would able to have this test due to recent fractures of her ribs and unable to perform deep breathing required for this test. She questions Dr Russell's thoughts on this matter, which she is now melanie for lung Bx 9/20/21.     She will f/u with Dr Russell today to discuss future plans

## 2021-09-09 ENCOUNTER — APPOINTMENT (OUTPATIENT)
Dept: LAB | Facility: HOSPITAL | Age: 66
End: 2021-09-09

## 2021-09-09 ENCOUNTER — TELEMEDICINE (OUTPATIENT)
Dept: PRIMARY CARE CLINIC | Age: 66
End: 2021-09-09
Payer: MEDICARE

## 2021-09-09 DIAGNOSIS — S22.060A COMPRESSION FRACTURE OF T7 VERTEBRA, INITIAL ENCOUNTER (HCC): ICD-10-CM

## 2021-09-09 DIAGNOSIS — F32.A ANXIETY AND DEPRESSION: ICD-10-CM

## 2021-09-09 DIAGNOSIS — F41.9 ANXIETY AND DEPRESSION: ICD-10-CM

## 2021-09-09 DIAGNOSIS — C34.90 SQUAMOUS CELL CARCINOMA OF LUNG, UNSPECIFIED LATERALITY (HCC): Primary | ICD-10-CM

## 2021-09-09 PROCEDURE — 99443 PR PHYS/QHP TELEPHONE EVALUATION 21-30 MIN: CPT | Performed by: NURSE PRACTITIONER

## 2021-09-09 RX ORDER — HYDROCODONE BITARTRATE AND ACETAMINOPHEN 10; 325 MG/1; MG/1
1 TABLET ORAL EVERY 8 HOURS PRN
Qty: 90 TABLET | Refills: 0 | Status: SHIPPED | OUTPATIENT
Start: 2021-09-09 | End: 2021-10-08 | Stop reason: SDUPTHER

## 2021-09-09 RX ORDER — CITALOPRAM 40 MG/1
TABLET ORAL
Qty: 90 TABLET | Refills: 1 | Status: SHIPPED | OUTPATIENT
Start: 2021-09-09 | End: 2022-03-28

## 2021-09-09 RX ORDER — BUSPIRONE HYDROCHLORIDE 5 MG/1
5 TABLET ORAL 2 TIMES DAILY PRN
Qty: 60 TABLET | Refills: 0 | Status: SHIPPED | OUTPATIENT
Start: 2021-09-09 | End: 2021-10-17

## 2021-09-09 ASSESSMENT — ENCOUNTER SYMPTOMS
GASTROINTESTINAL NEGATIVE: 1
EYES NEGATIVE: 1
RESPIRATORY NEGATIVE: 1

## 2021-09-09 NOTE — PROGRESS NOTES
[address] [address]            Phone:  [phone]  Fax:  [phone]    Vamsi Valdovinos is a 74 y.o. female evaluated via telephone on 9/9/2021. Consent:    She and/or health care decision maker is aware that that she may receive a bill for this telephone service, depending on her insurance coverage, and has provided verbal consent to proceed: Yes      HPI  Chief Complaint   Patient presents with    Fatigue    Anxiety    Depression       I communicated with the patient and/or health care decision maker about follow up on her fatigue, anxiety and depression. She states at the moment all those are horrible and she just needs something to help. She has had increased stress with her treatment options and the return of her cancer. She is still waiting for possible wedge resection on her lung. She is wanting to add something else with the Celexa to help with her increased. She does have Valium to take as need for panic attacks. Review of Systems   Constitutional: Positive for fatigue. HENT: Negative. Eyes: Negative. Respiratory: Negative. Cardiovascular: Negative. Gastrointestinal: Negative. Endocrine: Negative. Genitourinary: Negative. Musculoskeletal: Negative. Skin: Negative. Neurological: Negative. Hematological: Negative. Psychiatric/Behavioral: Negative for sleep disturbance. The patient is nervous/anxious. PLAN    Details of this discussion including any medical advice provided:     1. Anxiety and depression    - citalopram (CELEXA) 40 MG tablet; Take once daily  Dispense: 90 tablet; Refill: 1    2. Compression fracture of T7 vertebra, initial encounter (HCC)    - HYDROcodone-acetaminophen (NORCO)  MG per tablet; Take 1 tablet by mouth every 8 hours as needed for Pain for up to 30 days. Intended supply: 30 days  Dispense: 90 tablet; Refill: 0    3.  Squamous cell carcinoma of lung, unspecified laterality ([facility]    - HYDROcodone-acetaminophen (NORCO)  MG per tablet; Take 1 tablet by mouth every 8 hours as needed for Pain for up to 30 days. Intended supply: 30 days  Dispense: 90 tablet; Refill: 0      Refill on [facility].    Starting patient on Buspar as needed for anxiety. She is to take 1 tablet daily and then a second tablet as needed throughout the day. Increase Celexa up to 40 mg daily. I affirm this is a Patient Initiated Episode with an Established Patient who has not had a related appointment within my department in the past 7 days or scheduled within the next 24 hours. Total Time: minutes: 21-30 minutes      Note: not billable if this call serves to triage the patient into an appointment for the relevant concern      ROBER Baez         Pursuant to the emergency declaration under the [facility], [facility] waiver authority and the [employer] Act, this Virtual  Visit was conducted, with patient's consent, to reduce the patient's risk of exposure to COVID-19 and provide continuity of care for an established patient. Services were provided through a telephone discussion  to substitute for in-person clinic visit. Parties involved during telephone call include myself, ROBER Baez and patient listed.

## 2021-09-20 ENCOUNTER — PREP FOR SURGERY (OUTPATIENT)
Dept: OTHER | Facility: HOSPITAL | Age: 66
End: 2021-09-20

## 2021-09-20 ENCOUNTER — TELEPHONE (OUTPATIENT)
Dept: CARDIAC SURGERY | Facility: CLINIC | Age: 66
End: 2021-09-20

## 2021-09-20 ENCOUNTER — TRANSCRIBE ORDERS (OUTPATIENT)
Dept: LAB | Facility: HOSPITAL | Age: 66
End: 2021-09-20

## 2021-09-20 ENCOUNTER — OFFICE VISIT (OUTPATIENT)
Dept: CARDIAC SURGERY | Facility: CLINIC | Age: 66
End: 2021-09-20

## 2021-09-20 VITALS
HEART RATE: 94 BPM | SYSTOLIC BLOOD PRESSURE: 151 MMHG | WEIGHT: 183 LBS | BODY MASS INDEX: 29.41 KG/M2 | DIASTOLIC BLOOD PRESSURE: 90 MMHG | OXYGEN SATURATION: 94 % | HEIGHT: 66 IN

## 2021-09-20 DIAGNOSIS — R06.02 SOB (SHORTNESS OF BREATH): ICD-10-CM

## 2021-09-20 DIAGNOSIS — Z01.818 PREOPERATIVE TESTING: Primary | ICD-10-CM

## 2021-09-20 DIAGNOSIS — C34.81 MALIGNANT NEOPLASM OF OVERLAPPING SITES OF RIGHT LUNG (HCC): Primary | Chronic | ICD-10-CM

## 2021-09-20 DIAGNOSIS — R91.1 LUNG NODULE: Primary | ICD-10-CM

## 2021-09-20 PROCEDURE — 99204 OFFICE O/P NEW MOD 45 MIN: CPT | Performed by: SURGERY

## 2021-09-20 RX ORDER — HEPARIN SODIUM 5000 [USP'U]/ML
5000 INJECTION, SOLUTION INTRAVENOUS; SUBCUTANEOUS ONCE
Status: CANCELLED | OUTPATIENT
Start: 2021-09-28

## 2021-09-20 RX ORDER — SODIUM CHLORIDE 0.9 % (FLUSH) 0.9 %
10 SYRINGE (ML) INJECTION AS NEEDED
Status: CANCELLED | OUTPATIENT
Start: 2021-09-20

## 2021-09-20 RX ORDER — ALBUTEROL SULFATE 1.25 MG/3ML
1.25 SOLUTION RESPIRATORY (INHALATION)
Status: CANCELLED | OUTPATIENT
Start: 2021-09-20

## 2021-09-20 RX ORDER — BUSPIRONE HYDROCHLORIDE 5 MG/1
15 TABLET ORAL DAILY
COMMUNITY
Start: 2021-09-09 | End: 2023-02-09

## 2021-09-20 RX ORDER — SODIUM CHLORIDE 0.9 % (FLUSH) 0.9 %
10 SYRINGE (ML) INJECTION EVERY 12 HOURS SCHEDULED
Status: CANCELLED | OUTPATIENT
Start: 2021-09-20

## 2021-09-21 NOTE — TELEPHONE ENCOUNTER
Patient informed of prework date/time and OR date/arrival time 0500/npo/no meds. She voiced understanding to all.

## 2021-09-22 ENCOUNTER — TELEPHONE (OUTPATIENT)
Dept: CARDIAC SURGERY | Facility: CLINIC | Age: 66
End: 2021-09-22

## 2021-09-22 NOTE — TELEPHONE ENCOUNTER
Patient calling to ask if she can take Buspar the morning of surgery since her case will be in the afternoon.  I have advised her nothing to eat or drink after midnight and no medications.  She verbalized understanding.

## 2021-09-22 NOTE — TELEPHONE ENCOUNTER
Pt left vm message requesting a call back re: questions she has prior to surgery next week.  Can reach her at #[phone]/slim

## 2021-09-23 ENCOUNTER — TELEPHONE (OUTPATIENT)
Dept: CARDIAC SURGERY | Facility: CLINIC | Age: 66
End: 2021-09-23

## 2021-09-23 NOTE — TELEPHONE ENCOUNTER
Sister calling with questions.  Pt having lung surgery with Dr Leggett next week and she is wanting to get more info re: pt's recovery and if she will need someone with her 24/7 or just at night or just during the day or what she will need to do to best care for pt.  Can reach her at #[phone]/slim

## 2021-09-24 ENCOUNTER — APPOINTMENT (OUTPATIENT)
Dept: LAB | Facility: HOSPITAL | Age: 66
End: 2021-09-24

## 2021-09-24 ENCOUNTER — HOSPITAL ENCOUNTER (OUTPATIENT)
Dept: GENERAL RADIOLOGY | Facility: HOSPITAL | Age: 66
Discharge: HOME | End: 2021-09-24

## 2021-09-24 ENCOUNTER — PATIENT ROUNDING (BHMG ONLY) (OUTPATIENT)
Dept: CARDIAC SURGERY | Facility: CLINIC | Age: 66
End: 2021-09-24

## 2021-09-24 ENCOUNTER — PRE-ADMISSION TESTING (OUTPATIENT)
Dept: PREADMISSION TESTING | Facility: HOSPITAL | Age: 66
End: 2021-09-24

## 2021-09-24 VITALS
HEIGHT: 65 IN | OXYGEN SATURATION: 99 % | SYSTOLIC BLOOD PRESSURE: 145 MMHG | DIASTOLIC BLOOD PRESSURE: 79 MMHG | HEART RATE: 92 BPM | RESPIRATION RATE: 18 BRPM | WEIGHT: 181.88 LBS | BODY MASS INDEX: 30.3 KG/M2

## 2021-09-24 DIAGNOSIS — R91.1 LUNG NODULE: ICD-10-CM

## 2021-09-24 DIAGNOSIS — R06.02 SOB (SHORTNESS OF BREATH): ICD-10-CM

## 2021-09-24 LAB
ALBUMIN SERPL-MCNC: 4.4 G/DL (ref 3.5–5.2)
ALBUMIN/GLOB SERPL: 1.8 G/DL
ALP SERPL-CCNC: 55 U/L (ref 39–117)
ALT SERPL W P-5'-P-CCNC: 13 U/L (ref 1–33)
ANION GAP SERPL CALCULATED.3IONS-SCNC: 10 MMOL/L (ref 5–15)
APTT PPP: 23.4 SECONDS (ref 24.1–35)
AST SERPL-CCNC: 20 U/L (ref 1–32)
BASOPHILS # BLD AUTO: 0.03 10*3/MM3 (ref 0–0.2)
BASOPHILS NFR BLD AUTO: 0.3 % (ref 0–1.5)
BILIRUB SERPL-MCNC: 0.4 MG/DL (ref 0–1.2)
BUN SERPL-MCNC: 15 MG/DL (ref 8–23)
BUN/CREAT SERPL: 14.9 (ref 7–25)
CALCIUM SPEC-SCNC: 9.7 MG/DL (ref 8.6–10.5)
CHLORIDE SERPL-SCNC: 105 MMOL/L (ref 98–107)
CO2 SERPL-SCNC: 27 MMOL/L (ref 22–29)
CREAT SERPL-MCNC: 1.01 MG/DL (ref 0.57–1)
DEPRECATED RDW RBC AUTO: 48.1 FL (ref 37–54)
EOSINOPHIL # BLD AUTO: 0.06 10*3/MM3 (ref 0–0.4)
EOSINOPHIL NFR BLD AUTO: 0.7 % (ref 0.3–6.2)
ERYTHROCYTE [DISTWIDTH] IN BLOOD BY AUTOMATED COUNT: 13.2 % (ref 12.3–15.4)
GFR SERPL CREATININE-BSD FRML MDRD: 55 ML/MIN/1.73
GLOBULIN UR ELPH-MCNC: 2.4 GM/DL
GLUCOSE SERPL-MCNC: 111 MG/DL (ref 65–99)
HCT VFR BLD AUTO: 41.6 % (ref 34–46.6)
HGB BLD-MCNC: 13 G/DL (ref 12–15.9)
IMM GRANULOCYTES # BLD AUTO: 0.04 10*3/MM3 (ref 0–0.05)
IMM GRANULOCYTES NFR BLD AUTO: 0.4 % (ref 0–0.5)
INR PPP: 0.95 (ref 0.91–1.09)
LYMPHOCYTES # BLD AUTO: 0.42 10*3/MM3 (ref 0.7–3.1)
LYMPHOCYTES NFR BLD AUTO: 4.6 % (ref 19.6–45.3)
MCH RBC QN AUTO: 31 PG (ref 26.6–33)
MCHC RBC AUTO-ENTMCNC: 31.3 G/DL (ref 31.5–35.7)
MCV RBC AUTO: 99 FL (ref 79–97)
MONOCYTES # BLD AUTO: 0.47 10*3/MM3 (ref 0.1–0.9)
MONOCYTES NFR BLD AUTO: 5.1 % (ref 5–12)
NEUTROPHILS NFR BLD AUTO: 8.17 10*3/MM3 (ref 1.7–7)
NEUTROPHILS NFR BLD AUTO: 88.9 % (ref 42.7–76)
NRBC BLD AUTO-RTO: 0 /100 WBC (ref 0–0.2)
PLATELET # BLD AUTO: 218 10*3/MM3 (ref 140–450)
PMV BLD AUTO: 9.9 FL (ref 6–12)
POTASSIUM SERPL-SCNC: 4.4 MMOL/L (ref 3.5–5.2)
PROT SERPL-MCNC: 6.8 G/DL (ref 6–8.5)
PROTHROMBIN TIME: 12.3 SECONDS (ref 11.9–14.6)
RBC # BLD AUTO: 4.2 10*6/MM3 (ref 3.77–5.28)
SODIUM SERPL-SCNC: 142 MMOL/L (ref 136–145)
WBC # BLD AUTO: 9.19 10*3/MM3 (ref 3.4–10.8)

## 2021-09-24 PROCEDURE — 80053 COMPREHEN METABOLIC PANEL: CPT

## 2021-09-24 PROCEDURE — 93010 ELECTROCARDIOGRAM REPORT: CPT | Performed by: INTERNAL MEDICINE

## 2021-09-24 PROCEDURE — 85025 COMPLETE CBC W/AUTO DIFF WBC: CPT

## 2021-09-24 PROCEDURE — 71046 X-RAY EXAM CHEST 2 VIEWS: CPT

## 2021-09-24 PROCEDURE — 93005 ELECTROCARDIOGRAM TRACING: CPT

## 2021-09-24 PROCEDURE — 85730 THROMBOPLASTIN TIME PARTIAL: CPT

## 2021-09-24 PROCEDURE — 85610 PROTHROMBIN TIME: CPT

## 2021-09-24 PROCEDURE — 36415 COLL VENOUS BLD VENIPUNCTURE: CPT

## 2021-09-24 NOTE — PROGRESS NOTES
September 24, 2021    Hello, may I speak with Damaris Hu?    My name is Katerin Cordero RN    I am  with [facility]ARDIOTH[facility]ACIC SURGERY  [address], [address]  [place] [address] [phone].    Before we get started may I verify your date of birth? 1955    I am calling to officially welcome you to our practice and ask about your recent visit. Is this a good time to talk? YES     Tell me about your visit with us. What things went well?  went very well. Having surgery next week.       We're always looking for ways to make our patients' experiences even better. Do you have recommendations on ways we may improve?  NO recommendations, pleased with Dr. Leggett's office visit.    Overall were you satisfied with your first visit to our practice? YES        I appreciate you taking the time to speak with me today. Is there anything else I can do for you? NO      Thank you, and have a great day.

## 2021-09-24 NOTE — DISCHARGE INSTRUCTIONS
DAY OF SURGERY INSTRUCTIONS        YOUR SURGEON: Dr. Lennon Leggett    PROCEDURE: Thoracoscopy video assisted with left upper lob wedge resection    DATE OF SURGERY: Tuesday September 28, 2021    ARRIVAL TIME: AS DIRECTED BY OFFICE    YOU MAY TAKE THE FOLLOWING MEDICATION(S) THE MORNING OF SURGERY WITH A SIP OF WATER: Diazepam (VALIUM) if needed for anxiety, Buspirone (BUSPAR), Hydrocodone/Acetaminophen (NORCO) if needed for pain      ALL OTHER HOME MEDICATION CHECK WITH YOUR PHYSICIAN              MANAGING PAIN AFTER SURGERY    We know you are probably wondering what your pain will be like after surgery.  Following surgery it is unrealistic to expect you will not have pain.   Pain is how our bodies let us know that something is wrong or cautions us to be careful.  That said, our goal is to make your pain tolerable.    Methods we may use to treat your pain include (oral or IV medications, PCAs, epidurals, nerve blocks, etc.)   While some procedures require IV pain medications for a short time after surgery, transitioning to pain medications by mouth allows for better management of pain.   Your nurse will encourage you to take oral pain medications whenever possible.  IV medications work almost immediately, but only last a short while.  Taking medications by mouth allows for a more constant level of medication in your blood stream for a longer period of time.      Once your pain is out of control it is harder to get back under control.  It is important you are aware when your next dose of pain medication is due.  If you are admitted, your nurse may write the time of your next dose on the white board in your room to help you remember.      We are interested in your pain and encourage you to inform us about aggravating factors during your visit.   Many times a simple repositioning every few hours can make a big difference.    If your physician says it is okay, do not let your pain prevent you from getting out of bed. Be  sure to call your nurse for assistance prior to getting up so you do not fall.      Before surgery, please decide your tolerable pain goal.  These faces help describe the pain ratings we use on a 0-10 scale.   Be prepared to tell us your goal and whether or not you take pain or anxiety medications at home.          BEFORE YOU COME TO THE HOSPITAL  (Pre-op instructions)  • Do not eat, drink, smoke or chew gum after midnight the night before surgery.  This also includes no mints.  • Morning of surgery take only the medicines you have been instructed with a sip of water unless otherwise instructed  by your physician.  • Do not shave, wear makeup or dark nail polish.  • Remove all jewelry including rings.  • Leave anything you consider valuable at home.  • Leave your suitcase in the car until after your surgery.  • Bring the following with you if applicable:  o Picture ID and insurance, Medicare or Medicaid cards  o Co-pay/deductible required by insurance (cash, check, credit card)  o Copy of advance directive, living will or power-of- documents if not brought to PAT  o CPAP or BIPAP mask and tubing  o Relaxation aids ( book, magazine), etc.  o Hearing aids                        ON THE DAY OF SURGERY  · On the day of surgery check in at registration located at the main entrance of the hospital.   ? You will be registered and given a beeper with instructions where to wait in the main lobby.  ? When your beeper lights up and vibrates a member of the Outpatient Surgery staff will meet you at the double doors under the stair steps and escort you to your preoperative room.   · You may have cloth compression devices placed on your legs. These help to prevent blood clots and reduce swelling in your legs.  · An IV may be inserted into one of your veins.  · In the operating room, you may be given one or more of the following:  ? A medicine to help you relax (sedative).  ? A medicine to numb the area (local  "anesthetic).  ? A medicine to make you fall asleep (general anesthetic).  ? A medicine that is injected into an area of your body to numb everything below the injection site (regional anesthetic).  · Your surgical site will be marked or identified.  · You may be given an antibiotic through your IV to help prevent infection.  Contact a health care provider if you:  · Develop a fever of more than 100.4°F (38°C) or other feelings of illness during the 48 hours before your surgery.  · Have symptoms that get worse.  Have questions or concerns about your surgery    General Anesthesia/Surgery, Adult  General anesthesia is the use of medicines to make a person \"go to sleep\" (unconscious) for a medical procedure. General anesthesia must be used for certain procedures, and is often recommended for procedures that:  · Last a long time.  · Require you to be still or in an unusual position.  · Are major and can cause blood loss.  The medicines used for general anesthesia are called general anesthetics. As well as making you unconscious for a certain amount of time, these medicines:  · Prevent pain.  · Control your blood pressure.  · Relax your muscles.  Tell a health care provider about:  · Any allergies you have.  · All medicines you are taking, including vitamins, herbs, eye drops, creams, and over-the-counter medicines.  · Any problems you or family members have had with anesthetic medicines.  · Types of anesthetics you have had in the past.  · Any blood disorders you have.  · Any surgeries you have had.  · Any medical conditions you have.  · Any recent upper respiratory, chest, or ear infections.  · Any history of:  ? Heart or lung conditions, such as heart failure, sleep apnea, asthma, or chronic obstructive pulmonary disease (COPD).  ?  service.  ? Depression or anxiety.  · Any tobacco or drug use, including marijuana or alcohol use.  · Whether you are pregnant or may be pregnant.  What are the risks?  Generally, " this is a safe procedure. However, problems may occur, including:  · Allergic reaction.  · Lung and heart problems.  · Inhaling food or liquid from the stomach into the lungs (aspiration).  · Nerve injury.  · Air in the bloodstream, which can lead to stroke.  · Extreme agitation or confusion (delirium) when you wake up from the anesthetic.  · Waking up during your procedure and being unable to move. This is rare.  These problems are more likely to develop if you are having a major surgery or if you have an advanced or serious medical condition. You can prevent some of these complications by answering all of your health care provider's questions thoroughly and by following all instructions before your procedure.  General anesthesia can cause side effects, including:  · Nausea or vomiting.  · A sore throat from the breathing tube.  · Hoarseness.  · Wheezing or coughing.  · Shaking chills.  · Tiredness.  · Body aches.  · Anxiety.  · Sleepiness or drowsiness.  · Confusion or agitation.  RISKS AND COMPLICATIONS OF SURGERY  Your health care provider will discuss possible risks and complications with you before surgery. Common risks and complications include:    · Problems due to the use of anesthetics.  · Blood loss and replacement (does not apply to minor surgical procedures).  · Temporary increase in pain due to surgery.  · Uncorrected pain or problems that the surgery was meant to correct.  · Infection.  · New damage.    What happens before the procedure?    Medicines  Ask your health care provider about:  · Changing or stopping your regular medicines. This is especially important if you are taking diabetes medicines or blood thinners.  · Taking medicines such as aspirin and ibuprofen. These medicines can thin your blood. Do not take these medicines unless your health care provider tells you to take them.  · Taking over-the-counter medicines, vitamins, herbs, and supplements. Do not take these during the week before  your procedure unless your health care provider approves them.  General instructions  · Starting 3-6 weeks before the procedure, do not use any products that contain nicotine or tobacco, such as cigarettes and e-cigarettes. If you need help quitting, ask your health care provider.  · If you brush your teeth on the morning of the procedure, make sure to spit out all of the toothpaste.  · Tell your health care provider if you become ill or develop a cold, cough, or fever.  · If instructed by your health care provider, bring your sleep apnea device with you on the day of your surgery (if applicable).  · Ask your health care provider if you will be going home the same day, the following day, or after a longer hospital stay.  ? Plan to have someone take you home from the hospital or clinic.  ? Plan to have a responsible adult care for you for at least 24 hours after you leave the hospital or clinic. This is important.  What happens during the procedure?  · You will be given anesthetics through both of the following:  ? A mask placed over your nose and mouth.  ? An IV in one of your veins.  · You may receive a medicine to help you relax (sedative).  · After you are unconscious, a breathing tube may be inserted down your throat to help you breathe. This will be removed before you wake up.  · An anesthesia specialist will stay with you throughout your procedure. He or she will:  ? Keep you comfortable and safe by continuing to give you medicines and adjusting the amount of medicine that you get.  ? Monitor your blood pressure, pulse, and oxygen levels to make sure that the anesthetics do not cause any problems.  The procedure may vary among health care providers and hospitals.  What happens after the procedure?  · Your blood pressure, temperature, heart rate, breathing rate, and blood oxygen level will be monitored until the medicines you were given have worn off.  · You will wake up in a recovery area. You may wake up  slowly.  · If you feel anxious or agitated, you may be given medicine to help you calm down.  · If you will be going home the same day, your health care provider may check to make sure you can walk, drink, and urinate.  · Your health care provider will treat any pain or side effects you have before you go home.  · Do not drive for 24 hours if you were given a sedative.  Summary  · General anesthesia is used to keep you still and prevent pain during a procedure.  · It is important to tell your healthcare provider about your medical history and any surgeries you have had, and previous experience with anesthesia.  · Follow your healthcare provider’s instructions about when to stop eating, drinking, or taking certain medicines before your procedure.  · Plan to have someone take you home from the hospital or clinic.  This information is not intended to replace advice given to you by your health care provider. Make sure you discuss any questions you have with your health care provider.  Document Released: 03/26/2009 Document Revised: 08/03/2018 Document Reviewed: 08/03/2018  [employer] Interactive Patient Education © 2019 [employer]       Fall Prevention in Hospitals, Adult  As a hospital patient, your condition and the treatments you receive can increase your risk for falls. Some additional risk factors for falls in a hospital include:  · Being in an unfamiliar environment.  · Being on bed rest.  · Your surgery.  · Taking certain medicines.  · Your tubing requirements, such as intravenous (IV) therapy or catheters.  It is important that you learn how to decrease fall risks while at the hospital. Below are important tips that can help prevent falls.  SAFETY TIPS FOR PREVENTING FALLS  Talk about your risk of falling.  · Ask your health care provider why you are at risk for falling. Is it your medicine, illness, tubing placement, or something else?  · Make a plan with your health care provider to keep you safe from  falls.  · Ask your health care provider or pharmacist about side effects of your medicines. Some medicines can make you dizzy or affect your coordination.  Ask for help.  · Ask for help before getting out of bed. You may need to press your call button.  · Ask for assistance in getting safely to the toilet.  · Ask for a walker or cane to be put at your bedside. Ask that most of the side rails on your bed be placed up before your health care provider leaves the room.  · Ask family or friends to sit with you.  · Ask for things that are out of your reach, such as your glasses, hearing aids, telephone, bedside table, or call button.  Follow these tips to avoid falling:  · Stay lying or seated, rather than standing, while waiting for help.  · Wear rubber-soled slippers or shoes whenever you walk in the hospital.  · Avoid quick, sudden movements.  ¨ Change positions slowly.  ¨ Sit on the side of your bed before standing.  ¨ Stand up slowly and wait before you start to walk.  · Let your health care provider know if there is a spill on the floor.  · Pay careful attention to the medical equipment, electrical cords, and tubes around you.  · When you need help, use your call button by your bed or in the bathroom. Wait for one of your health care providers to help you.  · If you feel dizzy or unsure of your footing, return to bed and wait for assistance.  · Avoid being distracted by the TV, telephone, or another person in your room.  · Do not lean or support yourself on rolling objects, such as IV poles or bedside tables.     This information is not intended to replace advice given to you by your health care provider. Make sure you discuss any questions you have with your health care provider.     Document Released: 12/15/2001 Document Revised: 01/08/2016 Document Reviewed: 08/25/2013  [employer] Interactive Patient Education ©2016 [employer]       [facility] 4% Patient Instruction Sheet    Chlorhexidine Before  Surgery  Chlorhexidine gluconate (CHG) is a germ-killing (antiseptic) solution that is used to clean the skin. It gets rid of the bacteria that normally live on the skin. Cleaning your skin with CHG before surgery helps lower the risk for infection after surgery.    How to use CHG solution  · You will take 2 showers, one shower the night before surgery, the second shower the morning of surgery before coming to the hospital.  · Use CHG only as told by your health care provider, and follow the instructions on the label.  · Use CHG solution while taking a shower. Follow these steps when using CHG solution (unless your health care provider gives you different instructions):  1. Start the shower.  2. Use your normal soap and shampoo to wash your face and hair.  3. Turn off the shower or move out of the shower stream.  4. Pour the CHG onto a clean washcloth. Do not use any type of brush or rough-edged sponge.  5. Starting at your neck, lather your body down to your toes. Make sure you:  6. Pay special attention to the part of your body where you will be having surgery. Scrub this area for at least 1 minute.  7. Use the full amount of CHG as directed. Usually, this is one half bottle for each shower.  8. Do not use CHG on your head or face. If the solution gets into your ears or eyes, rinse them well with water.  9. Avoid your genital area.  10. Avoid any areas of skin that have broken skin, cuts, or scrapes.  11. Scrub your back and under your arms. Make sure to wash skin folds.  12. Let the lather sit on your skin for 1-2 minutes or as long as told by your health care  provider.  13. Thoroughly rinse your entire body in the shower. Make sure that all body creases and crevices are rinsed well.  14. Dry off with a clean towel. Do not put any substances on your body afterward, such as powder, lotion, or perfume.  15. Put on clean clothes or pajamas.  16. If it is the night before your surgery, sleep in clean sheets.    What  are the risks?  Risks of using CHG include:  · A skin reaction.  · Hearing loss, if CHG gets in your ears.  · Eye injury, if CHG gets in your eyes and is not rinsed out.  · The CHG product catching fire.  Make sure that you avoid smoking and flames after applying CHG to your skin.  Do not use CHG:  · If you have a chlorhexidine allergy or have previously reacted to chlorhexidine.  · On babies younger than 2 months of age.      On the day of surgery, when you are taken to your room in Outpatient Surgery you will be given a CHG prepackaged cloth to wipe the site for your surgery.  How to use CHG prepackaged cloths  · Follow the instructions on the label.  · Use the CHG cloth on clean, dry skin. Follow these steps when using a CHG cloth (unless your health care provider gives you different instructions):  1. Using the CHG cloth, vigorously scrub the part of your body where you will be having surgery. Scrub using a back-and-forth motion for 3 minutes. The area on your body should be completely wet with CHG when you are finished scrubbing.  2. Do not rinse. Discard the cloth and let the area air-dry for 1 minute. Do not put any substances on your body afterward, such as powder, lotion, or perfume.  Contact a health care provider if:  · Your skin gets irritated after scrubbing.  · You have questions about using your solution or cloth.  Get help right away if:  · Your eyes become very red or swollen.  · Your eyes itch badly.  · Your skin itches badly and is red or swollen.  · Your hearing changes.  · You have trouble seeing.  · You have swelling or tingling in your mouth or throat.  · You have trouble breathing.  · You swallow any chlorhexidine.  Summary  · Chlorhexidine gluconate (CHG) is a germ-killing (antiseptic) solution that is used to clean the skin. Cleaning your skin with CHG before surgery helps lower the risk for infection after surgery.  · You may be given CHG to use at home. It may be in a bottle or in a  prepackaged cloth to use on your skin. Carefully follow your health care provider's instructions and the instructions on the product label.  · Do not use CHG if you have a chlorhexidine allergy.  · Contact your health care provider if your skin gets irritated after scrubbing.  This information is not intended to replace advice given to you by your health care provider. Make sure you discuss any questions you have with your health care provider.  Document Released: 09/11/2013 Document Revised: 11/15/2018 Document Reviewed: 11/15/2018  [employer] Interactive Patient Education © 2019 [employer]          PATIENT/FAMILY/RESPONSIBLE PARTY VERBALIZES UNDERSTANDING OF ABOVE EDUCATION.  COPY OF PAIN SCALE GIVEN AND REVIEWED WITH VERBALIZED UNDERSTANDING.

## 2021-09-25 ENCOUNTER — LAB (OUTPATIENT)
Dept: LAB | Facility: HOSPITAL | Age: 66
End: 2021-09-25

## 2021-09-25 LAB
QT INTERVAL: 376 MS
QTC INTERVAL: 439 MS
SARS-COV-2 ORF1AB RESP QL NAA+PROBE: NOT DETECTED

## 2021-09-25 PROCEDURE — C9803 HOPD COVID-19 SPEC COLLECT: HCPCS | Performed by: INTERNAL MEDICINE

## 2021-09-25 PROCEDURE — U0004 COV-19 TEST NON-CDC HGH THRU: HCPCS | Performed by: INTERNAL MEDICINE

## 2021-09-27 ENCOUNTER — HOSPITAL ENCOUNTER (OUTPATIENT)
Dept: PULMONOLOGY | Facility: HOSPITAL | Age: 66
Discharge: HOME | End: 2021-09-27
Admitting: INTERNAL MEDICINE

## 2021-09-27 DIAGNOSIS — C34.81 MALIGNANT NEOPLASM OF OVERLAPPING SITES OF RIGHT LUNG (HCC): ICD-10-CM

## 2021-09-27 PROCEDURE — 94726 PLETHYSMOGRAPHY LUNG VOLUMES: CPT | Performed by: INTERNAL MEDICINE

## 2021-09-27 PROCEDURE — 94060 EVALUATION OF WHEEZING: CPT

## 2021-09-27 PROCEDURE — 94726 PLETHYSMOGRAPHY LUNG VOLUMES: CPT

## 2021-09-27 PROCEDURE — 94729 DIFFUSING CAPACITY: CPT | Performed by: INTERNAL MEDICINE

## 2021-09-27 PROCEDURE — 94729 DIFFUSING CAPACITY: CPT

## 2021-09-27 PROCEDURE — 94060 EVALUATION OF WHEEZING: CPT | Performed by: INTERNAL MEDICINE

## 2021-09-27 RX ORDER — ALBUTEROL SULFATE 2.5 MG/3ML
2.5 SOLUTION RESPIRATORY (INHALATION) ONCE
Status: COMPLETED | OUTPATIENT
Start: 2021-09-27 | End: 2021-09-27

## 2021-09-27 RX ADMIN — ALBUTEROL SULFATE 2.5 MG: 2.5 SOLUTION RESPIRATORY (INHALATION) at 11:40

## 2021-09-27 NOTE — PROGRESS NOTES
Thoracic Surgery Consultation    Referring Physician: Dr. Kade Russell    Primary Care Physician: RONALD Godinez    Chief Complaint   Patient presents with   • Lung Cancer     New pt from [facility]          Subjective     Ms. Hu is a 66-year-old female who presents with previously treated right lung squamous cell carcinoma, stage IIIc.  This was treated with definitive chemo and radiation.  She follows up now with enlarging left-sided lung nodules.  She has a history of a T6/T7 compression fracture as well.  Dr. Martinez has been following these nodules.  She was referred to me by Dr. Melton on his these increasing size nodules were attempted to be biopsied via bronchoscopic biopsy by Dr. Hopson in [place] but could not get adequate sample.  Given this she was referred to me for possible VATS wedge resection biopsy, biopsy is to obtain more tissue to assess for immunotherapy markers.  She did quit smoking 6 years ago, she denies cough or hemoptysis.  No unwanted weight loss.        Review of Systems   Constitutional: Negative for activity change and unexpected weight change.   Respiratory: Negative for choking and shortness of breath.    Cardiovascular: Negative for chest pain and leg swelling.   Musculoskeletal: Positive for back pain.        A complete 10 system review of systems was performed, is negative except stated above.    Past Medical History:   Diagnosis Date   • Allergic rhinitis Early 20s   • Anemia    • Arthritis    • Cancer (CMS/HCC)     Right lung   • Cataract    • Cervical disc disease    • Depression    • Emphysema lung (CMS/HCC)    • Emphysema of lung (CMS/HCC) 3yrs ago   • GERD (gastroesophageal reflux disease)    • Hyperlipidemia    • Hypomagnesemia 12/30/2019   • Lung cancer (CMS/HCC)    • Lung nodule 10/2019   • Migraines    • Osteoporosis    • Rheumatoid arthritis (CMS/HCC) 6yrs   • Vitamin D deficiency      Past Surgical History:   Procedure Laterality Date   • BREAST SURGERY       Cyst removal   • CATARACT EXTRACTION Bilateral    • COLONOSCOPY N/A 12/3/2018    Procedure: COLONOSCOPY WITH ANESTHESIA;  Surgeon: Myah Pool MD;  Location: [facility] ENDOSCOPY;  Service: Gastroenterology   • HYSTERECTOMY     • LUNG BIOPSY  10/2019    [facility]   • VENOUS ACCESS DEVICE (PORT) INSERTION N/A 2019    Procedure: PLACEMENT OF SINGLE LUMEN PORT;  Surgeon: Mona Gibson MD;  Location: [facility] OR;  Service: General     Family History   Problem Relation Age of Onset   • Colon polyps Mother         < 60 years old   • Lung cancer Mother    • Birth defects Mother    • Heart disease Mother    • No Known Problems Father    • Hypertension Sister    • Arthritis Sister    • Asthma Sister    • Prostate cancer Maternal Grandfather    • Breast cancer Maternal Aunt    • Hypertension Sister    • Hypertension Sister    • Colon cancer Neg Hx      Social History     Tobacco Use   • Smoking status: Former Smoker     Packs/day: 1.00     Years: 30.00     Pack years: 30.00     Types: Cigarettes     Start date: 1973     Quit date: 2015     Years since quittin.3   • Smokeless tobacco: Never Used   • Tobacco comment: Quit 4 years ago   Vaping Use   • Vaping Use: Never used   Substance Use Topics   • Alcohol use: No   • Drug use: No     Current Outpatient Medications   Medication Sig Dispense Refill   • albuterol sulfate  (90 Base) MCG/ACT inhaler INHALE 2 PUFFS BY MOUTH 4 TIMES DAILY AS NEEDED FOR WHEEZING     • atorvastatin (LIPITOR) 20 MG tablet Take 20 mg by mouth Daily.     • busPIRone (BUSPAR) 5 MG tablet Take 5 mg by mouth Daily.     • Calcium Carbonate-Vit D-Min (GNP Calcium 1200) 8043-5703 MG-UNIT chewable tablet Chew 1 tablet/day Daily.     • cetirizine (zyrTEC) 10 MG tablet Take 10 mg by mouth Daily.     • citalopram (CeleXA) 20 MG tablet Take 40 mg by mouth Daily.     • diazePAM (VALIUM) 2 MG tablet TAKE 1 TABLET BY MOUTH EVERY 8 HOURS AS NEEDED FOR ANXIETY UP TO 10 DAYS     •  "fluticasone (FLONASE) 50 MCG/ACT nasal spray 1 spray by Each Nare route Daily As Needed.  5   • HYDROcodone-acetaminophen (NORCO)  MG per tablet Take 1 tablet by mouth Every 8 (Eight) Hours As Needed. for pain     • omeprazole (priLOSEC) 20 MG capsule Take 20 mg by mouth Every Morning Before Breakfast.  0   • predniSONE (DELTASONE) 10 MG tablet Take 10 mg by mouth Daily.     • Trelegy Ellipta 100-62.5-25 MCG/INH inhaler Inhale 1 puff Daily.       No current facility-administered medications for this visit.     Allergies:  Amoxicillin    Objective      Vital Signs  Visit Vitals  /90 (BP Location: Right arm, Patient Position: Sitting, Cuff Size: Adult)   Pulse 94   Ht 167.6 cm (66\")   Wt 83 kg (183 lb)   LMP  (LMP Unknown)   SpO2 94%   BMI 29.54 kg/m²         Physical Exam  Vitals reviewed.   Constitutional:       General: She is not in acute distress.     Appearance: She is well-developed. She is not diaphoretic.   HENT:      Head: Normocephalic and atraumatic.   Eyes:      General: No scleral icterus.     Pupils: Pupils are equal, round, and reactive to light.   Neck:      Vascular: No JVD.      Trachea: No tracheal deviation.   Cardiovascular:      Rate and Rhythm: Normal rate and regular rhythm.      Heart sounds: Normal heart sounds. No murmur heard.     Pulmonary:      Effort: Pulmonary effort is normal. No respiratory distress.      Breath sounds: Normal breath sounds. No stridor. No wheezing.   Abdominal:      General: There is no distension.      Palpations: Abdomen is soft.      Tenderness: There is no abdominal tenderness.   Musculoskeletal:         General: No deformity. Normal range of motion.      Cervical back: Normal range of motion and neck supple.   Skin:     General: Skin is warm and dry.      Capillary Refill: Capillary refill takes less than 2 seconds.      Coloration: Skin is not pale.      Findings: No erythema or rash.   Neurological:      Mental Status: She is alert and oriented to " person, place, and time.      Cranial Nerves: No cranial nerve deficit.   Psychiatric:         Behavior: Behavior normal.         Thought Content: Thought content normal.         Judgment: Judgment normal.         Results Review:     WBC   Date Value Ref Range Status   09/24/2021 9.19 3.40 - 10.80 10*3/mm3 Final   06/15/2021 9.9 4.8 - 10.8 K/uL Final     RBC   Date Value Ref Range Status   09/24/2021 4.20 3.77 - 5.28 10*6/mm3 Final   06/15/2021 4.20 4.20 - 5.40 M/uL Final     Hemoglobin   Date Value Ref Range Status   09/24/2021 13.0 12.0 - 15.9 g/dL Final   06/15/2021 13.0 12.0 - 16.0 g/dL Final     Hematocrit   Date Value Ref Range Status   09/24/2021 41.6 34.0 - 46.6 % Final   06/15/2021 40.9 37.0 - 47.0 % Final     MCV   Date Value Ref Range Status   09/24/2021 99.0 (H) 79.0 - 97.0 fL Final   06/15/2021 97.4 81.0 - 99.0 fL Final     MCH   Date Value Ref Range Status   09/24/2021 31.0 26.6 - 33.0 pg Final   06/15/2021 31.0 27.0 - 31.0 pg Final     MCHC   Date Value Ref Range Status   09/24/2021 31.3 (L) 31.5 - 35.7 g/dL Final   06/15/2021 31.8 (L) 33.0 - 37.0 g/dL Final     RDW   Date Value Ref Range Status   09/24/2021 13.2 12.3 - 15.4 % Final   06/15/2021 13.6 11.5 - 14.5 % Final     RDW-SD   Date Value Ref Range Status   09/24/2021 48.1 37.0 - 54.0 fl Final     MPV   Date Value Ref Range Status   09/24/2021 9.9 6.0 - 12.0 fL Final   06/15/2021 9.3 (L) 9.4 - 12.3 fL Final     Platelets   Date Value Ref Range Status   09/24/2021 218 140 - 450 10*3/mm3 Final   06/15/2021 234 130 - 400 K/uL Final     Neutrophil Rel %   Date Value Ref Range Status   06/15/2021 84.5 (H) 50.0 - 65.0 % Final     Neutrophil %   Date Value Ref Range Status   09/24/2021 88.9 (H) 42.7 - 76.0 % Final     Lymphocyte Rel %   Date Value Ref Range Status   06/15/2021 5.2 (L) 20.0 - 40.0 % Final     Lymphocyte %   Date Value Ref Range Status   09/24/2021 4.6 (L) 19.6 - 45.3 % Final     Monocyte Rel %   Date Value Ref Range Status   06/15/2021  8.1 0.0 - 10.0 % Final     Monocyte %   Date Value Ref Range Status   09/24/2021 5.1 5.0 - 12.0 % Final     Eosinophil Rel %   Date Value Ref Range Status   06/15/2021 1.5 0.0 - 5.0 % Final     Eosinophil %   Date Value Ref Range Status   09/24/2021 0.7 0.3 - 6.2 % Final     Basophil Rel %   Date Value Ref Range Status   06/15/2021 0.4 0.0 - 1.0 % Final     Basophil %   Date Value Ref Range Status   09/24/2021 0.3 0.0 - 1.5 % Final     Immature Grans %   Date Value Ref Range Status   09/24/2021 0.4 0.0 - 0.5 % Final     Neutrophils Absolute   Date Value Ref Range Status   06/15/2021 8.3 (H) 1.5 - 7.5 K/uL Final     Neutrophils, Absolute   Date Value Ref Range Status   09/24/2021 8.17 (H) 1.70 - 7.00 10*3/mm3 Final     Lymphocytes Absolute   Date Value Ref Range Status   06/15/2021 0.5 (L) 1.1 - 4.5 K/uL Final     Lymphocytes, Absolute   Date Value Ref Range Status   09/24/2021 0.42 (L) 0.70 - 3.10 10*3/mm3 Final     Monocytes Absolute   Date Value Ref Range Status   06/15/2021 0.80 0.00 - 0.90 K/uL Final     Monocytes, Absolute   Date Value Ref Range Status   09/24/2021 0.47 0.10 - 0.90 10*3/mm3 Final     Eosinophils Absolute   Date Value Ref Range Status   06/15/2021 0.20 0.00 - 0.60 K/uL Final     Eosinophils, Absolute   Date Value Ref Range Status   09/24/2021 0.06 0.00 - 0.40 10*3/mm3 Final     Basophils Absolute   Date Value Ref Range Status   06/15/2021 0.00 0.00 - 0.20 K/uL Final     Basophils, Absolute   Date Value Ref Range Status   09/24/2021 0.03 0.00 - 0.20 10*3/mm3 Final     Immature Grans, Absolute   Date Value Ref Range Status   09/24/2021 0.04 0.00 - 0.05 10*3/mm3 Final   06/15/2021 0.0 K/uL Final     nRBC   Date Value Ref Range Status   09/24/2021 0.0 0.0 - 0.2 /100 WBC Final     Glucose   Date Value Ref Range Status   09/24/2021 111 (H) 65 - 99 mg/dL Final   06/15/2021 97 74 - 109 mg/dL Final     Sodium   Date Value Ref Range Status   09/24/2021 142 136 - 145 mmol/L Final   06/15/2021 140 136 - 145  mmol/L Final     Potassium   Date Value Ref Range Status   09/24/2021 4.4 3.5 - 5.2 mmol/L Final   06/15/2021 4.2 3.5 - 5.0 mmol/L Final     CO2   Date Value Ref Range Status   09/24/2021 27.0 22.0 - 29.0 mmol/L Final   06/15/2021 28 22 - 29 mmol/L Final     Chloride   Date Value Ref Range Status   09/24/2021 105 98 - 107 mmol/L Final   06/15/2021 103 98 - 111 mmol/L Final     Anion Gap   Date Value Ref Range Status   09/24/2021 10.0 5.0 - 15.0 mmol/L Final   06/15/2021 9 7 - 19 mmol/L Final     Creatinine   Date Value Ref Range Status   09/24/2021 1.01 (H) 0.57 - 1.00 mg/dL Final   07/26/2021 1.00 0.60 - 1.30 mg/dL Final     Comment:     Serial Number: [id number]:  [id number]   06/15/2021 0.8 0.5 - 0.9 mg/dL Final     BUN   Date Value Ref Range Status   09/24/2021 15 8 - 23 mg/dL Final   06/15/2021 14 8 - 23 mg/dL Final     BUN/Creatinine Ratio   Date Value Ref Range Status   09/24/2021 14.9 7.0 - 25.0 Final     Calcium   Date Value Ref Range Status   09/24/2021 9.7 8.6 - 10.5 mg/dL Final   06/15/2021 9.9 8.8 - 10.2 mg/dL Final     eGFR Non  Am   Date Value Ref Range Status   08/24/2021 55 (A) >60 Final     Comment:     >60 mL/min/1.73m2 EGFR, calc. for ages 18 and older using the  MDRD formula (not corrected for weight), is valid for stable  renal function.     eGFR Non  Amer   Date Value Ref Range Status   09/24/2021 55 (L) >60 mL/min/1.73 Final     Alkaline Phosphatase   Date Value Ref Range Status   09/24/2021 55 39 - 117 U/L Final   06/15/2021 51 35 - 104 U/L Final     Total Protein   Date Value Ref Range Status   09/24/2021 6.8 6.0 - 8.5 g/dL Final   06/15/2021 6.9 6.6 - 8.7 g/dL Final     ALT (SGPT)   Date Value Ref Range Status   09/24/2021 13 1 - 33 U/L Final   06/15/2021 12 5 - 33 U/L Final     AST (SGOT)   Date Value Ref Range Status   09/24/2021 20 1 - 32 U/L Final   06/15/2021 14 5 - 32 U/L Final     Total Bilirubin   Date Value Ref Range Status   09/24/2021 0.4 0.0 - 1.2 mg/dL Final    06/15/2021 0.4 0.2 - 1.2 mg/dL Final     Albumin   Date Value Ref Range Status   09/24/2021 4.40 3.50 - 5.20 g/dL Final   06/15/2021 4.4 3.5 - 5.2 g/dL Final     Globulin   Date Value Ref Range Status   09/24/2021 2.4 gm/dL Final        I reviewed the patient's clinical results and discussed with patient.    CT Chest:  Lungs: Previous noted lung nodules have increased in size. Series 3  image 24 left upper lobe a 7 mm nodule is present previously this was  3.3 mm. In the left upper lobe on image 40 a 12.5 cm nodule is present  previously this was 9.8 cm. On image 50 a 7.6 mm nodule is present  previously this was 4 mm.     The right lower lobe a 10 mm nodules on image 77. Previously this was 7  mm. On image 132 a 6 mm nodule is present previously this was 4 mm.     Fibrotic changes noted in the right lower lobe.. No pleural effusion is  present. The trachea and bronchial tree are patent.      Heart: Normal in size and appearance. Coronary calcifications are  present..      Vasculature: Vascular calcifications noted in the aortic arch. No  coronary arteriosclerosis identified. The great vessels are normal in  appearance. The pulmonary arteries are normal in appearance.     Lymph nodes: No enlarged axillary, hilar, or mediastinal lymph nodes.      Bones and soft tissues: Compression deformities at T6 and T7 are noted  this is similar to prior exam..      Upper abdomen: The imaged portion of the upper abdomen is unremarkable.      IMPRESSION:  1. Progression of metastatic disease with increase in size of multiple  nodules as described above..        This report was finalized on 07/26/2021 15:25 by Dr. Salomon Lucas MD.    PET Scan:      I personally reviewed images of following exams, the following is my interpretation:    CT Chest:  Enlarging left upper lobe lung nodules consistent with metastatic disease, both are along the major fissure with the larger one being approximately 2 cm deep to the fissure and a  smaller 1 very close to the lateral aspect of the fissure near the superior segment          Assessment/Plan     Ms. Hu is a 66-year-old female who presents with a history of stage IIIc squamous cell carcinoma of the right long.  She is status post definitive chemoradiation 2019.  She has what is likely metastatic disease to the left lung with enlarging nodules that are radiographic consistent with metastasis.  I discussed the case with Dr. Melton, he needs additional tissue after treatment to assess for markers for further treatment.  I discussed with Ms. Hu we can likely get these nodules with a wedge resection using minimally invasive techniques.  We discussed the risk and benefits of surgery and alternatives including but not limited to bleeding, infection, injury to major vessels or organs, need for transfusion, need for conversion to open operation, pneumonia, prolonged airleak, risk of anesthesia, and/or death.  She understands these risk and agrees to proceed.  We will plan on a left VATS wedge resection for diagnosis only.    Thank you for trusting me with the care of Ms. Hu.  Please do not hesitate to call with any questions or concerns.    Saji Leggett M.D.  Cardiothoracic Surgeon

## 2021-09-28 ENCOUNTER — ANESTHESIA EVENT (OUTPATIENT)
Dept: PERIOP | Facility: HOSPITAL | Age: 66
End: 2021-09-28

## 2021-09-28 ENCOUNTER — HOSPITAL ENCOUNTER (INPATIENT)
Facility: HOSPITAL | Age: 66
LOS: 1 days | Discharge: HOME | End: 2021-09-29
Attending: SURGERY | Admitting: SURGERY

## 2021-09-28 ENCOUNTER — ANESTHESIA (OUTPATIENT)
Dept: PERIOP | Facility: HOSPITAL | Age: 66
End: 2021-09-28

## 2021-09-28 ENCOUNTER — APPOINTMENT (OUTPATIENT)
Dept: GENERAL RADIOLOGY | Facility: HOSPITAL | Age: 66
End: 2021-09-28

## 2021-09-28 DIAGNOSIS — R91.1 LUNG NODULE: ICD-10-CM

## 2021-09-28 LAB
ABO GROUP BLD: NORMAL
ANION GAP SERPL CALCULATED.3IONS-SCNC: 8 MMOL/L (ref 5–15)
BLD GP AB SCN SERPL QL: NEGATIVE
BUN SERPL-MCNC: 14 MG/DL (ref 8–23)
BUN/CREAT SERPL: 18.7 (ref 7–25)
CALCIUM SPEC-SCNC: 8.7 MG/DL (ref 8.6–10.5)
CHLORIDE SERPL-SCNC: 110 MMOL/L (ref 98–107)
CO2 SERPL-SCNC: 24 MMOL/L (ref 22–29)
CREAT SERPL-MCNC: 0.75 MG/DL (ref 0.57–1)
DEPRECATED RDW RBC AUTO: 47.3 FL (ref 37–54)
ERYTHROCYTE [DISTWIDTH] IN BLOOD BY AUTOMATED COUNT: 13.3 % (ref 12.3–15.4)
GFR SERPL CREATININE-BSD FRML MDRD: 77 ML/MIN/1.73
GLUCOSE SERPL-MCNC: 139 MG/DL (ref 65–99)
HCT VFR BLD AUTO: 34.9 % (ref 34–46.6)
HGB BLD-MCNC: 11 G/DL (ref 12–15.9)
MCH RBC QN AUTO: 30.1 PG (ref 26.6–33)
MCHC RBC AUTO-ENTMCNC: 31.5 G/DL (ref 31.5–35.7)
MCV RBC AUTO: 95.4 FL (ref 79–97)
PLATELET # BLD AUTO: 178 10*3/MM3 (ref 140–450)
PMV BLD AUTO: 9.7 FL (ref 6–12)
POTASSIUM SERPL-SCNC: 3.8 MMOL/L (ref 3.5–5.2)
RBC # BLD AUTO: 3.66 10*6/MM3 (ref 3.77–5.28)
RH BLD: POSITIVE
SODIUM SERPL-SCNC: 142 MMOL/L (ref 136–145)
T&S EXPIRATION DATE: NORMAL
WBC # BLD AUTO: 9.29 10*3/MM3 (ref 3.4–10.8)

## 2021-09-28 PROCEDURE — C1889 IMPLANT/INSERT DEVICE, NOC: HCPCS | Performed by: SURGERY

## 2021-09-28 PROCEDURE — 25010000002 FENTANYL CITRATE (PF) 50 MCG/ML SOLUTION: Performed by: ANESTHESIOLOGY

## 2021-09-28 PROCEDURE — 88307 TISSUE EXAM BY PATHOLOGIST: CPT | Performed by: SURGERY

## 2021-09-28 PROCEDURE — 36415 COLL VENOUS BLD VENIPUNCTURE: CPT

## 2021-09-28 PROCEDURE — 0BTG4ZZ RESECTION OF LEFT UPPER LUNG LOBE, PERCUTANEOUS ENDOSCOPIC APPROACH: ICD-10-PCS | Performed by: SURGERY

## 2021-09-28 PROCEDURE — 25010000002 PROPOFOL 10 MG/ML EMULSION: Performed by: NURSE ANESTHETIST, CERTIFIED REGISTERED

## 2021-09-28 PROCEDURE — 81275 KRAS GENE VARIANTS EXON 2: CPT

## 2021-09-28 PROCEDURE — 86901 BLOOD TYPING SEROLOGIC RH(D): CPT

## 2021-09-28 PROCEDURE — 25010000002 HEPARIN (PORCINE) PER 1000 UNITS: Performed by: NURSE PRACTITIONER

## 2021-09-28 PROCEDURE — 25010000002 FENTANYL CITRATE (PF) 250 MCG/5ML SOLUTION: Performed by: NURSE ANESTHETIST, CERTIFIED REGISTERED

## 2021-09-28 PROCEDURE — 88360 TUMOR IMMUNOHISTOCHEM/MANUAL: CPT

## 2021-09-28 PROCEDURE — 32608 THORACOSCOPY W/BX NODULE: CPT | Performed by: SURGERY

## 2021-09-28 PROCEDURE — 25010000002 VANCOMYCIN 1 G RECONSTITUTED SOLUTION: Performed by: NURSE ANESTHETIST, CERTIFIED REGISTERED

## 2021-09-28 PROCEDURE — 88341 IMHCHEM/IMCYTCHM EA ADD ANTB: CPT | Performed by: SURGERY

## 2021-09-28 PROCEDURE — 81235 EGFR GENE COM VARIANTS: CPT

## 2021-09-28 PROCEDURE — 94799 UNLISTED PULMONARY SVC/PX: CPT

## 2021-09-28 PROCEDURE — C1729 CATH, DRAINAGE: HCPCS | Performed by: SURGERY

## 2021-09-28 PROCEDURE — 25010000002 MIDAZOLAM PER 1 MG: Performed by: ANESTHESIOLOGY

## 2021-09-28 PROCEDURE — 25010000002 DEXAMETHASONE PER 1 MG: Performed by: NURSE ANESTHETIST, CERTIFIED REGISTERED

## 2021-09-28 PROCEDURE — 88377 M/PHMTRC ALYS ISHQUANT/SEMIQ: CPT

## 2021-09-28 PROCEDURE — 80048 BASIC METABOLIC PNL TOTAL CA: CPT | Performed by: SURGERY

## 2021-09-28 PROCEDURE — 25010000002 VANCOMYCIN 10 G RECONSTITUTED SOLUTION: Performed by: NURSE PRACTITIONER

## 2021-09-28 PROCEDURE — 25010000002 CEFAZOLIN PER 500 MG: Performed by: SURGERY

## 2021-09-28 PROCEDURE — 88342 IMHCHEM/IMCYTCHM 1ST ANTB: CPT | Performed by: SURGERY

## 2021-09-28 PROCEDURE — 25010000002 KETOROLAC TROMETHAMINE PER 15 MG: Performed by: SURGERY

## 2021-09-28 PROCEDURE — 86900 BLOOD TYPING SEROLOGIC ABO: CPT

## 2021-09-28 PROCEDURE — 81276 KRAS GENE ADDL VARIANTS: CPT

## 2021-09-28 PROCEDURE — 25010000002 ONDANSETRON PER 1 MG: Performed by: NURSE ANESTHETIST, CERTIFIED REGISTERED

## 2021-09-28 PROCEDURE — 85027 COMPLETE CBC AUTOMATED: CPT | Performed by: SURGERY

## 2021-09-28 PROCEDURE — 71045 X-RAY EXAM CHEST 1 VIEW: CPT

## 2021-09-28 PROCEDURE — 81210 BRAF GENE: CPT

## 2021-09-28 PROCEDURE — 25010000002 HYDROMORPHONE 1 MG/ML SOLUTION: Performed by: NURSE ANESTHETIST, CERTIFIED REGISTERED

## 2021-09-28 PROCEDURE — 88331 PATH CONSLTJ SURG 1 BLK 1SPC: CPT | Performed by: PATHOLOGY

## 2021-09-28 PROCEDURE — 25010000002 HYDROMORPHONE PER 4 MG: Performed by: ANESTHESIOLOGY

## 2021-09-28 PROCEDURE — 86850 RBC ANTIBODY SCREEN: CPT

## 2021-09-28 DEVICE — ENDOPATH ECHELON ENDOSCOPIC LINEAR CUTTER RELOADS, BLACK, 60MM
Type: IMPLANTABLE DEVICE | Site: LUNG | Status: FUNCTIONAL
Brand: ECHELON ENDOPATH

## 2021-09-28 DEVICE — THE ECHELON, ECHELON ENDOPATH™ AND ECHELON FLEX™ FAMILIES OF ENDOSCOPIC LINEAR CUTTERS AND RELOADS ARE STERILE, SINGLE PATIENT USE INSTRUMENTS THAT SIMULTANEOUSLY CUT AND STAPLE TISSUE. THERE ARE SIX STAGGERED ROWS OF STAPLES, THREE ON EITHER SIDE OF THE CUT LINE. THE 45 MM INSTRUMENTS HAVE A STAPLE LINE THATIS APPROXIMATELY 45 MM LONG AND A CUT LINE THAT IS APPROXIMATELY 42 MM LONG. THE SHAFT CAN ROTATE FREELY IN BOTH DIRECTIONS AND AN ARTICULATION MECHANISM ON ARTICULATING INSTRUMENTS ENABLES BENDING THE DISTAL PORTIONOF THE SHAFT TO FACILITATE LATERAL ACCESS OF THE OPERATIVE SITE.THE INSTRUMENTS ARE SHIPPED WITHOUT A RELOAD AND MUST BE LOADED PRIOR TO USE. A STAPLE RETAINING CAP ON THE RELOAD PROTECTS THE STAPLE LEG POINTS DURING SHIPPING AND TRANSPORTATION. THE INSTRUMENTS’ LOCK-OUT FEATURE IS DESIGNED TO PREVENT A USED RELOAD FROM BEING REFIRED.
Type: IMPLANTABLE DEVICE | Site: LUNG | Status: FUNCTIONAL
Brand: ECHELON ENDOPATH

## 2021-09-28 RX ORDER — SODIUM CHLORIDE 0.9 % (FLUSH) 0.9 %
3 SYRINGE (ML) INJECTION AS NEEDED
Status: DISCONTINUED | OUTPATIENT
Start: 2021-09-28 | End: 2021-09-28 | Stop reason: HOSPADM

## 2021-09-28 RX ORDER — SODIUM CHLORIDE 0.9 % (FLUSH) 0.9 %
10 SYRINGE (ML) INJECTION EVERY 12 HOURS SCHEDULED
Status: DISCONTINUED | OUTPATIENT
Start: 2021-09-28 | End: 2021-09-28 | Stop reason: HOSPADM

## 2021-09-28 RX ORDER — PHENYLEPHRINE HCL IN 0.9% NACL 1 MG/10 ML
SYRINGE (ML) INTRAVENOUS AS NEEDED
Status: DISCONTINUED | OUTPATIENT
Start: 2021-09-28 | End: 2021-09-28 | Stop reason: SURG

## 2021-09-28 RX ORDER — ALBUTEROL SULFATE 1.25 MG/3ML
1.25 SOLUTION RESPIRATORY (INHALATION)
Status: DISCONTINUED | OUTPATIENT
Start: 2021-09-28 | End: 2021-09-28 | Stop reason: HOSPADM

## 2021-09-28 RX ORDER — LIDOCAINE HYDROCHLORIDE 20 MG/ML
INJECTION, SOLUTION EPIDURAL; INFILTRATION; INTRACAUDAL; PERINEURAL AS NEEDED
Status: DISCONTINUED | OUTPATIENT
Start: 2021-09-28 | End: 2021-09-28 | Stop reason: SURG

## 2021-09-28 RX ORDER — ALBUTEROL SULFATE 1.25 MG/3ML
1.25 SOLUTION RESPIRATORY (INHALATION)
Status: DISCONTINUED | OUTPATIENT
Start: 2021-09-28 | End: 2021-09-28

## 2021-09-28 RX ORDER — LABETALOL HYDROCHLORIDE 5 MG/ML
5 INJECTION, SOLUTION INTRAVENOUS
Status: DISCONTINUED | OUTPATIENT
Start: 2021-09-28 | End: 2021-09-28 | Stop reason: HOSPADM

## 2021-09-28 RX ORDER — FENTANYL CITRATE 50 UG/ML
25 INJECTION, SOLUTION INTRAMUSCULAR; INTRAVENOUS
Status: COMPLETED | OUTPATIENT
Start: 2021-09-28 | End: 2021-09-28

## 2021-09-28 RX ORDER — BISACODYL 10 MG
10 SUPPOSITORY, RECTAL RECTAL DAILY PRN
Status: DISCONTINUED | OUTPATIENT
Start: 2021-09-28 | End: 2021-09-29 | Stop reason: HOSPADM

## 2021-09-28 RX ORDER — SODIUM CHLORIDE 0.9 % (FLUSH) 0.9 %
10 SYRINGE (ML) INJECTION AS NEEDED
Status: DISCONTINUED | OUTPATIENT
Start: 2021-09-28 | End: 2021-09-28 | Stop reason: HOSPADM

## 2021-09-28 RX ORDER — BUPIVACAINE HCL/0.9 % NACL/PF 0.1 %
2 PLASTIC BAG, INJECTION (ML) EPIDURAL EVERY 8 HOURS
Status: COMPLETED | OUTPATIENT
Start: 2021-09-28 | End: 2021-09-28

## 2021-09-28 RX ORDER — SODIUM CHLORIDE 9 MG/ML
50 INJECTION, SOLUTION INTRAVENOUS CONTINUOUS
Status: DISCONTINUED | OUTPATIENT
Start: 2021-09-28 | End: 2021-09-28

## 2021-09-28 RX ORDER — HYDROMORPHONE HYDROCHLORIDE 1 MG/ML
0.5 INJECTION, SOLUTION INTRAMUSCULAR; INTRAVENOUS; SUBCUTANEOUS
Status: COMPLETED | OUTPATIENT
Start: 2021-09-28 | End: 2021-09-28

## 2021-09-28 RX ORDER — MIDAZOLAM HYDROCHLORIDE 1 MG/ML
0.5 INJECTION INTRAMUSCULAR; INTRAVENOUS
Status: COMPLETED | OUTPATIENT
Start: 2021-09-28 | End: 2021-09-28

## 2021-09-28 RX ORDER — ROCURONIUM BROMIDE 10 MG/ML
INJECTION, SOLUTION INTRAVENOUS AS NEEDED
Status: DISCONTINUED | OUTPATIENT
Start: 2021-09-28 | End: 2021-09-28 | Stop reason: SURG

## 2021-09-28 RX ORDER — SODIUM CHLORIDE, SODIUM LACTATE, POTASSIUM CHLORIDE, CALCIUM CHLORIDE 600; 310; 30; 20 MG/100ML; MG/100ML; MG/100ML; MG/100ML
100 INJECTION, SOLUTION INTRAVENOUS CONTINUOUS
Status: DISCONTINUED | OUTPATIENT
Start: 2021-09-28 | End: 2021-09-28 | Stop reason: HOSPADM

## 2021-09-28 RX ORDER — ACETYLCYSTEINE 200 MG/ML
3 SOLUTION ORAL; RESPIRATORY (INHALATION)
Status: DISCONTINUED | OUTPATIENT
Start: 2021-09-28 | End: 2021-09-29 | Stop reason: HOSPADM

## 2021-09-28 RX ORDER — KETAMINE HCL IN NACL, ISO-OSM 100MG/10ML
SYRINGE (ML) INJECTION AS NEEDED
Status: DISCONTINUED | OUTPATIENT
Start: 2021-09-28 | End: 2021-09-28 | Stop reason: SURG

## 2021-09-28 RX ORDER — SODIUM CHLORIDE 0.9 % (FLUSH) 0.9 %
3 SYRINGE (ML) INJECTION EVERY 12 HOURS SCHEDULED
Status: DISCONTINUED | OUTPATIENT
Start: 2021-09-28 | End: 2021-09-28 | Stop reason: HOSPADM

## 2021-09-28 RX ORDER — SODIUM CHLORIDE, SODIUM LACTATE, POTASSIUM CHLORIDE, CALCIUM CHLORIDE 600; 310; 30; 20 MG/100ML; MG/100ML; MG/100ML; MG/100ML
1000 INJECTION, SOLUTION INTRAVENOUS CONTINUOUS
Status: DISCONTINUED | OUTPATIENT
Start: 2021-09-28 | End: 2021-09-28 | Stop reason: HOSPADM

## 2021-09-28 RX ORDER — CITALOPRAM 20 MG/1
40 TABLET ORAL DAILY
Status: DISCONTINUED | OUTPATIENT
Start: 2021-09-28 | End: 2021-09-29 | Stop reason: HOSPADM

## 2021-09-28 RX ORDER — IPRATROPIUM BROMIDE AND ALBUTEROL SULFATE 2.5; .5 MG/3ML; MG/3ML
3 SOLUTION RESPIRATORY (INHALATION)
Status: DISCONTINUED | OUTPATIENT
Start: 2021-09-28 | End: 2021-09-29 | Stop reason: HOSPADM

## 2021-09-28 RX ORDER — MAGNESIUM HYDROXIDE 1200 MG/15ML
LIQUID ORAL AS NEEDED
Status: DISCONTINUED | OUTPATIENT
Start: 2021-09-28 | End: 2021-09-28 | Stop reason: HOSPADM

## 2021-09-28 RX ORDER — PANTOPRAZOLE SODIUM 40 MG/1
40 TABLET, DELAYED RELEASE ORAL EVERY MORNING
Refills: 0 | Status: DISCONTINUED | OUTPATIENT
Start: 2021-09-28 | End: 2021-09-29 | Stop reason: HOSPADM

## 2021-09-28 RX ORDER — FLUMAZENIL 0.1 MG/ML
0.2 INJECTION INTRAVENOUS AS NEEDED
Status: DISCONTINUED | OUTPATIENT
Start: 2021-09-28 | End: 2021-09-28 | Stop reason: HOSPADM

## 2021-09-28 RX ORDER — DEXAMETHASONE SODIUM PHOSPHATE 4 MG/ML
INJECTION, SOLUTION INTRA-ARTICULAR; INTRALESIONAL; INTRAMUSCULAR; INTRAVENOUS; SOFT TISSUE AS NEEDED
Status: DISCONTINUED | OUTPATIENT
Start: 2021-09-28 | End: 2021-09-28 | Stop reason: SURG

## 2021-09-28 RX ORDER — HEPARIN SODIUM 5000 [USP'U]/ML
5000 INJECTION, SOLUTION INTRAVENOUS; SUBCUTANEOUS ONCE
Status: COMPLETED | OUTPATIENT
Start: 2021-09-28 | End: 2021-09-28

## 2021-09-28 RX ORDER — CETIRIZINE HYDROCHLORIDE 10 MG/1
10 TABLET ORAL DAILY
Status: DISCONTINUED | OUTPATIENT
Start: 2021-09-28 | End: 2021-09-29 | Stop reason: HOSPADM

## 2021-09-28 RX ORDER — DOCUSATE SODIUM 100 MG/1
100 CAPSULE, LIQUID FILLED ORAL DAILY
Status: DISCONTINUED | OUTPATIENT
Start: 2021-09-28 | End: 2021-09-29 | Stop reason: HOSPADM

## 2021-09-28 RX ORDER — BUSPIRONE HYDROCHLORIDE 5 MG/1
5 TABLET ORAL DAILY
Status: DISCONTINUED | OUTPATIENT
Start: 2021-09-28 | End: 2021-09-29 | Stop reason: HOSPADM

## 2021-09-28 RX ORDER — POLYETHYLENE GLYCOL 3350 17 G/17G
17 POWDER, FOR SOLUTION ORAL DAILY
Status: DISCONTINUED | OUTPATIENT
Start: 2021-09-28 | End: 2021-09-29 | Stop reason: HOSPADM

## 2021-09-28 RX ORDER — ONDANSETRON 2 MG/ML
4 INJECTION INTRAMUSCULAR; INTRAVENOUS AS NEEDED
Status: DISCONTINUED | OUTPATIENT
Start: 2021-09-28 | End: 2021-09-28 | Stop reason: HOSPADM

## 2021-09-28 RX ORDER — LIDOCAINE HYDROCHLORIDE 10 MG/ML
0.5 INJECTION, SOLUTION EPIDURAL; INFILTRATION; INTRACAUDAL; PERINEURAL ONCE AS NEEDED
Status: DISCONTINUED | OUTPATIENT
Start: 2021-09-28 | End: 2021-09-28 | Stop reason: HOSPADM

## 2021-09-28 RX ORDER — VANCOMYCIN HYDROCHLORIDE 1 G/20ML
INJECTION, POWDER, LYOPHILIZED, FOR SOLUTION INTRAVENOUS AS NEEDED
Status: DISCONTINUED | OUTPATIENT
Start: 2021-09-28 | End: 2021-09-28 | Stop reason: SURG

## 2021-09-28 RX ORDER — OXYCODONE AND ACETAMINOPHEN 10; 325 MG/1; MG/1
1 TABLET ORAL ONCE AS NEEDED
Status: COMPLETED | OUTPATIENT
Start: 2021-09-28 | End: 2021-09-28

## 2021-09-28 RX ORDER — EPHEDRINE SULFATE 50 MG/ML
INJECTION, SOLUTION INTRAVENOUS AS NEEDED
Status: DISCONTINUED | OUTPATIENT
Start: 2021-09-28 | End: 2021-09-28 | Stop reason: SURG

## 2021-09-28 RX ORDER — ACETAMINOPHEN 500 MG
1000 TABLET ORAL ONCE
Status: COMPLETED | OUTPATIENT
Start: 2021-09-28 | End: 2021-09-28

## 2021-09-28 RX ORDER — KETOROLAC TROMETHAMINE 15 MG/ML
15 INJECTION, SOLUTION INTRAMUSCULAR; INTRAVENOUS EVERY 6 HOURS SCHEDULED
Status: DISCONTINUED | OUTPATIENT
Start: 2021-09-28 | End: 2021-09-29 | Stop reason: HOSPADM

## 2021-09-28 RX ORDER — NEOSTIGMINE METHYLSULFATE 5 MG/5 ML
SYRINGE (ML) INTRAVENOUS AS NEEDED
Status: DISCONTINUED | OUTPATIENT
Start: 2021-09-28 | End: 2021-09-28 | Stop reason: SURG

## 2021-09-28 RX ORDER — NALOXONE HCL 0.4 MG/ML
0.04 VIAL (ML) INJECTION AS NEEDED
Status: DISCONTINUED | OUTPATIENT
Start: 2021-09-28 | End: 2021-09-28 | Stop reason: HOSPADM

## 2021-09-28 RX ORDER — SODIUM CHLORIDE 0.9 % (FLUSH) 0.9 %
3-10 SYRINGE (ML) INJECTION AS NEEDED
Status: DISCONTINUED | OUTPATIENT
Start: 2021-09-28 | End: 2021-09-28 | Stop reason: HOSPADM

## 2021-09-28 RX ORDER — PROPOFOL 10 MG/ML
VIAL (ML) INTRAVENOUS AS NEEDED
Status: DISCONTINUED | OUTPATIENT
Start: 2021-09-28 | End: 2021-09-28 | Stop reason: SURG

## 2021-09-28 RX ORDER — HYDROCODONE BITARTRATE AND ACETAMINOPHEN 10; 325 MG/1; MG/1
1 TABLET ORAL EVERY 4 HOURS PRN
Status: DISCONTINUED | OUTPATIENT
Start: 2021-09-28 | End: 2021-09-29 | Stop reason: HOSPADM

## 2021-09-28 RX ORDER — ONDANSETRON 2 MG/ML
4 INJECTION INTRAMUSCULAR; INTRAVENOUS EVERY 6 HOURS PRN
Status: DISCONTINUED | OUTPATIENT
Start: 2021-09-28 | End: 2021-09-29 | Stop reason: HOSPADM

## 2021-09-28 RX ORDER — DIAZEPAM 2 MG/1
1 TABLET ORAL EVERY 8 HOURS PRN
Status: DISCONTINUED | OUTPATIENT
Start: 2021-09-28 | End: 2021-09-29 | Stop reason: HOSPADM

## 2021-09-28 RX ORDER — ONDANSETRON 2 MG/ML
INJECTION INTRAMUSCULAR; INTRAVENOUS AS NEEDED
Status: DISCONTINUED | OUTPATIENT
Start: 2021-09-28 | End: 2021-09-28 | Stop reason: SURG

## 2021-09-28 RX ORDER — ATORVASTATIN CALCIUM 10 MG/1
20 TABLET, FILM COATED ORAL DAILY
Status: DISCONTINUED | OUTPATIENT
Start: 2021-09-28 | End: 2021-09-29 | Stop reason: HOSPADM

## 2021-09-28 RX ORDER — ONDANSETRON 4 MG/1
4 TABLET, FILM COATED ORAL EVERY 6 HOURS PRN
Status: DISCONTINUED | OUTPATIENT
Start: 2021-09-28 | End: 2021-09-29 | Stop reason: HOSPADM

## 2021-09-28 RX ORDER — FENTANYL CITRATE 50 UG/ML
INJECTION, SOLUTION INTRAMUSCULAR; INTRAVENOUS AS NEEDED
Status: DISCONTINUED | OUTPATIENT
Start: 2021-09-28 | End: 2021-09-28 | Stop reason: SURG

## 2021-09-28 RX ORDER — LIDOCAINE HYDROCHLORIDE 10 MG/ML
0.5 INJECTION, SOLUTION EPIDURAL; INFILTRATION; INTRACAUDAL; PERINEURAL ONCE AS NEEDED
Status: COMPLETED | OUTPATIENT
Start: 2021-09-28 | End: 2021-09-28

## 2021-09-28 RX ADMIN — ROCURONIUM BROMIDE 50 MG: 50 INJECTION INTRAVENOUS at 07:21

## 2021-09-28 RX ADMIN — HYDROMORPHONE HYDROCHLORIDE 0.5 MG: 1 INJECTION, SOLUTION INTRAMUSCULAR; INTRAVENOUS; SUBCUTANEOUS at 09:37

## 2021-09-28 RX ADMIN — IPRATROPIUM BROMIDE AND ALBUTEROL SULFATE 3 ML: 2.5; .5 SOLUTION RESPIRATORY (INHALATION) at 14:10

## 2021-09-28 RX ADMIN — OXYCODONE AND ACETAMINOPHEN 1 TABLET: 325; 10 TABLET ORAL at 11:33

## 2021-09-28 RX ADMIN — CETIRIZINE HYDROCHLORIDE 10 MG: 10 TABLET ORAL at 14:36

## 2021-09-28 RX ADMIN — HYDROMORPHONE HYDROCHLORIDE 0.5 MG: 1 INJECTION, SOLUTION INTRAMUSCULAR; INTRAVENOUS; SUBCUTANEOUS at 09:27

## 2021-09-28 RX ADMIN — LIDOCAINE HYDROCHLORIDE 80 MG: 20 INJECTION, SOLUTION EPIDURAL; INFILTRATION; INTRACAUDAL; PERINEURAL at 07:21

## 2021-09-28 RX ADMIN — CEFAZOLIN 2 G: 10 INJECTION, POWDER, FOR SOLUTION INTRAVENOUS at 21:38

## 2021-09-28 RX ADMIN — FENTANYL CITRATE 25 MCG: 50 INJECTION, SOLUTION INTRAMUSCULAR; INTRAVENOUS at 09:45

## 2021-09-28 RX ADMIN — IPRATROPIUM BROMIDE AND ALBUTEROL SULFATE 3 ML: 2.5; .5 SOLUTION RESPIRATORY (INHALATION) at 22:55

## 2021-09-28 RX ADMIN — DEXAMETHASONE SODIUM PHOSPHATE 8 MG: 4 INJECTION, SOLUTION INTRA-ARTICULAR; INTRALESIONAL; INTRAMUSCULAR; INTRAVENOUS; SOFT TISSUE at 07:32

## 2021-09-28 RX ADMIN — PANTOPRAZOLE SODIUM 40 MG: 40 TABLET, DELAYED RELEASE ORAL at 14:36

## 2021-09-28 RX ADMIN — KETOROLAC TROMETHAMINE 15 MG: 15 INJECTION, SOLUTION INTRAMUSCULAR; INTRAVENOUS at 14:36

## 2021-09-28 RX ADMIN — FENTANYL CITRATE 25 MCG: 50 INJECTION, SOLUTION INTRAMUSCULAR; INTRAVENOUS at 09:05

## 2021-09-28 RX ADMIN — ATORVASTATIN CALCIUM 20 MG: 10 TABLET, FILM COATED ORAL at 14:36

## 2021-09-28 RX ADMIN — Medication 20 MG: at 08:25

## 2021-09-28 RX ADMIN — SODIUM CHLORIDE, POTASSIUM CHLORIDE, SODIUM LACTATE AND CALCIUM CHLORIDE 1000 ML: 600; 310; 30; 20 INJECTION, SOLUTION INTRAVENOUS at 06:21

## 2021-09-28 RX ADMIN — FENTANYL CITRATE 25 MCG: 50 INJECTION, SOLUTION INTRAMUSCULAR; INTRAVENOUS at 09:19

## 2021-09-28 RX ADMIN — HYDROCODONE BITARTRATE AND ACETAMINOPHEN 1 TABLET: 10; 325 TABLET ORAL at 22:21

## 2021-09-28 RX ADMIN — VANCOMYCIN HYDROCHLORIDE 1250 MG: 10 INJECTION, POWDER, LYOPHILIZED, FOR SOLUTION INTRAVENOUS at 06:22

## 2021-09-28 RX ADMIN — ONDANSETRON 4 MG: 2 INJECTION INTRAMUSCULAR; INTRAVENOUS at 08:26

## 2021-09-28 RX ADMIN — BUSPIRONE HYDROCHLORIDE 5 MG: 5 TABLET ORAL at 09:31

## 2021-09-28 RX ADMIN — DOCUSATE SODIUM 100 MG: 100 CAPSULE ORAL at 14:37

## 2021-09-28 RX ADMIN — DIAZEPAM 1 MG: 2 TABLET ORAL at 22:46

## 2021-09-28 RX ADMIN — EPHEDRINE SULFATE 10 MG: 50 INJECTION INTRAVENOUS at 07:31

## 2021-09-28 RX ADMIN — SODIUM CHLORIDE, POTASSIUM CHLORIDE, SODIUM LACTATE AND CALCIUM CHLORIDE 1000 ML: 600; 310; 30; 20 INJECTION, SOLUTION INTRAVENOUS at 11:50

## 2021-09-28 RX ADMIN — ACETAMINOPHEN 1000 MG: 500 TABLET, FILM COATED ORAL at 06:39

## 2021-09-28 RX ADMIN — MIDAZOLAM 0.5 MG: 1 INJECTION INTRAMUSCULAR; INTRAVENOUS at 06:51

## 2021-09-28 RX ADMIN — FENTANYL CITRATE 125 MCG: 50 INJECTION, SOLUTION INTRAMUSCULAR; INTRAVENOUS at 07:25

## 2021-09-28 RX ADMIN — PROPOFOL 150 MG: 10 INJECTION, EMULSION INTRAVENOUS at 07:21

## 2021-09-28 RX ADMIN — LIDOCAINE HYDROCHLORIDE 0.5 ML: 10 INJECTION, SOLUTION EPIDURAL; INFILTRATION; INTRACAUDAL; PERINEURAL at 06:21

## 2021-09-28 RX ADMIN — GLYCOPYRROLATE 0.4 MG: 0.2 INJECTION, SOLUTION INTRAMUSCULAR; INTRAVENOUS at 08:32

## 2021-09-28 RX ADMIN — HYDROCODONE BITARTRATE AND ACETAMINOPHEN 1 TABLET: 10; 325 TABLET ORAL at 17:24

## 2021-09-28 RX ADMIN — CEFAZOLIN 2 G: 10 INJECTION, POWDER, FOR SOLUTION INTRAVENOUS at 14:36

## 2021-09-28 RX ADMIN — CITALOPRAM 40 MG: 20 TABLET, FILM COATED ORAL at 09:29

## 2021-09-28 RX ADMIN — FENTANYL CITRATE 125 MCG: 50 INJECTION, SOLUTION INTRAMUSCULAR; INTRAVENOUS at 07:21

## 2021-09-28 RX ADMIN — MIDAZOLAM 0.5 MG: 1 INJECTION INTRAMUSCULAR; INTRAVENOUS at 06:39

## 2021-09-28 RX ADMIN — HYDROMORPHONE HYDROCHLORIDE 0.5 MG: 1 INJECTION, SOLUTION INTRAMUSCULAR; INTRAVENOUS; SUBCUTANEOUS at 09:17

## 2021-09-28 RX ADMIN — DIAZEPAM 1 MG: 2 TABLET ORAL at 09:28

## 2021-09-28 RX ADMIN — Medication 3 MG: at 08:32

## 2021-09-28 RX ADMIN — HYDROMORPHONE HYDROCHLORIDE 0.5 MG: 1 INJECTION, SOLUTION INTRAMUSCULAR; INTRAVENOUS; SUBCUTANEOUS at 08:56

## 2021-09-28 RX ADMIN — Medication 200 MCG: at 07:31

## 2021-09-28 RX ADMIN — ACETYLCYSTEINE 3 ML: 200 SOLUTION ORAL; RESPIRATORY (INHALATION) at 22:56

## 2021-09-28 RX ADMIN — VANCOMYCIN HYDROCHLORIDE 1250 MG: 1 INJECTION, POWDER, LYOPHILIZED, FOR SOLUTION INTRAVENOUS at 07:23

## 2021-09-28 RX ADMIN — ACETYLCYSTEINE 3 ML: 200 SOLUTION ORAL; RESPIRATORY (INHALATION) at 14:10

## 2021-09-28 RX ADMIN — Medication 30 MG: at 07:56

## 2021-09-28 RX ADMIN — HYDROMORPHONE HYDROCHLORIDE 0.5 MG: 1 INJECTION, SOLUTION INTRAMUSCULAR; INTRAVENOUS; SUBCUTANEOUS at 09:07

## 2021-09-28 RX ADMIN — HYDROMORPHONE HYDROCHLORIDE 0.5 MG: 1 INJECTION, SOLUTION INTRAMUSCULAR; INTRAVENOUS; SUBCUTANEOUS at 08:36

## 2021-09-28 RX ADMIN — KETOROLAC TROMETHAMINE 15 MG: 15 INJECTION, SOLUTION INTRAMUSCULAR; INTRAVENOUS at 19:46

## 2021-09-28 RX ADMIN — FENTANYL CITRATE 25 MCG: 50 INJECTION, SOLUTION INTRAMUSCULAR; INTRAVENOUS at 09:10

## 2021-09-28 RX ADMIN — PROPOFOL 50 MG: 10 INJECTION, EMULSION INTRAVENOUS at 07:25

## 2021-09-28 RX ADMIN — HEPARIN SODIUM 5000 UNITS: 5000 INJECTION INTRAVENOUS; SUBCUTANEOUS at 06:21

## 2021-09-28 NOTE — ANESTHESIA POSTPROCEDURE EVALUATION
Patient: Damaris Hu    Procedure Summary     Date: 09/28/21 Room / Location: [facility] OR 16 /  [facility] OR    Anesthesia Start: 0706 Anesthesia Stop: 0901    Procedure: THORACOSCOPY VIDEO ASSISTED WITH LEFT UPPER LOBE WEDGE RESECTION (Left Chest) Diagnosis:       Lung nodule      (Lung nodule [R91.1])    Surgeons: Saji Leggett MD Provider: Carrillo Herbert CRNA    Anesthesia Type: general ASA Status: 3          Anesthesia Type: general    Vitals  Vitals Value Taken Time   /64 09/28/21 1220   Temp 97.7 °F (36.5 °C) 09/28/21 1220   Pulse 71 09/28/21 1226   Resp 15 09/28/21 1220   SpO2 98 % 09/28/21 1226   Vitals shown include unvalidated device data.        Post Anesthesia Care and Evaluation    Patient location during evaluation: PACU  Patient participation: complete - patient participated  Level of consciousness: awake and alert  Pain management: adequate  Airway patency: patent  Anesthetic complications: No anesthetic complications    Cardiovascular status: acceptable  Respiratory status: acceptable  Hydration status: acceptable    Comments: Blood pressure 120/63, pulse 68, temperature 97.6 °F (36.4 °C), temperature source Oral, resp. rate 15, SpO2 97 %, not currently breastfeeding.    Pt discharged from PACU based on osmin score >8

## 2021-09-28 NOTE — ANESTHESIA PREPROCEDURE EVALUATION
Anesthesia Evaluation     Patient summary reviewed   NPO Solid Status: > 8 hours  NPO Liquid Status: > 8 hours           Airway   Mallampati: I  TM distance: >3 FB  Neck ROM: full  No difficulty expected  Dental - normal exam     Pulmonary    (+) a smoker (quit 2014) Former, lung cancer, COPD,     ROS comment: Recent ED visit for pain, found to have right lung mass  1. Primary lung mass is centered in the RIGHT upper lobe and abuts the  posterior mediastinal pleura. This also partially encases the RIGHT  bronchus intermedius and RIGHT upper lobe bronchus. This is consistent  with pulmonary malignancy. Surgical sampling is recommended. This could  be obtained with bronchoscopy.  2. Suspected metastatic lymph node in the pretracheal region measuring  1.3 cm in short axis.    Had f/u bronch and EBUS in [place] that diagnosed her with Squamous cell carcinoma  Cardiovascular   Exercise tolerance: poor (<4 METS)    (+) hyperlipidemia,   (-) pacemaker, past MI, CAD, cardiac stents, CABG      Neuro/Psych  (+) headaches, psychiatric history Anxiety and Depression,     (-) seizures, TIA, CVA  GI/Hepatic/Renal/Endo    (+) obesity,  GERD,  renal disease CRI,   (-) liver disease, diabetes    Musculoskeletal     Abdominal   (+) obese,    Substance History      OB/GYN          Other   arthritis,    history of cancer active                      Anesthesia Plan    ASA 3     general     intravenous induction     Anesthetic plan, all risks, benefits, and alternatives have been provided, discussed and informed consent has been obtained with: patient.  Use of blood products discussed with patient  Consented to blood products.

## 2021-09-28 NOTE — ANESTHESIA PROCEDURE NOTES
Airway  Urgency: elective    Date/Time: 9/28/2021 7:21 AM  Airway not difficult    General Information and Staff    Patient location during procedure: OR  CRNA: Carrillo Herbert CRNA    Indications and Patient Condition  Indications for airway management: airway protection    Preoxygenated: yes  MILS maintained throughout  Mask difficulty assessment: 1 - vent by mask    Final Airway Details  Final airway type: endotracheal airway      Successful airway: ETT and EBT - double lumen left  Cuffed: yes   Successful intubation technique: video laryngoscopy  Facilitating devices/methods: intubating stylet  Endotracheal tube insertion site: oral  Blade: Huertas  Blade size: 4  ETT size (mm): 7.5  EBT DL size (fr): 35  Cormack-Lehane Classification: grade I - full view of glottis  Placement verified by: chest auscultation and capnometry   Cuff volume (mL): 8  Measured from: lips  Number of attempts at approach: 1  Assessment: lips, teeth, and gum same as pre-op and atraumatic intubation

## 2021-09-29 ENCOUNTER — APPOINTMENT (OUTPATIENT)
Dept: GENERAL RADIOLOGY | Facility: HOSPITAL | Age: 66
End: 2021-09-29

## 2021-09-29 VITALS
SYSTOLIC BLOOD PRESSURE: 113 MMHG | RESPIRATION RATE: 16 BRPM | HEIGHT: 65 IN | BODY MASS INDEX: 30.3 KG/M2 | OXYGEN SATURATION: 98 % | HEART RATE: 84 BPM | WEIGHT: 181.88 LBS | DIASTOLIC BLOOD PRESSURE: 70 MMHG | TEMPERATURE: 98.5 F

## 2021-09-29 PROBLEM — E66.9 CLASS 1 OBESITY WITH BODY MASS INDEX (BMI) OF 30.0 TO 30.9 IN ADULT: Status: ACTIVE | Noted: 2021-09-29

## 2021-09-29 LAB
ANION GAP SERPL CALCULATED.3IONS-SCNC: 9 MMOL/L (ref 5–15)
BUN SERPL-MCNC: 16 MG/DL (ref 8–23)
BUN/CREAT SERPL: 14.8 (ref 7–25)
CALCIUM SPEC-SCNC: 9.3 MG/DL (ref 8.6–10.5)
CHLORIDE SERPL-SCNC: 104 MMOL/L (ref 98–107)
CO2 SERPL-SCNC: 26 MMOL/L (ref 22–29)
CREAT SERPL-MCNC: 1.08 MG/DL (ref 0.57–1)
DEPRECATED RDW RBC AUTO: 48.5 FL (ref 37–54)
ERYTHROCYTE [DISTWIDTH] IN BLOOD BY AUTOMATED COUNT: 13.5 % (ref 12.3–15.4)
GFR SERPL CREATININE-BSD FRML MDRD: 50 ML/MIN/1.73
GLUCOSE SERPL-MCNC: 90 MG/DL (ref 65–99)
HCT VFR BLD AUTO: 35 % (ref 34–46.6)
HGB BLD-MCNC: 10.7 G/DL (ref 12–15.9)
MCH RBC QN AUTO: 29.9 PG (ref 26.6–33)
MCHC RBC AUTO-ENTMCNC: 30.6 G/DL (ref 31.5–35.7)
MCV RBC AUTO: 97.8 FL (ref 79–97)
PLATELET # BLD AUTO: 198 10*3/MM3 (ref 140–450)
PMV BLD AUTO: 10.3 FL (ref 6–12)
POTASSIUM SERPL-SCNC: 3.6 MMOL/L (ref 3.5–5.2)
RBC # BLD AUTO: 3.58 10*6/MM3 (ref 3.77–5.28)
SODIUM SERPL-SCNC: 139 MMOL/L (ref 136–145)
WBC # BLD AUTO: 9.5 10*3/MM3 (ref 3.4–10.8)

## 2021-09-29 PROCEDURE — 25010000002 ENOXAPARIN PER 10 MG: Performed by: SURGERY

## 2021-09-29 PROCEDURE — 71046 X-RAY EXAM CHEST 2 VIEWS: CPT

## 2021-09-29 PROCEDURE — 94799 UNLISTED PULMONARY SVC/PX: CPT

## 2021-09-29 PROCEDURE — 25010000002 KETOROLAC TROMETHAMINE PER 15 MG: Performed by: SURGERY

## 2021-09-29 PROCEDURE — 71045 X-RAY EXAM CHEST 1 VIEW: CPT

## 2021-09-29 PROCEDURE — 99238 HOSP IP/OBS DSCHRG MGMT 30/<: CPT | Performed by: NURSE PRACTITIONER

## 2021-09-29 PROCEDURE — 80048 BASIC METABOLIC PNL TOTAL CA: CPT | Performed by: SURGERY

## 2021-09-29 PROCEDURE — 85027 COMPLETE CBC AUTOMATED: CPT | Performed by: SURGERY

## 2021-09-29 RX ADMIN — KETOROLAC TROMETHAMINE 15 MG: 15 INJECTION, SOLUTION INTRAMUSCULAR; INTRAVENOUS at 05:51

## 2021-09-29 RX ADMIN — HYDROCODONE BITARTRATE AND ACETAMINOPHEN 1 TABLET: 10; 325 TABLET ORAL at 12:16

## 2021-09-29 RX ADMIN — KETOROLAC TROMETHAMINE 15 MG: 15 INJECTION, SOLUTION INTRAMUSCULAR; INTRAVENOUS at 00:56

## 2021-09-29 RX ADMIN — ACETYLCYSTEINE 3 ML: 200 SOLUTION ORAL; RESPIRATORY (INHALATION) at 06:33

## 2021-09-29 RX ADMIN — ENOXAPARIN SODIUM 40 MG: 40 INJECTION SUBCUTANEOUS at 09:19

## 2021-09-29 RX ADMIN — BUSPIRONE HYDROCHLORIDE 5 MG: 5 TABLET ORAL at 09:19

## 2021-09-29 RX ADMIN — IPRATROPIUM BROMIDE AND ALBUTEROL SULFATE 3 ML: 2.5; .5 SOLUTION RESPIRATORY (INHALATION) at 06:33

## 2021-09-29 RX ADMIN — PANTOPRAZOLE SODIUM 40 MG: 40 TABLET, DELAYED RELEASE ORAL at 05:51

## 2021-09-29 RX ADMIN — CETIRIZINE HYDROCHLORIDE 10 MG: 10 TABLET ORAL at 09:19

## 2021-09-29 RX ADMIN — HYDROCODONE BITARTRATE AND ACETAMINOPHEN 1 TABLET: 10; 325 TABLET ORAL at 03:48

## 2021-09-30 ENCOUNTER — READMISSION MANAGEMENT (OUTPATIENT)
Dept: CALL CENTER | Facility: HOSPITAL | Age: 66
End: 2021-09-30

## 2021-09-30 NOTE — OUTREACH NOTE
Prep Survey      Responses   [facility] facility patient discharged from?  [place]   Is LACE score < 7 ?  No   Emergency Room discharge w/ pulse ox?  No   Eligibility  Readm Mgmt   Discharge diagnosis  Lung nodule VATS left upper lobe wedge resection.     Does the patient have one of the following disease processes/diagnoses(primary or secondary)?  Cardiothoracic surgery   Does the patient have Home health ordered?  No   Prep survey completed?  Yes          Joleen Lambert RN

## 2021-10-05 ENCOUNTER — READMISSION MANAGEMENT (OUTPATIENT)
Dept: CALL CENTER | Facility: HOSPITAL | Age: 66
End: 2021-10-05

## 2021-10-05 NOTE — OUTREACH NOTE
CT Surgery Week 1 Survey      Responses   [facility] patient discharged from?  [place]   Does the patient have one of the following disease processes/diagnoses(primary or secondary)?  Cardiothoracic surgery   Week 1 attempt successful?  Yes   Call start time  0902   Call end time  0907   Discharge diagnosis  Lung nodule VATS left upper lobe wedge resection.     Is patient permission given to speak with other caregiver?  No   Meds reviewed with patient/caregiver?  Yes   Does the patient have all medications related to this admission filled (includes all antibiotics, pain medications, cardiac medications, etc.)  Yes   Is the patient taking all medications as directed (includes completed medication regime)?  Yes   Comments regarding appointments  New Patient with Power Bright MD NEUROSURGERY Tuesday Oct 12, 2021 2:30 PM   Does the patient have a primary care provider?   Yes   Does the patient have an appointment scheduled with their C/T surgeon?  Yes [Post-Op with Ekaterina Cui, RONALD 10/18/21    130pm]   Comments regarding PCP  Luz Maria Skelton, RONALD Thursday Oct 14, 2021 1:00 p.m   Has the patient kept scheduled appointments due by today?  N/A   Has home health visited the patient within 72 hours of discharge?  N/A   Psychosocial issues?  No   Did the patient receive a copy of their discharge instructions?  Yes   Nursing interventions  Reviewed instructions with patient   What is the patient's perception of their health status since discharge?  Improving   Nursing interventions  Nurse provided patient education   Is the patient /caregiver able to teach back basic post-op care?  Practice 'cough and deep breath', Keep incision areas clean, dry and protected   Is the patient/caregiver able to teach back signs and symptoms of incisional infection?  Increased redness, swelling or pain at the incisonal site, Increased drainage or bleeding, Incisional warmth, Pus or odor from incision, Fever   Is the  patient/caregiver able to teach back steps to recovery at home?  Set small, achievable goals for return to baseline health, Rest and rebuild strength, gradually increase activity, Eat a well-balance diet   If the patient is a current smoker, are they able to teach back resources for cessation?  Not a smoker   Is the patient/caregiver able to teach back the hierarchy of who to call/visit for symptoms/problems? PCP, Specialist, Home health nurse, Urgent Care, ED, 911  Yes   Week 1 call completed?  Yes   Wrap up additional comments  Denies further questions or concerns today.             Danay Anderson RN

## 2021-10-06 RX ORDER — FLUTICASONE FUROATE, UMECLIDINIUM BROMIDE AND VILANTEROL TRIFENATATE 100; 62.5; 25 UG/1; UG/1; UG/1
POWDER RESPIRATORY (INHALATION)
Qty: 60 EACH | Refills: 0 | Status: SHIPPED | OUTPATIENT
Start: 2021-10-06 | End: 2021-11-07

## 2021-10-07 ENCOUNTER — APPOINTMENT (OUTPATIENT)
Dept: LAB | Facility: HOSPITAL | Age: 66
End: 2021-10-07

## 2021-10-08 DIAGNOSIS — C34.90 SQUAMOUS CELL CARCINOMA OF LUNG, UNSPECIFIED LATERALITY (HCC): ICD-10-CM

## 2021-10-08 DIAGNOSIS — S22.060A COMPRESSION FRACTURE OF T7 VERTEBRA, INITIAL ENCOUNTER (HCC): ICD-10-CM

## 2021-10-08 RX ORDER — HYDROCODONE BITARTRATE AND ACETAMINOPHEN 10; 325 MG/1; MG/1
1 TABLET ORAL EVERY 8 HOURS PRN
Qty: 90 TABLET | Refills: 0 | Status: SHIPPED | OUTPATIENT
Start: 2021-10-08 | End: 2021-11-09 | Stop reason: SDUPTHER

## 2021-10-08 NOTE — TELEPHONE ENCOUNTER
Naif Ramos called to request a refill on her medication. Last office visit : 9/9/2021   Next office visit : 10/26/2021     Last UDS:   Amphetamine Screen, Urine   Date Value Ref Range Status   06/15/2021 -  Final     Barbiturate Screen, Urine   Date Value Ref Range Status   06/15/2021 -  Final     Benzodiazepine Screen, Urine   Date Value Ref Range Status   06/15/2021 -  Final     Buprenorphine Urine   Date Value Ref Range Status   06/15/2021 -  Final     Cocaine Metabolite Screen, Urine   Date Value Ref Range Status   06/15/2021 -  Final     Gabapentin Screen, Urine   Date Value Ref Range Status   06/15/2021 -  Final     MDMA, Urine   Date Value Ref Range Status   06/15/2021 -  Final     Methamphetamine, Urine   Date Value Ref Range Status   06/15/2021 -  Final     Opiate Scrn, Ur   Date Value Ref Range Status   06/15/2021 -  Final     Oxycodone Screen, Ur   Date Value Ref Range Status   06/15/2021 +  Final     PCP Screen, Urine   Date Value Ref Range Status   06/15/2021 -  Final     Propoxyphene Screen, Urine   Date Value Ref Range Status   06/15/2021 -  Final     THC Screen, Urine   Date Value Ref Range Status   06/15/2021 -  Final     Tricyclic Antidepressants, Urine   Date Value Ref Range Status   06/15/2021 -  Final             Last [facility]: 10/8/2021  Medication Contract: 6/15/2021   Last Fill: 9/12/2021    Requested Prescriptions     Pending Prescriptions Disp Refills    HYDROcodone-acetaminophen (NORCO)  MG per tablet 90 tablet 0     Sig: Take 1 tablet by mouth every 8 hours as needed for Pain for up to 30 days. Intended supply: 30 days         Please approve or refuse this medication.    Dipak Javed, [address] [place] [address]

## 2021-10-08 NOTE — PROGRESS NOTES
[facility] ONC [facility] GROUP HEMATOLOGY AND ONCOLOGY  [address]  [facility] [address] [phone]    Patient Name: Damaris Hu  Encounter Date: 10/13/2021  YOB: 1955  Patient Number: [id number]       REASON FOR VISIT: Damaris Hu is a 65-year-old female who returns in follow-up of stage IIIC (cT4, cN3) squamous cell lung carcinoma.  She received definitive radiation to the right lung (12/05/2019 through 01/27/2020 -6600 cGy in 33 fractions) concurrent with weekly chemotherapy, week 6 on 01/27/2020.  She received maintenance durvalumab, cycle 3 given on 05/12/2020 but then was lost to follow-up. She returned with shortness of breath, back pain and lightheadedness.  CT of the chest, 07/26/2021 revealed previous noted lung nodules increased in size.  She is here with her daughter, Pepper (previously with her sister Ashley).    Pertinent interval history:    --09/28/2021- Final diagnosis:  Lung, left upper lobe, wedge resection: A.consistent with metastatic foci (2) of squamous cell carcinoma (1.6 cm and 1.1 cm in greatest dimension).  B. No visceropleural involvement seen.  C.  Emphysematous changes.      I have reviewed the HPI and verified with the patient the accuracy of it. No changes to interval history since the information was documented. Kade Russell MD 10/13/21       DIAGNOSTIC ABNORMALITIES:          1.   10/28/2019 patient presented to the emergency room with chest pain that has been ongoing for the past year but that recently had gotten more noticeable, more frequent, and has been associated with shortness of breath.  On the day prior to her hospital admission she apparently had a syncopal episode.  An evaluation ensued:  Labs: Glucose 112 otherwise CMP was normal with a BUN of 14 creatinine 0.91 (GFR 62) calcium 10.2, total protein 7.1 and normal liver enzymes.  Troponin T was less than 0.010, proBNP normal at 97.4, d-dimer was  normal at 0.27, sed rate 10, CRP 2.02 (slightly elevated), hemoglobin 11.3, hematocrit 35.6, MCV 92.5, platelets 306,000, WBC 7.37 with 76 point 1 segs 15.2 lymphs.          2.   10/28/2019 - head without contrast.  Impression: No acute intracranial disease.          3.   10/28/2019-CT chest with contrast.  Impression: Primary lung mass centered in the right upper lobe and abuts the posterior mediastinal pleura (5.4 x 5.3 x 4.5 cm).  This also partially encases the right bronchus intermedius and right upper lobe bronchus.  This is consistent with pulmonary malignancy.  Surgical sampling recommended with bronchoscopy.  Suspected metastatic lymph node in the pretracheal region measuring 1.3 cm in short axis.          4.   11/04/2019 - was seen by Dr. Delonte Burns, [facility] for further assessment of the lung mass.  Spirometry on that date revealed severe COPD with positive bronchodilator response.  Postbronchodilator FEV1 revealed significant improvement to moderate/severe COPD.  Plan: Navigational bronchoscopy and possible EBUS for tissue diagnosis.          5.   11/05/2019- PET scan, skull vertex to mid thighs.  Impression: Markedly hypermetabolic right perihilar tumor (49 x 37 mm) with right supraclavicular and mediastinal kyler metastasis.          6.   11/07/2019- CT chest without contrast at [facility].  Comparison, PET CT 11/5/2019.  Impression: Large, spiculated right infrahilar mass, crossing the major fissure to involve the right upper and right lower lobes with direct extension into the subcarinal space.  Mildly enlarged mediastinal lymph nodes and normal-sized right supraclavicular lymph node corresponding to the area of PET positivity.          7.   11/07/2019-bronchoscopy and EBUS guided FNA biopsy of right upper lobe lung: Positive for malignant cells consistent with squamous cell carcinoma.  EBUS guided FNA, station 7: Lymph node elements present.  No  malignant cells identified.  EBUS guided FNA, station 4R: Positive for malignant cells consistent with squamous cell carcinoma.  Immunohistochemical stains performed on cell block #3.  Tumor cells positive for squamous squamous markers p63 and CK 5/6, negative for CK7, TTF-1, and CD 56.  Immunoprofile supports the above diagnosis.  Addendum: Subsequent biopsy showed metastatic disease in a supraclavicular lymph node, finding consistent with advanced stage disease.  Per protocol will proceed with tumor genomic testing on this specimen.  Due to the relatively small amount of tumor cellularity available, the entire panel of testing may not be able to be completed.  BRAF mutation not detected, K-mayco mutation not detected, EGFR mutation not detected.          8.   11/07/2019-navigational bronchoscopy and forceps biopsy of posterior right upper lobe lung mass.  Diagnosis: Fragments of bronchial mucosa.  No evidence of granuloma or malignancy.          9.   11/08/2019- ultrasound-guided biopsy right supraclavicular lymph node (fine-needle aspirate).  Cytopathology diagnosis: Positive for malignant cells consistent with previously established diagnosis of metastatic non-small cell carcinoma.  Comment: Insufficient tumor cellularity for further testing on the specimen.         10.  11/15/2019-- labs, 11/15/2019 with stable anemia otherwise normal CBC with differential, slightly depressed ferritin (65), iron saturation 7%, serum iron 19, TIBC 289.  Repleted B12 and folate.  Received Injectafer.  CMP, CEA, serum iron, iron saturation, ferritin, B12, folate.  Slightly elevated CEA (3.38).         11.   11/21/2019- brain MRI at [facility].  White matter changes.  Empty sella syndrome.  No metastatic lesions.      PREVIOUS INTERVENTIONS:          1.   11/19/2019-Injectafer 750 mg IV x1          2.   Definitive course of radiation to the right lung (concurrent with chemotherapy)-radiation initiated 12/5/2019 through 01/27/2020 -  6600 cGy at 180 cGy/day/33 fractions          3.   Weekly concurrent chemotherapy with carboplatin and Taxol beginning 12/09/2019 through 01/27/2020 (6 weeks)          4.   Maintenance Durvalumab beginning 02/17/2020 through 05/12/2020- C3    LABS    Lab Results - Last 18 Months   Lab Units 09/29/21  0708 09/28/21  0906 09/24/21  1251 08/12/21  1538 06/15/21  1252 11/19/20  1535 05/07/20  1332   HEMOGLOBIN g/dL 10.7* 11.0* 13.0 13.5 13.0 12.5 12.2   HEMATOCRIT % 35.0 34.9 41.6 43.6 40.9 40.7 38.8   MCV fL 97.8* 95.4 99.0* 98.0* 97.4 95.1 93.3   WBC 10*3/mm3 9.50 9.29 9.19 10.51 9.9 6.7 8.93   RDW % 13.5 13.3 13.2 13.0 13.6 13.0 16.1*   MPV fL 10.3 9.7 9.9 9.4 9.3* 10.0 9.1   PLATELETS 10*3/mm3 198 178 218 261 234 261 262   IMM GRAN % %  --   --  0.4 0.3  --   --  1.0*   NEUTROS ABS 10*3/mm3  --   --  8.17* 9.32* 8.3* 4.9 6.78   LYMPHS ABS 10*3/mm3  --   --  0.42* 0.62* 0.5* 0.7* 0.70   MONOS ABS 10*3/mm3  --   --  0.47 0.47 0.80 0.70 1.20*   EOS ABS 10*3/mm3  --   --  0.06 0.04 0.20 0.40 0.12   BASOS ABS 10*3/mm3  --   --  0.03 0.03 0.00 0.10 0.04   IMMATURE GRANS (ABS) 10*3/mm3  --   --  0.04 0.03 0.0 0.0 0.09*   NRBC /100 WBC  --   --  0.0 0.0  --   --  0.0       Lab Results - Last 18 Months   Lab Units 09/29/21  0708 09/28/21  0906 09/24/21  1251 08/24/21  1302 08/12/21  1538 07/26/21  1434 06/15/21  1252 01/19/21  1445 11/19/20  1535 06/16/20  1312 05/07/20  1332   GLUCOSE mg/dL 90 139* 111*  --  119*  --  97  --  92  --  94   SODIUM mmol/L 139 142 142  --  139  --  140  --  137  --  140   POTASSIUM mmol/L 3.6 3.8 4.4  --  4.8  --  4.2  --  4.1  --  4.1   TOTAL CO2 mmol/L  --   --   --   --   --   --  28  --  25  --   --    CO2 mmol/L 26.0 24.0 27.0  --  26.0  --   --   --   --   --  24.0   CHLORIDE mmol/L 104 110* 105  --  103  --  103  --  103  --  101   ANION GAP mmol/L 9.0 8.0 10.0  --  10.0  --  9  --  9  --  15.0   CREATININE mg/dL 1.08* 0.75 1.01*  --  0.86 1.00 0.8   < > 0.8   < > 0.81   BUN mg/dL 16 14  15  --  14  --  14  --  14  --  14   BUN / CREAT RATIO  14.8 18.7 14.9  --  16.3  --   --   --   --   --  17.3   CALCIUM mg/dL 9.3 8.7 9.7  --  10.3  --  9.9  --  10.1  --  9.7   EGFR IF NONAFRICN AM mL/min/1.73 50* 77 55* 55* 66  --  >60  --  >60   < > 71   ALK PHOS U/L  --   --  55  --  55  --  51  --  65  --  56   TOTAL PROTEIN g/dL  --   --  6.8  --  7.3  --  6.9  --  7.1  --  6.6   ALT (SGPT) U/L  --   --  13  --  18  --  12  --  15  --  14   AST (SGOT) U/L  --   --  20  --  18  --  14  --  17  --  13   BILIRUBIN mg/dL  --   --  0.4  --  0.3  --  0.4  --  0.3  --  0.2   ALBUMIN g/dL  --   --  4.40  --  5.10  --  4.4  --  4.6  --  4.00   GLOBULIN gm/dL  --   --  2.4  --  2.2  --   --   --   --   --  2.6    < > = values in this interval not displayed.       Lab Results - Last 18 Months   Lab Units 08/12/21  1538 05/07/20  1332   CEA ng/mL 5.87 5.81       Lab Results - Last 18 Months   Lab Units 05/07/20  1332   IRON mcg/dL 41   TIBC mcg/dL 297*   IRON SATURATION % 14*   FERRITIN ng/mL 284.60*   FOLATE ng/mL 16.90         PAST MEDICAL HISTORY:  ALLERGIES:  Allergies   Allergen Reactions   • Amoxicillin GI Intolerance     CURRENT MEDICATIONS:  Outpatient Encounter Medications as of 10/13/2021   Medication Sig Dispense Refill   • albuterol sulfate  (90 Base) MCG/ACT inhaler INHALE 2 PUFFS BY MOUTH 4 TIMES DAILY AS NEEDED FOR WHEEZING     • atorvastatin (LIPITOR) 20 MG tablet Take 20 mg by mouth Daily.     • busPIRone (BUSPAR) 5 MG tablet Take 5 mg by mouth 3 (Three) Times a Day.     • Calcium Carbonate-Vit D-Min (GNP Calcium 1200) 0660-6810 MG-UNIT chewable tablet Chew 1 tablet/day Daily.     • cetirizine (zyrTEC) 10 MG tablet Take 10 mg by mouth Daily.     • citalopram (CeleXA) 20 MG tablet Take 40 mg by mouth Daily.     • diazePAM (VALIUM) 2 MG tablet TAKE 1 TABLET BY MOUTH EVERY 8 HOURS AS NEEDED FOR ANXIETY UP TO 10 DAYS     • fluticasone (FLONASE) 50 MCG/ACT nasal spray 1 spray by Each Nare route Daily  As Needed.  5   • HYDROcodone-acetaminophen (NORCO)  MG per tablet Take 1 tablet by mouth Every 8 (Eight) Hours As Needed. for pain     • omeprazole (priLOSEC) 20 MG capsule Take 20 mg by mouth Every Morning Before Breakfast.  0   • predniSONE (DELTASONE) 10 MG tablet Take 10 mg by mouth Daily.     • Trelegy Ellipta 100-62.5-25 MCG/INH inhaler Inhale 1 puff Daily.     • busPIRone (BUSPAR) 5 MG tablet Take 5 mg by mouth Daily.       No facility-administered encounter medications on file as of 10/13/2021.     Adult illnesses:  Depression  GERD  Vitamin D deficiency  Chronic fatigue syndrome  Former smoker, quit 4 years ago  Hemorrhoids with history of bright red blood per rectum.  Chronic back pain  Chronic neck pain  Bilateral shoulder and arm radiculopathy, right > left  Degenerative disc disease, C5-C7  Anxiety  Seen in [facility] ED, 03/15/2020 for chest pain.  Evaluation including labs on 03/14/2020 revealed elevated d-dimer and positive blood cultures for Propionibacterium.  CT angiogram of the chest showed near complete resolution of the perihilar right lung mass.  Chronic lung changes with bilateral upper lobe infiltrate.  Upper lobe pneumonia is the main concern though some of this may be related to radiation therapy fibrosis.  No pulmonary embolism.      Past surgeries:  Breast surgery for cyst removal  Colonoscopy, 12/3/2018  Hysterectomy  Left subclavian Mediport placement, 11/22/2019.  Dr. Gibson  Cataracts bilaterally, 04/2021    ADULT ILLNESSES:  Patient Active Problem List   Diagnosis Code   • Age-related osteoporosis without current pathological fracture M81.0   • Anxiety and depression F41.9, F32.A   • Cervical disc disease M50.90   • Hyperlipidemia E78.5   • Lumbar degenerative disc disease M51.36   • Malignant neoplasm of overlapping sites of right lung (HCC) C34.81   • COPD, group B, by GOLD 2017 classification (HCC) J44.9   • Iron deficiency anemia D50.9   • Chemotherapy induced neutropenia (HCC)  D70.1, T45.1X5A   • Antineoplastic chemotherapy induced anemia D64.81, T45.1X5A   • Former smoker Z87.891   • Regional lymph node metastasis present (HCC) C77.9   • 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   • Hypomagnesemia E83.42   • History of radiation therapy Z92.3   • Multiple lung nodules R91.8   • Encounter for care related to vascular access port Z45.2   • Lung nodule R91.1   • Class 1 obesity with body mass index (BMI) of 30.0 to 30.9 in adult E66.9, Z68.30   • Compression fracture of T6 vertebra with routine healing S22.050D   • Compression fracture of T7 vertebra with routine healing S22.060D   • Chronic midline thoracic back pain M54.6, G89.29   • Acute midline thoracic back pain M54.6     SURGERIES:  Past Surgical History:   Procedure Laterality Date   • BREAST SURGERY      Cyst removal   • CATARACT EXTRACTION Bilateral    • COLONOSCOPY N/A 12/3/2018    Procedure: COLONOSCOPY WITH ANESTHESIA;  Surgeon: Myah Pool MD;  Location: [facility] ENDOSCOPY;  Service: Gastroenterology   • HYSTERECTOMY     • LUNG BIOPSY  10/2019    [facility]   • THORACOSCOPY Left 9/28/2021    Procedure: THORACOSCOPY VIDEO ASSISTED WITH LEFT UPPER LOBE WEDGE RESECTION;  Surgeon: Saji Leggett MD;  Location: [facility] OR;  Service: Cardiothoracic;  Laterality: Left;   • VENOUS ACCESS DEVICE (PORT) INSERTION N/A 11/22/2019    Procedure: PLACEMENT OF SINGLE LUMEN PORT;  Surgeon: Mona Gibson MD;  Location: [facility] OR;  Service: General     HEALTH MAINTENANCE ITEMS:  Health Maintenance Due   Topic Date Due   • ZOSTER VACCINE (2 of 3) 09/15/2015   • HEPATITIS C SCREENING  Never done   • ANNUAL WELLNESS VISIT  Never done   • Pneumococcal Vaccine 65+ (2 of 4 - PPSV23) 10/19/2018   • COLORECTAL CANCER SCREENING  12/03/2019   • MAMMOGRAM  02/05/2021   • DXA SCAN  02/05/2021   • INFLUENZA VACCINE  08/01/2021       <no information>  Last Completed Colonoscopy     This patient has no relevant Health  Maintenance data.        Immunization History   Administered Date(s) Administered   • COVID-19 (MODERNA) 2021, 2021, 2021   • FLUAD TRI 65YR+ 2020   • Fluzone High Dose =>65 Years ([employer] ONLY) 10/25/2018, 10/24/2019   • Pneumococcal Conjugate 13-Valent (PCV13) 2018   • Tdap 2015, 10/16/2015   • Zostavax 2015     Last Completed Mammogram     This patient has no relevant Health Maintenance data.            FAMILY HISTORY:  Family History   Problem Relation Age of Onset   • Colon polyps Mother         < 60 years old   • Lung cancer Mother    • Birth defects Mother    • Heart disease Mother    • No Known Problems Father    • Hypertension Sister    • Arthritis Sister    • Asthma Sister    • Prostate cancer Maternal Grandfather    • Breast cancer Maternal Aunt    • Hypertension Sister    • Hypertension Sister    • Colon cancer Neg Hx      SOCIAL HISTORY:  Social History     Socioeconomic History   • Marital status:    Tobacco Use   • Smoking status: Former Smoker     Packs/day: 1.00     Years: 30.00     Pack years: 30.00     Types: Cigarettes     Start date: 1973     Quit date: 2015     Years since quittin.3   • Smokeless tobacco: Never Used   • Tobacco comment: Quit 4 years ago   Vaping Use   • Vaping Use: Never used   Substance and Sexual Activity   • Alcohol use: No   • Drug use: No   • Sexual activity: Defer       REVIEW OF SYSTEMS:  Review of Systems   Constitutional: Positive for fatigue (Chronic. ). Negative for activity change (Manages her personal ADLs and some chores.  Is no longer driving), appetite change (still eating well), chills, diaphoresis, fever, unexpected weight gain and unexpected weight loss.        Manages her personal ADLs, light chores, but isn't running errands.  She still lives by herself.  Says she still spends ~ 50% up and about    Has received Covid vaccines x 3   Eyes: Negative.    Respiratory: Positive for shortness of breath  "(Baseline exertional dyspnea, modulated with inhalers - Trelegy). Negative for cough.    Cardiovascular: Negative for chest pain (Chest pain/pleurisy. Recurrent since last cycle of durvalumab, improved on prednisone - now on 10 mg/day).   Gastrointestinal: Negative.  Negative for abdominal distention, abdominal pain, blood in stool and diarrhea.   Genitourinary: Positive for hematuria (microscopic.  Followed by [facility]).   Musculoskeletal: Positive for arthralgias, back pain (compression fractures mid-back) and neck pain.   Allergic/Immunologic: Positive for environmental allergies.   Neurological: Positive for dizziness (postural). Negative for syncope.   Hematological: Negative for adenopathy. Does not bruise/bleed easily.   Psychiatric/Behavioral: Positive for depressed mood. The patient is nervous/anxious.        /90   Pulse 92   Temp 96.3 °F (35.7 °C)   Resp 16   Ht 165.1 cm (65\")   Wt 82.7 kg (182 lb 4.8 oz)   LMP  (LMP Unknown)   SpO2 94%   Breastfeeding No   BMI 30.34 kg/m²  Body surface area is 1.9 meters squared.  Pain Score    10/13/21 1358   PainSc:   8       Physical Exam:  Physical Exam   Constitutional: She is oriented to person, place, and time. She appears well-developed and well-nourished. No distress.   Pleasant, cooperative, heavy-set, modestly kept elderly female.  Ambulatory.  ECOG 1-2 (prior: 2).      Has regained 1 pound (had lost 2 pounds at his prior visit - had gained 29 pounds at her 3 visits prior to that).   HENT:   Head: Normocephalic.   Mouth/Throat: No oropharyngeal exudate.   Eyes: Pupils are equal, round, and reactive to light. No scleral icterus.   Neck: No JVD present. No tracheal deviation present. No thyromegaly present.   Cardiovascular: Regular rhythm and normal heart sounds. Exam reveals no friction rub.   No murmur heard.  Pulmonary/Chest: Effort normal. No stridor. No respiratory distress. She has no wheezes (No wheezes today). She has no rales. She " exhibits no tenderness.   Barrel shaped chest with diminished breath sounds globally.      Port in the left upper chest is noted.  Is well seated.    Thoracoscopic incision site in the left hemithorax are healed   Abdominal: Soft. Bowel sounds are normal. She exhibits no distension and no mass. There is no abdominal tenderness. There is no guarding.   Musculoskeletal: Normal range of motion. No tenderness.   Neurological: She is alert and oriented to person, place, and time. No cranial nerve deficit. Gait (Gait is slow, a bit stooped, but is otherwise independent) normal.   Skin: Skin is warm and dry. No erythema. There is pallor.   Psychiatric: Her behavior is normal. Judgment and thought content normal.   Vitals reviewed.      Assessment:  1.  Squamous cell carcinoma of the right lung            Tumor stage: IIIC (cT4, cN3, M0)            Bone tumor burden: 5.4 x 5.3 x 4.5 cm mass along the right major fissure that abuts the medial mediastinal pleura and partially encases the right upper lobe bronchus and right bronchus intermedius.  Pathologic pretracheal lymph node measures 1.3 cm.  Right supraclavicular metastasis.            Complications of tumor: Chest pain, dyspnea, syncope?            BRAF mutation not detected            KRAS mutation not detected            EGFR mutation not detected.            PD-L1 and ALK: Not reported apparently due to lack of specimen            Tumor status: Definitive radiation concurrent with chemotherapy completed.  Completed only 3 cycles of maintenance durvalumab.            --03/15/2020-CT angiogram of the chest showed near complete resolution of the perihilar right lung mass.  Chronic lung changes with bilateral upper lobe infiltrate.  Upper lobe pneumonia is the main concern though some of this may be related to radiation therapy fibrosis.  No pulmonary embolism.            --07/26/2021-CT chest.  Progression of metastatic disease with increasing size of multiple  nodules.           --08/24/2021-  MRI brain.  No mets           --08/24/2021-PET scan-increasing size scattered bilateral pulmonary nodules compared to PET/CT of 2/5/2021.  Large left upper lobe pulmonary nodule measures 11 mm with SUV 6.34.  Additional nodules measure less than 1 cm with borderline or absent uptake.  Findings worrisome for intrathoracic recurrence with pulmonary metastasis.           --08/30/2021-navigational biopsy/EBUS: Left upper lobe transbronchial biopsy-negative for malignancy.           --09/28/2021- Final diagnosis:  Lung, left upper lobe, wedge resection: A.consistent with metastatic foci (2) of squamous cell carcinoma (1.6 cm and 1.1 cm in greatest dimension).  B. No visceropleural involvement seen.  C.  Emphysematous changes.  PD-L1 negative (TPS<1%).  Negative for ALK, ROS1 rearrangements.  Negative for BRAF, KRAS and EGFR mutations.    2.  Normocytic anemia, contribution from anemia of malignancy/anemia of chronic disease and chemo.    --Hgb 10.7, 09/29/2021 (prior range: 9.8 - 13.5)    3.  T6, and T7 fractures, relatively severe at T7.  Pathologic fracture is difficult to entirely exclude on CT chest, 10/14/2020.  --Seen by Dr. Bright, 10/12/2021.  Assessment/plan: Compression fractures at T6-7.  Worsening pain in the last couple of weeks.  Could be pleuritic pain postoperatively from her transthoracic biopsy.  We will reimage thoracic spine with MRI to see if there are any acute changes.  Prior compression fractures at T6/7 at this point may not be amenable to kyphoplasty given chronicity.    4.  Chronic fatigue syndrome  5.  COPD.  Now followed by Dr. Tobar  6.  Former smoker 40 pack years  7.  Depression  8.  Hemorrhoids with history of hematochezia  9.  Chronic kidney disease  --GFR 50 mL/min, 09/29/2021 (prior: 55-90)   10. Chronic neck/back pains with DDD, C5-C7.  Followed by Dr. Grady.  Rx for Norco 5 prior to anterior cervical discectomy and fusion C5 in 2015 - never had  surgery  11. Anxiety.  Cancelled C4 durvalumab scheduled for 05/26/2020  12.  Self-directed.  Was lost to follow-up after her last office appointment on 6/10/2020.    RECOMMENDATIONS:   1.    Re: Discussed the labs from 09/28/2020 and 09/29/2021 with normal WBC, stable anemia, normal platelets, depressed GFR otherwise stable BMP.  Pending CEA (from 5.87; 3.71; 4.12; 3.38).    2.   Apprised of left upper lobe, wedge resection consistent with metastatic foci (2) of squamous cell carcinoma: PD-L1 negative (TPS<1%).  Negative for ALK, ROS1 rearrangements.  Negative for BRAF, KRAS and EGFR mutations, 09/28/2021  3.   Previously apprised of brain MRI and PET scan, 08/24/2021 (above). No brain mets.  Increasing bilateral pulmonary nodules.    4.   Send biopsy specimens for molecular studies, 09/28/2021: EGFR, BRAF and KRAS.  ALK, ROS1, NTRK and PD-L1.   5.   Review NCCN guidelines version 5.2021 non-small cell lung cancer after definitive therapy and metastatic recurrence-establish histologic subtype with adequate tissue for molecular testing (rebiopsy if appropriate).  Integrate palliative care.  If histologic subtype is unchanged (squamous cell carcinoma) consider molecular testing (EGFR, ALK, KRAS, ROS1, BRAF, NTRK 1/2/3, met exon 14, RET, PD-L1 testing    6.     Re:  The benefit (MOON 55% versus 29% carbo/pem; PFS 13 mo versus 8.9 mo carbo/pem) and potential toxicities of pembrolizumab (Keytruda) discussed (to include but not limited to: Fatigue, hyperglycemia, hyponatremia, hypoalbuminemia, cough, nausea, pruritus, rash, decreased appetite, hypertriglyceridemia, hypercholesterolemia, hepatotoxicity, hypocalcemia, constipation, diarrhea, arthralgias, dyspnea, peripheral edema, vomiting, headache, anemia, abdominal pain, back pain, fever, vitiligo, hyperthyroidism, hypothyroidism, facial edema, colitis, pneumonia, renal failure, lymphopenia, confusion, pleural effusion, severe infusion reactions,  immune-mediated reactions, exfoliative dermatitis, pneumonitis, pneumonia, pulmonary embolism, sepsis, pancreatitis, type 1 diabetes with ketoacidosis, adrenal insufficiency, nephritis, uveitis, optic neuritis, myasthenia gravis, myositis, rhabdomyolysis, hemolytic anemia, seizures, peripheral neuropathy, respiratory failure). Questions answered to his apparent satisfaction and to the best of my ability. He will agree to. a trial of therapy if deemed necessary.     7.    Schedule B12 at 1000 mcg IM on 10/18/2021 in anticipation of pemetrexed based palliative chemotherapy.  8.    Schedule (plan: Every 21 days x4 cycles prior to maintenance pembro) C1, 10/18/2021; C2, 2021; C3, 2021 -since no targetable  mutation/s:                 •pembrolizumab (Keytruda) 200 mg IV per administration guidelines, day 1                •Carboplatin AUC 5 (BSA = ; TD=  mg) IVPB over 30 min D1                •pemetrexed (Alimta) 500 mg/m2 (BSA = ; TD = mg) IV per administration guidelines.                 - CMP before each dose of carboplatin-                 Make sure:               •Folate, 1 mg p.o. daily started 1 week prior to first dose.  Continue throughout Alimta Rx.               •B12 at 1000 mg IM to begin 1 week before Alimta.  Repeat every 3rd cycle (Cycles 4, 7, 10, and 13)                 9.   Premedicate with:               •Aloxi 0.25 mg IV before chemo                •Decadron 10 mg IV               •Emend 150 mg IV               •Pepcid 20 mg IV     10.  Upon initiation of therapy:  CBC with differential weekly with Procrit 40,000 units weekly if Hgb less than 10 and Hct less than 30; Neupogen 480 mcg subcutaneously daily x3 if ANC less than 1.0 at [facility].     11.  Rx:               •Zofran 8 mg p.o. 3 times daily, #30 - eRx               •Folic acid 1 mg, Si p.o. daily #30, 6 RF - eRx     13.  Appoint to Dr. Alanis - [facility] Re:  Management opinion/clinical trials  14.  Port flush  every 8 weeks   15.  Review office encounter from Dr. Bright, 10/12/2021 (above).  Thoracic spine MRI ordered.  Prior compression fractures at T6-7 may not be amenable to kyphoplasty given chronicity.  16.  Return to the office in 6 weeks with pre-office serum iron, Fe sat, ferritin, CBC with differential, CMP, and CEA.  Schedule surveillance scans after every third cycle of therapy (C3, C6, etc)    MEDICAL DECISION MAKING: High Complexity   AMOUNT OF DATA: Extensive         RISK OF COMPLICATIONS: High         I spent 59 total minutes, face-to-face, caring for Damaris andrews.  Greater than 50% of this time involved counseling and/or coordination of care as documented within this note regarding the patient's illness(es), pros and cons of various treatment options, instructions and/or risk reduction.

## 2021-10-12 ENCOUNTER — OFFICE VISIT (OUTPATIENT)
Dept: NEUROSURGERY | Facility: CLINIC | Age: 66
End: 2021-10-12

## 2021-10-12 VITALS — WEIGHT: 182.2 LBS | HEIGHT: 65 IN | BODY MASS INDEX: 30.35 KG/M2

## 2021-10-12 DIAGNOSIS — S22.060D COMPRESSION FRACTURE OF T7 VERTEBRA WITH ROUTINE HEALING: ICD-10-CM

## 2021-10-12 DIAGNOSIS — M54.6 ACUTE MIDLINE THORACIC BACK PAIN: ICD-10-CM

## 2021-10-12 DIAGNOSIS — S22.050D COMPRESSION FRACTURE OF T6 VERTEBRA WITH ROUTINE HEALING: Primary | ICD-10-CM

## 2021-10-12 DIAGNOSIS — Z87.891 FORMER SMOKER: Chronic | ICD-10-CM

## 2021-10-12 DIAGNOSIS — E66.09 CLASS 1 OBESITY DUE TO EXCESS CALORIES WITH SERIOUS COMORBIDITY AND BODY MASS INDEX (BMI) OF 30.0 TO 30.9 IN ADULT: ICD-10-CM

## 2021-10-12 PROBLEM — G89.29 CHRONIC MIDLINE THORACIC BACK PAIN: Status: ACTIVE | Noted: 2021-10-12

## 2021-10-12 PROCEDURE — 99214 OFFICE O/P EST MOD 30 MIN: CPT | Performed by: NEUROLOGICAL SURGERY

## 2021-10-12 NOTE — PATIENT INSTRUCTIONS
"PATIENT TO CONTINUE TO FOLLOW UP WITH HER PRIMARY CARE PROVIDER FOR YEARLY PHYSICAL EXAMS TO ENSURE COMPLETE HEALTH MAINTENANCE        BMI for Adults  What is BMI?  Body mass index (BMI) is a number that is calculated from a person's weight and height. BMI can help estimate how much of a person's weight is composed of fat. BMI does not measure body fat directly. Rather, it is an alternative to procedures that directly measure body fat, which can be difficult and expensive.  BMI can help identify people who may be at higher risk for certain medical problems.  What are BMI measurements used for?  BMI is used as a screening tool to identify possible weight problems. It helps determine whether a person is obese, overweight, a healthy weight, or underweight.  BMI is useful for:  · Identifying a weight problem that may be related to a medical condition or may increase the risk for medical problems.  · Promoting changes, such as changes in diet and exercise, to help reach a healthy weight. BMI screening can be repeated to see if these changes are working.  How is BMI calculated?  BMI involves measuring your weight in relation to your height. Both height and weight are measured, and the BMI is calculated from those numbers. This can be done either in English (U[place] or metric measurements. Note that charts and online BMI calculators are available to help you find your BMI quickly and easily without having to do these calculations yourself.  To calculate your BMI in English (U.S[place] measurements:    1. Measure your weight in pounds (lb).  2. Multiply the number of pounds by 703.  ? For example, for a person who weighs 180 lb, multiply that number by 703, which equals 126,540.  3. Measure your height in inches. Then multiply that number by itself to get a measurement called \"inches squared.\"  ? For example, for a person who is 70 inches tall, the \"inches squared\" measurement is 70 inches x 70 inches, which equals 4,900 inches " "squared.  4. Divide the total from step 2 (number of lb x 703) by the total from step 3 (inches squared): 126,540 ÷ 4,900 = 25.8. This is your BMI.    To calculate your BMI in metric measurements:  1. Measure your weight in kilograms (kg).  2. Measure your height in meters (m). Then multiply that number by itself to get a measurement called \"meters squared.\"  ? For example, for a person who is 1.75 m tall, the \"meters squared\" measurement is 1.75 m x 1.75 m, which is equal to 3.1 meters squared.  3. Divide the number of kilograms (your weight) by the meters squared number. In this example: 70 ÷ 3.1 = 22.6. This is your BMI.  What do the results mean?  BMI charts are used to identify whether you are underweight, normal weight, overweight, or obese. The following guidelines will be used:  · Underweight: BMI less than 18.5.  · Normal weight: BMI between 18.5 and 24.9.  · Overweight: BMI between 25 and 29.9.  · Obese: BMI of 30 or above.  Keep these notes in mind:  · Weight includes both fat and muscle, so someone with a muscular build, such as an athlete, may have a BMI that is higher than 24.9. In cases like these, BMI is not an accurate measure of body fat.  · To determine if excess body fat is the cause of a BMI of 25 or higher, further assessments may need to be done by a health care provider.  · BMI is usually interpreted in the same way for men and women.  Where to find more information  For more information about BMI, including tools to quickly calculate your BMI, go to these websites:  · Centers for Disease Control and Prevention: www.cdc.gov  · American Heart Association: www.heart.org  · National Heart, Lung, and Blood [place]: www.nhlbi.nih.gov  Summary  · Body mass index (BMI) is a number that is calculated from a person's weight and height.  · BMI may help estimate how much of a person's weight is composed of fat. BMI can help identify those who may be at higher risk for certain medical problems.  · BMI " "can be measured using English measurements or metric measurements.  · BMI charts are used to identify whether you are underweight, normal weight, overweight, or obese.  This information is not intended to replace advice given to you by your health care provider. Make sure you discuss any questions you have with your health care provider.  Document Revised: 09/09/2020 Document Reviewed: 07/17/2020  [employer] Patient Education © 2021 [employer]      https://www.nhlbi.nih.gov/files/docs/public/heart/dash_brief.pdf\">   DASH Eating Plan  DASH stands for Dietary Approaches to Stop Hypertension. The DASH eating plan is a healthy eating plan that has been shown to:  · Reduce high blood pressure (hypertension).  · Reduce your risk for type 2 diabetes, heart disease, and stroke.  · Help with weight loss.  What are tips for following this plan?  Reading food labels  · Check food labels for the amount of salt (sodium) per serving. Choose foods with less than 5 percent of the Daily Value of sodium. Generally, foods with less than 300 milligrams (mg) of sodium per serving fit into this eating plan.  · To find whole grains, look for the word \"whole\" as the first word in the ingredient list.  Shopping  · Buy products labeled as \"low-sodium\" or \"no salt added.\"  · Buy fresh foods. Avoid canned foods and pre-made or frozen meals.  Cooking  · Avoid adding salt when cooking. Use salt-free seasonings or herbs instead of table salt or sea salt. Check with your health care provider or pharmacist before using salt substitutes.  · Do not yoon foods. Cook foods using healthy methods such as baking, boiling, grilling, roasting, and broiling instead.  · Cook with heart-healthy oils, such as olive, canola, avocado, soybean, or sunflower oil.  Meal planning    · Eat a balanced diet that includes:  ? 4 or more servings of fruits and 4 or more servings of vegetables each day. Try to fill one-half of your plate with fruits and vegetables.  ? 6-8 " servings of whole grains each day.  ? Less than 6 oz (170 g) of lean meat, poultry, or fish each day. A 3-oz (85-g) serving of meat is about the same size as a deck of cards. One egg equals 1 oz (28 g).  ? 2-3 servings of low-fat dairy each day. One serving is 1 cup (237 mL).  ? 1 serving of nuts, seeds, or beans 5 times each week.  ? 2-3 servings of heart-healthy fats. Healthy fats called omega-3 fatty acids are found in foods such as walnuts, flaxseeds, fortified milks, and eggs. These fats are also found in cold-water fish, such as sardines, salmon, and mackerel.  · Limit how much you eat of:  ? Canned or prepackaged foods.  ? Food that is high in trans fat, such as some fried foods.  ? Food that is high in saturated fat, such as fatty meat.  ? Desserts and other sweets, sugary drinks, and other foods with added sugar.  ? Full-fat dairy products.  · Do not salt foods before eating.  · Do not eat more than 4 egg yolks a week.  · Try to eat at least 2 vegetarian meals a week.  · Eat more home-cooked food and less restaurant, buffet, and fast food.    Lifestyle  · When eating at a restaurant, ask that your food be prepared with less salt or no salt, if possible.  · If you drink alcohol:  ? Limit how much you use to:  § 0-1 drink a day for women who are not pregnant.  § 0-2 drinks a day for men.  ? Be aware of how much alcohol is in your drink. In the [place] one drink equals one 12 oz bottle of beer (355 mL), one 5 oz glass of wine (148 mL), or one 1½ oz glass of hard liquor (44 mL).  General information  · Avoid eating more than 2,300 mg of salt a day. If you have hypertension, you may need to reduce your sodium intake to 1,500 mg a day.  · Work with your health care provider to maintain a healthy body weight or to lose weight. Ask what an ideal weight is for you.  · Get at least 30 minutes of exercise that causes your heart to beat faster (aerobic exercise) most days of the week. Activities may include walking,  swimming, or biking.  · Work with your health care provider or dietitian to adjust your eating plan to your individual calorie needs.  What foods should I eat?  Fruits  All fresh, dried, or frozen fruit. Canned fruit in natural juice (without added sugar).  Vegetables  Fresh or frozen vegetables (raw, steamed, roasted, or grilled). Low-sodium or reduced-sodium tomato and vegetable juice. Low-sodium or reduced-sodium tomato sauce and tomato paste. Low-sodium or reduced-sodium canned vegetables.  Grains  Whole-grain or whole-wheat bread. Whole-grain or whole-wheat pasta. Brown rice. Oatmeal. Quinoa. Bulgur. Whole-grain and low-sodium cereals. Doris bread. Low-fat, low-sodium crackers. Whole-wheat flour tortillas.  Meats and other proteins  Skinless chicken or turkey. Ground chicken or turkey. Pork with fat trimmed off. Fish and seafood. Egg whites. Dried beans, peas, or lentils. Unsalted nuts, nut butters, and seeds. Unsalted canned beans. Lean cuts of beef with fat trimmed off. Low-sodium, lean precooked or cured meat, such as sausages or meat loaves.  Dairy  Low-fat (1%) or fat-free (skim) milk. Reduced-fat, low-fat, or fat-free cheeses. Nonfat, low-sodium ricotta or cottage cheese. Low-fat or nonfat yogurt. Low-fat, low-sodium cheese.  Fats and oils  Soft margarine without trans fats. Vegetable oil. Reduced-fat, low-fat, or light mayonnaise and salad dressings (reduced-sodium). Canola, safflower, olive, avocado, soybean, and sunflower oils. Avocado.  Seasonings and condiments  Herbs. Spices. Seasoning mixes without salt.  Other foods  Unsalted popcorn and pretzels. Fat-free sweets.  The items listed above may not be a complete list of foods and beverages you can eat. Contact a dietitian for more information.  What foods should I avoid?  Fruits  Canned fruit in a light or heavy syrup. Fried fruit. Fruit in cream or butter sauce.  Vegetables  Creamed or fried vegetables. Vegetables in a cheese sauce. Regular canned  vegetables (not low-sodium or reduced-sodium). Regular canned tomato sauce and paste (not low-sodium or reduced-sodium). Regular tomato and vegetable juice (not low-sodium or reduced-sodium). Pickles. Olives.  Grains  Baked goods made with fat, such as croissants, muffins, or some breads. Dry pasta or rice meal packs.  Meats and other proteins  Fatty cuts of meat. Ribs. Fried meat. Orozco. Bologna, salami, and other precooked or cured meats, such as sausages or meat loaves. Fat from the back of a pig (fatback). Bratwurst. Salted nuts and seeds. Canned beans with added salt. Canned or smoked fish. Whole eggs or egg yolks. Chicken or turkey with skin.  Dairy  Whole or 2% milk, cream, and half-and-half. Whole or full-fat cream cheese. Whole-fat or sweetened yogurt. Full-fat cheese. Nondairy creamers. Whipped toppings. Processed cheese and cheese spreads.  Fats and oils  Butter. Stick margarine. Lard. Shortening. Ghee. Orozco fat. Tropical oils, such as coconut, palm kernel, or palm oil.  Seasonings and condiments  Onion salt, garlic salt, seasoned salt, table salt, and sea salt. Worcestershire sauce. Tartar sauce. Barbecue sauce. Teriyaki sauce. Soy sauce, including reduced-sodium. Steak sauce. Canned and packaged gravies. Fish sauce. Oyster sauce. Cocktail sauce. Store-bought horseradish. Ketchup. Mustard. Meat flavorings and tenderizers. Bouillon cubes. Hot sauces. Pre-made or packaged marinades. Pre-made or packaged taco seasonings. Relishes. Regular salad dressings.  Other foods  Salted popcorn and pretzels.  The items listed above may not be a complete list of foods and beverages you should avoid. Contact a dietitian for more information.  Where to find more information  · National Heart, Lung, and Blood [place]: www.nhlbi.nih.gov  · American Heart Association: www.heart.org  · Academy of Nutrition and Dietetics: www.eatright.org  · National Kidney Foundation: www.kidney.org  Summary  · The DASH eating plan is a  healthy eating plan that has been shown to reduce high blood pressure (hypertension). It may also reduce your risk for type 2 diabetes, heart disease, and stroke.  · When on the DASH eating plan, aim to eat more fresh fruits and vegetables, whole grains, lean proteins, low-fat dairy, and heart-healthy fats.  · With the DASH eating plan, you should limit salt (sodium) intake to 2,300 mg a day. If you have hypertension, you may need to reduce your sodium intake to 1,500 mg a day.  · Work with your health care provider or dietitian to adjust your eating plan to your individual calorie needs.  This information is not intended to replace advice given to you by your health care provider. Make sure you discuss any questions you have with your health care provider.  Document Revised: 11/20/2020 Document Reviewed: 11/20/2020  Elsevier Patient Education © 2021 [employer] Inc.

## 2021-10-12 NOTE — PROGRESS NOTES
SUBJECTIVE:  Patient ID: Damaris Hu is a 66 y.o. female is here today for follow-up.    Chief Complaint:back pain   Chief Complaint   Patient presents with   • Back Pain     patient with known compression fracture (+lung CA); patient had imaging @ [facility] ([facility] and @ [facility].  Patient has known about compression fracture since July 2020.       HPI  66-year-old female with a known history of T6-T7 compression fractures.  The fractures happened in July 2020.  She was evaluated by Dr. Hagen and by Blas Ho for possibility of kyphoplasty in 2020.  She is had persistent midthoracic back pain.  She had a transthoracic biopsy by Dr. Leggett a couple weeks ago and says that her back pain is been worse since then.  She has diagnosis of lung cancer.  She is been treated with radiation and chemotherapy and immunotherapy.    The following portions of the patient's history were reviewed and updated as appropriate: allergies, current medications, past family history, past medical history, past social history, past surgical history and problem list.    OBJECTIVE:    Review of Systems   All other systems reviewed and are negative.         Physical Exam  Eyes:      Extraocular Movements: EOM normal.      Pupils: Pupils are equal, round, and reactive to light.   Neurological:      Mental Status: She is oriented to person, place, and time.      Coordination: Finger-Nose-Finger Test normal.      Gait: Gait is intact.      Deep Tendon Reflexes: Strength normal.   Psychiatric:         Speech: Speech normal.         Neurologic Exam     Mental Status   Oriented to person, place, and time.   Attention: normal.   Speech: speech is normal   Level of consciousness: alert  Knowledge: good.     Cranial Nerves     CN II   Visual fields full to confrontation.     CN III, IV, VI   Pupils are equal, round, and reactive to light.  Extraocular motions are normal.     CN V   Facial sensation intact.     CN VII   Facial expression full, symmetric.     CN  VIII   CN VIII normal.     CN IX, X   CN IX normal.   CN X normal.     CN XI   CN XI normal.     CN XII   CN XII normal.     Motor Exam   Muscle bulk: normal  Overall muscle tone: normal  Right arm pronator drift: absent  Left arm pronator drift: absent    Strength   Strength 5/5 throughout.     Sensory Exam   Light touch normal.   Pinprick normal.     Gait, Coordination, and Reflexes     Gait  Gait: normal    Coordination   Finger to nose coordination: normal    Tremor   Resting tremor: absent  Intention tremor: absent  Action tremor: absent    Reflexes   Reflexes 2+ except as noted.       Independent Review of Radiographic Studies:   CT scan of the chest from July 2021 shows wedge compression fractures at T6/7.  These have progressed compared to prior imaging.    ASSESSMENT/PLAN:  The patient has a history of compression fractures at T6-7.  She says in the last couple weeks her pain is gotten worse.  This could be pleuritic pain postoperatively from her transthoracic biopsy.  We will reimage her thoracic spine with MRI to see if there is any acute changes.  I explained to her very clearly that the prior compression fractures at T6-7 at this point may not be amenable to kyphoplasty given their chronicity.      1. Compression fracture of T6 vertebra with routine healing    2. Compression fracture of T7 vertebra with routine healing    3. Acute midline thoracic back pain    4. Class 1 obesity due to excess calories with serious comorbidity and body mass index (BMI) of 30.0 to 30.9 in adult    5. Former smoker        The patient's Body mass index is 30.17 kg/m².. BMI is above normal parameters. Recommendations include: educational material    No follow-ups on file.      Power Bright MD

## 2021-10-13 ENCOUNTER — OFFICE VISIT (OUTPATIENT)
Dept: ONCOLOGY | Facility: CLINIC | Age: 66
End: 2021-10-13

## 2021-10-13 VITALS
HEIGHT: 65 IN | TEMPERATURE: 96.3 F | RESPIRATION RATE: 16 BRPM | DIASTOLIC BLOOD PRESSURE: 90 MMHG | OXYGEN SATURATION: 94 % | BODY MASS INDEX: 30.37 KG/M2 | SYSTOLIC BLOOD PRESSURE: 138 MMHG | HEART RATE: 92 BPM | WEIGHT: 182.3 LBS

## 2021-10-13 DIAGNOSIS — C34.81 MALIGNANT NEOPLASM OF OVERLAPPING SITES OF RIGHT LUNG (HCC): Primary | Chronic | ICD-10-CM

## 2021-10-13 PROCEDURE — 96372 THER/PROPH/DIAG INJ SC/IM: CPT | Performed by: INTERNAL MEDICINE

## 2021-10-13 PROCEDURE — 99215 OFFICE O/P EST HI 40 MIN: CPT | Performed by: INTERNAL MEDICINE

## 2021-10-13 RX ORDER — BUSPIRONE HYDROCHLORIDE 5 MG/1
5 TABLET ORAL DAILY
COMMUNITY
End: 2021-11-04 | Stop reason: ALTCHOICE

## 2021-10-13 RX ORDER — CYANOCOBALAMIN 1000 UG/ML
1000 INJECTION, SOLUTION INTRAMUSCULAR; SUBCUTANEOUS
Status: DISCONTINUED | OUTPATIENT
Start: 2021-10-13 | End: 2021-11-11

## 2021-10-13 RX ORDER — FOLIC ACID 1 MG/1
1 TABLET ORAL DAILY
Qty: 30 TABLET | Refills: 6 | Status: SHIPPED | OUTPATIENT
Start: 2021-10-13 | End: 2021-11-04 | Stop reason: ALTCHOICE

## 2021-10-13 RX ORDER — ONDANSETRON HYDROCHLORIDE 8 MG/1
8 TABLET, FILM COATED ORAL EVERY 8 HOURS PRN
Qty: 30 TABLET | Refills: 0 | Status: SHIPPED | OUTPATIENT
Start: 2021-10-13 | End: 2021-11-08

## 2021-10-13 RX ADMIN — CYANOCOBALAMIN 1000 MCG: 1000 INJECTION, SOLUTION INTRAMUSCULAR; SUBCUTANEOUS at 15:15

## 2021-10-13 NOTE — PROGRESS NOTES
Subjective     PATIENT NAME:  Damaris Hu  YOB: 1955  PATIENTS AGE:  66 y.o.  PATIENTS SEX:  female  DATE OF SERVICE:  10/13/2021  PROVIDER:  Kade Russell MD      ____________________PATIENT EDUCATION____________________    PATIENT EDUCATION:  Today I met with the patient to discuss the chemotherapy regimen recommended for treatment of her disease.  The patient was given explanation of treatment premed side effects including office policy that prohibits patients to drive if sedating medications are administered, MD explanation given regarding benefits, side effects, toxicities and goals of treatment.  The patient received a Chemotherapy/Biotherapy Plan Summary including diagnosis and specific treatment plan.    SIDE EFFECTS:  Common side effects were discussed with the patient and daughter.  Discussion included hair loss/discoloration, anemia/fatigue, infection/chills/fever, appetite, bleeding risk/precautions, constipation, diarrhea, mouth sores, taste alteration, loss of appetite,nausea/vomiting, peripheral neuropathy, skin/nail changes, rash, muscle aches/weakness, photosensitivity, weight gain/loss, hearing loss, dizziness, high blood pressure, heart damage, liver damage, lung damage, kidney damage, DVT/PE risk, fluid retention, electrolyte/LFT imbalance.  The patient was advised that if complications occur, additional medical treatment is available.    Discussion also included side effects specific to drugs in the treatment plan, specifically.        A total of 15 minutes were spent with the patient, with 100% of time spent in education and counseling.

## 2021-10-14 ENCOUNTER — TELEPHONE (OUTPATIENT)
Dept: ONCOLOGY | Facility: CLINIC | Age: 66
End: 2021-10-14

## 2021-10-14 ENCOUNTER — READMISSION MANAGEMENT (OUTPATIENT)
Dept: CALL CENTER | Facility: HOSPITAL | Age: 66
End: 2021-10-14

## 2021-10-14 DIAGNOSIS — C34.81 MALIGNANT NEOPLASM OF OVERLAPPING SITES OF RIGHT LUNG (HCC): Primary | ICD-10-CM

## 2021-10-14 NOTE — TELEPHONE ENCOUNTER
Message left by patient's sister Ashley. She request that records be sent to Dr. Elizabeth and Dr. Hopson. I faxed records to them. She also request that the referral be made to Dr. Becerril at [facility] instead of the Thoracic oncologist at [place].

## 2021-10-14 NOTE — TELEPHONE ENCOUNTER
----- Message from Joi Gibson MA sent at 10/13/2021  2:44 PM CDT -----  Regarding: put in referral  Appoint to Dr. Alanis - [place] [facility] Re:  Management opinion/clinical trials

## 2021-10-14 NOTE — TELEPHONE ENCOUNTER
Received call from patient daughter, she calls to request treatment date change from 10/18/21 to 10/25/21.  Spoke with [facility] to request the date change.    Apt was changed to: October 10/25/21 @ 8 am. [facility] will move all future treatments forward from this date.   Informed patient daughter she v/u and will be able to view changes in My Chart. If any other questions or concerns please call.

## 2021-10-14 NOTE — OUTREACH NOTE
CT Surgery Week 2 Survey      Responses   [facility] patient discharged from? [place]   Does the patient have one of the following disease processes/diagnoses(primary or secondary)? Cardiothoracic surgery   Week 2 attempt successful? Yes   Call start time 1245   Call end time 1252   Discharge diagnosis Lung nodule VATS left upper lobe wedge resection.     Meds reviewed with patient/caregiver? Yes   Is the patient having any side effects they believe may be caused by any medication additions or changes? No   Does the patient have all medications related to this admission filled (includes all antibiotics, pain medications, cardiac medications, etc.) Yes   Is the patient taking all medications as directed (includes completed medication regime)? Yes   Does the patient have a primary care provider?  Yes   Does the patient have an appointment scheduled with their C/T surgeon? Yes   Has the patient kept scheduled appointments due by today? N/A   Comments Post op with Ekaterina Cui on 10/18/2021   Has home health visited the patient within 72 hours of discharge? N/A   Psychosocial issues? No   Did the patient receive a copy of their discharge instructions? Yes   Nursing interventions Reviewed instructions with patient   What is the patient's perception of their health status since discharge? Improving   Nursing interventions Nurse provided patient education   Is the patient/caregiver able to teach back normal signs of recovery? Pain or discomfort at incisional site   Nursing interventions Reassured on normal signs of recovery   Is the patient /caregiver able to teach back basic post-op care? Practice 'cough and deep breath',  Keep incision areas clean, dry and protected   Is the patient/caregiver able to teach back signs and symptoms of incisional infection? Increased redness, swelling or pain at the incisonal site,  Increased drainage or bleeding,  Incisional warmth,  Pus or odor from incision,  Fever   Is the  patient/caregiver able to teach back steps to recovery at home? Set small, achievable goals for return to baseline health,  Rest and rebuild strength, gradually increase activity,  Eat a well-balance diet   Is the patient /caregiver able to teach back the importance of cardiac rehab? Yes   If the patient is a current smoker, are they able to teach back resources for cessation? Not a smoker   Is the patient/caregiver able to teach back the hierarchy of who to call/visit for symptoms/problems? PCP, Specialist, Home health nurse, Urgent Care, ED, 911 Yes   Week 2 call completed? Yes   Wrap up additional comments Denies further questions or concerns today.           Serafin Palscencia RN

## 2021-10-15 ENCOUNTER — TELEPHONE (OUTPATIENT)
Dept: ONCOLOGY | Facility: CLINIC | Age: 66
End: 2021-10-15

## 2021-10-15 ENCOUNTER — TELEPHONE (OUTPATIENT)
Dept: RADIATION ONCOLOGY | Facility: HOSPITAL | Age: 66
End: 2021-10-15

## 2021-10-15 DIAGNOSIS — C34.81 MALIGNANT NEOPLASM OF OVERLAPPING SITES OF RIGHT LUNG (HCC): Primary | ICD-10-CM

## 2021-10-15 NOTE — TELEPHONE ENCOUNTER
Caller: PRASANNA     Best call back number: [phone]    What is the best time to reach you: 7:00- 4:00    Who are you requesting to speak with (clinical staff, provider,  specific staff member): NURSE    What was the call regarding: PER PRASANNA, WE HAD INITIALLY REFERRED PATIENT FOR A BIOPSY.  REC'D WEDGE BY DR. ERENDIRA AGUILAR.  WE HAD CALLED THEIR OFFICE TO REFER BACK TO THEM AND PRASANNA IS NOT SURE WHY.      Do you require a callback: PLEASE CONTACT PRASANNA TO ADVISE.

## 2021-10-15 NOTE — TELEPHONE ENCOUNTER
CONCEPCIÓN spoke to Ms. Hu via phone to discuss her distress score from 10-13-21. She was here for chemo education for lung cancer. CONCEPCIÓN explained role and source of support. She is 66 years old and lives alone. Her support system includes her daughter and two sisters. She does not have any transportation or financial concerns. Ms. Hu states her distress score was high due to finding out she needed to have chemo again. She is still trying to process all the information she was given. Emotional support was provided. CONCEPCIÓN explained coping strategies to help reduce anxiety. She said her PCP recently increased her Celexa and started her on Buspar due to having panic attacks. She does feel this is helping her anxiety. She also states to take her mind off her health concerns she will play games on her IPad. She was also nervous about having to go through treatment alone when she starts. CONCEPCIÓN explained visitor policy. She is still having pain from her biopsy but states overall she is starting to get back to her baseline. She declined information on support groups. CONCEPCIÓN will follow up with Ms. Hu once she starts treatments.

## 2021-10-15 NOTE — TELEPHONE ENCOUNTER
Called and left Susan a voicemail to call back. I am needing more information about this referral. As far as I can tell we did not make a referral back to Dr. Hopson or Dr. Elizabeth. We are needing to make a referral to Oncologist Dr. Kevin Valentine at [facility].

## 2021-10-15 NOTE — TELEPHONE ENCOUNTER
Faxed updated office note with [facility] to Michelle Baugh. Labs scheduled of [facility].

## 2021-10-17 RX ORDER — PREDNISONE 10 MG/1
TABLET ORAL
Qty: 30 TABLET | Refills: 0 | Status: SHIPPED | OUTPATIENT
Start: 2021-10-17 | End: 2021-11-15

## 2021-10-17 RX ORDER — BUSPIRONE HYDROCHLORIDE 5 MG/1
TABLET ORAL
Qty: 60 TABLET | Refills: 0 | Status: SHIPPED | OUTPATIENT
Start: 2021-10-17

## 2021-10-17 NOTE — PROGRESS NOTES
"Subjective   Chief Complaint   Patient presents with   • Post-op Follow-up     Pt had Thoracoscopy on 9/28       Patient ID: Damaris Hu is a 66 y.o. female who is here for follow-up having had Diagnostic Left Upper Lobe Wedge Resection, VATS by Dr. Leggett on 9/28/2021 performed on 9/28/2021.    History of Present Illness  Post operative recovery was uneventful without any major complications. Sleep habits are fair. Pain control has been fair.   No fevers/sweats/chills. No drainage from incisions. No chest pain or shortness of breath. Appetite is good.  Denies tobacco use.  Ambualting without difficulty.  She has seen Dr. Russell and has follow up with Dr. Bey in [place] tomorrow.  Chest xray has been done today.  She does have some pain at chest tube and incision sites.    The following portions of the patient's history were reviewed and updated as appropriate: allergies, current medications, past family history, past medical history, past social history, past surgical history and problem list.    Review of Systems   Constitutional: Negative for activity change, chills, diaphoresis, fatigue and fever.   HENT: Negative for trouble swallowing and voice change.    Eyes: Negative for visual disturbance.   Respiratory: Negative for chest tightness and shortness of breath.    Cardiovascular: Negative for chest pain, palpitations and leg swelling.   Gastrointestinal: Negative for abdominal pain, diarrhea, nausea and vomiting.   Musculoskeletal: Negative for arthralgias and myalgias.   Skin: Negative for color change, pallor, rash and wound.   Neurological: Negative for dizziness, syncope and light-headedness.   Psychiatric/Behavioral: Negative for agitation, confusion and sleep disturbance.       Objective   Visit Vitals  /81 (BP Location: Left arm, Patient Position: Sitting, Cuff Size: Adult)   Pulse 83   Ht 165.1 cm (65\")   Wt 82.1 kg (181 lb)   LMP  (LMP Unknown)   SpO2 96%   BMI 30.12 kg/m²       Physical " Exam  Vitals reviewed.   Constitutional:       Appearance: Normal appearance.   HENT:      Head: Normocephalic.   Eyes:      Pupils: Pupils are equal, round, and reactive to light.   Cardiovascular:      Rate and Rhythm: Normal rate and regular rhythm.      Heart sounds: Normal heart sounds. No murmur heard.      Pulmonary:      Breath sounds: Normal breath sounds. No wheezing or rales.   Abdominal:      General: There is no distension.      Palpations: Abdomen is soft.      Tenderness: There is no abdominal tenderness.   Musculoskeletal:         General: No swelling or tenderness.   Skin:     General: Skin is warm and dry.      Comments: Thoracic incision are C/D/I and healing nicely   Neurological:      General: No focal deficit present.      Mental Status: She is alert and oriented to person, place, and time.   Psychiatric:         Mood and Affect: Mood normal.         Behavior: Behavior normal.         Thought Content: Thought content normal.         Judgment: Judgment normal.             Assessment/Plan         Independent Review of Radiographic Studies:    CXR: Post surgical changes, no pneumothorax.          Diagnoses and all orders for this visit:    1. Lung nodule (Primary)    2. Former smoker    3. Class 1 obesity due to excess calories with serious comorbidity and body mass index (BMI) of 30.0 to 30.9 in adult           Overall, Damaris Hu is doing well.  Ongoing nerve pain from surgery however I have encouraged her that this will improve with time.  Increase ambulation as tolerated.  Ambulation is limited due to previous compression fracture.  Following post op thoracic surgery home instructions. Provided support and encouragement. All questions have been answered to the best of my ability.  Patient has follow up with Dr. Leggett in a few weeks with chest xray prior to appointment.  Patient has been instructed to contact our office with any questions or concerns should they arise prior to the next office  visit.     Patient's Body mass index is 30.12 kg/m². indicating that she is obese (BMI >30). Obesity-related health conditions include the following: hypertension and dyslipidemias. Obesity is unchanged. BMI is is above average; BMI management plan is completed. We discussed portion control and increasing exercise..    Damaris Hu  reports that she quit smoking about 6 years ago. Her smoking use included cigarettes. She started smoking about 48 years ago. She has a 30.00 pack-year smoking history. She has never used smokeless tobacco.. I have educated her on the risk of diseases from using tobacco products such as cancer, COPD and heart disease.     Advance Care Planning   ACP discussion was held with the patient during this visit. Patient has an advance directive in EMR which is still valid.     Keep upcoming appointment with oncology  Follow up with Dr. Leggett in 1 month with chest xray prior to appontment  Call the office sooner should any issues arise.  Patient verbalized understanding and is agreeable.

## 2021-10-18 ENCOUNTER — OFFICE VISIT (OUTPATIENT)
Dept: CARDIAC SURGERY | Facility: CLINIC | Age: 66
End: 2021-10-18

## 2021-10-18 ENCOUNTER — APPOINTMENT (OUTPATIENT)
Dept: ONCOLOGY | Facility: HOSPITAL | Age: 66
End: 2021-10-18

## 2021-10-18 ENCOUNTER — HOSPITAL ENCOUNTER (OUTPATIENT)
Dept: GENERAL RADIOLOGY | Facility: HOSPITAL | Age: 66
Discharge: HOME | End: 2021-10-18
Admitting: NURSE PRACTITIONER

## 2021-10-18 ENCOUNTER — APPOINTMENT (OUTPATIENT)
Dept: LAB | Facility: HOSPITAL | Age: 66
End: 2021-10-18

## 2021-10-18 ENCOUNTER — LAB (OUTPATIENT)
Dept: LAB | Facility: HOSPITAL | Age: 66
End: 2021-10-18

## 2021-10-18 VITALS
SYSTOLIC BLOOD PRESSURE: 122 MMHG | OXYGEN SATURATION: 96 % | HEART RATE: 83 BPM | DIASTOLIC BLOOD PRESSURE: 81 MMHG | HEIGHT: 65 IN | BODY MASS INDEX: 30.16 KG/M2 | WEIGHT: 181 LBS

## 2021-10-18 DIAGNOSIS — Z87.891 FORMER SMOKER: Chronic | ICD-10-CM

## 2021-10-18 DIAGNOSIS — R91.1 LUNG NODULE: Primary | ICD-10-CM

## 2021-10-18 DIAGNOSIS — R91.1 LUNG NODULE: ICD-10-CM

## 2021-10-18 DIAGNOSIS — C34.81 MALIGNANT NEOPLASM OF OVERLAPPING SITES OF RIGHT LUNG (HCC): ICD-10-CM

## 2021-10-18 DIAGNOSIS — E66.09 CLASS 1 OBESITY DUE TO EXCESS CALORIES WITH SERIOUS COMORBIDITY AND BODY MASS INDEX (BMI) OF 30.0 TO 30.9 IN ADULT: ICD-10-CM

## 2021-10-18 LAB
ALBUMIN SERPL-MCNC: 4.8 G/DL (ref 3.5–5.2)
ALBUMIN/GLOB SERPL: 2.1 G/DL
ALP SERPL-CCNC: 66 U/L (ref 39–117)
ALT SERPL W P-5'-P-CCNC: 13 U/L (ref 1–33)
ANION GAP SERPL CALCULATED.3IONS-SCNC: 11 MMOL/L (ref 5–15)
AST SERPL-CCNC: 17 U/L (ref 1–32)
BASOPHILS # BLD AUTO: 0.03 10*3/MM3 (ref 0–0.2)
BASOPHILS NFR BLD AUTO: 0.3 % (ref 0–1.5)
BILIRUB SERPL-MCNC: 0.4 MG/DL (ref 0–1.2)
BUN SERPL-MCNC: 18 MG/DL (ref 8–23)
BUN/CREAT SERPL: 19.4 (ref 7–25)
CALCIUM SPEC-SCNC: 10.3 MG/DL (ref 8.6–10.5)
CHLORIDE SERPL-SCNC: 104 MMOL/L (ref 98–107)
CO2 SERPL-SCNC: 25 MMOL/L (ref 22–29)
CREAT SERPL-MCNC: 0.93 MG/DL (ref 0.57–1)
DEPRECATED RDW RBC AUTO: 47.8 FL (ref 37–54)
EOSINOPHIL # BLD AUTO: 0.03 10*3/MM3 (ref 0–0.4)
EOSINOPHIL NFR BLD AUTO: 0.3 % (ref 0.3–6.2)
ERYTHROCYTE [DISTWIDTH] IN BLOOD BY AUTOMATED COUNT: 13.4 % (ref 12.3–15.4)
FERRITIN SERPL-MCNC: 74.45 NG/ML (ref 13–150)
GFR SERPL CREATININE-BSD FRML MDRD: 60 ML/MIN/1.73
GLOBULIN UR ELPH-MCNC: 2.3 GM/DL
GLUCOSE SERPL-MCNC: 117 MG/DL (ref 65–99)
HCT VFR BLD AUTO: 41.1 % (ref 34–46.6)
HGB BLD-MCNC: 12.7 G/DL (ref 12–15.9)
IMM GRANULOCYTES # BLD AUTO: 0.06 10*3/MM3 (ref 0–0.05)
IMM GRANULOCYTES NFR BLD AUTO: 0.5 % (ref 0–0.5)
IRON 24H UR-MRATE: 52 MCG/DL (ref 37–145)
IRON SATN MFR SERPL: 14 % (ref 20–50)
LYMPHOCYTES # BLD AUTO: 0.53 10*3/MM3 (ref 0.7–3.1)
LYMPHOCYTES NFR BLD AUTO: 4.8 % (ref 19.6–45.3)
MCH RBC QN AUTO: 29.7 PG (ref 26.6–33)
MCHC RBC AUTO-ENTMCNC: 30.9 G/DL (ref 31.5–35.7)
MCV RBC AUTO: 96.3 FL (ref 79–97)
MONOCYTES # BLD AUTO: 0.54 10*3/MM3 (ref 0.1–0.9)
MONOCYTES NFR BLD AUTO: 4.9 % (ref 5–12)
NEUTROPHILS NFR BLD AUTO: 89.2 % (ref 42.7–76)
NEUTROPHILS NFR BLD AUTO: 9.84 10*3/MM3 (ref 1.7–7)
NRBC BLD AUTO-RTO: 0 /100 WBC (ref 0–0.2)
PLATELET # BLD AUTO: 268 10*3/MM3 (ref 140–450)
PMV BLD AUTO: 9.8 FL (ref 6–12)
POTASSIUM SERPL-SCNC: 4.5 MMOL/L (ref 3.5–5.2)
PROT SERPL-MCNC: 7.1 G/DL (ref 6–8.5)
RBC # BLD AUTO: 4.27 10*6/MM3 (ref 3.77–5.28)
SODIUM SERPL-SCNC: 140 MMOL/L (ref 136–145)
TIBC SERPL-MCNC: 365 MCG/DL (ref 298–536)
TRANSFERRIN SERPL-MCNC: 245 MG/DL (ref 200–360)
WBC # BLD AUTO: 11.03 10*3/MM3 (ref 3.4–10.8)

## 2021-10-18 PROCEDURE — 99024 POSTOP FOLLOW-UP VISIT: CPT | Performed by: NURSE PRACTITIONER

## 2021-10-18 PROCEDURE — 71046 X-RAY EXAM CHEST 2 VIEWS: CPT

## 2021-10-18 PROCEDURE — 82378 CARCINOEMBRYONIC ANTIGEN: CPT

## 2021-10-18 PROCEDURE — 82728 ASSAY OF FERRITIN: CPT

## 2021-10-18 PROCEDURE — 80053 COMPREHEN METABOLIC PANEL: CPT

## 2021-10-18 PROCEDURE — 36415 COLL VENOUS BLD VENIPUNCTURE: CPT

## 2021-10-18 PROCEDURE — 83540 ASSAY OF IRON: CPT

## 2021-10-18 PROCEDURE — 85025 COMPLETE CBC W/AUTO DIFF WBC: CPT

## 2021-10-18 PROCEDURE — 84466 ASSAY OF TRANSFERRIN: CPT

## 2021-10-19 LAB — CEA SERPL-MCNC: 5.11 NG/ML

## 2021-10-20 ENCOUNTER — READMISSION MANAGEMENT (OUTPATIENT)
Dept: CALL CENTER | Facility: HOSPITAL | Age: 66
End: 2021-10-20

## 2021-10-20 ENCOUNTER — TELEPHONE (OUTPATIENT)
Dept: ONCOLOGY | Facility: CLINIC | Age: 66
End: 2021-10-20

## 2021-10-20 NOTE — TELEPHONE ENCOUNTER
Patient's daughter called and cancelled patient's treatment for Monday 10/25/21. They had an appointment at [facility] yesterday and she said they will be sending those records to us, and once we receive those and Dr. Russell sees she asked that she receive a call back. Also per patient's daughter request, I have faxed over patient's lab results to [facility] that were done here on 10/18/21.

## 2021-10-20 NOTE — OUTREACH NOTE
CT Surgery Week 3 Survey      Responses   [facility] patient discharged from? [facility]   Does the patient have one of the following disease processes/diagnoses(primary or secondary)? Cardiothoracic surgery   Week 3 attempt successful? Yes   Call start time 0919   Call end time 0921   Meds reviewed with patient/caregiver? Yes   Is the patient taking all medications as directed (includes completed medication regime)? Yes   Has the patient kept scheduled appointments due by today? Yes   Comments Has seen Dr. Leggett, no new issues, incision healing well   Comments incision healing, no new issues   What is the patient's perception of their health status since discharge? Improving   Week 3 call completed? Yes   Revoked No further contact(revokes)-requires comment   Is the patient interested in additional calls from an ambulatory ?  NOTE:  applies to high risk patients requiring additional follow-up. No   Graduated/Revoked comments She states has seen Dr. Leggett, healing well, pain under control, will call us if needed, o new issues or concerns today.           Ese Roblero RN

## 2021-10-22 ENCOUNTER — TELEPHONE (OUTPATIENT)
Dept: ONCOLOGY | Facility: CLINIC | Age: 66
End: 2021-10-22

## 2021-10-22 NOTE — TELEPHONE ENCOUNTER
Called patient to let her know that as of right now we haven't received the records from [facility] but as soon as we receive them we will give them to Dr. Russell to look over. Patient verbalized understanding.    RP

## 2021-10-25 ENCOUNTER — APPOINTMENT (OUTPATIENT)
Dept: ONCOLOGY | Facility: HOSPITAL | Age: 66
End: 2021-10-25

## 2021-10-25 ENCOUNTER — APPOINTMENT (OUTPATIENT)
Dept: LAB | Facility: HOSPITAL | Age: 66
End: 2021-10-25

## 2021-10-26 ENCOUNTER — HOSPITAL ENCOUNTER (OUTPATIENT)
Dept: MRI IMAGING | Facility: HOSPITAL | Age: 66
Discharge: HOME | End: 2021-10-26
Admitting: NEUROLOGICAL SURGERY

## 2021-10-26 DIAGNOSIS — M54.6 ACUTE MIDLINE THORACIC BACK PAIN: ICD-10-CM

## 2021-10-26 DIAGNOSIS — S22.060D COMPRESSION FRACTURE OF T7 VERTEBRA WITH ROUTINE HEALING: ICD-10-CM

## 2021-10-26 DIAGNOSIS — S22.050D COMPRESSION FRACTURE OF T6 VERTEBRA WITH ROUTINE HEALING: ICD-10-CM

## 2021-10-26 PROCEDURE — 72146 MRI CHEST SPINE W/O DYE: CPT

## 2021-10-29 ENCOUNTER — VIRTUAL VISIT (OUTPATIENT)
Dept: PRIMARY CARE CLINIC | Age: 66
End: 2021-10-29
Payer: MEDICARE

## 2021-10-29 DIAGNOSIS — F41.9 ANXIETY AND DEPRESSION: Primary | ICD-10-CM

## 2021-10-29 DIAGNOSIS — S22.050S COMPRESSION FRACTURE OF T5 VERTEBRA, SEQUELA: ICD-10-CM

## 2021-10-29 DIAGNOSIS — F32.A ANXIETY AND DEPRESSION: Primary | ICD-10-CM

## 2021-10-29 DIAGNOSIS — C34.90 SQUAMOUS CELL CARCINOMA OF LUNG, UNSPECIFIED LATERALITY (HCC): ICD-10-CM

## 2021-10-29 DIAGNOSIS — S22.060A COMPRESSION FRACTURE OF T7 VERTEBRA, INITIAL ENCOUNTER (HCC): ICD-10-CM

## 2021-10-29 PROCEDURE — 99214 OFFICE O/P EST MOD 30 MIN: CPT | Performed by: NURSE PRACTITIONER

## 2021-10-29 ASSESSMENT — ENCOUNTER SYMPTOMS
GASTROINTESTINAL NEGATIVE: 1
RESPIRATORY NEGATIVE: 1
EYES NEGATIVE: 1
BACK PAIN: 1

## 2021-10-29 NOTE — PROGRESS NOTES
[address] [address]     Phone:  [phone]  Fax:  [phone]      10/29/2021    TELEHEALTH EVALUATION -- Audio/Visual (During ZJAAL-75 public health emergency)    HPI:    Chief Complaint   Patient presents with    Medication Check         Neyda Garner (:  1955) has requested an audio/video evaluation for the following concern(s): This is a video visit for follow-up on chronic conditions. Overall patient states that she is doing well since last check. Patient is still following up with oncology in regards to squamous cell carcinoma. During last visit we did increase her Celexa up to 40 mg daily to see if this would help with her increased anxiety depression symptoms. She is also taking Buspar 5 mg in AM.    Patient has had lung biopsy with Dr Bailey Ojeda recently. She reports that she has remained very sore since then. New t5 fracture was noted on a new MRI per patient report. She is going to Dr Karina Lay for additional recommendations. Review of Systems   Constitutional: Negative. HENT: Negative. Eyes: Negative. Respiratory: Negative. Cardiovascular: Negative. Gastrointestinal: Negative. Endocrine: Negative. Genitourinary: Negative. Musculoskeletal: Positive for back pain (chronic). Skin: Negative. Neurological: Negative. Hematological: Negative. Psychiatric/Behavioral: The patient is nervous/anxious. Prior to Visit Medications    Medication Sig Taking? Authorizing Provider   busPIRone (BUSPAR) 5 MG tablet Take 1 tablet by mouth twice daily as needed for anxiety Yes Priscella Loud, APRN   predniSONE (DELTASONE) 10 MG tablet Take 1 tablet by mouth once daily Yes Priscella Loud, APRN   HYDROcodone-acetaminophen (NORCO)  MG per tablet Take 1 tablet by mouth every 8 hours as needed for Pain for up to 30 days.  Intended supply: 30 days Yes ROBER Ellis - CNP   TRELEGY ELLIPTA 100-62.5-25 MCG/INH AEPB INHALE 1 PUFF ONCE DAILY Yes Elvia Mcin, APRN   citalopram (CELEXA) 40 MG tablet Take once daily Yes Elvia Mcin, APRN   omeprazole (PRILOSEC) 20 MG delayed release capsule Take 1 capsule by mouth Daily Yes Elvia [place], APRN   atorvastatin (LIPITOR) 20 MG tablet Take 1 tablet by mouth daily Yes Elvia [place], APRN   cetirizine (ZYRTEC) 10 MG tablet Take 1 tablet by mouth daily Yes Elvia Mcin, APRN   diclofenac sodium (VOLTAREN) 1 % GEL Apply 2 g topically 4 times daily Yes Elvia Mcin, APRN   fluticasone (FLONASE) 50 MCG/ACT nasal spray 1 spray by Nasal route daily Yes Elvia Mcin APRN   albuterol sulfate HFA (VENTOLIN HFA) 108 (90 Base) MCG/ACT inhaler Inhale 2 puffs into the lungs 4 times daily as needed for Wheezing  Patient not taking: Reported on 10/29/2021  ROBER Jimenez       Social History     Tobacco Use    Smoking status: Former Smoker     Packs/day: 1.00     Years: 20.00     Pack years: 20.00     Quit date: 10/3/2014     Years since quittin.0    Smokeless tobacco: Never Used   Vaping Use    Vaping Use: Never used   Substance Use Topics    Alcohol use: No    Drug use: No        Allergies   Allergen Reactions    Amoxicillin Other (See Comments)   ,   Past Medical History:   Diagnosis Date    Allergic rhinitis     Anxiety     COPD (chronic obstructive pulmonary disease) ([address]     GERD (gastroesophageal reflux disease)     [facility]     RA (rheumatoid arthritis) ([facility]    ,   Past Surgical History:   Procedure Laterality Date    BREAST SURGERY Bilateral     Dr Ashlyn Govea Bilateral     SKIN BIOPSY      2 mole removed by mary    , No family history on file.     PHYSICAL EXAMINATION:  [ INSTRUCTIONS:  \"[x]\" Indicates a positive item  \"[]\" Indicates a negative item  -- DELETE ALL ITEMS NOT EXAMINED]  Vital Signs: (As obtained by patient/caregiver at home)        Constitutional: [x] Appears well-developed and well-nourished [x] No apparent distress      [] Abnormal   Mental status  [x] Alert and awake  [x] Oriented to person/place/time [x]Able to follow commands        Eyes:  EOM    [x]  Normal  [] Abnormal-  Sclera  [x]  Normal  [] Abnormal -         Discharge []  None visible  [] Abnormal -    HENT:   [x] Normocephalic, atraumatic. [] Abnormal   [x] Mouth/Throat: Mucous membranes are moist.     External Ears [x] Normal  [] Abnormal-    Neck: [x] No visualized mass     Pulmonary/Chest: [x] Respiratory effort normal.  [x] No visualized signs of difficulty breathing or respiratory distress        [] Abnormal      Musculoskeletal:   [x] Normal gait with no signs of ataxia         [x] Normal range of motion of neck        [] Abnormal       Neurological:        [x] No Facial Asymmetry (Cranial nerve 7 motor function) (limited exam to video visit)          [] No gaze palsy        [] Abnormal         Skin:        [x] No significant exanthematous lesions or discoloration noted on facial skin         [] Abnormal            Psychiatric:       [x] Normal Affect [] Abnormal        [] No Hallucinations    Other pertinent observable physical exam findings:-    Due to this being a TeleHealth encounter, evaluation of the following organ systems is limited: Vitals/Constitutional/EENT/Resp/CV/GI//MS/Neuro/Skin/Heme-Lymph-Imm. ASSESSMENT/PLAN:  1. Anxiety and depression      2. Squamous cell carcinoma of lung, unspecified laterality ([address]      3. Compression fracture of T7 vertebra, initial encounter ([address]      4. Compression fracture of T5 vertebra, sequela      She is to keep appointment as scheduled with oncology and neurosurgery. Will continue pain management with Norco.    Will continue Buspar and Celexa as prescribed. Return in about 2 months (around 12/29/2021) for Follow up chronic conditions. An  electronic signature was used to authenticate this note.     --ROBER Ivory on 10/29/2021 at 1:47 PM        Pursuant to the emergency declaration under the [facility], [facility] waiver authority and the [employer] Act, this Virtual  Visit was conducted, with patient's consent, to reduce the patient's risk of exposure to COVID-19 and provide continuity of care for an established patient. Services were provided through a video synchronous discussion virtually to substitute for in-person clinic visit.

## 2021-11-04 ENCOUNTER — OFFICE VISIT (OUTPATIENT)
Dept: NEUROSURGERY | Facility: CLINIC | Age: 66
End: 2021-11-04

## 2021-11-04 VITALS — BODY MASS INDEX: 30.09 KG/M2 | WEIGHT: 180.6 LBS | HEIGHT: 65 IN

## 2021-11-04 DIAGNOSIS — M54.6 ACUTE MIDLINE THORACIC BACK PAIN: ICD-10-CM

## 2021-11-04 DIAGNOSIS — S22.050G COMPRESSION FRACTURE OF T5 VERTEBRA WITH DELAYED HEALING: Primary | ICD-10-CM

## 2021-11-04 DIAGNOSIS — S22.050G COMPRESSION FRACTURE OF T6 VERTEBRA WITH DELAYED HEALING: ICD-10-CM

## 2021-11-04 DIAGNOSIS — Z87.891 FORMER SMOKER: Chronic | ICD-10-CM

## 2021-11-04 DIAGNOSIS — E66.09 CLASS 1 OBESITY DUE TO EXCESS CALORIES WITH SERIOUS COMORBIDITY AND BODY MASS INDEX (BMI) OF 30.0 TO 30.9 IN ADULT: ICD-10-CM

## 2021-11-04 PROBLEM — S22.050D COMPRESSION FRACTURE OF T6 VERTEBRA WITH ROUTINE HEALING: Status: RESOLVED | Noted: 2021-10-12 | Resolved: 2021-11-04

## 2021-11-04 PROCEDURE — 99214 OFFICE O/P EST MOD 30 MIN: CPT | Performed by: NEUROLOGICAL SURGERY

## 2021-11-04 NOTE — H&P (VIEW-ONLY)
SUBJECTIVE:  Patient ID: Damaris Hu is a 66 y.o. female is here today for follow-up.    Chief Complaint:back pain   Chief Complaint   Patient presents with   • Back Pain     patient continues to have back pain that wraps around her ribcage; she is here to discuss the MRI @ [facility] on 10/26/2021 which shows a new compression frature.       HPI  58-year-old female with a history of lung cancer and prior compression fractures.  She has had several weeks of  worsening high thoracic back pain.  We sent her for repeat MRI imaging she is here to discuss the results    The following portions of the patient's history were reviewed and updated as appropriate: allergies, current medications, past family history, past medical history, past social history, past surgical history and problem list.    OBJECTIVE:    Review of Systems   Musculoskeletal: Positive for back pain.   All other systems reviewed and are negative.         Physical Exam  Eyes:      Extraocular Movements: EOM normal.      Pupils: Pupils are equal, round, and reactive to light.   Neurological:      Mental Status: She is oriented to person, place, and time.      Coordination: Finger-Nose-Finger Test normal.      Gait: Gait is intact.      Deep Tendon Reflexes: Strength normal.   Psychiatric:         Speech: Speech normal.         Neurologic Exam     Mental Status   Oriented to person, place, and time.   Attention: normal.   Speech: speech is normal   Level of consciousness: alert  Knowledge: good.     Cranial Nerves     CN II   Visual fields full to confrontation.     CN III, IV, VI   Pupils are equal, round, and reactive to light.  Extraocular motions are normal.     CN V   Facial sensation intact.     CN VII   Facial expression full, symmetric.     CN VIII   CN VIII normal.     CN IX, X   CN IX normal.   CN X normal.     CN XI   CN XI normal.     CN XII   CN XII normal.     Motor Exam   Muscle bulk: normal  Overall muscle tone: normal  Right arm pronator drift:  absent  Left arm pronator drift: absent    Strength   Strength 5/5 throughout.     Sensory Exam   Light touch normal.   Pinprick normal.     Gait, Coordination, and Reflexes     Gait  Gait: normal    Coordination   Finger to nose coordination: normal    Tremor   Resting tremor: absent  Intention tremor: absent  Action tremor: absent    Reflexes   Reflexes 2+ except as noted.       Independent Review of Radiographic Studies:       ASSESSMENT/PLAN:  Review of imaging from earlier this year and that the MRI done October 26 shows an acute compression fracture at T5 with some edema still present in the compression fracture at T6.  The T7 fracture is a very chronic.  I would recommend taking the patient to the operating room for biopsy and kyphoplasty of T5 and T6.  We discussed this at length.  The risk and benefits were reviewed which included but were not limited to infection, bleeding, paralysis, spinal fluid leak, extravasation of kyphoplasty cement resulting in neurocompression, pulmonary was not, stroke, coma, and death.  She acknowledged understand this.  Her questions concerns were addressed.      No diagnosis found.    The patient's Body mass index is 30.12 kg/m².. BMI is above normal parameters. Recommendations include: educational material    No follow-ups on file.      Power Bright MD

## 2021-11-04 NOTE — H&P
SUBJECTIVE:  Patient ID: Damaris Hu is a 66 y.o. female is here today for follow-up.    Chief Complaint:back pain   Chief Complaint   Patient presents with   • Back Pain     patient continues to have back pain that wraps around her ribcage; she is here to discuss the MRI @ [facility] on 10/26/2021 which shows a new compression frature.       HPI  58-year-old female with a history of lung cancer and prior compression fractures.  She has had several weeks of  worsening high thoracic back pain.  We sent her for repeat MRI imaging she is here to discuss the results    The following portions of the patient's history were reviewed and updated as appropriate: allergies, current medications, past family history, past medical history, past social history, past surgical history and problem list.    OBJECTIVE:    Review of Systems   Musculoskeletal: Positive for back pain.   All other systems reviewed and are negative.         Physical Exam  Eyes:      Extraocular Movements: EOM normal.      Pupils: Pupils are equal, round, and reactive to light.   Neurological:      Mental Status: She is oriented to person, place, and time.      Coordination: Finger-Nose-Finger Test normal.      Gait: Gait is intact.      Deep Tendon Reflexes: Strength normal.   Psychiatric:         Speech: Speech normal.         Neurologic Exam     Mental Status   Oriented to person, place, and time.   Attention: normal.   Speech: speech is normal   Level of consciousness: alert  Knowledge: good.     Cranial Nerves     CN II   Visual fields full to confrontation.     CN III, IV, VI   Pupils are equal, round, and reactive to light.  Extraocular motions are normal.     CN V   Facial sensation intact.     CN VII   Facial expression full, symmetric.     CN VIII   CN VIII normal.     CN IX, X   CN IX normal.   CN X normal.     CN XI   CN XI normal.     CN XII   CN XII normal.     Motor Exam   Muscle bulk: normal  Overall muscle tone: normal  Right arm pronator drift:  absent  Left arm pronator drift: absent    Strength   Strength 5/5 throughout.     Sensory Exam   Light touch normal.   Pinprick normal.     Gait, Coordination, and Reflexes     Gait  Gait: normal    Coordination   Finger to nose coordination: normal    Tremor   Resting tremor: absent  Intention tremor: absent  Action tremor: absent    Reflexes   Reflexes 2+ except as noted.       Independent Review of Radiographic Studies:       ASSESSMENT/PLAN:  Review of imaging from earlier this year and that the MRI done October 26 shows an acute compression fracture at T5 with some edema still present in the compression fracture at T6.  The T7 fracture is a very chronic.  I would recommend taking the patient to the operating room for biopsy and kyphoplasty of T5 and T6.  We discussed this at length.  The risk and benefits were reviewed which included but were not limited to infection, bleeding, paralysis, spinal fluid leak, extravasation of kyphoplasty cement resulting in neurocompression, pulmonary was not, stroke, coma, and death.  She acknowledged understand this.  Her questions concerns were addressed.      No diagnosis found.    The patient's Body mass index is 30.12 kg/m².. BMI is above normal parameters. Recommendations include: educational material    No follow-ups on file.      Power Bright MD

## 2021-11-04 NOTE — PROGRESS NOTES
SUBJECTIVE:  Patient ID: Damaris Hu is a 66 y.o. female is here today for follow-up.    Chief Complaint:back pain   Chief Complaint   Patient presents with   • Back Pain     patient continues to have back pain that wraps around her ribcage; she is here to discuss the MRI @ [facility] on 10/26/2021 which shows a new compression frature.       HPI  58-year-old female with a history of lung cancer and prior compression fractures.  She has had several weeks of  worsening high thoracic back pain.  We sent her for repeat MRI imaging she is here to discuss the results    The following portions of the patient's history were reviewed and updated as appropriate: allergies, current medications, past family history, past medical history, past social history, past surgical history and problem list.    OBJECTIVE:    Review of Systems   Musculoskeletal: Positive for back pain.   All other systems reviewed and are negative.         Physical Exam  Eyes:      Extraocular Movements: EOM normal.      Pupils: Pupils are equal, round, and reactive to light.   Neurological:      Mental Status: She is oriented to person, place, and time.      Coordination: Finger-Nose-Finger Test normal.      Gait: Gait is intact.      Deep Tendon Reflexes: Strength normal.   Psychiatric:         Speech: Speech normal.         Neurologic Exam     Mental Status   Oriented to person, place, and time.   Attention: normal.   Speech: speech is normal   Level of consciousness: alert  Knowledge: good.     Cranial Nerves     CN II   Visual fields full to confrontation.     CN III, IV, VI   Pupils are equal, round, and reactive to light.  Extraocular motions are normal.     CN V   Facial sensation intact.     CN VII   Facial expression full, symmetric.     CN VIII   CN VIII normal.     CN IX, X   CN IX normal.   CN X normal.     CN XI   CN XI normal.     CN XII   CN XII normal.     Motor Exam   Muscle bulk: normal  Overall muscle tone: normal  Right arm pronator drift:  absent  Left arm pronator drift: absent    Strength   Strength 5/5 throughout.     Sensory Exam   Light touch normal.   Pinprick normal.     Gait, Coordination, and Reflexes     Gait  Gait: normal    Coordination   Finger to nose coordination: normal    Tremor   Resting tremor: absent  Intention tremor: absent  Action tremor: absent    Reflexes   Reflexes 2+ except as noted.       Independent Review of Radiographic Studies:       ASSESSMENT/PLAN:  Review of imaging from earlier this year and that the MRI done October 26 shows an acute compression fracture at T5 with some edema still present in the compression fracture at T6.  The T7 fracture is a very chronic.  I would recommend taking the patient to the operating room for biopsy and kyphoplasty of T5 and T6.  We discussed this at length.  The risk and benefits were reviewed which included but were not limited to infection, bleeding, paralysis, spinal fluid leak, extravasation of kyphoplasty cement resulting in neurocompression, pulmonary was not, stroke, coma, and death.  She acknowledged understand this.  Her questions concerns were addressed.      No diagnosis found.    The patient's Body mass index is 30.12 kg/m².. BMI is above normal parameters. Recommendations include: educational material    No follow-ups on file.      Power Bright MD

## 2021-11-04 NOTE — PATIENT INSTRUCTIONS
"PATIENT TO CONTINUE TO FOLLOW UP WITH HER PRIMARY CARE PROVIDER FOR YEARLY PHYSICAL EXAMS TO ENSURE COMPLETE HEALTH MAINTENANCE        BMI for Adults  What is BMI?  Body mass index (BMI) is a number that is calculated from a person's weight and height. BMI can help estimate how much of a person's weight is composed of fat. BMI does not measure body fat directly. Rather, it is an alternative to procedures that directly measure body fat, which can be difficult and expensive.  BMI can help identify people who may be at higher risk for certain medical problems.  What are BMI measurements used for?  BMI is used as a screening tool to identify possible weight problems. It helps determine whether a person is obese, overweight, a healthy weight, or underweight.  BMI is useful for:  · Identifying a weight problem that may be related to a medical condition or may increase the risk for medical problems.  · Promoting changes, such as changes in diet and exercise, to help reach a healthy weight. BMI screening can be repeated to see if these changes are working.  How is BMI calculated?  BMI involves measuring your weight in relation to your height. Both height and weight are measured, and the BMI is calculated from those numbers. This can be done either in English (U[place] or metric measurements. Note that charts and online BMI calculators are available to help you find your BMI quickly and easily without having to do these calculations yourself.  To calculate your BMI in English ([place] measurements:    1. Measure your weight in pounds (lb).  2. Multiply the number of pounds by 703.  ? For example, for a person who weighs 180 lb, multiply that number by 703, which equals 126,540.  3. Measure your height in inches. Then multiply that number by itself to get a measurement called \"inches squared.\"  ? For example, for a person who is 70 inches tall, the \"inches squared\" measurement is 70 inches x 70 inches, which equals 4,900 inches " "squared.  4. Divide the total from step 2 (number of lb x 703) by the total from step 3 (inches squared): 126,540 ÷ 4,900 = 25.8. This is your BMI.    To calculate your BMI in metric measurements:  1. Measure your weight in kilograms (kg).  2. Measure your height in meters (m). Then multiply that number by itself to get a measurement called \"meters squared.\"  ? For example, for a person who is 1.75 m tall, the \"meters squared\" measurement is 1.75 m x 1.75 m, which is equal to 3.1 meters squared.  3. Divide the number of kilograms (your weight) by the meters squared number. In this example: 70 ÷ 3.1 = 22.6. This is your BMI.  What do the results mean?  BMI charts are used to identify whether you are underweight, normal weight, overweight, or obese. The following guidelines will be used:  · Underweight: BMI less than 18.5.  · Normal weight: BMI between 18.5 and 24.9.  · Overweight: BMI between 25 and 29.9.  · Obese: BMI of 30 or above.  Keep these notes in mind:  · Weight includes both fat and muscle, so someone with a muscular build, such as an athlete, may have a BMI that is higher than 24.9. In cases like these, BMI is not an accurate measure of body fat.  · To determine if excess body fat is the cause of a BMI of 25 or higher, further assessments may need to be done by a health care provider.  · BMI is usually interpreted in the same way for men and women.  Where to find more information  For more information about BMI, including tools to quickly calculate your BMI, go to these websites:  · Centers for Disease Control and Prevention: www.cdc.gov  · American Heart Association: www.heart.org  · National Heart, Lung, and Blood [place]: www.nhlbi.nih.gov  Summary  · Body mass index (BMI) is a number that is calculated from a person's weight and height.  · BMI may help estimate how much of a person's weight is composed of fat. BMI can help identify those who may be at higher risk for certain medical problems.  · BMI " "can be measured using English measurements or metric measurements.  · BMI charts are used to identify whether you are underweight, normal weight, overweight, or obese.  This information is not intended to replace advice given to you by your health care provider. Make sure you discuss any questions you have with your health care provider.  Document Revised: 09/09/2020 Document Reviewed: 07/17/2020  [employer] Patient Education © 2021 [employer]      https://www.nhlbi.nih.gov/files/docs/public/heart/dash_brief.pdf\">   DASH Eating Plan  DASH stands for Dietary Approaches to Stop Hypertension. The DASH eating plan is a healthy eating plan that has been shown to:  · Reduce high blood pressure (hypertension).  · Reduce your risk for type 2 diabetes, heart disease, and stroke.  · Help with weight loss.  What are tips for following this plan?  Reading food labels  · Check food labels for the amount of salt (sodium) per serving. Choose foods with less than 5 percent of the Daily Value of sodium. Generally, foods with less than 300 milligrams (mg) of sodium per serving fit into this eating plan.  · To find whole grains, look for the word \"whole\" as the first word in the ingredient list.  Shopping  · Buy products labeled as \"low-sodium\" or \"no salt added.\"  · Buy fresh foods. Avoid canned foods and pre-made or frozen meals.  Cooking  · Avoid adding salt when cooking. Use salt-free seasonings or herbs instead of table salt or sea salt. Check with your health care provider or pharmacist before using salt substitutes.  · Do not yoon foods. Cook foods using healthy methods such as baking, boiling, grilling, roasting, and broiling instead.  · Cook with heart-healthy oils, such as olive, canola, avocado, soybean, or sunflower oil.  Meal planning    · Eat a balanced diet that includes:  ? 4 or more servings of fruits and 4 or more servings of vegetables each day. Try to fill one-half of your plate with fruits and vegetables.  ? 6-8 " servings of whole grains each day.  ? Less than 6 oz (170 g) of lean meat, poultry, or fish each day. A 3-oz (85-g) serving of meat is about the same size as a deck of cards. One egg equals 1 oz (28 g).  ? 2-3 servings of low-fat dairy each day. One serving is 1 cup (237 mL).  ? 1 serving of nuts, seeds, or beans 5 times each week.  ? 2-3 servings of heart-healthy fats. Healthy fats called omega-3 fatty acids are found in foods such as walnuts, flaxseeds, fortified milks, and eggs. These fats are also found in cold-water fish, such as sardines, salmon, and mackerel.  · Limit how much you eat of:  ? Canned or prepackaged foods.  ? Food that is high in trans fat, such as some fried foods.  ? Food that is high in saturated fat, such as fatty meat.  ? Desserts and other sweets, sugary drinks, and other foods with added sugar.  ? Full-fat dairy products.  · Do not salt foods before eating.  · Do not eat more than 4 egg yolks a week.  · Try to eat at least 2 vegetarian meals a week.  · Eat more home-cooked food and less restaurant, buffet, and fast food.    Lifestyle  · When eating at a restaurant, ask that your food be prepared with less salt or no salt, if possible.  · If you drink alcohol:  ? Limit how much you use to:  § 0-1 drink a day for women who are not pregnant.  § 0-2 drinks a day for men.  ? Be aware of how much alcohol is in your drink. In the [place] one drink equals one 12 oz bottle of beer (355 mL), one 5 oz glass of wine (148 mL), or one 1½ oz glass of hard liquor (44 mL).  General information  · Avoid eating more than 2,300 mg of salt a day. If you have hypertension, you may need to reduce your sodium intake to 1,500 mg a day.  · Work with your health care provider to maintain a healthy body weight or to lose weight. Ask what an ideal weight is for you.  · Get at least 30 minutes of exercise that causes your heart to beat faster (aerobic exercise) most days of the week. Activities may include walking,  swimming, or biking.  · Work with your health care provider or dietitian to adjust your eating plan to your individual calorie needs.  What foods should I eat?  Fruits  All fresh, dried, or frozen fruit. Canned fruit in natural juice (without added sugar).  Vegetables  Fresh or frozen vegetables (raw, steamed, roasted, or grilled). Low-sodium or reduced-sodium tomato and vegetable juice. Low-sodium or reduced-sodium tomato sauce and tomato paste. Low-sodium or reduced-sodium canned vegetables.  Grains  Whole-grain or whole-wheat bread. Whole-grain or whole-wheat pasta. Brown rice. Oatmeal. Quinoa. Bulgur. Whole-grain and low-sodium cereals. Doris bread. Low-fat, low-sodium crackers. Whole-wheat flour tortillas.  Meats and other proteins  Skinless chicken or turkey. Ground chicken or turkey. Pork with fat trimmed off. Fish and seafood. Egg whites. Dried beans, peas, or lentils. Unsalted nuts, nut butters, and seeds. Unsalted canned beans. Lean cuts of beef with fat trimmed off. Low-sodium, lean precooked or cured meat, such as sausages or meat loaves.  Dairy  Low-fat (1%) or fat-free (skim) milk. Reduced-fat, low-fat, or fat-free cheeses. Nonfat, low-sodium ricotta or cottage cheese. Low-fat or nonfat yogurt. Low-fat, low-sodium cheese.  Fats and oils  Soft margarine without trans fats. Vegetable oil. Reduced-fat, low-fat, or light mayonnaise and salad dressings (reduced-sodium). Canola, safflower, olive, avocado, soybean, and sunflower oils. Avocado.  Seasonings and condiments  Herbs. Spices. Seasoning mixes without salt.  Other foods  Unsalted popcorn and pretzels. Fat-free sweets.  The items listed above may not be a complete list of foods and beverages you can eat. Contact a dietitian for more information.  What foods should I avoid?  Fruits  Canned fruit in a light or heavy syrup. Fried fruit. Fruit in cream or butter sauce.  Vegetables  Creamed or fried vegetables. Vegetables in a cheese sauce. Regular canned  vegetables (not low-sodium or reduced-sodium). Regular canned tomato sauce and paste (not low-sodium or reduced-sodium). Regular tomato and vegetable juice (not low-sodium or reduced-sodium). Pickles. Olives.  Grains  Baked goods made with fat, such as croissants, muffins, or some breads. Dry pasta or rice meal packs.  Meats and other proteins  Fatty cuts of meat. Ribs. Fried meat. Orozco. Bologna, salami, and other precooked or cured meats, such as sausages or meat loaves. Fat from the back of a pig (fatback). Bratwurst. Salted nuts and seeds. Canned beans with added salt. Canned or smoked fish. Whole eggs or egg yolks. Chicken or turkey with skin.  Dairy  Whole or 2% milk, cream, and half-and-half. Whole or full-fat cream cheese. Whole-fat or sweetened yogurt. Full-fat cheese. Nondairy creamers. Whipped toppings. Processed cheese and cheese spreads.  Fats and oils  Butter. Stick margarine. Lard. Shortening. Ghee. Orozco fat. Tropical oils, such as coconut, palm kernel, or palm oil.  Seasonings and condiments  Onion salt, garlic salt, seasoned salt, table salt, and sea salt. Worcestershire sauce. Tartar sauce. Barbecue sauce. Teriyaki sauce. Soy sauce, including reduced-sodium. Steak sauce. Canned and packaged gravies. Fish sauce. Oyster sauce. Cocktail sauce. Store-bought horseradish. Ketchup. Mustard. Meat flavorings and tenderizers. Bouillon cubes. Hot sauces. Pre-made or packaged marinades. Pre-made or packaged taco seasonings. Relishes. Regular salad dressings.  Other foods  Salted popcorn and pretzels.  The items listed above may not be a complete list of foods and beverages you should avoid. Contact a dietitian for more information.  Where to find more information  · National Heart, Lung, and Blood [place]: www.nhlbi.nih.gov  · American Heart Association: www.heart.org  · Academy of Nutrition and Dietetics: www.eatright.org  · National Kidney Foundation: www.kidney.org  Summary  · The DASH eating plan is a  healthy eating plan that has been shown to reduce high blood pressure (hypertension). It may also reduce your risk for type 2 diabetes, heart disease, and stroke.  · When on the DASH eating plan, aim to eat more fresh fruits and vegetables, whole grains, lean proteins, low-fat dairy, and heart-healthy fats.  · With the DASH eating plan, you should limit salt (sodium) intake to 2,300 mg a day. If you have hypertension, you may need to reduce your sodium intake to 1,500 mg a day.  · Work with your health care provider or dietitian to adjust your eating plan to your individual calorie needs.  This information is not intended to replace advice given to you by your health care provider. Make sure you discuss any questions you have with your health care provider.  Document Revised: 11/20/2020 Document Reviewed: 11/20/2020  Elsevier Patient Education © 2021 [employer]

## 2021-11-05 ENCOUNTER — TELEPHONE (OUTPATIENT)
Dept: PRIMARY CARE CLINIC | Age: 66
End: 2021-11-05

## 2021-11-05 DIAGNOSIS — M80.80XG OTHER OSTEOPOROSIS WITH CURRENT PATHOLOGICAL FRACTURE WITH DELAYED HEALING, SUBSEQUENT ENCOUNTER: ICD-10-CM

## 2021-11-05 RX ORDER — DENOSUMAB 60 MG/ML
60 INJECTION SUBCUTANEOUS ONCE
Qty: 1 ML | Refills: 1 | Status: SHIPPED | OUTPATIENT
Start: 2021-11-05 | End: 2021-11-12

## 2021-11-05 NOTE — TELEPHONE ENCOUNTER
Adwoa Hoover called to request a refill on her medication.       Last office visit : 10/29/2021   Next office visit : 11/12/2021     Requested Prescriptions     Pending Prescriptions Disp Refills    denosumab (PROLIA) 60 MG/ML SOSY SC injection 1 mL 1     Sig: Inject 1 mL into the skin once for 1 dose            Riaz Mcnally MA

## 2021-11-05 NOTE — TELEPHONE ENCOUNTER
----- Message from Ronnellmartir León sent at 11/4/2021 11:06 AM CDT -----  Subject: Message to Provider    QUESTIONS  Information for Provider? Patient is checking on getting the Prolia shot,   first time taking it. Wants to know if needs new RX for it and wants to   know next step.  ---------------------------------------------------------------------------  --------------  CALL BACK INFO  What is the best way for the office to contact you? OK to leave message on   voicemail  Preferred Call Back Phone Number? [phone]  ---------------------------------------------------------------------------  --------------  SCRIPT ANSWERS  Relationship to Patient?  Self

## 2021-11-07 RX ORDER — FLUTICASONE FUROATE, UMECLIDINIUM BROMIDE AND VILANTEROL TRIFENATATE 100; 62.5; 25 UG/1; UG/1; UG/1
POWDER RESPIRATORY (INHALATION)
Qty: 60 EACH | Refills: 0 | Status: SHIPPED | OUTPATIENT
Start: 2021-11-07 | End: 2021-12-08

## 2021-11-08 ENCOUNTER — APPOINTMENT (OUTPATIENT)
Dept: LAB | Facility: HOSPITAL | Age: 66
End: 2021-11-08

## 2021-11-08 DIAGNOSIS — S22.060A COMPRESSION FRACTURE OF T7 VERTEBRA, INITIAL ENCOUNTER (HCC): ICD-10-CM

## 2021-11-08 DIAGNOSIS — C34.90 SQUAMOUS CELL CARCINOMA OF LUNG, UNSPECIFIED LATERALITY (HCC): ICD-10-CM

## 2021-11-08 RX ORDER — ONDANSETRON HYDROCHLORIDE 8 MG/1
TABLET, FILM COATED ORAL
Qty: 30 TABLET | Refills: 0 | Status: SHIPPED | OUTPATIENT
Start: 2021-11-08

## 2021-11-08 NOTE — TELEPHONE ENCOUNTER
Lena Nunn called to request a refill on her medication. Last office visit : 10/29/2021   Next office visit : 11/12/2021     Last UDS:   Amphetamine Screen, Urine   Date Value Ref Range Status   06/15/2021 -  Final     Barbiturate Screen, Urine   Date Value Ref Range Status   06/15/2021 -  Final     Benzodiazepine Screen, Urine   Date Value Ref Range Status   06/15/2021 -  Final     Buprenorphine Urine   Date Value Ref Range Status   06/15/2021 -  Final     Cocaine Metabolite Screen, Urine   Date Value Ref Range Status   06/15/2021 -  Final     Gabapentin Screen, Urine   Date Value Ref Range Status   06/15/2021 -  Final     MDMA, Urine   Date Value Ref Range Status   06/15/2021 -  Final     Methamphetamine, Urine   Date Value Ref Range Status   06/15/2021 -  Final     Opiate Scrn, Ur   Date Value Ref Range Status   06/15/2021 -  Final     Oxycodone Screen, Ur   Date Value Ref Range Status   06/15/2021 +  Final     PCP Screen, Urine   Date Value Ref Range Status   06/15/2021 -  Final     Propoxyphene Screen, Urine   Date Value Ref Range Status   06/15/2021 -  Final     THC Screen, Urine   Date Value Ref Range Status   06/15/2021 -  Final     Tricyclic Antidepressants, Urine   Date Value Ref Range Status   06/15/2021 -  Final       Last Odalis Friday: 10/8/21  Medication Contract: 6/15/21   Last Fill: 10/8/21    Requested Prescriptions     Pending Prescriptions Disp Refills    HYDROcodone-acetaminophen (NORCO)  MG per tablet 90 tablet 0     Sig: Take 1 tablet by mouth every 8 hours as needed for Pain for up to 30 days. Intended supply: 30 days         Please approve or refuse this medication.    David Thomas MA

## 2021-11-09 ENCOUNTER — TELEPHONE (OUTPATIENT)
Dept: PRIMARY CARE CLINIC | Age: 66
End: 2021-11-09

## 2021-11-09 RX ORDER — HYDROCODONE BITARTRATE AND ACETAMINOPHEN 10; 325 MG/1; MG/1
1 TABLET ORAL EVERY 8 HOURS PRN
Qty: 90 TABLET | Refills: 0 | Status: SHIPPED | OUTPATIENT
Start: 2021-11-09 | End: 2021-12-09 | Stop reason: SDUPTHER

## 2021-11-09 NOTE — TELEPHONE ENCOUNTER
----- Message from Frantz Kohler sent at 11/9/2021 10:43 AM CST -----  Subject: Medication Problem    QUESTIONS  Name of Medication? denosumab (PROLIA) 60 MG/ML SOSY SC injection  Patient-reported dosage and instructions? 1 every 180 day supply  What question or problem do you have with the medication? Prior   authorization for this med is being rejected and the dosage and frequency   verified. They have it as every 6 months and need to make sure of correct   frequency and dosage for pt to receive meds. Call [phone]. Preferred Pharmacy? [id number], [address]  Pharmacy phone number (if available)? [phone]  Additional Information for Provider? No preferred pharmacy. Injection is   sent to PCP.  ---------------------------------------------------------------------------  --------------  CALL BACK INFO  What is the best way for the office to contact you? OK to leave message on   voicemail  Preferred Call Back Phone Number? [phone]  ---------------------------------------------------------------------------  --------------  SCRIPT ANSWERS  Relationship to Patient? Third Party  Representative Name?  Alf

## 2021-11-09 NOTE — TELEPHONE ENCOUNTER
Called and spoke with Pasquale Hayes with [facility] and she is calling [facility] as she has auth on file for [employer] and thinks they are not processing it correctly for INS at the pharmacy level.

## 2021-11-11 ENCOUNTER — PRE-ADMISSION TESTING (OUTPATIENT)
Dept: PREADMISSION TESTING | Facility: HOSPITAL | Age: 66
End: 2021-11-11

## 2021-11-11 ENCOUNTER — HOSPITAL ENCOUNTER (OUTPATIENT)
Dept: GENERAL RADIOLOGY | Facility: HOSPITAL | Age: 66
Discharge: HOME | End: 2021-11-11

## 2021-11-11 VITALS
SYSTOLIC BLOOD PRESSURE: 176 MMHG | DIASTOLIC BLOOD PRESSURE: 89 MMHG | HEIGHT: 65 IN | BODY MASS INDEX: 30.23 KG/M2 | RESPIRATION RATE: 16 BRPM | OXYGEN SATURATION: 96 % | WEIGHT: 181.44 LBS | HEART RATE: 93 BPM

## 2021-11-11 DIAGNOSIS — S22.050G COMPRESSION FRACTURE OF T6 VERTEBRA WITH DELAYED HEALING: ICD-10-CM

## 2021-11-11 DIAGNOSIS — S22.050G COMPRESSION FRACTURE OF T5 VERTEBRA WITH DELAYED HEALING: ICD-10-CM

## 2021-11-11 DIAGNOSIS — R91.1 LUNG NODULE: ICD-10-CM

## 2021-11-11 LAB
ALBUMIN SERPL-MCNC: 4.6 G/DL (ref 3.5–5.2)
ALBUMIN/GLOB SERPL: 2.2 G/DL
ALP SERPL-CCNC: 60 U/L (ref 39–117)
ALT SERPL W P-5'-P-CCNC: 16 U/L (ref 1–33)
ANION GAP SERPL CALCULATED.3IONS-SCNC: 12 MMOL/L (ref 5–15)
AST SERPL-CCNC: 18 U/L (ref 1–32)
BACTERIA UR QL AUTO: ABNORMAL /HPF
BILIRUB SERPL-MCNC: 0.4 MG/DL (ref 0–1.2)
BILIRUB UR QL STRIP: NEGATIVE
BUN SERPL-MCNC: 17 MG/DL (ref 8–23)
BUN/CREAT SERPL: 18.7 (ref 7–25)
CALCIUM SPEC-SCNC: 10.1 MG/DL (ref 8.6–10.5)
CHLORIDE SERPL-SCNC: 103 MMOL/L (ref 98–107)
CLARITY UR: CLEAR
CO2 SERPL-SCNC: 26 MMOL/L (ref 22–29)
COLOR UR: YELLOW
CREAT SERPL-MCNC: 0.91 MG/DL (ref 0.57–1)
DEPRECATED RDW RBC AUTO: 48.1 FL (ref 37–54)
ERYTHROCYTE [DISTWIDTH] IN BLOOD BY AUTOMATED COUNT: 13.6 % (ref 12.3–15.4)
GFR SERPL CREATININE-BSD FRML MDRD: 61 ML/MIN/1.73
GLOBULIN UR ELPH-MCNC: 2.1 GM/DL
GLUCOSE SERPL-MCNC: 94 MG/DL (ref 65–99)
GLUCOSE UR STRIP-MCNC: NEGATIVE MG/DL
HCT VFR BLD AUTO: 41.8 % (ref 34–46.6)
HGB BLD-MCNC: 12.8 G/DL (ref 12–15.9)
HGB UR QL STRIP.AUTO: ABNORMAL
HYALINE CASTS UR QL AUTO: ABNORMAL /LPF
KETONES UR QL STRIP: NEGATIVE
LEUKOCYTE ESTERASE UR QL STRIP.AUTO: NEGATIVE
MCH RBC QN AUTO: 29.6 PG (ref 26.6–33)
MCHC RBC AUTO-ENTMCNC: 30.6 G/DL (ref 31.5–35.7)
MCV RBC AUTO: 96.8 FL (ref 79–97)
NITRITE UR QL STRIP: NEGATIVE
PH UR STRIP.AUTO: 7 [PH] (ref 5–8)
PLATELET # BLD AUTO: 257 10*3/MM3 (ref 140–450)
PMV BLD AUTO: 10.1 FL (ref 6–12)
POTASSIUM SERPL-SCNC: 4.4 MMOL/L (ref 3.5–5.2)
PROT SERPL-MCNC: 6.7 G/DL (ref 6–8.5)
PROT UR QL STRIP: NEGATIVE
RBC # BLD AUTO: 4.32 10*6/MM3 (ref 3.77–5.28)
RBC # UR: ABNORMAL /HPF
REF LAB TEST METHOD: ABNORMAL
SODIUM SERPL-SCNC: 141 MMOL/L (ref 136–145)
SP GR UR STRIP: <=1.005 (ref 1–1.03)
SQUAMOUS #/AREA URNS HPF: ABNORMAL /HPF
UROBILINOGEN UR QL STRIP: ABNORMAL
WBC # BLD AUTO: 9.05 10*3/MM3 (ref 3.4–10.8)
WBC UR QL AUTO: ABNORMAL /HPF

## 2021-11-11 PROCEDURE — 71046 X-RAY EXAM CHEST 2 VIEWS: CPT

## 2021-11-11 PROCEDURE — 81001 URINALYSIS AUTO W/SCOPE: CPT

## 2021-11-11 PROCEDURE — 80053 COMPREHEN METABOLIC PANEL: CPT

## 2021-11-11 PROCEDURE — 85027 COMPLETE CBC AUTOMATED: CPT

## 2021-11-11 PROCEDURE — 36415 COLL VENOUS BLD VENIPUNCTURE: CPT

## 2021-11-11 NOTE — DISCHARGE INSTRUCTIONS
DAY OF SURGERY INSTRUCTIONS          ARRIVAL TIME: AS DIRECTED BY OFFICE    YOU MAY TAKE THE FOLLOWING MEDICATION(S) THE MORNING OF SURGERY WITH A SIP OF WATER: ***      ALL OTHER HOME MEDICATION CHECK WITH YOUR PHYSICIAN      DO NOT TAKE ANY ERECTILE DYSFUNCTION MEDICATIONS (EX: CIALIS, VIAGRA) 24 HOURS PRIOR TO SURGERY                      MANAGING PAIN AFTER SURGERY    We know you are probably wondering what your pain will be like after surgery.  Following surgery it is unrealistic to expect you will not have pain.   Pain is how our bodies let us know that something is wrong or cautions us to be careful.  That said, our goal is to make your pain tolerable.    Methods we may use to treat your pain include (oral or IV medications, PCAs, epidurals, nerve blocks, etc.)   While some procedures require IV pain medications for a short time after surgery, transitioning to pain medications by mouth allows for better management of pain.   Your nurse will encourage you to take oral pain medications whenever possible.  IV medications work almost immediately, but only last a short while.  Taking medications by mouth allows for a more constant level of medication in your blood stream for a longer period of time.      Once your pain is out of control it is harder to get back under control.  It is important you are aware when your next dose of pain medication is due.  If you are admitted, your nurse may write the time of your next dose on the white board in your room to help you remember.      We are interested in your pain and encourage you to inform us about aggravating factors during your visit.   Many times a simple repositioning every few hours can make a big difference.    If your physician says it is okay, do not let your pain prevent you from getting out of bed. Be sure to call your nurse for assistance prior to getting up so you do not fall.      Before surgery, please decide your tolerable pain goal.  These faces  help describe the pain ratings we use on a 0-10 scale.   Be prepared to tell us your goal and whether or not you take pain or anxiety medications at home.          BEFORE YOU COME TO THE HOSPITAL  (Pre-op instructions)  • Do not eat, drink, smoke or chew gum after midnight the night before surgery.  This also includes no mints.  • Morning of surgery take only the medicines you have been instructed with a sip of water unless otherwise instructed  by your physician.  • Do not shave, wear makeup or dark nail polish.  • Remove all jewelry including rings.  • Leave anything you consider valuable at home.  • Leave your suitcase in the car until after your surgery.  • Bring the following with you if applicable:  o Picture ID and insurance, Medicare or Medicaid cards  o Co-pay/deductible required by insurance (cash, check, credit card)  o Copy of advance directive, living will or power-of- documents if not brought to pre-work  o CPAP or BIPAP mask and tubing  o Relaxation aids ( book, magazine), etc.  o Hearing aids                        ON THE DAY OF SURGERY  · On the day of surgery check in at registration located at the main entrance of the hospital. Only one family member or friend are allowed per patient.  ? You will be registered and given a beeper with instructions where to wait in the main lobby.  ? When your beeper lights up and vibrates a member of the Outpatient Surgery staff will meet you at the double doors under the stair steps and escort you to your preoperative room.   · You may have cloth compression devices placed on your legs. These help to prevent blood clots and reduce swelling in your legs.  · An IV may be inserted into one of your veins.  · In the operating room, you may be given one or more of the following:  ? A medicine to help you relax (sedative).  ? A medicine to numb the area (local anesthetic).  ? A medicine to make you fall asleep (general anesthetic).  ? A medicine that is injected  "into an area of your body to numb everything below the injection site (regional anesthetic).  · Your surgical site will be marked or identified.  · You may be given an antibiotic through your IV to help prevent infection.  Contact a health care provider if you:  · Develop a fever of more than 100.4°F (38°C) or other feelings of illness during the 48 hours before your surgery.  · Have symptoms that get worse.  Have questions or concerns about your surgery    General Anesthesia/Surgery, Adult  General anesthesia is the use of medicines to make a person \"go to sleep\" (unconscious) for a medical procedure. General anesthesia must be used for certain procedures, and is often recommended for procedures that:  · Last a long time.  · Require you to be still or in an unusual position.  · Are major and can cause blood loss.  The medicines used for general anesthesia are called general anesthetics. As well as making you unconscious for a certain amount of time, these medicines:  · Prevent pain.  · Control your blood pressure.  · Relax your muscles.  Tell a health care provider about:  · Any allergies you have.  · All medicines you are taking, including vitamins, herbs, eye drops, creams, and over-the-counter medicines.  · Any problems you or family members have had with anesthetic medicines.  · Types of anesthetics you have had in the past.  · Any blood disorders you have.  · Any surgeries you have had.  · Any medical conditions you have.  · Any recent upper respiratory, chest, or ear infections.  · Any history of:  ? Heart or lung conditions, such as heart failure, sleep apnea, asthma, or chronic obstructive pulmonary disease (COPD).  ?  service.  ? Depression or anxiety.  · Any tobacco or drug use, including marijuana or alcohol use.  · Whether you are pregnant or may be pregnant.  What are the risks?  Generally, this is a safe procedure. However, problems may occur, including:  · Allergic reaction.  · Lung and heart " problems.  · Inhaling food or liquid from the stomach into the lungs (aspiration).  · Nerve injury.  · Air in the bloodstream, which can lead to stroke.  · Extreme agitation or confusion (delirium) when you wake up from the anesthetic.  · Waking up during your procedure and being unable to move. This is rare.  These problems are more likely to develop if you are having a major surgery or if you have an advanced or serious medical condition. You can prevent some of these complications by answering all of your health care provider's questions thoroughly and by following all instructions before your procedure.  General anesthesia can cause side effects, including:  · Nausea or vomiting.  · A sore throat from the breathing tube.  · Hoarseness.  · Wheezing or coughing.  · Shaking chills.  · Tiredness.  · Body aches.  · Anxiety.  · Sleepiness or drowsiness.  · Confusion or agitation.  RISKS AND COMPLICATIONS OF SURGERY  Your health care provider will discuss possible risks and complications with you before surgery. Common risks and complications include:    · Problems due to the use of anesthetics.  · Blood loss and replacement (does not apply to minor surgical procedures).  · Temporary increase in pain due to surgery.  · Uncorrected pain or problems that the surgery was meant to correct.  · Infection.  · New damage.    What happens before the procedure?    Medicines  Ask your health care provider about:  · Changing or stopping your regular medicines. This is especially important if you are taking diabetes medicines or blood thinners.  · Taking medicines such as aspirin and ibuprofen. These medicines can thin your blood. Do not take these medicines unless your health care provider tells you to take them.  · Taking over-the-counter medicines, vitamins, herbs, and supplements. Do not take these during the week before your procedure unless your health care provider approves them.  General instructions  · Starting 3-6 weeks  before the procedure, do not use any products that contain nicotine or tobacco, such as cigarettes and e-cigarettes. If you need help quitting, ask your health care provider.  · If you brush your teeth on the morning of the procedure, make sure to spit out all of the toothpaste.  · Tell your health care provider if you become ill or develop a cold, cough, or fever.  · If instructed by your health care provider, bring your sleep apnea device with you on the day of your surgery (if applicable).  · Ask your health care provider if you will be going home the same day, the following day, or after a longer hospital stay.  ? Plan to have someone take you home from the hospital or clinic.  ? Plan to have a responsible adult care for you for at least 24 hours after you leave the hospital or clinic. This is important.  What happens during the procedure?  · You will be given anesthetics through both of the following:  ? A mask placed over your nose and mouth.  ? An IV in one of your veins.  · You may receive a medicine to help you relax (sedative).  · After you are unconscious, a breathing tube may be inserted down your throat to help you breathe. This will be removed before you wake up.  · An anesthesia specialist will stay with you throughout your procedure. He or she will:  ? Keep you comfortable and safe by continuing to give you medicines and adjusting the amount of medicine that you get.  ? Monitor your blood pressure, pulse, and oxygen levels to make sure that the anesthetics do not cause any problems.  The procedure may vary among health care providers and hospitals.  What happens after the procedure?  · Your blood pressure, temperature, heart rate, breathing rate, and blood oxygen level will be monitored until the medicines you were given have worn off.  · You will wake up in a recovery area. You may wake up slowly.  · If you feel anxious or agitated, you may be given medicine to help you calm down.  · If you will be  going home the same day, your health care provider may check to make sure you can walk, drink, and urinate.  · Your health care provider will treat any pain or side effects you have before you go home.  · Do not drive for 24 hours if you were given a sedative.  Summary  · General anesthesia is used to keep you still and prevent pain during a procedure.  · It is important to tell your healthcare provider about your medical history and any surgeries you have had, and previous experience with anesthesia.  · Follow your healthcare provider’s instructions about when to stop eating, drinking, or taking certain medicines before your procedure.  · Plan to have someone take you home from the hospital or clinic.  This information is not intended to replace advice given to you by your health care provider. Make sure you discuss any questions you have with your health care provider.  Document Released: 03/26/2009 Document Revised: 08/03/2018 Document Reviewed: 08/03/2018  [employer] Interactive Patient Education © 2019 [employer]       Fall Prevention in Hospitals, Adult  As a hospital patient, your condition and the treatments you receive can increase your risk for falls. Some additional risk factors for falls in a hospital include:  · Being in an unfamiliar environment.  · Being on bed rest.  · Your surgery.  · Taking certain medicines.  · Your tubing requirements, such as intravenous (IV) therapy or catheters.  It is important that you learn how to decrease fall risks while at the hospital. Below are important tips that can help prevent falls.  SAFETY TIPS FOR PREVENTING FALLS  Talk about your risk of falling.  · Ask your health care provider why you are at risk for falling. Is it your medicine, illness, tubing placement, or something else?  · Make a plan with your health care provider to keep you safe from falls.  · Ask your health care provider or pharmacist about side effects of your medicines. Some medicines can make  you dizzy or affect your coordination.  Ask for help.  · Ask for help before getting out of bed. You may need to press your call button.  · Ask for assistance in getting safely to the toilet.  · Ask for a walker or cane to be put at your bedside. Ask that most of the side rails on your bed be placed up before your health care provider leaves the room.  · Ask family or friends to sit with you.  · Ask for things that are out of your reach, such as your glasses, hearing aids, telephone, bedside table, or call button.  Follow these tips to avoid falling:  · Stay lying or seated, rather than standing, while waiting for help.  · Wear rubber-soled slippers or shoes whenever you walk in the hospital.  · Avoid quick, sudden movements.  ¨ Change positions slowly.  ¨ Sit on the side of your bed before standing.  ¨ Stand up slowly and wait before you start to walk.  · Let your health care provider know if there is a spill on the floor.  · Pay careful attention to the medical equipment, electrical cords, and tubes around you.  · When you need help, use your call button by your bed or in the bathroom. Wait for one of your health care providers to help you.  · If you feel dizzy or unsure of your footing, return to bed and wait for assistance.  · Avoid being distracted by the TV, telephone, or another person in your room.  · Do not lean or support yourself on rolling objects, such as IV poles or bedside tables.     This information is not intended to replace advice given to you by your health care provider. Make sure you discuss any questions you have with your health care provider.     Document Released: 12/15/2001 Document Revised: 01/08/2016 Document Reviewed: 08/25/2013  [employer] Interactive Patient Education ©2016 [employer]       [facility]  CHG 4% Patient Instruction Sheet    Chlorhexidine Before Surgery  Chlorhexidine gluconate (CHG) is a germ-killing (antiseptic) solution that is used to clean the skin. It  gets rid of the bacteria that normally live on the skin. Cleaning your skin with CHG before surgery helps lower the risk for infection after surgery.    How to use CHG solution  · You will take 2 showers, one shower the night before surgery, the second shower the morning of surgery before coming to the hospital.  · Use CHG only as told by your health care provider, and follow the instructions on the label.  · Use CHG solution while taking a shower. Follow these steps when using CHG solution (unless your health care provider gives you different instructions):  1. Start the shower.  2. Use your normal soap and shampoo to wash your face and hair.  3. Turn off the shower or move out of the shower stream.  4. Pour the CHG onto a clean washcloth. Do not use any type of brush or rough-edged sponge.  5. Starting at your neck, lather your body down to your toes. Make sure you:  6. Pay special attention to the part of your body where you will be having surgery. Scrub this area for at least 1 minute.  7. Use the full amount of CHG as directed. Usually, this is one half bottle for each shower.  8. Do not use CHG on your head or face. If the solution gets into your ears or eyes, rinse them well with water.  9. Avoid your genital area.  10. Avoid any areas of skin that have broken skin, cuts, or scrapes.  11. Scrub your back and under your arms. Make sure to wash skin folds.  12. Let the lather sit on your skin for 1-2 minutes or as long as told by your health care  provider.  13. Thoroughly rinse your entire body in the shower. Make sure that all body creases and crevices are rinsed well.  14. Dry off with a clean towel. Do not put any substances on your body afterward, such as powder, lotion, or perfume.  15. Put on clean clothes or pajamas.  16. If it is the night before your surgery, sleep in clean sheets.    What are the risks?  Risks of using CHG include:  · A skin reaction.  · Hearing loss, if CHG gets in your ears.  · Eye  injury, if CHG gets in your eyes and is not rinsed out.  · The CHG product catching fire.  Make sure that you avoid smoking and flames after applying CHG to your skin.  Do not use CHG:  · If you have a chlorhexidine allergy or have previously reacted to chlorhexidine.  · On babies younger than 2 months of age.      On the day of surgery, when you are taken to your room in Outpatient Surgery you will be given a CHG prepackaged cloth to wipe the site for your surgery.  How to use CHG prepackaged cloths  · Follow the instructions on the label.  · Use the CHG cloth on clean, dry skin. Follow these steps when using a CHG cloth (unless your health care provider gives you different instructions):  1. Using the CHG cloth, vigorously scrub the part of your body where you will be having surgery. Scrub using a back-and-forth motion for 3 minutes. The area on your body should be completely wet with CHG when you are finished scrubbing.  2. Do not rinse. Discard the cloth and let the area air-dry for 1 minute. Do not put any substances on your body afterward, such as powder, lotion, or perfume.  Contact a health care provider if:  · Your skin gets irritated after scrubbing.  · You have questions about using your solution or cloth.  Get help right away if:  · Your eyes become very red or swollen.  · Your eyes itch badly.  · Your skin itches badly and is red or swollen.  · Your hearing changes.  · You have trouble seeing.  · You have swelling or tingling in your mouth or throat.  · You have trouble breathing.  · You swallow any chlorhexidine.  Summary  · Chlorhexidine gluconate (CHG) is a germ-killing (antiseptic) solution that is used to clean the skin. Cleaning your skin with CHG before surgery helps lower the risk for infection after surgery.  · You may be given CHG to use at home. It may be in a bottle or in a prepackaged cloth to use on your skin. Carefully follow your health care provider's instructions and the instructions  on the product label.  · Do not use CHG if you have a chlorhexidine allergy.  · Contact your health care provider if your skin gets irritated after scrubbing.  This information is not intended to replace advice given to you by your health care provider. Make sure you discuss any questions you have with your health care provider.  Document Released: 09/11/2013 Document Revised: 11/15/2018 Document Reviewed: 11/15/2018  [employer] Interactive Patient Education © 2019 [employer]          PATIENT/FAMILY/RESPONSIBLE PARTY VERBALIZES UNDERSTANDING OF ABOVE EDUCATION.  COPY OF PAIN SCALE GIVEN AND REVIEWED WITH VERBALIZED UNDERSTANDING.

## 2021-11-12 ENCOUNTER — OFFICE VISIT (OUTPATIENT)
Dept: PRIMARY CARE CLINIC | Age: 66
End: 2021-11-12
Payer: MEDICARE

## 2021-11-12 VITALS
HEIGHT: 66 IN | DIASTOLIC BLOOD PRESSURE: 76 MMHG | BODY MASS INDEX: 29.09 KG/M2 | TEMPERATURE: 97.3 F | HEART RATE: 99 BPM | WEIGHT: 181 LBS | SYSTOLIC BLOOD PRESSURE: 140 MMHG | OXYGEN SATURATION: 97 %

## 2021-11-12 DIAGNOSIS — S22.050K: ICD-10-CM

## 2021-11-12 DIAGNOSIS — S22.060K: ICD-10-CM

## 2021-11-12 DIAGNOSIS — M80.80XG OTHER OSTEOPOROSIS WITH CURRENT PATHOLOGICAL FRACTURE WITH DELAYED HEALING, SUBSEQUENT ENCOUNTER: ICD-10-CM

## 2021-11-12 DIAGNOSIS — S22.050K: Primary | ICD-10-CM

## 2021-11-12 PROCEDURE — 99213 OFFICE O/P EST LOW 20 MIN: CPT | Performed by: NURSE PRACTITIONER

## 2021-11-12 PROCEDURE — 96372 THER/PROPH/DIAG INJ SC/IM: CPT | Performed by: NURSE PRACTITIONER

## 2021-11-12 ASSESSMENT — ENCOUNTER SYMPTOMS
BACK PAIN: 1
SHORTNESS OF BREATH: 0
EYES NEGATIVE: 1
GASTROINTESTINAL NEGATIVE: 1
RESPIRATORY NEGATIVE: 1

## 2021-11-12 NOTE — PROGRESS NOTES
[address] [facility] CARE  [address] [address] [place] [address]  Dept: [phone]  Dept Fax: [phone]  Loc: [phone]    Omega Hernandez is a 74 y.o. female who presents today for her medical conditions/complaints as noted below. Omega Hernandez is c/o of Other (Surgical clearance )      Chief Complaint   Patient presents with    Other     Surgical clearance        HPI:     HPI   Patient is in office today to get a surgical clearance. Dr Suzie Rain is wanting to do kyphoplasty on t5 and t6. Patient has been taking AZO capsules otc for bladder issues for greater than 1 year. Past Medical History:   Diagnosis Date    Allergic rhinitis     Anxiety     COPD (chronic obstructive pulmonary disease) (HCC)     GERD (gastroesophageal reflux disease)     Lung cancer (HCC)     RA (rheumatoid arthritis) (HCC)         Past Surgical History:   Procedure Laterality Date    BREAST SURGERY Bilateral     Dr Magen Ruiz Bilateral     SKIN BIOPSY      2 mole removed by mary        Social History     Tobacco Use    Smoking status: Former Smoker     Packs/day: 1.00     Years: 20.00     Pack years: 20.00     Quit date: 10/3/2014     Years since quittin.1    Smokeless tobacco: Never Used   Substance Use Topics    Alcohol use: No        Current Outpatient Medications   Medication Sig Dispense Refill    HYDROcodone-acetaminophen (NORCO)  MG per tablet Take 1 tablet by mouth every 8 hours as needed for Pain for up to 30 days.  Intended supply: 30 days 90 tablet 0    TRELEGY ELLIPTA 100-62.5-25 MCG/INH AEPB INHALE 1 PUFF ONCE DAILY 60 each 0    busPIRone (BUSPAR) 5 MG tablet Take 1 tablet by mouth twice daily as needed for anxiety 60 tablet 0    predniSONE (DELTASONE) 10 MG tablet Take 1 tablet by mouth once daily 30 tablet 0    citalopram (CELEXA) 40 MG tablet Take once daily 90 tablet 1    omeprazole (PRILOSEC) 20 MG delayed release capsule Take 1 capsule by mouth Daily 90 capsule 2    atorvastatin (LIPITOR) 20 MG tablet Take 1 tablet by mouth daily 90 tablet 1    cetirizine (ZYRTEC) 10 MG tablet Take 1 tablet by mouth daily 30 tablet 11    albuterol sulfate HFA (VENTOLIN HFA) 108 (90 Base) MCG/ACT inhaler Inhale 2 puffs into the lungs 4 times daily as needed for Wheezing 1 Inhaler 5    diclofenac sodium (VOLTAREN) 1 % GEL Apply 2 g topically 4 times daily 2 Tube 1    fluticasone (FLONASE) 50 MCG/ACT nasal spray 1 spray by Nasal route daily 1 Bottle 5     No current facility-administered medications for this visit. Allergies   Allergen Reactions    Amoxicillin Other (See Comments)       No family history on file. Subjective:      Review of Systems   Constitutional: Negative. HENT: Negative. Eyes: Negative. Respiratory: Negative. Negative for shortness of breath. Cardiovascular: Negative. Negative for chest pain. Gastrointestinal: Negative. Endocrine: Negative. Genitourinary: Negative. Negative for difficulty urinating, flank pain and urgency. Musculoskeletal: Positive for back pain. Skin: Negative. Neurological: Negative. Hematological: Negative. Psychiatric/Behavioral: Negative. Objective:     Physical Exam  Vitals and nursing note reviewed. Constitutional:       Appearance: Normal appearance. She is normal weight. She is ill-appearing. Comments: generalized wekaness   HENT:      Head: Normocephalic and atraumatic. Jaw: There is normal jaw occlusion. Right Ear: Hearing, tympanic membrane, ear canal and external ear normal.      Left Ear: Hearing, tympanic membrane, ear canal and external ear normal.      Nose: Nose normal.      Mouth/Throat:      Lips: Pink. Mouth: Mucous membranes are moist.      Pharynx: Oropharynx is clear. Eyes:      General: Lids are normal.      Extraocular Movements: Extraocular movements intact. Conjunctiva/sclera: Conjunctivae normal.      Pupils: Pupils are equal, round, and reactive to light. Neck:      Thyroid: No thyromegaly. Trachea: Trachea normal.   Cardiovascular:      Rate and Rhythm: Normal rate and regular rhythm. Pulses: Normal pulses. Dorsalis pedis pulses are 2+ on the right side and 2+ on the left side. Posterior tibial pulses are 2+ on the right side and 2+ on the left side. Heart sounds: Normal heart sounds. No murmur heard. Pulmonary:      Effort: Pulmonary effort is normal.      Breath sounds: Normal breath sounds and air entry. Abdominal:      General: Bowel sounds are normal.      Palpations: Abdomen is soft. Musculoskeletal:      Cervical back: Full passive range of motion without pain, normal range of motion and neck supple. Thoracic back: Tenderness present. Decreased range of motion. Lumbar back: Tenderness present. Decreased range of motion. Right knee: Decreased range of motion. Tenderness present. Left knee: Decreased range of motion. Tenderness present. Right lower leg: No edema. Left lower leg: No edema. Lymphadenopathy:      Cervical: No cervical adenopathy. Skin:     General: Skin is warm and dry. Capillary Refill: Capillary refill takes less than 2 seconds. Neurological:      General: No focal deficit present. Mental Status: She is alert and oriented to person, place, and time. Mental status is at baseline. Psychiatric:         Attention and Perception: Attention normal.         Mood and Affect: Mood normal.         Speech: Speech normal.         Behavior: Behavior normal.         Thought Content: Thought content normal.         Cognition and Memory: Cognition normal.         Judgment: Judgment normal.         BP (!) 140/76   Pulse 99   Temp 97.3 °F (36.3 °C)   Ht 5' 6\" (1.676 m)   Wt 181 lb (82.1 kg)   SpO2 97%   BMI 29.21 kg/m²     Assessment:      Diagnosis Orders   1. Compression fracture of T5 vertebra with nonunion, subsequent encounter     2. Other osteoporosis with current pathological fracture with delayed healing, subsequent encounter  denosumab (PROLIA) SC injection 60 mg    Comprehensive Metabolic Panel    Magnesium    Phosphorus    Vitamin D 25 Hydroxy   3. Compression fracture of T6 vertebra with nonunion, subsequent encounter     4. Compression fracture of T7 vertebra with nonunion, subsequent encounter         No results found for this visit on 11/12/21. Plan:     I believe AZO capsules did cause some of the abnormal urinalysis with the trace intact blood. Patient is currently cleared for surgery at this time from medical standpoint, but final evaluation on the day of surgery needs to be made by the surgical and anesthesia team.  If any major changes in patient status occur, evaluation by provider should occur as soon as possible. Patient to have labs in 2 weeks to monitor after Prolia injection. More than 50% of the time was spent counseling and coordinating care for a total time of 23 mins face to face. Return if symptoms worsen or fail to improve. Orders Placed This Encounter   Procedures    Comprehensive Metabolic Panel     Standing Status:   Future     Standing Expiration Date:   11/12/2022    Magnesium     Standing Status:   Future     Standing Expiration Date:   11/12/2022    Phosphorus     Standing Status:   Future     Standing Expiration Date:   11/12/2022    Vitamin D 25 Hydroxy     Standing Status:   Future     Standing Expiration Date:   11/12/2022       Orders Placed This Encounter   Medications    denosumab (PROLIA) SC injection 60 mg            Patient offered educational handouts and has had all questions answered. Patient voices understanding and agrees to plans along with risks and benefits of plan. Patient is instructed to continue prior meds, diet, and exercise plans as instructed.  Patient agrees to follow up as instructed and sooner if needed. Patient agrees to go to ER if condition becomes emergent. EMR Dragon/transcription disclaimer: Some of this encounter note is an electronic transcription/translation of spoken language to printed text. The electronic translation of spoken language may permit erroneous, or at times, nonsensical words or phrases to be inadvertently transcribed.  Although I have reviewed the note for such errors, some may still exist.    Electronically signed by ROBER Thao on 11/12/2021 at 3:25 PM

## 2021-11-12 NOTE — PROGRESS NOTES
After obtaining consent, and per orders of Heraclio RICHTER, injection of Prolia given in Left deltoid by Ye Silva MA. Patient instructed to remain in clinic for 20 minutes afterwards, and to report any adverse reaction to me immediately.

## 2021-11-15 ENCOUNTER — APPOINTMENT (OUTPATIENT)
Dept: LAB | Facility: HOSPITAL | Age: 66
End: 2021-11-15

## 2021-11-15 ENCOUNTER — OFFICE VISIT (OUTPATIENT)
Dept: CARDIAC SURGERY | Facility: CLINIC | Age: 66
End: 2021-11-15

## 2021-11-15 ENCOUNTER — APPOINTMENT (OUTPATIENT)
Dept: ONCOLOGY | Facility: HOSPITAL | Age: 66
End: 2021-11-15

## 2021-11-15 ENCOUNTER — LAB (OUTPATIENT)
Dept: LAB | Facility: HOSPITAL | Age: 66
End: 2021-11-15

## 2021-11-15 VITALS
SYSTOLIC BLOOD PRESSURE: 160 MMHG | OXYGEN SATURATION: 96 % | HEIGHT: 65 IN | DIASTOLIC BLOOD PRESSURE: 90 MMHG | HEART RATE: 103 BPM | WEIGHT: 181.2 LBS | BODY MASS INDEX: 30.19 KG/M2

## 2021-11-15 DIAGNOSIS — S22.050G COMPRESSION FRACTURE OF T6 VERTEBRA WITH DELAYED HEALING: ICD-10-CM

## 2021-11-15 DIAGNOSIS — C34.81 MALIGNANT NEOPLASM OF OVERLAPPING SITES OF RIGHT LUNG (HCC): Primary | Chronic | ICD-10-CM

## 2021-11-15 DIAGNOSIS — S22.050G COMPRESSION FRACTURE OF T5 VERTEBRA WITH DELAYED HEALING: ICD-10-CM

## 2021-11-15 LAB — SARS-COV-2 ORF1AB RESP QL NAA+PROBE: NOT DETECTED

## 2021-11-15 PROCEDURE — U0004 COV-19 TEST NON-CDC HGH THRU: HCPCS

## 2021-11-15 PROCEDURE — 99213 OFFICE O/P EST LOW 20 MIN: CPT | Performed by: SURGERY

## 2021-11-15 PROCEDURE — C9803 HOPD COVID-19 SPEC COLLECT: HCPCS

## 2021-11-15 RX ORDER — DENOSUMAB 60 MG/ML
INJECTION SUBCUTANEOUS
COMMUNITY
Start: 2021-11-10

## 2021-11-15 RX ORDER — PREDNISONE 10 MG/1
TABLET ORAL
Qty: 30 TABLET | Refills: 0 | Status: SHIPPED | OUTPATIENT
Start: 2021-11-15

## 2021-11-15 NOTE — PROGRESS NOTES
"    [facility] Cardiothoracic Surgery  PROGRESS NOTE   CC: Status post diagnostic wedge resection    Subjective:  Ms. Hu returns today in follow-up after diagnostic wedge resection of her left upper lobe.  This is consistent with metastatic squamous cell cancer which she noted she had from the right lung.  Since last visit she has been evaluated at [facility] is going to undergo immunotherapy trial there.  She is done well since surgery her breathing is back to baseline, no problems with wound healing drainage or erythema.  Her pain has not been an issue.    ROS: No recent fevers or chills or illnesses    Objective:      /90 (BP Location: Right arm, Patient Position: Sitting, Cuff Size: Adult)   Pulse 103   Ht 165.5 cm (65.16\")   Wt 82.2 kg (181 lb 3.2 oz)   LMP  (LMP Unknown)   SpO2 96%   BMI 30.01 kg/m²         PE:  Vitals:    11/15/21 1322   BP: 160/90   Pulse: 103   SpO2: 96%     GENERAL: NAD, resting comfortably, normal color  CARDIOVASCULAR: regular, regular rate, sinus  PULMONARY: Normal bilateral breath sounds, no labored breathing, well-healed left VATS incisions  ABDOMEN: soft, nt/nd  EXTREMITIES: mild peripheral edema, normal pulses, normal ROM        Lab Results (last 72 hours)     ** No results found for the last 72 hours. **              CXR: Good bilateral pulmonary expansion, left subclavian port in place, as expected postoperatively    Assessment/Plan     Ms. Hu is a 66-year-old female with previous right lung cancer that was non-small cell, with new nodules on the left there is status post wedge resection this was consistent with metastatic squamous cell, M1A.  For adjuvant therapy she has been evaluated at [facility], and is planning on undergoing an immunotherapy trial.  She is healed well from her surgery and overall I am happy with her progress from her surgical biopsy.  She has no further activity restrictions.  She has plans for spine surgery coming up in the " next couple of days with Dr. Bright.    Thank you for trusting me with the care of Ms. Hu.  She can follow-up with me on an as-needed basis.  Please do not hesitate to call with questions or concerns.    Saji Leggett M.D.  Cardiothoracic Surgeon

## 2021-11-16 ENCOUNTER — ANESTHESIA EVENT (OUTPATIENT)
Dept: PERIOP | Facility: HOSPITAL | Age: 66
End: 2021-11-16

## 2021-11-16 ENCOUNTER — DOCUMENTATION (OUTPATIENT)
Dept: NEUROSURGERY | Facility: CLINIC | Age: 66
End: 2021-11-16

## 2021-11-16 NOTE — PROGRESS NOTES
[facility]  [address]  [address]  [place] [address]  Dept: [phone]  Dept Fax: [phone]  Loc: [phone]    Damaris Hu is a 66 y.o. female who presents today for her medical conditions/complaints as noted below. Damaris Hu is c/o of Other (Surgical clearance )    Chief Complaint   Patient presents with   • Other   Surgical clearance     HPI:     HPI   Patient is in office today to get a surgical clearance. Dr Olmedo is wanting to do kyphoplasty on t5 and t6.   Patient has been taking AZO capsules otc for bladder issues for greater than 1 year.     Past Medical History:   Diagnosis Date   • Allergic rhinitis   • Anxiety   • COPD (chronic obstructive pulmonary disease) (HCC)   • GERD (gastroesophageal reflux disease)   • Lung cancer (HCC)   • RA (rheumatoid arthritis) (HCC)       Past Surgical History:   Procedure Laterality Date   • BREAST SURGERY Bilateral   Dr Rangel   • HYSTERECTOMY   • MAMMO IMPLANT DIGITAL DIAG BI Bilateral   • SKIN BIOPSY   2 mole removed by alyssa     Social History     Tobacco Use   • Smoking status: Former Smoker   Packs/day: 1.00   Years: 20.00   Pack years: 20.00   Quit date: 10/3/2014   Years since quittin.1   • Smokeless tobacco: Never Used   Substance Use Topics   • Alcohol use: No       Current Outpatient Medications   Medication Sig Dispense Refill   • HYDROcodone-acetaminophen (NORCO)  MG per tablet Take 1 tablet by mouth every 8 hours as needed for Pain for up to 30 days. Intended supply: 30 days 90 tablet 0   • TRELEGY ELLIPTA 100-62.5-25 MCG/INH AEPB INHALE 1 PUFF ONCE DAILY 60 each 0   • busPIRone (BUSPAR) 5 MG tablet Take 1 tablet by mouth twice daily as needed for anxiety 60 tablet 0   • predniSONE (DELTASONE) 10 MG tablet Take 1 tablet by mouth once daily 30 tablet 0   • citalopram (CELEXA) 40 MG tablet Take once daily 90 tablet 1   • omeprazole (PRILOSEC) 20 MG delayed release capsule Take 1  capsule by mouth Daily 90 capsule 2   • atorvastatin (LIPITOR) 20 MG tablet Take 1 tablet by mouth daily 90 tablet 1   • cetirizine (ZYRTEC) 10 MG tablet Take 1 tablet by mouth daily 30 tablet 11   • albuterol sulfate HFA (VENTOLIN HFA) 108 (90 Base) MCG/ACT inhaler Inhale 2 puffs into the lungs 4 times daily as needed for Wheezing 1 Inhaler 5   • diclofenac sodium (VOLTAREN) 1 % GEL Apply 2 g topically 4 times daily 2 Tube 1   • fluticasone (FLONASE) 50 MCG/ACT nasal spray 1 spray by Nasal route daily 1 Bottle 5     No current facility-administered medications for this visit.     Allergies   Allergen Reactions   • Amoxicillin Other (See Comments)     No family history on file.    Subjective:     Review of Systems   Constitutional: Negative.   HENT: Negative.   Eyes: Negative.   Respiratory: Negative. Negative for shortness of breath.   Cardiovascular: Negative. Negative for chest pain.   Gastrointestinal: Negative.   Endocrine: Negative.   Genitourinary: Negative. Negative for difficulty urinating, flank pain and urgency.   Musculoskeletal: Positive for back pain.   Skin: Negative.   Neurological: Negative.   Hematological: Negative.   Psychiatric/Behavioral: Negative.     Objective:     Physical Exam  Vitals and nursing note reviewed.   Constitutional:   Appearance: Normal appearance. She is normal weight. She is ill-appearing.   Comments: generalized wekaness   HENT:   Head: Normocephalic and atraumatic.   Jaw: There is normal jaw occlusion.   Right Ear: Hearing, tympanic membrane, ear canal and external ear normal.   Left Ear: Hearing, tympanic membrane, ear canal and external ear normal.   Nose: Nose normal.   Mouth/Throat:   Lips: Pink.   Mouth: Mucous membranes are moist.   Pharynx: Oropharynx is clear.   Eyes:   General: Lids are normal.   Extraocular Movements: Extraocular movements intact.   Conjunctiva/sclera: Conjunctivae normal.   Pupils: Pupils are equal, round, and reactive to light.   Neck:  "  Thyroid: No thyromegaly.   Trachea: Trachea normal.   Cardiovascular:   Rate and Rhythm: Normal rate and regular rhythm.   Pulses: Normal pulses.   Dorsalis pedis pulses are 2+ on the right side and 2+ on the left side.   Posterior tibial pulses are 2+ on the right side and 2+ on the left side.   Heart sounds: Normal heart sounds. No murmur heard.    Pulmonary:   Effort: Pulmonary effort is normal.   Breath sounds: Normal breath sounds and air entry.   Abdominal:   General: Bowel sounds are normal.   Palpations: Abdomen is soft.   Musculoskeletal:   Cervical back: Full passive range of motion without pain, normal range of motion and neck supple.   Thoracic back: Tenderness present. Decreased range of motion.   Lumbar back: Tenderness present. Decreased range of motion.   Right knee: Decreased range of motion. Tenderness present.   Left knee: Decreased range of motion. Tenderness present.   Right lower leg: No edema.   Left lower leg: No edema.   Lymphadenopathy:   Cervical: No cervical adenopathy.   Skin:  General: Skin is warm and dry.   Capillary Refill: Capillary refill takes less than 2 seconds.   Neurological:   General: No focal deficit present.   Mental Status: She is alert and oriented to person, place, and time. Mental status is at baseline.   Psychiatric:   Attention and Perception: Attention normal.   Mood and Affect: Mood normal.   Speech: Speech normal.   Behavior: Behavior normal.   Thought Content: Thought content normal.   Cognition and Memory: Cognition normal.   Judgment: Judgment normal.     BP (!) 140/76  Pulse 99  Temp 97.3 °F (36.3 °C)  Ht 5' 6\" (1.676 m)  Wt 181 lb (82.1 kg)  SpO2 97%  BMI 29.21 kg/m²     Assessment:     Diagnosis Orders   1. Compression fracture of T5 vertebra with nonunion, subsequent encounter   2. Other osteoporosis with current pathological fracture with delayed healing, subsequent encounter denosumab (PROLIA) SC injection 60 mg   Comprehensive Metabolic Panel "   Magnesium   Phosphorus   Vitamin D 25 Hydroxy   3. Compression fracture of T6 vertebra with nonunion, subsequent encounter   4. Compression fracture of T7 vertebra with nonunion, subsequent encounter     No results found for this visit on 11/12/21.    Plan:     I believe AZO capsules did cause some of the abnormal urinalysis with the trace intact blood.     Patient is currently cleared for surgery at this time from medical standpoint, but final evaluation on the day of surgery needs to be made by the surgical and anesthesia team. If any major changes in patient status occur, evaluation by provider should occur as soon as possible.    Patient to have labs in 2 weeks to monitor after Prolia injection.     More than 50% of the time was spent counseling and coordinating care for a total time of 23 mins face to face.    Return if symptoms worsen or fail to improve.    Orders Placed This Encounter   Procedures   • Comprehensive Metabolic Panel   Standing Status: Future   Standing Expiration Date: 11/12/2022   • Magnesium   Standing Status: Future   Standing Expiration Date: 11/12/2022   • Phosphorus   Standing Status: Future   Standing Expiration Date: 11/12/2022   • Vitamin D 25 Hydroxy   Standing Status: Future   Standing Expiration Date: 11/12/2022     Orders Placed This Encounter   Medications   • denosumab (PROLIA) SC injection 60 mg       Patient offered educational handouts and has had all questions answered. Patient voices understanding and agrees to plans along with risks and benefits of plan. Patient is instructed to continue prior meds, diet, and exercise plans as instructed. Patient agrees to follow up as instructed and sooner if needed. Patient agrees to go to ER if condition becomes emergent.     EMR Dragon/transcription disclaimer: Some of this encounter note is an electronic transcription/translation of spoken language to printed text. The electronic translation of spoken language may permit erroneous,  or at times, nonsensical words or phrases to be inadvertently transcribed. Although I have reviewed the note for such errors, some may still exist.    Electronically signed by RONALD Godinez on 11/12/2021 at 3:25 PM

## 2021-11-17 ENCOUNTER — APPOINTMENT (OUTPATIENT)
Dept: GENERAL RADIOLOGY | Facility: HOSPITAL | Age: 66
End: 2021-11-17

## 2021-11-17 ENCOUNTER — HOSPITAL ENCOUNTER (OUTPATIENT)
Facility: HOSPITAL | Age: 66
Setting detail: HOSPITAL OUTPATIENT SURGERY
Discharge: HOME | End: 2021-11-17
Attending: NEUROLOGICAL SURGERY | Admitting: NEUROLOGICAL SURGERY

## 2021-11-17 ENCOUNTER — ANESTHESIA (OUTPATIENT)
Dept: PERIOP | Facility: HOSPITAL | Age: 66
End: 2021-11-17

## 2021-11-17 VITALS
SYSTOLIC BLOOD PRESSURE: 111 MMHG | DIASTOLIC BLOOD PRESSURE: 72 MMHG | OXYGEN SATURATION: 95 % | HEART RATE: 90 BPM | TEMPERATURE: 97.8 F | RESPIRATION RATE: 16 BRPM

## 2021-11-17 DIAGNOSIS — S22.050G COMPRESSION FRACTURE OF T6 VERTEBRA WITH DELAYED HEALING: ICD-10-CM

## 2021-11-17 DIAGNOSIS — S22.050G COMPRESSION FRACTURE OF T5 VERTEBRA WITH DELAYED HEALING: ICD-10-CM

## 2021-11-17 LAB
ABO GROUP BLD: NORMAL
BLD GP AB SCN SERPL QL: NEGATIVE
RH BLD: POSITIVE
T&S EXPIRATION DATE: NORMAL

## 2021-11-17 PROCEDURE — 25010000002 FENTANYL CITRATE (PF) 100 MCG/2ML SOLUTION: Performed by: NURSE ANESTHETIST, CERTIFIED REGISTERED

## 2021-11-17 PROCEDURE — C1713 ANCHOR/SCREW BN/BN,TIS/BN: HCPCS | Performed by: NEUROLOGICAL SURGERY

## 2021-11-17 PROCEDURE — 22515 PERQ VERTEBRAL AUGMENTATION: CPT | Performed by: NEUROLOGICAL SURGERY

## 2021-11-17 PROCEDURE — 25010000002 DEXAMETHASONE PER 1 MG: Performed by: ANESTHESIOLOGY

## 2021-11-17 PROCEDURE — 86901 BLOOD TYPING SEROLOGIC RH(D): CPT | Performed by: NEUROLOGICAL SURGERY

## 2021-11-17 PROCEDURE — 25010000002 VANCOMYCIN 1 G RECONSTITUTED SOLUTION: Performed by: NURSE ANESTHETIST, CERTIFIED REGISTERED

## 2021-11-17 PROCEDURE — 25010000002 VANCOMYCIN 10 G RECONSTITUTED SOLUTION: Performed by: NEUROLOGICAL SURGERY

## 2021-11-17 PROCEDURE — 72070 X-RAY EXAM THORAC SPINE 2VWS: CPT

## 2021-11-17 PROCEDURE — 25010000002 PROPOFOL 10 MG/ML EMULSION: Performed by: NURSE ANESTHETIST, CERTIFIED REGISTERED

## 2021-11-17 PROCEDURE — 22513 PERQ VERTEBRAL AUGMENTATION: CPT | Performed by: NEUROLOGICAL SURGERY

## 2021-11-17 PROCEDURE — 86850 RBC ANTIBODY SCREEN: CPT | Performed by: NEUROLOGICAL SURGERY

## 2021-11-17 PROCEDURE — 86900 BLOOD TYPING SEROLOGIC ABO: CPT | Performed by: NEUROLOGICAL SURGERY

## 2021-11-17 PROCEDURE — 88307 TISSUE EXAM BY PATHOLOGIST: CPT | Performed by: NEUROLOGICAL SURGERY

## 2021-11-17 PROCEDURE — 88311 DECALCIFY TISSUE: CPT | Performed by: NEUROLOGICAL SURGERY

## 2021-11-17 PROCEDURE — 25010000002 IOPAMIDOL 61 % SOLUTION: Performed by: NEUROLOGICAL SURGERY

## 2021-11-17 PROCEDURE — 76000 FLUOROSCOPY <1 HR PHYS/QHP: CPT

## 2021-11-17 DEVICE — BONE CEMENT C01B HV-R WITH MIXER  US
Type: IMPLANTABLE DEVICE | Site: SPINE LUMBAR | Status: FUNCTIONAL
Brand: KYPHON® HV-R® BONE CEMENT AND KYPHON® MIXER PACK

## 2021-11-17 RX ORDER — FLUMAZENIL 0.1 MG/ML
0.2 INJECTION INTRAVENOUS AS NEEDED
Status: DISCONTINUED | OUTPATIENT
Start: 2021-11-17 | End: 2021-11-17 | Stop reason: HOSPADM

## 2021-11-17 RX ORDER — MIDAZOLAM HYDROCHLORIDE 1 MG/ML
0.5 INJECTION INTRAMUSCULAR; INTRAVENOUS
Status: DISCONTINUED | OUTPATIENT
Start: 2021-11-17 | End: 2021-11-17 | Stop reason: HOSPADM

## 2021-11-17 RX ORDER — EPHEDRINE SULFATE 50 MG/ML
INJECTION, SOLUTION INTRAVENOUS AS NEEDED
Status: DISCONTINUED | OUTPATIENT
Start: 2021-11-17 | End: 2021-11-17 | Stop reason: SURG

## 2021-11-17 RX ORDER — ROCURONIUM BROMIDE 10 MG/ML
INJECTION, SOLUTION INTRAVENOUS AS NEEDED
Status: DISCONTINUED | OUTPATIENT
Start: 2021-11-17 | End: 2021-11-17 | Stop reason: SURG

## 2021-11-17 RX ORDER — ACETAMINOPHEN 500 MG
1000 TABLET ORAL ONCE
Status: COMPLETED | OUTPATIENT
Start: 2021-11-17 | End: 2021-11-17

## 2021-11-17 RX ORDER — ONDANSETRON 2 MG/ML
4 INJECTION INTRAMUSCULAR; INTRAVENOUS ONCE AS NEEDED
Status: DISCONTINUED | OUTPATIENT
Start: 2021-11-17 | End: 2021-11-17 | Stop reason: HOSPADM

## 2021-11-17 RX ORDER — PHENYLEPHRINE HCL IN 0.9% NACL 1 MG/10 ML
SYRINGE (ML) INTRAVENOUS AS NEEDED
Status: DISCONTINUED | OUTPATIENT
Start: 2021-11-17 | End: 2021-11-17 | Stop reason: SURG

## 2021-11-17 RX ORDER — SODIUM CHLORIDE 0.9 % (FLUSH) 0.9 %
3 SYRINGE (ML) INJECTION AS NEEDED
Status: DISCONTINUED | OUTPATIENT
Start: 2021-11-17 | End: 2021-11-17 | Stop reason: HOSPADM

## 2021-11-17 RX ORDER — SODIUM CHLORIDE, SODIUM LACTATE, POTASSIUM CHLORIDE, CALCIUM CHLORIDE 600; 310; 30; 20 MG/100ML; MG/100ML; MG/100ML; MG/100ML
100 INJECTION, SOLUTION INTRAVENOUS CONTINUOUS
Status: DISCONTINUED | OUTPATIENT
Start: 2021-11-17 | End: 2021-11-17 | Stop reason: HOSPADM

## 2021-11-17 RX ORDER — PROPOFOL 10 MG/ML
VIAL (ML) INTRAVENOUS AS NEEDED
Status: DISCONTINUED | OUTPATIENT
Start: 2021-11-17 | End: 2021-11-17 | Stop reason: SURG

## 2021-11-17 RX ORDER — VANCOMYCIN HYDROCHLORIDE 1 G/20ML
INJECTION, POWDER, LYOPHILIZED, FOR SOLUTION INTRAVENOUS AS NEEDED
Status: DISCONTINUED | OUTPATIENT
Start: 2021-11-17 | End: 2021-11-17 | Stop reason: SURG

## 2021-11-17 RX ORDER — DEXAMETHASONE SODIUM PHOSPHATE 4 MG/ML
4 INJECTION, SOLUTION INTRA-ARTICULAR; INTRALESIONAL; INTRAMUSCULAR; INTRAVENOUS; SOFT TISSUE ONCE AS NEEDED
Status: COMPLETED | OUTPATIENT
Start: 2021-11-17 | End: 2021-11-17

## 2021-11-17 RX ORDER — NALOXONE HCL 0.4 MG/ML
0.4 VIAL (ML) INJECTION AS NEEDED
Status: DISCONTINUED | OUTPATIENT
Start: 2021-11-17 | End: 2021-11-17 | Stop reason: HOSPADM

## 2021-11-17 RX ORDER — SUCCINYLCHOLINE/SOD CL,ISO/PF 200MG/10ML
SYRINGE (ML) INTRAVENOUS AS NEEDED
Status: DISCONTINUED | OUTPATIENT
Start: 2021-11-17 | End: 2021-11-17 | Stop reason: SURG

## 2021-11-17 RX ORDER — LIDOCAINE HYDROCHLORIDE 10 MG/ML
0.5 INJECTION, SOLUTION EPIDURAL; INFILTRATION; INTRACAUDAL; PERINEURAL ONCE AS NEEDED
Status: DISCONTINUED | OUTPATIENT
Start: 2021-11-17 | End: 2021-11-17 | Stop reason: HOSPADM

## 2021-11-17 RX ORDER — SODIUM CHLORIDE 0.9 % (FLUSH) 0.9 %
3 SYRINGE (ML) INJECTION EVERY 12 HOURS SCHEDULED
Status: DISCONTINUED | OUTPATIENT
Start: 2021-11-17 | End: 2021-11-17 | Stop reason: HOSPADM

## 2021-11-17 RX ORDER — LABETALOL HYDROCHLORIDE 5 MG/ML
5 INJECTION, SOLUTION INTRAVENOUS
Status: DISCONTINUED | OUTPATIENT
Start: 2021-11-17 | End: 2021-11-17 | Stop reason: HOSPADM

## 2021-11-17 RX ORDER — LIDOCAINE HYDROCHLORIDE 20 MG/ML
INJECTION, SOLUTION EPIDURAL; INFILTRATION; INTRACAUDAL; PERINEURAL AS NEEDED
Status: DISCONTINUED | OUTPATIENT
Start: 2021-11-17 | End: 2021-11-17 | Stop reason: SURG

## 2021-11-17 RX ORDER — OXYCODONE AND ACETAMINOPHEN 10; 325 MG/1; MG/1
1 TABLET ORAL ONCE AS NEEDED
Status: COMPLETED | OUTPATIENT
Start: 2021-11-17 | End: 2021-11-17

## 2021-11-17 RX ORDER — SODIUM CHLORIDE 0.9 % (FLUSH) 0.9 %
3-10 SYRINGE (ML) INJECTION AS NEEDED
Status: DISCONTINUED | OUTPATIENT
Start: 2021-11-17 | End: 2021-11-17 | Stop reason: HOSPADM

## 2021-11-17 RX ORDER — FENTANYL CITRATE 50 UG/ML
25 INJECTION, SOLUTION INTRAMUSCULAR; INTRAVENOUS
Status: DISCONTINUED | OUTPATIENT
Start: 2021-11-17 | End: 2021-11-17 | Stop reason: HOSPADM

## 2021-11-17 RX ORDER — LIDOCAINE HYDROCHLORIDE 40 MG/ML
SOLUTION TOPICAL AS NEEDED
Status: DISCONTINUED | OUTPATIENT
Start: 2021-11-17 | End: 2021-11-17 | Stop reason: SURG

## 2021-11-17 RX ORDER — FENTANYL CITRATE 50 UG/ML
INJECTION, SOLUTION INTRAMUSCULAR; INTRAVENOUS AS NEEDED
Status: DISCONTINUED | OUTPATIENT
Start: 2021-11-17 | End: 2021-11-17 | Stop reason: SURG

## 2021-11-17 RX ORDER — OXYCODONE AND ACETAMINOPHEN 7.5; 325 MG/1; MG/1
2 TABLET ORAL EVERY 4 HOURS PRN
Status: DISCONTINUED | OUTPATIENT
Start: 2021-11-17 | End: 2021-11-17 | Stop reason: HOSPADM

## 2021-11-17 RX ORDER — SODIUM CHLORIDE, SODIUM LACTATE, POTASSIUM CHLORIDE, CALCIUM CHLORIDE 600; 310; 30; 20 MG/100ML; MG/100ML; MG/100ML; MG/100ML
1000 INJECTION, SOLUTION INTRAVENOUS CONTINUOUS
Status: DISCONTINUED | OUTPATIENT
Start: 2021-11-17 | End: 2021-11-17 | Stop reason: HOSPADM

## 2021-11-17 RX ORDER — MAGNESIUM HYDROXIDE 1200 MG/15ML
LIQUID ORAL AS NEEDED
Status: DISCONTINUED | OUTPATIENT
Start: 2021-11-17 | End: 2021-11-17 | Stop reason: HOSPADM

## 2021-11-17 RX ADMIN — DEXAMETHASONE SODIUM PHOSPHATE 4 MG: 4 INJECTION, SOLUTION INTRA-ARTICULAR; INTRALESIONAL; INTRAMUSCULAR; INTRAVENOUS; SOFT TISSUE at 12:43

## 2021-11-17 RX ADMIN — LIDOCAINE HYDROCHLORIDE 1 EACH: 40 SOLUTION TOPICAL at 13:32

## 2021-11-17 RX ADMIN — EPHEDRINE SULFATE 15 MG: 50 INJECTION INTRAVENOUS at 13:51

## 2021-11-17 RX ADMIN — ROCURONIUM BROMIDE 5 MG: 10 INJECTION INTRAVENOUS at 13:32

## 2021-11-17 RX ADMIN — ACETAMINOPHEN 1000 MG: 500 TABLET, FILM COATED ORAL at 12:43

## 2021-11-17 RX ADMIN — FENTANYL CITRATE 100 MCG: 50 INJECTION, SOLUTION INTRAMUSCULAR; INTRAVENOUS at 13:32

## 2021-11-17 RX ADMIN — Medication 200 MCG: at 13:43

## 2021-11-17 RX ADMIN — PROPOFOL 150 MG: 10 INJECTION, EMULSION INTRAVENOUS at 13:32

## 2021-11-17 RX ADMIN — VANCOMYCIN HYDROCHLORIDE 1250 MG: 1 INJECTION, POWDER, LYOPHILIZED, FOR SOLUTION INTRAVENOUS at 13:28

## 2021-11-17 RX ADMIN — SODIUM CHLORIDE, POTASSIUM CHLORIDE, SODIUM LACTATE AND CALCIUM CHLORIDE 1000 ML: 600; 310; 30; 20 INJECTION, SOLUTION INTRAVENOUS at 12:22

## 2021-11-17 RX ADMIN — Medication 100 MCG: at 13:39

## 2021-11-17 RX ADMIN — VANCOMYCIN HYDROCHLORIDE 1250 MG: 10 INJECTION, POWDER, LYOPHILIZED, FOR SOLUTION INTRAVENOUS at 13:08

## 2021-11-17 RX ADMIN — Medication 200 MCG: at 13:50

## 2021-11-17 RX ADMIN — LIDOCAINE HYDROCHLORIDE 80 MG: 20 INJECTION, SOLUTION EPIDURAL; INFILTRATION; INTRACAUDAL; PERINEURAL at 13:32

## 2021-11-17 RX ADMIN — Medication 160 MG: at 13:32

## 2021-11-17 RX ADMIN — OXYCODONE AND ACETAMINOPHEN 1 TABLET: 325; 10 TABLET ORAL at 15:07

## 2021-11-17 NOTE — OP NOTE
Procedure Note  Preop Diagnosis: Compression fracture of T5 vertebra with delayed healing [S22.050G]  Compression fracture of T6 vertebra with delayed healing [S22.050G]    Post-Op Diagnosis Codes:     * Compression fracture of T5 vertebra with delayed healing [S22.050G]     * Compression fracture of T6 vertebra with delayed healing [S22.050G]     Procedure Name: T5 and T6 Kyphoplasty and vertebral body biopsy    Indications:  A MRI of the T5 and T6 revealed findings of compression fracture of T5 and T6.  The patient now presents for T5 and T6 kyphoplasty and vertebral biopsy after discussing therapeutic alternatives.          Surgeon: Power Bright MD     Assistants: none    Anesthesia: General endotracheal anesthesia    ASA Class: 3    Procedure Details   After obtaining informed consent, having the risks and benefits of the procedure explained including but not limited to infection, bleeding, paralysis, spinal fluid leak, bowel bladder incontinence, extravasation of kyphoplasty cement resulting in neurocompression, pulmonary embolism, stroke, coma, and death, the patient was brought to the operating room.  The patient was given general anesthesia via an endotracheal tube. The patient was flipped prone onto a Arthur axis table.  Portable fluoroscopy was used to localize level of T5 and T6 . Preplanned incisions of the left and right of midline were then marked with an indelible marker.  The patient was then prepped and draped in a standard sterile fashion.  The preplanned incisions were infiltrated with Marcaine and epinephrine.  A 10 blade scalpel was used to make an incision at the dermis and epidermis.  Jamshidi needles were then advanced to the pedicles at the identified levels on the left and the right under direct fluoroscopic guidance.  The inner cannula was removed.  Biopsy sample was obtained from T6 under direct fluoroscopic guidance. Hand drill was then used to drill channel through the fractured  vertebral bodies from the left and the right under direct fluoroscopic guidance.  Kyphoplasty balloons were then inserted from the left side and the right side under direct fluoroscopic guidance into the vertebral bodies.  The balloons were inflated and deflated and then removed.  And then several syringes of kyphoplasty cement were injected into the fractured vertebral bodies under direct fluoroscopic guidance from the left and the right.  The cement was allowed to harden.  The Jamshidi needles were removed.  The wounds were then irrigated with an antibiotic solution and closed with Mastisol and Steri-Strips.  All sponge, needle and instrument counts were correct at the end of the procedure.  The patient was extubated in stable condition and returned to recovery room with about 8 mL of blood loss.        Findings:  Pathologic osteoporotic compression fractures T5, T6        Estimated Blood Loss:  8           Drains: none           Total IV Fluids: ml           Specimens:   Specimens     ID Source Type Tests Collected By Collected At Frozen?    A Spine, Thoracic Bone · SURGICAL PATHOLOGY EXAM   Power Bright MD 11/17/21 1354 No    Description: T6                 Implants:   Implant Name Type Inv. Item Serial No.  Lot No. LRB No. Used Action   KT MIXER KYPHON W/CMT BONE KYPHX HV R - [id number] Implant KT MIXER KYPHON W/CMT BONE KYPHX HV R  [employer] [id number] N/A 1 Implanted              Complications:  none           Disposition: PACU - hemodynamically stable.           Condition: stable        Power Bright MD

## 2021-11-17 NOTE — DISCHARGE INSTRUCTIONS

## 2021-11-17 NOTE — ANESTHESIA PREPROCEDURE EVALUATION
Anesthesia Evaluation     Patient summary reviewed   no history of anesthetic complications:  NPO Solid Status: > 8 hours  NPO Liquid Status: > 8 hours           Airway   Mallampati: I  TM distance: >3 FB  Neck ROM: full  No difficulty expected  Dental - normal exam     Pulmonary    (+) lung cancer (s/p left upper lobe wedge resection), COPD,   (-) asthma, sleep apnea  Cardiovascular   Exercise tolerance: good (4-7 METS)    ECG reviewed    (+) hyperlipidemia,   (-) hypertension, past MI, CAD, dysrhythmias, cardiac stents      Neuro/Psych  (-) seizures, TIA, CVA  GI/Hepatic/Renal/Endo    (+) obesity,     (-) liver disease, no renal disease, diabetes    Musculoskeletal     Abdominal    Substance History      OB/GYN          Other                        Anesthesia Plan    ASA 3     general     intravenous induction     Anesthetic plan, all risks, benefits, and alternatives have been provided, discussed and informed consent has been obtained with: patient.

## 2021-11-17 NOTE — ANESTHESIA POSTPROCEDURE EVALUATION
Patient: Damaris Hu    Procedure Summary     Date: 11/17/21 Room / Location:  [facility] OR  /  [facility] OR    Anesthesia Start: 1328 Anesthesia Stop: 1431    Procedure: KYPHOPLASTY WITH BIOPSY T5,6 (N/A Spine Lumbar) Diagnosis:       Compression fracture of T5 vertebra with delayed healing      Compression fracture of T6 vertebra with delayed healing      (Compression fracture of T5 vertebra with delayed healing [S22.050G])      (Compression fracture of T6 vertebra with delayed healing [S22.050G])    Surgeons: Power Bright MD Provider: Jorge L Montero CRNA    Anesthesia Type: general ASA Status: 3          Anesthesia Type: general    Vitals  Vitals Value Taken Time   /76 11/17/21 1445   Temp 97.8 °F (36.6 °C) 11/17/21 1445   Pulse 88 11/17/21 1449   Resp 15 11/17/21 1445   SpO2 99 % 11/17/21 1449   Vitals shown include unvalidated device data.        Post Anesthesia Care and Evaluation    Patient location during evaluation: PACU  Patient participation: complete - patient participated  Level of consciousness: awake and alert  Pain management: adequate  Airway patency: patent  Anesthetic complications: No anesthetic complications    Cardiovascular status: acceptable  Respiratory status: acceptable  Hydration status: acceptable    Comments: Blood pressure 111/72, pulse 90, temperature 97.8 °F (36.6 °C), temperature source Temporal, resp. rate 16, SpO2 95 %, not currently breastfeeding.    Pt discharged from PACU based on osmin score >8

## 2021-11-17 NOTE — ANESTHESIA PROCEDURE NOTES
Airway  Urgency: elective    Date/Time: 11/17/2021 1:33 PM  Airway not difficult    General Information and Staff    Patient location during procedure: OR  CRNA: Jorge L Montero CRNA    Indications and Patient Condition  Indications for airway management: airway protection    Preoxygenated: yes  Mask difficulty assessment: 1 - vent by mask    Final Airway Details  Final airway type: endotracheal airway      Successful airway: ETT  Cuffed: yes   Successful intubation technique: direct laryngoscopy  Endotracheal tube insertion site: oral  Blade: Chasity  Blade size: 3.5  ETT size (mm): 7.5  Cormack-Lehane Classification: grade I - full view of glottis  Placement verified by: chest auscultation, capnometry and palpation of cuff   Cuff volume (mL): 6  Measured from: lips  ETT/EBT  to lips (cm): 20  Number of attempts at approach: 1  Assessment: lips, teeth, and gum same as pre-op and atraumatic intubation

## 2021-11-19 LAB
CYTO UR: NORMAL
LAB AP CASE REPORT: NORMAL
LAB AP CLINICAL INFORMATION: NORMAL
PATH REPORT.FINAL DX SPEC: NORMAL
PATH REPORT.GROSS SPEC: NORMAL

## 2021-11-23 ENCOUNTER — TELEPHONE (OUTPATIENT)
Dept: NEUROSURGERY | Facility: CLINIC | Age: 66
End: 2021-11-23

## 2021-11-23 NOTE — TELEPHONE ENCOUNTER
Patient called and states she is still having some soreness, not pain, in her back after having her Kyphoplasty, not pain just soreness, explained this is normal but if anything changes or gets worse, call the office.  She states her incision looks good, she asked if she could apply heat now and Hayden stated that she could.  She has follow up next week.

## 2021-11-24 DIAGNOSIS — M80.80XG OTHER OSTEOPOROSIS WITH CURRENT PATHOLOGICAL FRACTURE WITH DELAYED HEALING, SUBSEQUENT ENCOUNTER: ICD-10-CM

## 2021-11-24 LAB
ALBUMIN SERPL-MCNC: 4.5 G/DL (ref 3.5–5.2)
ALP BLD-CCNC: 59 U/L (ref 35–104)
ALT SERPL-CCNC: 15 U/L (ref 5–33)
ANION GAP SERPL CALCULATED.3IONS-SCNC: 15 MMOL/L (ref 7–19)
AST SERPL-CCNC: 15 U/L (ref 5–32)
BILIRUB SERPL-MCNC: 0.4 MG/DL (ref 0.2–1.2)
BUN BLDV-MCNC: 19 MG/DL (ref 8–23)
CALCIUM SERPL-MCNC: 10.1 MG/DL (ref 8.8–10.2)
CHLORIDE BLD-SCNC: 102 MMOL/L (ref 98–111)
CO2: 23 MMOL/L (ref 22–29)
CREAT SERPL-MCNC: 1 MG/DL (ref 0.5–0.9)
GFR AFRICAN AMERICAN: >59
GFR NON-AFRICAN AMERICAN: 55
GLUCOSE BLD-MCNC: 99 MG/DL (ref 74–109)
MAGNESIUM: 2 MG/DL (ref 1.6–2.4)
PHOSPHORUS: 4.1 MG/DL (ref 2.5–4.5)
POTASSIUM SERPL-SCNC: 4.3 MMOL/L (ref 3.5–5)
SODIUM BLD-SCNC: 140 MMOL/L (ref 136–145)
TOTAL PROTEIN: 6.9 G/DL (ref 6.6–8.7)
VITAMIN D 25-HYDROXY: 53.9 NG/ML

## 2021-11-30 ENCOUNTER — TELEPHONE (OUTPATIENT)
Dept: PRIMARY CARE CLINIC | Age: 66
End: 2021-11-30

## 2021-11-30 NOTE — TELEPHONE ENCOUNTER
----- Message from ROBER Infante sent at 11/29/2021  1:25 PM CST -----  Please let patient know that lab results were normal/stable. Thanks!

## 2021-12-02 ENCOUNTER — TELEPHONE (OUTPATIENT)
Dept: NEUROSURGERY | Facility: CLINIC | Age: 66
End: 2021-12-02

## 2021-12-02 NOTE — TELEPHONE ENCOUNTER
Patient is status post kyphoplasty 11/17/21 and is questioning whether or not she can take the steri-strips off (they are not sticking to her skin anymore).  I told her she could remove them at this point; otherwise, she says she is doing fine.    taylor cantu CMA

## 2021-12-07 DIAGNOSIS — F32.A ANXIETY AND DEPRESSION: ICD-10-CM

## 2021-12-07 DIAGNOSIS — F41.9 ANXIETY AND DEPRESSION: ICD-10-CM

## 2021-12-08 ENCOUNTER — OFFICE VISIT (OUTPATIENT)
Dept: NEUROSURGERY | Facility: CLINIC | Age: 66
End: 2021-12-08

## 2021-12-08 VITALS
HEIGHT: 65 IN | WEIGHT: 181 LBS | DIASTOLIC BLOOD PRESSURE: 92 MMHG | BODY MASS INDEX: 30.16 KG/M2 | SYSTOLIC BLOOD PRESSURE: 150 MMHG

## 2021-12-08 DIAGNOSIS — S22.050D COMPRESSION FRACTURE OF T6 VERTEBRA WITH ROUTINE HEALING: ICD-10-CM

## 2021-12-08 DIAGNOSIS — S22.050D COMPRESSION FRACTURE OF T5 VERTEBRA WITH ROUTINE HEALING: Primary | ICD-10-CM

## 2021-12-08 DIAGNOSIS — S22.060A COMPRESSION FRACTURE OF T7 VERTEBRA, INITIAL ENCOUNTER (HCC): ICD-10-CM

## 2021-12-08 DIAGNOSIS — Z87.891 FORMER SMOKER: Chronic | ICD-10-CM

## 2021-12-08 DIAGNOSIS — E66.3 OVERWEIGHT WITH BODY MASS INDEX (BMI) OF 29 TO 29.9 IN ADULT: ICD-10-CM

## 2021-12-08 DIAGNOSIS — C34.90 SQUAMOUS CELL CARCINOMA OF LUNG, UNSPECIFIED LATERALITY (HCC): ICD-10-CM

## 2021-12-08 PROCEDURE — 99212 OFFICE O/P EST SF 10 MIN: CPT | Performed by: NURSE PRACTITIONER

## 2021-12-08 RX ORDER — FLUTICASONE FUROATE, UMECLIDINIUM BROMIDE AND VILANTEROL TRIFENATATE 100; 62.5; 25 UG/1; UG/1; UG/1
POWDER RESPIRATORY (INHALATION)
Qty: 60 EACH | Refills: 0 | Status: SHIPPED | OUTPATIENT
Start: 2021-12-08 | End: 2022-01-05 | Stop reason: SDUPTHER

## 2021-12-08 NOTE — PATIENT INSTRUCTIONS
"BMI for Adults  What is BMI?  Body mass index (BMI) is a number that is calculated from a person's weight and height. BMI can help estimate how much of a person's weight is composed of fat. BMI does not measure body fat directly. Rather, it is an alternative to procedures that directly measure body fat, which can be difficult and expensive.  BMI can help identify people who may be at higher risk for certain medical problems.  What are BMI measurements used for?  BMI is used as a screening tool to identify possible weight problems. It helps determine whether a person is obese, overweight, a healthy weight, or underweight.  BMI is useful for:  · Identifying a weight problem that may be related to a medical condition or may increase the risk for medical problems.  · Promoting changes, such as changes in diet and exercise, to help reach a healthy weight. BMI screening can be repeated to see if these changes are working.  How is BMI calculated?  BMI involves measuring your weight in relation to your height. Both height and weight are measured, and the BMI is calculated from those numbers. This can be done either in English ([place] or metric measurements. Note that charts and online BMI calculators are available to help you find your BMI quickly and easily without having to do these calculations yourself.  To calculate your BMI in English ([place] measurements:    1. Measure your weight in pounds (lb).  2. Multiply the number of pounds by 703.  ? For example, for a person who weighs 180 lb, multiply that number by 703, which equals 126,540.  3. Measure your height in inches. Then multiply that number by itself to get a measurement called \"inches squared.\"  ? For example, for a person who is 70 inches tall, the \"inches squared\" measurement is 70 inches x 70 inches, which equals 4,900 inches squared.  4. Divide the total from step 2 (number of lb x 703) by the total from step 3 (inches squared): 126,540 ÷ 4,900 = 25.8. This is " "your BMI.    To calculate your BMI in metric measurements:  1. Measure your weight in kilograms (kg).  2. Measure your height in meters (m). Then multiply that number by itself to get a measurement called \"meters squared.\"  ? For example, for a person who is 1.75 m tall, the \"meters squared\" measurement is 1.75 m x 1.75 m, which is equal to 3.1 meters squared.  3. Divide the number of kilograms (your weight) by the meters squared number. In this example: 70 ÷ 3.1 = 22.6. This is your BMI.  What do the results mean?  BMI charts are used to identify whether you are underweight, normal weight, overweight, or obese. The following guidelines will be used:  · Underweight: BMI less than 18.5.  · Normal weight: BMI between 18.5 and 24.9.  · Overweight: BMI between 25 and 29.9.  · Obese: BMI of 30 or above.  Keep these notes in mind:  · Weight includes both fat and muscle, so someone with a muscular build, such as an athlete, may have a BMI that is higher than 24.9. In cases like these, BMI is not an accurate measure of body fat.  · To determine if excess body fat is the cause of a BMI of 25 or higher, further assessments may need to be done by a health care provider.  · BMI is usually interpreted in the same way for men and women.  Where to find more information  For more information about BMI, including tools to quickly calculate your BMI, go to these websites:  · Centers for Disease Control and Prevention: www.cdc.gov  · American Heart Association: www.heart.org  · National Heart, Lung, and Blood [place]: www.nhlbi.nih.gov  Summary  · Body mass index (BMI) is a number that is calculated from a person's weight and height.  · BMI may help estimate how much of a person's weight is composed of fat. BMI can help identify those who may be at higher risk for certain medical problems.  · BMI can be measured using English measurements or metric measurements.  · BMI charts are used to identify whether you are underweight, normal " "weight, overweight, or obese.  This information is not intended to replace advice given to you by your health care provider. Make sure you discuss any questions you have with your health care provider.  Document Revised: 09/09/2020 Document Reviewed: 07/17/2020  [employer] Patient Education © 2021 [employer]      https://www.nhlbi.nih.gov/files/docs/public/heart/dash_brief.pdf\">   DASH Eating Plan  DASH stands for Dietary Approaches to Stop Hypertension. The DASH eating plan is a healthy eating plan that has been shown to:  · Reduce high blood pressure (hypertension).  · Reduce your risk for type 2 diabetes, heart disease, and stroke.  · Help with weight loss.  What are tips for following this plan?  Reading food labels  · Check food labels for the amount of salt (sodium) per serving. Choose foods with less than 5 percent of the Daily Value of sodium. Generally, foods with less than 300 milligrams (mg) of sodium per serving fit into this eating plan.  · To find whole grains, look for the word \"whole\" as the first word in the ingredient list.  Shopping  · Buy products labeled as \"low-sodium\" or \"no salt added.\"  · Buy fresh foods. Avoid canned foods and pre-made or frozen meals.  Cooking  · Avoid adding salt when cooking. Use salt-free seasonings or herbs instead of table salt or sea salt. Check with your health care provider or pharmacist before using salt substitutes.  · Do not yoon foods. Cook foods using healthy methods such as baking, boiling, grilling, roasting, and broiling instead.  · Cook with heart-healthy oils, such as olive, canola, avocado, soybean, or sunflower oil.  Meal planning    · Eat a balanced diet that includes:  ? 4 or more servings of fruits and 4 or more servings of vegetables each day. Try to fill one-half of your plate with fruits and vegetables.  ? 6-8 servings of whole grains each day.  ? Less than 6 oz (170 g) of lean meat, poultry, or fish each day. A 3-oz (85-g) serving of meat is " about the same size as a deck of cards. One egg equals 1 oz (28 g).  ? 2-3 servings of low-fat dairy each day. One serving is 1 cup (237 mL).  ? 1 serving of nuts, seeds, or beans 5 times each week.  ? 2-3 servings of heart-healthy fats. Healthy fats called omega-3 fatty acids are found in foods such as walnuts, flaxseeds, fortified milks, and eggs. These fats are also found in cold-water fish, such as sardines, salmon, and mackerel.  · Limit how much you eat of:  ? Canned or prepackaged foods.  ? Food that is high in trans fat, such as some fried foods.  ? Food that is high in saturated fat, such as fatty meat.  ? Desserts and other sweets, sugary drinks, and other foods with added sugar.  ? Full-fat dairy products.  · Do not salt foods before eating.  · Do not eat more than 4 egg yolks a week.  · Try to eat at least 2 vegetarian meals a week.  · Eat more home-cooked food and less restaurant, buffet, and fast food.    Lifestyle  · When eating at a restaurant, ask that your food be prepared with less salt or no salt, if possible.  · If you drink alcohol:  ? Limit how much you use to:  § 0-1 drink a day for women who are not pregnant.  § 0-2 drinks a day for men.  ? Be aware of how much alcohol is in your drink. In the [place] one drink equals one 12 oz bottle of beer (355 mL), one 5 oz glass of wine (148 mL), or one 1½ oz glass of hard liquor (44 mL).  General information  · Avoid eating more than 2,300 mg of salt a day. If you have hypertension, you may need to reduce your sodium intake to 1,500 mg a day.  · Work with your health care provider to maintain a healthy body weight or to lose weight. Ask what an ideal weight is for you.  · Get at least 30 minutes of exercise that causes your heart to beat faster (aerobic exercise) most days of the week. Activities may include walking, swimming, or biking.  · Work with your health care provider or dietitian to adjust your eating plan to your individual calorie needs.  What  foods should I eat?  Fruits  All fresh, dried, or frozen fruit. Canned fruit in natural juice (without added sugar).  Vegetables  Fresh or frozen vegetables (raw, steamed, roasted, or grilled). Low-sodium or reduced-sodium tomato and vegetable juice. Low-sodium or reduced-sodium tomato sauce and tomato paste. Low-sodium or reduced-sodium canned vegetables.  Grains  Whole-grain or whole-wheat bread. Whole-grain or whole-wheat pasta. Brown rice. Oatmeal. Quinoa. Bulgur. Whole-grain and low-sodium cereals. Doris bread. Low-fat, low-sodium crackers. Whole-wheat flour tortillas.  Meats and other proteins  Skinless chicken or turkey. Ground chicken or turkey. Pork with fat trimmed off. Fish and seafood. Egg whites. Dried beans, peas, or lentils. Unsalted nuts, nut butters, and seeds. Unsalted canned beans. Lean cuts of beef with fat trimmed off. Low-sodium, lean precooked or cured meat, such as sausages or meat loaves.  Dairy  Low-fat (1%) or fat-free (skim) milk. Reduced-fat, low-fat, or fat-free cheeses. Nonfat, low-sodium ricotta or cottage cheese. Low-fat or nonfat yogurt. Low-fat, low-sodium cheese.  Fats and oils  Soft margarine without trans fats. Vegetable oil. Reduced-fat, low-fat, or light mayonnaise and salad dressings (reduced-sodium). Canola, safflower, olive, avocado, soybean, and sunflower oils. Avocado.  Seasonings and condiments  Herbs. Spices. Seasoning mixes without salt.  Other foods  Unsalted popcorn and pretzels. Fat-free sweets.  The items listed above may not be a complete list of foods and beverages you can eat. Contact a dietitian for more information.  What foods should I avoid?  Fruits  Canned fruit in a light or heavy syrup. Fried fruit. Fruit in cream or butter sauce.  Vegetables  Creamed or fried vegetables. Vegetables in a cheese sauce. Regular canned vegetables (not low-sodium or reduced-sodium). Regular canned tomato sauce and paste (not low-sodium or reduced-sodium). Regular tomato and  vegetable juice (not low-sodium or reduced-sodium). Pickles. Olives.  Grains  Baked goods made with fat, such as croissants, muffins, or some breads. Dry pasta or rice meal packs.  Meats and other proteins  Fatty cuts of meat. Ribs. Fried meat. Orozco. Bologna, salami, and other precooked or cured meats, such as sausages or meat loaves. Fat from the back of a pig (fatback). Bratwurst. Salted nuts and seeds. Canned beans with added salt. Canned or smoked fish. Whole eggs or egg yolks. Chicken or turkey with skin.  Dairy  Whole or 2% milk, cream, and half-and-half. Whole or full-fat cream cheese. Whole-fat or sweetened yogurt. Full-fat cheese. Nondairy creamers. Whipped toppings. Processed cheese and cheese spreads.  Fats and oils  Butter. Stick margarine. Lard. Shortening. Ghee. Orozco fat. Tropical oils, such as coconut, palm kernel, or palm oil.  Seasonings and condiments  Onion salt, garlic salt, seasoned salt, table salt, and sea salt. Worcestershire sauce. Tartar sauce. Barbecue sauce. Teriyaki sauce. Soy sauce, including reduced-sodium. Steak sauce. Canned and packaged gravies. Fish sauce. Oyster sauce. Cocktail sauce. Store-bought horseradish. Ketchup. Mustard. Meat flavorings and tenderizers. Bouillon cubes. Hot sauces. Pre-made or packaged marinades. Pre-made or packaged taco seasonings. Relishes. Regular salad dressings.  Other foods  Salted popcorn and pretzels.  The items listed above may not be a complete list of foods and beverages you should avoid. Contact a dietitian for more information.  Where to find more information  · National Heart, Lung, and Blood [place]: www.nhlbi.nih.gov  · American Heart Association: www.heart.org  · Academy of Nutrition and Dietetics: www.eatright.org  · National Kidney Foundation: www.kidney.org  Summary  · The DASH eating plan is a healthy eating plan that has been shown to reduce high blood pressure (hypertension). It may also reduce your risk for type 2 diabetes, heart  disease, and stroke.  · When on the DASH eating plan, aim to eat more fresh fruits and vegetables, whole grains, lean proteins, low-fat dairy, and heart-healthy fats.  · With the DASH eating plan, you should limit salt (sodium) intake to 2,300 mg a day. If you have hypertension, you may need to reduce your sodium intake to 1,500 mg a day.  · Work with your health care provider or dietitian to adjust your eating plan to your individual calorie needs.  This information is not intended to replace advice given to you by your health care provider. Make sure you discuss any questions you have with your health care provider.  Document Revised: 11/20/2020 Document Reviewed: 11/20/2020  Elsevier Patient Education © 2021 [employer]

## 2021-12-08 NOTE — TELEPHONE ENCOUNTER
Irving Abernathy called to request a refill on her medication. Last Carmencita Cousins: 12-8-2021  Medication Contract: 6-15/2021   Last Fill: 11-    Last office visit : 11/12/2021   Next office visit : 1/4/2022     Last UDS:   Amphetamine Screen, Urine   Date Value Ref Range Status   06/15/2021 -  Final     Barbiturate Screen, Urine   Date Value Ref Range Status   06/15/2021 -  Final     Benzodiazepine Screen, Urine   Date Value Ref Range Status   06/15/2021 -  Final     Buprenorphine Urine   Date Value Ref Range Status   06/15/2021 -  Final     Cocaine Metabolite Screen, Urine   Date Value Ref Range Status   06/15/2021 -  Final     Gabapentin Screen, Urine   Date Value Ref Range Status   06/15/2021 -  Final     MDMA, Urine   Date Value Ref Range Status   06/15/2021 -  Final     Methamphetamine, Urine   Date Value Ref Range Status   06/15/2021 -  Final     Opiate Scrn, Ur   Date Value Ref Range Status   06/15/2021 -  Final     Oxycodone Screen, Ur   Date Value Ref Range Status   06/15/2021 +  Final     PCP Screen, Urine   Date Value Ref Range Status   06/15/2021 -  Final     Propoxyphene Screen, Urine   Date Value Ref Range Status   06/15/2021 -  Final     THC Screen, Urine   Date Value Ref Range Status   06/15/2021 -  Final     Tricyclic Antidepressants, Urine   Date Value Ref Range Status   06/15/2021 -  Final                 Requested Prescriptions     Pending Prescriptions Disp Refills    HYDROcodone-acetaminophen (NORCO)  MG per tablet 90 tablet 0     Sig: Take 1 tablet by mouth every 8 hours as needed for Pain for up to 30 days. Intended supply: 30 days         Please approve or refuse this medication.    Denver Rodriguez MA

## 2021-12-08 NOTE — TELEPHONE ENCOUNTER
Derian Tipton called to request a refill on her medication. Last Epifanio Moh: 12-8-2021  Medication Contract: 6-   Last Fill: 7-    Last office visit : 11/12/2021   Next office visit : 12/8/2021     Last UDS:   Amphetamine Screen, Urine   Date Value Ref Range Status   06/15/2021 -  Final     Barbiturate Screen, Urine   Date Value Ref Range Status   06/15/2021 -  Final     Benzodiazepine Screen, Urine   Date Value Ref Range Status   06/15/2021 -  Final     Buprenorphine Urine   Date Value Ref Range Status   06/15/2021 -  Final     Cocaine Metabolite Screen, Urine   Date Value Ref Range Status   06/15/2021 -  Final     Gabapentin Screen, Urine   Date Value Ref Range Status   06/15/2021 -  Final     MDMA, Urine   Date Value Ref Range Status   06/15/2021 -  Final     Methamphetamine, Urine   Date Value Ref Range Status   06/15/2021 -  Final     Opiate Scrn, Ur   Date Value Ref Range Status   06/15/2021 -  Final     Oxycodone Screen, Ur   Date Value Ref Range Status   06/15/2021 +  Final     PCP Screen, Urine   Date Value Ref Range Status   06/15/2021 -  Final     Propoxyphene Screen, Urine   Date Value Ref Range Status   06/15/2021 -  Final     THC Screen, Urine   Date Value Ref Range Status   06/15/2021 -  Final     Tricyclic Antidepressants, Urine   Date Value Ref Range Status   06/15/2021 -  Final                 Requested Prescriptions     Pending Prescriptions Disp Refills    diazePAM (VALIUM) 2 MG tablet [Pharmacy Med Name: diazePAM 2 MG Oral Tablet] 15 tablet 0     Sig: TAKE 1 TABLET BY MOUTH EVERY 8 HOURS AS NEEDED FOR ANXIETY UP TO 10 DAYS     Signed Prescriptions Disp Refills    Yonatan Minus 100-62.5-25 MCG/INH AEPB 60 each 0     Sig: INHALE 1 PUFF ONCE DAILY     Authorizing Provider: Maynor Wing     Ordering User: Jeremias Mccoy         Please approve or refuse this medication.    Renea Ardon MA

## 2021-12-08 NOTE — PROGRESS NOTES
"  Chief complaint:   Chief Complaint   Patient presents with   • Back Pain     Damaris is returning for a follow up for a Kyphoplasty for a compression fracture, she states her pain is better since surgery.         Subjective     HPI: This is a 66 y.o. female patient who went to the operating room on 11/17/2021 for a T5-6 kyphoplasty and biopsy. The patient is here in follow up today for postoperative visit.  The patient comes in and says that her pain is much better than what it was from before surgery.  She says that she is able to walk without having excruciating pain that she was once having.  She does still have some back soreness and stiffness but says this is improving as well        Review of Systems   Musculoskeletal: Positive for back pain.   Neurological: Negative.    Psychiatric/Behavioral: Negative.          Objective      Vital Signs  /92   Ht 165.5 cm (65.16\")   Wt 82.1 kg (181 lb)   LMP  (LMP Unknown)   BMI 29.97 kg/m²     Physical Exam  Constitutional:       Appearance: She is well-developed.   HENT:      Head: Normocephalic.   Eyes:      Extraocular Movements: EOM normal.      Pupils: Pupils are equal, round, and reactive to light.   Pulmonary:      Effort: Pulmonary effort is normal.   Musculoskeletal:         General: Normal range of motion.      Cervical back: Normal range of motion.      Comments: Improving back pain   Skin:     General: Skin is warm.   Neurological:      Mental Status: She is alert and oriented to person, place, and time.      GCS: GCS eye subscore is 4. GCS verbal subscore is 5. GCS motor subscore is 6.      Cranial Nerves: No cranial nerve deficit.      Sensory: No sensory deficit.      Gait: Gait is intact. Gait normal.      Deep Tendon Reflexes: Strength normal and reflexes are normal and symmetric.   Psychiatric:         Speech: Speech normal.         Behavior: Behavior normal.         Thought Content: Thought content normal.       Incisions clean dry and " intact    Neurologic Exam     Mental Status   Oriented to person, place, and time.   Attention: normal. Concentration: normal.   Speech: speech is normal   Level of consciousness: alert  Normal comprehension.     Cranial Nerves     CN II   Visual fields full to confrontation.     CN III, IV, VI   Pupils are equal, round, and reactive to light.  Extraocular motions are normal.     CN V   Facial sensation intact.     CN VII   Facial expression full, symmetric.     CN VIII   CN VIII normal.     CN IX, X   CN IX normal.   CN X normal.     CN XI   CN XI normal.     CN XII   CN XII normal.     Motor Exam   Muscle bulk: normal    Strength   Strength 5/5 throughout.     Sensory Exam   Light touch normal.     Gait, Coordination, and Reflexes     Gait  Gait: normal    Reflexes   Reflexes 2+ except as noted.       Imaging review: No new imaging.  No evidence of malignancy noted in the pathology report.          Assessment/Plan: Patient doing well from her surgery and having improvement in her pain.  She is still working with oncology in [place] in regards to cancer treatments.  At this point we will need to see her on an as-needed basis.  She was told to call us if any further problems or concerns and we will be happy to get her back in the office if needed.    Patient is a nonsmoker  The patient's Body mass index is 29.97 kg/m².. BMI is above normal parameters. Recommendations include: educational material and nutrition counseling   Advance Care Planning   ACP discussion was held with the patient during this visit. Patient has an advance directive in EMR which is still valid.       Diagnoses and all orders for this visit:    1. Compression fracture of T5 vertebra with routine healing (Primary)    2. Compression fracture of T6 vertebra with routine healing    3. Former smoker    4. Overweight with body mass index (BMI) of 29 to 29.9 in adult        I discussed the patients findings and my recommendations with  patient  Hayden Jasso, APRN  12/08/21  15:36 CST

## 2021-12-09 RX ORDER — HYDROCODONE BITARTRATE AND ACETAMINOPHEN 10; 325 MG/1; MG/1
1 TABLET ORAL EVERY 8 HOURS PRN
Qty: 90 TABLET | Refills: 0 | Status: SHIPPED | OUTPATIENT
Start: 2021-12-09 | End: 2022-01-05 | Stop reason: SDUPTHER

## 2021-12-09 RX ORDER — DIAZEPAM 2 MG/1
TABLET ORAL
Qty: 15 TABLET | Refills: 0 | Status: SHIPPED | OUTPATIENT
Start: 2021-12-09 | End: 2022-02-23 | Stop reason: SDUPTHER

## 2021-12-18 DIAGNOSIS — J30.2 SEASONAL ALLERGIES: ICD-10-CM

## 2021-12-19 RX ORDER — CETIRIZINE HYDROCHLORIDE 10 MG/1
TABLET ORAL
Qty: 30 TABLET | Refills: 0 | Status: SHIPPED | OUTPATIENT
Start: 2021-12-19 | End: 2022-01-13

## 2022-01-01 NOTE — TELEPHONE ENCOUNTER
I called patient and let her know that it is for her weekly labwork to see if her counts are low enough for Procrit. I let her know that even though she is finished with chemo we still need to watch her counts. She verbalized understanding.    She also reports that the [facility] center is telling her that they are having trouble getting ahold of our office when they need something. She reports that this has happened before we got the new phone system. I will notify Samia so that she is aware.    2g/1g

## 2022-01-05 DIAGNOSIS — S22.060A COMPRESSION FRACTURE OF T7 VERTEBRA, INITIAL ENCOUNTER (HCC): ICD-10-CM

## 2022-01-05 DIAGNOSIS — C34.90 SQUAMOUS CELL CARCINOMA OF LUNG, UNSPECIFIED LATERALITY (HCC): ICD-10-CM

## 2022-01-06 RX ORDER — FLUTICASONE FUROATE, UMECLIDINIUM BROMIDE AND VILANTEROL TRIFENATATE 100; 62.5; 25 UG/1; UG/1; UG/1
1 POWDER RESPIRATORY (INHALATION) DAILY
Qty: 60 EACH | Refills: 3 | Status: SHIPPED | OUTPATIENT
Start: 2022-01-06 | End: 2022-05-24

## 2022-01-06 NOTE — TELEPHONE ENCOUNTER
Clifford Guzman called to request a refill on her medication. Last office visit : 11/12/2021   Next office visit : 2/3/2022     Last UDS:   Amphetamine Screen, Urine   Date Value Ref Range Status   06/15/2021 -  Final     Barbiturate Screen, Urine   Date Value Ref Range Status   06/15/2021 -  Final     Benzodiazepine Screen, Urine   Date Value Ref Range Status   06/15/2021 -  Final     Buprenorphine Urine   Date Value Ref Range Status   06/15/2021 -  Final     Cocaine Metabolite Screen, Urine   Date Value Ref Range Status   06/15/2021 -  Final     Gabapentin Screen, Urine   Date Value Ref Range Status   06/15/2021 -  Final     MDMA, Urine   Date Value Ref Range Status   06/15/2021 -  Final     Methamphetamine, Urine   Date Value Ref Range Status   06/15/2021 -  Final     Opiate Scrn, Ur   Date Value Ref Range Status   06/15/2021 -  Final     Oxycodone Screen, Ur   Date Value Ref Range Status   06/15/2021 +  Final     PCP Screen, Urine   Date Value Ref Range Status   06/15/2021 -  Final     Propoxyphene Screen, Urine   Date Value Ref Range Status   06/15/2021 -  Final     THC Screen, Urine   Date Value Ref Range Status   06/15/2021 -  Final     Tricyclic Antidepressants, Urine   Date Value Ref Range Status   06/15/2021 -  Final       Last Giovanny Nokomis: 12-08-21  Medication Contract: 06-15-21   Last Fill: 12-09-21    Requested Prescriptions     Pending Prescriptions Disp Refills    HYDROcodone-acetaminophen (NORCO)  MG per tablet 90 tablet 0     Sig: Take 1 tablet by mouth every 8 hours as needed for Pain for up to 30 days. Intended supply: 30 days     Signed Prescriptions Disp Refills    fluticasone-umeclidin-vilant (TRELEGY ELLIPTA) 100-62.5-25 MCG/INH AEPB 60 each 3     Sig: Inhale 1 puff into the lungs daily     Authorizing Provider: Niraj Shoemaker     Ordering User: Edna Ozuna         Please approve or refuse this medication.    Dennis Escobedo MA

## 2022-01-07 RX ORDER — HYDROCODONE BITARTRATE AND ACETAMINOPHEN 10; 325 MG/1; MG/1
1 TABLET ORAL EVERY 8 HOURS PRN
Qty: 90 TABLET | Refills: 0 | Status: SHIPPED | OUTPATIENT
Start: 2022-01-07 | End: 2022-02-03 | Stop reason: SDUPTHER

## 2022-01-13 DIAGNOSIS — J30.2 SEASONAL ALLERGIES: ICD-10-CM

## 2022-01-13 RX ORDER — CETIRIZINE HYDROCHLORIDE 10 MG/1
TABLET ORAL
Qty: 30 TABLET | Refills: 0 | Status: SHIPPED | OUTPATIENT
Start: 2022-01-13 | End: 2022-02-23

## 2022-01-26 ASSESSMENT — PATIENT HEALTH QUESTIONNAIRE - PHQ9
SUM OF ALL RESPONSES TO PHQ QUESTIONS 1-9: 0
2. FEELING DOWN, DEPRESSED OR HOPELESS: 0
SUM OF ALL RESPONSES TO PHQ QUESTIONS 1-9: 0
SUM OF ALL RESPONSES TO PHQ9 QUESTIONS 1 & 2: 0
SUM OF ALL RESPONSES TO PHQ QUESTIONS 1-9: 0
SUM OF ALL RESPONSES TO PHQ QUESTIONS 1-9: 0
1. LITTLE INTEREST OR PLEASURE IN DOING THINGS: 0

## 2022-01-26 ASSESSMENT — LIFESTYLE VARIABLES: HOW OFTEN DO YOU HAVE A DRINK CONTAINING ALCOHOL: 0

## 2022-01-30 NOTE — PROGRESS NOTES
"Background:  Pt w hx lung cancer, radiation pneumonitis, moderate to severe copd    Chief Complaint  COPD    Subjective    History of Present Illness       Damaris Hu presents to [facility].  Since the last visit she has been getting treated with chemotherapy in [place] and has had a compression fracture treated with kyphoplasty, path neg for metastatic disease.  She is seeing Dr. Bey through [facility], in a trial.  She had Covid around emi time and some lingering effects.  She has not been using rescue inhalers and uses trelegy.  She takes 10 mg of prednisone per day.     Objective     Vital Signs:   /70   Pulse 110   Ht 165.1 cm (65\")   Wt 79.8 kg (176 lb)   SpO2 97% Comment: RA  BMI 29.29 kg/m²   Physical Exam  Constitutional:       General: She is not in acute distress.     Appearance: She is well-developed. She is not ill-appearing or toxic-appearing.   HENT:      Head: Atraumatic.   Eyes:      General: No scleral icterus.     Conjunctiva/sclera: Conjunctivae normal.   Cardiovascular:      Rate and Rhythm: Normal rate and regular rhythm.      Heart sounds: S1 normal and S2 normal.   Pulmonary:      Effort: Pulmonary effort is normal.      Breath sounds: Normal breath sounds.   Abdominal:      General: There is no distension.   Musculoskeletal:         General: No deformity.      Cervical back: Neck supple.   Skin:     Coloration: Skin is not pale.      Findings: No rash.   Neurological:      Mental Status: She is alert.        Result Review  Data Reviewed:{ Labs  Result Review  Imaging  Media :23}               Surgical Pathology Exam (11/17/2021 13:54) negative for malignancy.  osteoporosis       Assessment and Plan  {CC Problem List  Visit Diagnosis  ROS  Review (Popup)  Health Maintenance  Quality  BestPractice  Medications  SmartSets  SnapShot Encounters  Media :23}   Problem List Items Addressed This Visit        " Pulmonary Problems    Malignant neoplasm of overlapping sites of right lung (HCC) (Chronic)       Other    Former smoker (Chronic)    History of radiation therapy      Other Visit Diagnoses     Stage 3 severe COPD by GOLD classification ([facility])    -  Primary    Pulmonary emphysema, unspecified emphysema type ([facility])          Her last PFT is reviewed and showed moderate obstruction.  She is stable probably with some long Covid effect and copd causing the shortness of breath.  Will increase trelegy to 200 mcg for the next 2 weeks and then back to 100 mcg.  Follow up with spirometry.  Continue nonsmoking.  Radiology follow     Follow Up {Instructions Charge Capture  Follow-up Communications :23}   No follow-ups on file.  Patient was given instructions and counseling regarding her condition or for health maintenance advice. Please see specific information pulled into the AVS if appropriate.    Electronically signed by Edmond Tobar MD, 1/31/2022, 11:30 CST

## 2022-01-31 ENCOUNTER — OFFICE VISIT (OUTPATIENT)
Dept: PULMONOLOGY | Facility: CLINIC | Age: 67
End: 2022-01-31

## 2022-01-31 VITALS
DIASTOLIC BLOOD PRESSURE: 70 MMHG | HEIGHT: 65 IN | WEIGHT: 176 LBS | BODY MASS INDEX: 29.32 KG/M2 | SYSTOLIC BLOOD PRESSURE: 136 MMHG | HEART RATE: 110 BPM | OXYGEN SATURATION: 97 %

## 2022-01-31 DIAGNOSIS — Z92.3 HISTORY OF RADIATION THERAPY: ICD-10-CM

## 2022-01-31 DIAGNOSIS — C34.81 MALIGNANT NEOPLASM OF OVERLAPPING SITES OF RIGHT LUNG: ICD-10-CM

## 2022-01-31 DIAGNOSIS — J44.9 STAGE 3 SEVERE COPD BY GOLD CLASSIFICATION: Primary | ICD-10-CM

## 2022-01-31 DIAGNOSIS — J43.9 PULMONARY EMPHYSEMA, UNSPECIFIED EMPHYSEMA TYPE: ICD-10-CM

## 2022-01-31 DIAGNOSIS — Z87.891 FORMER SMOKER: ICD-10-CM

## 2022-01-31 PROCEDURE — 99214 OFFICE O/P EST MOD 30 MIN: CPT | Performed by: INTERNAL MEDICINE

## 2022-01-31 RX ORDER — SERTRALINE HYDROCHLORIDE 100 MG/1
100 TABLET, FILM COATED ORAL DAILY
COMMUNITY
End: 2023-02-09

## 2022-01-31 RX ORDER — MULTIPLE VITAMINS W/ MINERALS TAB 9MG-400MCG
1 TAB ORAL DAILY
COMMUNITY

## 2022-01-31 RX ORDER — VITAMIN E 268 MG
400 CAPSULE ORAL DAILY
COMMUNITY
End: 2023-02-09

## 2022-02-01 ENCOUNTER — TELEPHONE (OUTPATIENT)
Dept: PULMONOLOGY | Facility: CLINIC | Age: 67
End: 2022-02-01

## 2022-02-03 DIAGNOSIS — C34.90 SQUAMOUS CELL CARCINOMA OF LUNG, UNSPECIFIED LATERALITY (HCC): ICD-10-CM

## 2022-02-03 DIAGNOSIS — S22.060A COMPRESSION FRACTURE OF T7 VERTEBRA, INITIAL ENCOUNTER (HCC): ICD-10-CM

## 2022-02-04 RX ORDER — HYDROCODONE BITARTRATE AND ACETAMINOPHEN 10; 325 MG/1; MG/1
1 TABLET ORAL EVERY 8 HOURS PRN
Qty: 90 TABLET | Refills: 0 | Status: SHIPPED | OUTPATIENT
Start: 2022-03-05 | End: 2022-04-04

## 2022-02-04 RX ORDER — HYDROCODONE BITARTRATE AND ACETAMINOPHEN 10; 325 MG/1; MG/1
1 TABLET ORAL EVERY 8 HOURS PRN
Qty: 90 TABLET | Refills: 0 | Status: SHIPPED | OUTPATIENT
Start: 2022-02-05 | End: 2022-03-07

## 2022-02-04 NOTE — TELEPHONE ENCOUNTER
Roe Casas called to request a refill on her medication. Last office visit : 11/12/2021   Next office visit : 2/23/2022     Last UDS:   Amphetamine Screen, Urine   Date Value Ref Range Status   06/15/2021 -  Final     Barbiturate Screen, Urine   Date Value Ref Range Status   06/15/2021 -  Final     Benzodiazepine Screen, Urine   Date Value Ref Range Status   06/15/2021 -  Final     Buprenorphine Urine   Date Value Ref Range Status   06/15/2021 -  Final     Cocaine Metabolite Screen, Urine   Date Value Ref Range Status   06/15/2021 -  Final     Gabapentin Screen, Urine   Date Value Ref Range Status   06/15/2021 -  Final     MDMA, Urine   Date Value Ref Range Status   06/15/2021 -  Final     Methamphetamine, Urine   Date Value Ref Range Status   06/15/2021 -  Final     Opiate Scrn, Ur   Date Value Ref Range Status   06/15/2021 -  Final     Oxycodone Screen, Ur   Date Value Ref Range Status   06/15/2021 +  Final     PCP Screen, Urine   Date Value Ref Range Status   06/15/2021 -  Final     Propoxyphene Screen, Urine   Date Value Ref Range Status   06/15/2021 -  Final     THC Screen, Urine   Date Value Ref Range Status   06/15/2021 -  Final     Tricyclic Antidepressants, Urine   Date Value Ref Range Status   06/15/2021 -  Final       Last Kari Astorga: 12-8-2021  Medication Contract: 6-2021   Last Fill: 1-7-21    Requested Prescriptions     Pending Prescriptions Disp Refills    HYDROcodone-acetaminophen (NORCO)  MG per tablet 90 tablet 0     Sig: Take 1 tablet by mouth every 8 hours as needed for Pain for up to 30 days. Intended supply: 30 days               Please approve or refuse this medication.    Yecenia Ambrose LPN

## 2022-02-04 NOTE — TELEPHONE ENCOUNTER
JORGE was reviewed today per office protocol. Report shows No discrepancies. Fill pattern is consistent from single provider(s) at single pharmacy(s).     Presciption Escribed

## 2022-02-21 SDOH — HEALTH STABILITY: PHYSICAL HEALTH: ON AVERAGE, HOW MANY DAYS PER WEEK DO YOU ENGAGE IN MODERATE TO STRENUOUS EXERCISE (LIKE A BRISK WALK)?: 0 DAYS

## 2022-02-21 ASSESSMENT — PATIENT HEALTH QUESTIONNAIRE - PHQ9
SUM OF ALL RESPONSES TO PHQ QUESTIONS 1-9: 0
2. FEELING DOWN, DEPRESSED OR HOPELESS: 0
SUM OF ALL RESPONSES TO PHQ9 QUESTIONS 1 & 2: 0
1. LITTLE INTEREST OR PLEASURE IN DOING THINGS: 0
SUM OF ALL RESPONSES TO PHQ QUESTIONS 1-9: 0

## 2022-02-21 ASSESSMENT — LIFESTYLE VARIABLES
HOW OFTEN DO YOU HAVE A DRINK CONTAINING ALCOHOL: NEVER
HOW OFTEN DO YOU HAVE A DRINK CONTAINING ALCOHOL: 1
HOW OFTEN DO YOU HAVE SIX OR MORE DRINKS ON ONE OCCASION: 1

## 2022-02-23 ENCOUNTER — TELEMEDICINE (OUTPATIENT)
Dept: PRIMARY CARE CLINIC | Age: 67
End: 2022-02-23
Payer: MEDICARE

## 2022-02-23 DIAGNOSIS — F41.9 ANXIETY AND DEPRESSION: ICD-10-CM

## 2022-02-23 DIAGNOSIS — Z00.00 MEDICARE ANNUAL WELLNESS VISIT, SUBSEQUENT: Primary | ICD-10-CM

## 2022-02-23 DIAGNOSIS — Z13.6 SCREENING FOR CARDIOVASCULAR CONDITION: ICD-10-CM

## 2022-02-23 DIAGNOSIS — F32.A ANXIETY AND DEPRESSION: ICD-10-CM

## 2022-02-23 PROCEDURE — G0439 PPPS, SUBSEQ VISIT: HCPCS | Performed by: NURSE PRACTITIONER

## 2022-02-23 PROCEDURE — G0446 INTENS BEHAVE THER CARDIO DX: HCPCS | Performed by: NURSE PRACTITIONER

## 2022-02-23 RX ORDER — DIAZEPAM 2 MG/1
TABLET ORAL
Qty: 15 TABLET | Refills: 0 | Status: SHIPPED | OUTPATIENT
Start: 2022-02-23 | End: 2022-05-27 | Stop reason: SDUPTHER

## 2022-02-23 RX ORDER — DIAZEPAM 10 MG/1
10 TABLET ORAL EVERY 6 HOURS PRN
COMMUNITY
End: 2022-02-23

## 2022-02-23 ASSESSMENT — PATIENT HEALTH QUESTIONNAIRE - PHQ9
5. POOR APPETITE OR OVEREATING: 0
SUM OF ALL RESPONSES TO PHQ9 QUESTIONS 1 & 2: 0
SUM OF ALL RESPONSES TO PHQ QUESTIONS 1-9: 0
9. THOUGHTS THAT YOU WOULD BE BETTER OFF DEAD, OR OF HURTING YOURSELF: 0
8. MOVING OR SPEAKING SO SLOWLY THAT OTHER PEOPLE COULD HAVE NOTICED. OR THE OPPOSITE, BEING SO FIGETY OR RESTLESS THAT YOU HAVE BEEN MOVING AROUND A LOT MORE THAN USUAL: 0
4. FEELING TIRED OR HAVING LITTLE ENERGY: 0
2. FEELING DOWN, DEPRESSED OR HOPELESS: 0
6. FEELING BAD ABOUT YOURSELF - OR THAT YOU ARE A FAILURE OR HAVE LET YOURSELF OR YOUR FAMILY DOWN: 0
SUM OF ALL RESPONSES TO PHQ QUESTIONS 1-9: 0
10. IF YOU CHECKED OFF ANY PROBLEMS, HOW DIFFICULT HAVE THESE PROBLEMS MADE IT FOR YOU TO DO YOUR WORK, TAKE CARE OF THINGS AT HOME, OR GET ALONG WITH OTHER PEOPLE: 0
1. LITTLE INTEREST OR PLEASURE IN DOING THINGS: 0
SUM OF ALL RESPONSES TO PHQ QUESTIONS 1-9: 0
3. TROUBLE FALLING OR STAYING ASLEEP: 0
SUM OF ALL RESPONSES TO PHQ QUESTIONS 1-9: 0
7. TROUBLE CONCENTRATING ON THINGS, SUCH AS READING THE NEWSPAPER OR WATCHING TELEVISION: 0

## 2022-02-23 NOTE — PROGRESS NOTES
Medicare Annual Wellness Visit    Chastity Marquez is here for Medicare Stone Banks was seen today for medicare awv. Diagnoses and all orders for this visit:    Medicare annual wellness visit, subsequent    Screening for cardiovascular condition  -     IA Intens behave ther cardio dx, 15 minutes []    Anxiety and depression  -     diazePAM (VALIUM) 2 MG tablet; TAKE 1 TABLET BY MOUTH EVERY 8 HOURS AS NEEDED FOR ANXIETY UP TO 10 DAYS         Recommendations for Preventive Services Due: see orders and patient instructions/AVS.  Recommended screening schedule for the next 5-10 years is provided to the patient in written form: see Patient Instructions/AVS.     Return for Medicare Annual Wellness Visit in 1 year. Reviewed and updated this visit:  Tobacco  Allergies  Meds  Problems  Med Hx  Surg Hx  Soc Hx  Fam Hx        Subjective   Patient is seeing [facility] specialist for trial drugs. She is seeing Oncology for trial treatments. She is going back on March 3rd for a scan. Patient's complete Health Risk Assessment and screening values have been reviewed and are found in Flowsheets. The following problems were reviewed today and where indicated follow up appointments were made and/or referrals ordered. Positive Risk Factor Screenings with Interventions:               Opioid Risk: (Low risk score <55) Opioid risk score: 11    Patient is low risk for opioid use disorder or overdose.   Last PDMP Emmett Padilla as Reviewed:  Review User Review Instant Review Result   JAMESON WINSLOW 10/8/2021  9:18 AM Reviewed PDMP [1]        General Health and ACP:  General  In general, how would you say your health is?: Fair  In the past 7 days, have you experienced any of the following: New or Increased Pain, New or Increased Fatigue, Loneliness, Social Isolation, Stress or Anger?: No  Select all that apply: None of These  Do you get the social and emotional support that you need?: Yes  Do you have a Living Will?: Yes    Advance Directives     Power of  Living Will ACP-Advance Directive ACP-Power of     Not on File Not on File Not on File Not on File      General Health Risk Interventions:  · Poor self-assessment of health status: following up with [address] Habits/Nutrition:     Physical Activity: Unknown    Days of Exercise per Week: 0 days    Minutes of Exercise per Session: Not on file     Have you lost any weight without trying in the past 3 months?: No     Have you seen the dentist within the past year?: (!) No    Health Habits/Nutrition Interventions:  · Dental exam overdue:  patient encouraged to make appointment with his/her dentist      ADLs:  In the past 7 days, did you need help from others to perform any of the following everyday activities: Eating, dressing, grooming, bathing, toileting, or walking/balance?: No  Select all that apply: None  In the past 7 days, did you need help from others to take care of any of the following: Laundry, housekeeping, banking/finances, shopping, telephone use, food preparation, transportation, or taking medications?: (!) Yes  Select all that apply: (!) Transportation    ADL Interventions:  · Patient declines any further evaluation/treatment for this issue       Objective      Patient-Reported Vitals  Patient-Reported Weight: 181 lbs  Patient-Reported Height: 5'6       General Appearance: alert and oriented to person, place and time, well developed and well- nourished, in no acute distress  Skin: warm and dry, no rash or erythema  Head: normocephalic and atraumatic  Eyes: pupils equal, round, and reactive to light, extraocular eye movements intact, conjunctivae normal  ENT: tympanic membrane, external ear and ear canal normal bilaterally, nose without deformity, nasal mucosa and turbinates normal without polyps  Neck: supple and non-tender without mass, no thyromegaly or thyroid nodules, no cervical lymphadenopathy  Pulmonary/Chest: clear to auscultation bilaterally- no wheezes, rales or rhonchi, normal air movement, no respiratory distress  Cardiovascular: normal rate, regular rhythm, normal S1 and S2, no murmurs, rubs, clicks, or gallops, distal pulses intact, no carotid bruits  Abdomen: soft, non-tender, non-distended, normal bowel sounds, no masses or organomegaly  Extremities: no cyanosis, clubbing or edema  Musculoskeletal: normal range of motion, no joint swelling, deformity or tenderness  Neurologic: reflexes normal and symmetric, no cranial nerve deficit, gait, coordination and speech normal      Allergies   Allergen Reactions    Amoxicillin Other (See Comments)     Prior to Visit Medications    Medication Sig Taking? Authorizing Provider   diazePAM (VALIUM) 2 MG tablet TAKE 1 TABLET BY MOUTH EVERY 8 HOURS AS NEEDED FOR ANXIETY UP TO 10 DAYS Yes ROBER Esquivel   HYDROcodone-acetaminophen (NORCO)  MG per tablet Take 1 tablet by mouth every 8 hours as needed for Pain for up to 30 days. Intended supply: 30 days Yes Carolee Herbert MD   HYDROcodone-acetaminophen [facility])  MG per tablet Take 1 tablet by mouth every 8 hours as needed for Pain for up to 30 days.  Intended supply: 30 days Yes Carolee Herbert MD   fluticasone-umeclidin-vilant (TRELEGY ELLIPTA) 326-70.9-99 MCG/INH AEPB Inhale 1 puff into the lungs daily Yes ROBER Esquivel   predniSONE (DELTASONE) 10 MG tablet Take 1 tablet by mouth once daily Yes ROBER Esquivel   busPIRone (BUSPAR) 5 MG tablet Take 1 tablet by mouth twice daily as needed for anxiety  Patient taking differently: 15 mg 2 times daily  Yes ROBER Esquivel   citalopram (CELEXA) 40 MG tablet Take once daily Yes ROBER Esquivel   omeprazole (PRILOSEC) 20 MG delayed release capsule Take 1 capsule by mouth Daily Yes ROBER Esquivel   albuterol sulfate HFA (VENTOLIN HFA) 108 (90 Base) MCG/ACT inhaler Inhale 2 puffs into the lungs 4 times daily as needed for Wheezing Yes ROBER Esquivel diclofenac sodium (VOLTAREN) 1 % GEL Apply 2 g topically 4 times daily Yes ROBER Rowell   fluticasone (FLONASE) 50 MCG/ACT nasal spray 1 spray by Nasal route daily Yes ROBER Rowell       CareTeam (Including outside providers/suppliers regularly involved in providing care):   Patient Care Team:  ROBER Rowell as PCP - General (Nurse Practitioner Family)  ROBER Rowell as PCP - [facility]  Shayy Silvestre DO as Consulting Physician (Neurosurgery)       Sandrita Young, was evaluated through a synchronous (real-time) audio-video encounter. The patient (or guardian if applicable) is aware that this is a billable service, which includes applicable co-pays. This Virtual Visit was conducted with patient's (and/or legal guardian's) consent. The visit was conducted pursuant to the emergency declaration under the [address] [place] [address] authority and the [employer] and [employer] General Act. Patient identification was verified, and a caregiver was present when appropriate. The patient was located at home in a state where the provider was licensed to provide care. Cardiovascular Disease Risk Counseling: Assessed the patient's risk to develop cardiovascular disease and reviewed main risk factors. Reviewed steps to reduce disease risk including:   · Quitting tobacco use, reducing amount smoked, or not starting the habit  · Making healthy food choices  · Being physically active and gradualy increasing activity levels   · Reduce weight and determine a healthy BMI goal  · Monitor blood pressure and treat if higher than 140/90 mmHg  · Maintain blood total cholesterol levels under 5 mmol/l or 190 mg/dl  · Maintain LDL cholesterol levels under 3.0 mmol/l or 115 mg/dl   · Control blood glucose levels  · Consider taking aspirin (75 mg daily), once blood pressure is controlled   Provided a follow up plan.   Time spent (minutes): 27

## 2022-02-23 NOTE — PATIENT INSTRUCTIONS
Advance Directives: Care Instructions  Overview  An advance directive is a legal way to state your wishes at the end of your life. It tells your family and your doctor what to do if you can't say what you want. There are two main types of advance directives. You can change them any time your wishes change. Living will. This form tells your family and your doctor your wishes about life support and other treatment. The form is also called a declaration. Medical power of . This form lets you name a person to make treatment decisions for you when you can't speak for yourself. This person is called a health care agent (health care proxy, health care surrogate). The form is also called a durable power of  for health care. If you do not have an advance directive, decisions about your medical care may be made by a family member, or by a doctor or a  who doesn't know you. It may help to think of an advance directive as a gift to the people who care for you. If you have one, they won't have to make tough decisions by themselves. Follow-up care is a key part of your treatment and safety. Be sure to make and go to all appointments, and call your doctor if you are having problems. It's also a good idea to know your test results and keep a list of the medicines you take. What should you include in an advance directive? Many states have a unique advance directive form. (It may ask you to address specific issues.) Or you might use a universal form that's approved by many states. If your form doesn't tell you what to address, it may be hard to know what to include in your advance directive. Use the questions below to help you get started. · Who do you want to make decisions about your medical care if you are not able to? · What life-support measures do you want if you have a serious illness that gets worse over time or can't be cured? · What are you most afraid of that might happen?  (Maybe you're afraid of having pain, losing your independence, or being kept alive by machines.)  · Where would you prefer to die? (Your home? A hospital? A nursing home?)  · Do you want to donate your organs when you die? · Do you want certain Gnosticism practices performed before you die? When should you call for help? Be sure to contact your doctor if you have any questions. Where can you learn more? Go to [email or web address] org and sign in to your DineInTime account. Enter R264 in the riskmethods box to learn more about \"Advance Directives: Care Instructions. \"     If you do not have an account, please click on the \"Sign Up Now\" link. Current as of: March 17, 2021               Content Version: 13.1  © 2436-8153 Healthwise, [employer]. Care instructions adapted under license by [facility] ([facility]). If you have questions about a medical condition or this instruction, always ask your healthcare professional. [place] [address] any warranty or liability for your use of this information. Learning About Living Perroy  What is a living will? A living will, also called a declaration, is a legal form. It tells your family and your doctor your wishes when you can't speak for yourself. It's used by the health professionals who will treat you as you near the end of your life or if you get seriously hurt or ill. If you put your wishes in writing, your loved ones and others will know what kind of care you want. They won't need to guess. This can ease your mind and be helpful to others. And you can change or cancel your living will at any time. A living will is not the same as an estate or property will. An estate will explains what you want to happen with your money and property after you die. How do you use it? A living will is used to describe the kinds of treatment or life support you want as you near the end of your life or if you get seriously hurt or ill.  Keep these facts in mind about living brown. · Your living will is used only if you can't speak or make decisions for yourself. Most often, one or more doctors must certify that you can't speak or decide for yourself before your living will takes effect. · If you get better and can speak for yourself again, you can accept or refuse any treatment. It doesn't matter what you said in your living will. · Some states may limit your right to refuse treatment in certain cases. For example, you may need to clearly state in your living will that you don't want artificial hydration and nutrition, such as being fed through a tube. Is a living will a legal document? A living will is a legal document. Each state has its own laws about living brown. And a living will may be called something else in your state. Here are some things to know about living brown. · You don't need an  to complete a living will. But legal advice can be helpful if your state's laws are unclear. It can also help if your health history is complicated or your family can't agree on what should be in your living will. · You can change your living will at any time. Some people find that their wishes about end-of-life care change as their health changes. If you make big changes to your living will, complete a new form. · If you move to another state, make sure that your living will is legal in the state where you now live. In most cases, doctors will respect your wishes even if you have a form from a different state. · You might use a universal form that has been approved by many states. This kind of form can sometimes be filled out and stored online. Your digital copy will then be available wherever you have a connection to the internet. The doctors and nurses who need to treat you can find it right away. · Your state may offer an online registry. This is another place where you can store your living will online.   · It's a good idea to get your living will notarized. This means using a person called a Row [place] Bow to watch two people sign, or witness, your living will. What should you know when you create a living will? Here are some questions to ask yourself as you make your living will:  · Do you know enough about life support methods that might be used? If not, talk to your doctor so you know what might be done if you can't breathe on your own, your heart stops, or you can't swallow. · What things would you still want to be able to do after you receive life-support methods? Would you want to be able to walk? To speak? To eat on your own? To live without the help of machines? · Do you want certain Tenriism practices performed if you become very ill? · If you have a choice, where do you want to be cared for? In your home? At a hospital or nursing home? · If you have a choice at the end of your life, where would you prefer to die? At home? In a hospital or nursing home? Somewhere else? · Would you prefer to be buried or cremated? · Do you want your organs to be donated after you die? What should you do with your living will? · Make sure that your family members and your health care agent have copies of your living will (also called a declaration). · Give your doctor a copy of your living will. Ask him or her to keep it as part of your medical record. If you have more than one doctor, make sure that each one has a copy. · Put a copy of your living will where it can be easily found. For example, some people may put a copy on their refrigerator door. If you are using a digital copy, be sure your doctor, family members, and health care agent know how to find and access it. Where can you learn more? Go to https://42Floorspemelchoreweb.[email or web address]. org and sign in to your YouCastr account. Enter P214 in the ClearFlow box to learn more about \"Learning About Living Dinora Garsia. \"     If you do not have an account, please click on the \"Sign Up Now\" link.  Current as of: March 17, 2021               Content Version: 13.1  © 9612-6953 Healthwise, Incorporated. Care instructions adapted under license by [facility] ([facility]). If you have questions about a medical condition or this instruction, always ask your healthcare professional. [address] any warranty or liability for your use of this information. Personalized Preventive Plan for Chucky Willis - 2/23/2022  Medicare offers a range of preventive health benefits. Some of the tests and screenings are paid in full while other may be subject to a deductible, co-insurance, and/or copay. Some of these benefits include a comprehensive review of your medical history including lifestyle, illnesses that may run in your family, and various assessments and screenings as appropriate. After reviewing your medical record and screening and assessments performed today your provider may have ordered immunizations, labs, imaging, and/or referrals for you. A list of these orders (if applicable) as well as your Preventive Care list are included within your After Visit Summary for your review. Other Preventive Recommendations:    · A preventive eye exam performed by an eye specialist is recommended every 1-2 years to screen for glaucoma; cataracts, macular degeneration, and other eye disorders. · A preventive dental visit is recommended every 6 months. · Try to get at least 150 minutes of exercise per week or 10,000 steps per day on a pedometer . · Order or download the FREE \"Exercise & Physical Activity: Your Everyday Guide\" from [employer]. Call 9-293.651.9121 or search [employer] online. · You need 8198-6028 mg of calcium and 5014-1595 IU of vitamin D per day.  It is possible to meet your calcium requirement with diet alone, but a vitamin D supplement is usually necessary to meet this goal.  · When exposed to the sun, use a sunscreen that protects against both UVA and UVB radiation with an SPF of 30 or greater. Reapply every 2 to 3 hours or after sweating, drying off with a towel, or swimming. · Always wear a seat belt when traveling in a car. Always wear a helmet when riding a bicycle or motorcycle.

## 2022-02-24 LAB
CYTO UR: NORMAL
LAB AP CASE REPORT: NORMAL
LAB AP DIAGNOSIS COMMENT: NORMAL
Lab: NORMAL
PATH REPORT.FINAL DX SPEC: NORMAL
PATH REPORT.GROSS SPEC: NORMAL

## 2022-02-27 DIAGNOSIS — J30.2 SEASONAL ALLERGIES: ICD-10-CM

## 2022-02-28 RX ORDER — CETIRIZINE HYDROCHLORIDE 10 MG/1
TABLET ORAL
Qty: 30 TABLET | Refills: 0 | Status: SHIPPED | OUTPATIENT
Start: 2022-02-28 | End: 2022-03-28

## 2022-03-07 DIAGNOSIS — S22.060A COMPRESSION FRACTURE OF T7 VERTEBRA, INITIAL ENCOUNTER (HCC): ICD-10-CM

## 2022-03-07 DIAGNOSIS — C34.90 SQUAMOUS CELL CARCINOMA OF LUNG, UNSPECIFIED LATERALITY (HCC): ICD-10-CM

## 2022-03-07 RX ORDER — HYDROCODONE BITARTRATE AND ACETAMINOPHEN 10; 325 MG/1; MG/1
1 TABLET ORAL EVERY 8 HOURS PRN
Qty: 90 TABLET | Refills: 0 | Status: CANCELLED | OUTPATIENT
Start: 2022-03-07 | End: 2022-04-06

## 2022-03-27 DIAGNOSIS — J30.2 SEASONAL ALLERGIES: ICD-10-CM

## 2022-03-27 DIAGNOSIS — F41.9 ANXIETY AND DEPRESSION: ICD-10-CM

## 2022-03-27 DIAGNOSIS — F32.A ANXIETY AND DEPRESSION: ICD-10-CM

## 2022-03-28 RX ORDER — CETIRIZINE HYDROCHLORIDE 10 MG/1
TABLET ORAL
Qty: 30 TABLET | Refills: 0 | Status: SHIPPED | OUTPATIENT
Start: 2022-03-28 | End: 2022-04-19

## 2022-03-28 RX ORDER — CITALOPRAM 40 MG/1
TABLET ORAL
Qty: 90 TABLET | Refills: 0 | Status: SHIPPED | OUTPATIENT
Start: 2022-03-28 | End: 2022-07-03

## 2022-04-08 ENCOUNTER — TELEPHONE (OUTPATIENT)
Dept: PRIMARY CARE CLINIC | Age: 67
End: 2022-04-08

## 2022-04-19 DIAGNOSIS — J30.2 SEASONAL ALLERGIES: ICD-10-CM

## 2022-04-19 RX ORDER — CETIRIZINE HYDROCHLORIDE 10 MG/1
TABLET ORAL
Qty: 30 TABLET | Refills: 0 | Status: SHIPPED | OUTPATIENT
Start: 2022-04-19 | End: 2022-05-20

## 2022-04-26 ENCOUNTER — TELEMEDICINE (OUTPATIENT)
Dept: PRIMARY CARE CLINIC | Age: 67
End: 2022-04-26
Payer: MEDICARE

## 2022-04-26 DIAGNOSIS — C34.90 SQUAMOUS CELL CARCINOMA OF LUNG, UNSPECIFIED LATERALITY (HCC): Primary | ICD-10-CM

## 2022-04-26 PROCEDURE — 99442 PR PHYS/QHP TELEPHONE EVALUATION 11-20 MIN: CPT | Performed by: NURSE PRACTITIONER

## 2022-04-26 ASSESSMENT — ENCOUNTER SYMPTOMS
GASTROINTESTINAL NEGATIVE: 1
EYES NEGATIVE: 1
RESPIRATORY NEGATIVE: 1

## 2022-04-26 NOTE — PROGRESS NOTES
[address] [address]            Phone:  [phone]  Fax:  [phone]    Yoly Sprague is a 74 y.o. female evaluated via telephone on 4/26/2022. Consent:    She and/or health care decision maker is aware that that she may receive a bill for this telephone service, depending on her insurance coverage, and has provided verbal consent to proceed: Yes      HPI  Chief Complaint   Patient presents with    Follow-up     cancer treatment and trail meds       I communicated with the patient and/or health care decision maker about chronic conditions including cancer. She is currently on trail treatments out of [place]. She was started on 3 weeks of Keytruda infusion. She is also taking trial medications daily. She has been on these new trial meds Dec 10th. She does get a CT scan on Monday to see if there are any changes. Last scan did show that the tumor had not grown. Review of Systems   Constitutional: Positive for fatigue. HENT: Negative. Eyes: Negative. Respiratory: Negative. Cardiovascular: Negative. Gastrointestinal: Negative. Endocrine: Negative. Genitourinary: Negative. Musculoskeletal: Negative. Skin: Negative. Neurological: Positive for weakness. Hematological: Negative. Psychiatric/Behavioral: Positive for dysphoric mood. PLAN    Details of this discussion including any medical advice provided:     1. Squamous cell carcinoma of lung, unspecified laterality ([facility] [address]  Will continue all meds as prescribed. She is to continue follow up with [facility] for trial meds. I affirm this is a Patient Initiated Episode with an Established Patient who has not had a related appointment within my department in the past 7 days or scheduled within the next 24 hours.       Total Time: minutes: 11-20 minutes      Note: not billable if this call serves to triage the patient into an appointment for the relevant concern      [facility] Shad Trivedi         Pursuant to the emergency declaration under the [facility], [facility] waiver authority and the [employer] Act, this Virtual  Visit was conducted, with patient's consent, to reduce the patient's risk of exposure to COVID-19 and provide continuity of care for an established patient. Services were provided through a telephone discussion  to substitute for in-person clinic visit. Parties involved during telephone call include myself, ROBER Carson and patient listed.

## 2022-05-18 DIAGNOSIS — J30.2 SEASONAL ALLERGIES: ICD-10-CM

## 2022-05-20 ENCOUNTER — PATIENT MESSAGE (OUTPATIENT)
Dept: FAMILY MEDICINE CLINIC | Age: 67
End: 2022-05-20

## 2022-05-20 RX ORDER — CETIRIZINE HYDROCHLORIDE 10 MG/1
TABLET, FILM COATED ORAL
Qty: 30 TABLET | Refills: 5 | Status: SHIPPED | OUTPATIENT
Start: 2022-05-20

## 2022-05-24 RX ORDER — FLUTICASONE FUROATE, UMECLIDINIUM BROMIDE AND VILANTEROL TRIFENATATE 100; 62.5; 25 UG/1; UG/1; UG/1
1 POWDER RESPIRATORY (INHALATION) DAILY
Qty: 60 EACH | Refills: 3 | OUTPATIENT
Start: 2022-05-24

## 2022-05-24 RX ORDER — FLUTICASONE FUROATE, UMECLIDINIUM BROMIDE AND VILANTEROL TRIFENATATE 100; 62.5; 25 UG/1; UG/1; UG/1
POWDER RESPIRATORY (INHALATION)
Qty: 60 EACH | Refills: 0 | Status: SHIPPED | OUTPATIENT
Start: 2022-05-24 | End: 2022-06-24

## 2022-05-26 ENCOUNTER — PATIENT MESSAGE (OUTPATIENT)
Dept: FAMILY MEDICINE CLINIC | Age: 67
End: 2022-05-26

## 2022-05-26 DIAGNOSIS — F32.A ANXIETY AND DEPRESSION: ICD-10-CM

## 2022-05-26 DIAGNOSIS — F41.9 ANXIETY AND DEPRESSION: ICD-10-CM

## 2022-05-27 RX ORDER — DIAZEPAM 2 MG/1
TABLET ORAL
Qty: 15 TABLET | Refills: 0 | Status: SHIPPED | OUTPATIENT
Start: 2022-05-27 | End: 2022-06-24

## 2022-05-27 NOTE — TELEPHONE ENCOUNTER
MERVAT sent to pt pharm, suzette cag sent to pt to get her in for a follow up appt with [facility].

## 2022-05-27 NOTE — TELEPHONE ENCOUNTER
From: Christi Holloway  To: Richardson Shone  Sent: 5/26/2022 5:19 PM CDT  Subject: Refill    Sarah I need a refill on Diazepam 2mg. I dont have a refill. Had to stop cancer trial first of May. Too many side effects & Dr Naty Yung saw some growth. So hes looking for another trial or may decide to do Standard of Care. Just not very comfortable with [place] but not many choices around here. Im pretty nervous while trying to make this decision. Luisana got Prolia shot in my fridge & plan on coming up there hopefully one day next week. it went up even more from 6 mos ago. My part was 481.09. Oh my. Almost said forget it. But so scared of compression fractures. Thanks so much.  Agnes Mcgowan

## 2022-06-07 ENCOUNTER — NURSE ONLY (OUTPATIENT)
Dept: FAMILY MEDICINE CLINIC | Age: 67
End: 2022-06-07
Payer: MEDICARE

## 2022-06-07 DIAGNOSIS — M81.0 AGE-RELATED OSTEOPOROSIS WITHOUT CURRENT PATHOLOGICAL FRACTURE: Primary | ICD-10-CM

## 2022-06-07 PROCEDURE — 96372 THER/PROPH/DIAG INJ SC/IM: CPT | Performed by: NURSE PRACTITIONER

## 2022-06-07 RX ORDER — FERROUS SULFATE TAB EC 324 MG (65 MG FE EQUIVALENT) 324 (65 FE) MG
324 TABLET DELAYED RESPONSE ORAL
COMMUNITY
Start: 2022-05-05

## 2022-06-07 RX ORDER — GINSENG 250 MG
CAPSULE ORAL DAILY
COMMUNITY

## 2022-06-07 RX ORDER — METHYLPHENIDATE HYDROCHLORIDE 5 MG/1
5 TABLET ORAL 2 TIMES DAILY
COMMUNITY

## 2022-06-07 RX ORDER — ATORVASTATIN CALCIUM 20 MG/1
20 TABLET, FILM COATED ORAL DAILY
COMMUNITY

## 2022-06-07 RX ORDER — HYDROCODONE BITARTRATE AND ACETAMINOPHEN 10; 325 MG/1; MG/1
1 TABLET ORAL EVERY 8 HOURS PRN
COMMUNITY
Start: 2021-06-15

## 2022-06-07 NOTE — PROGRESS NOTES
After obtaining consent, and per orders of Irma RICHTER, injection of prolia given in Right arm by Renetta Fisher MA. Patient instructed to remain in clinic for 20 minutes afterwards, and to report any adverse reaction to me immediately.

## 2022-06-21 RX ORDER — FLUTICASONE FUROATE, UMECLIDINIUM BROMIDE AND VILANTEROL TRIFENATATE 100; 62.5; 25 UG/1; UG/1; UG/1
POWDER RESPIRATORY (INHALATION)
Qty: 60 EACH | Refills: 0 | OUTPATIENT
Start: 2022-06-21

## 2022-06-22 RX ORDER — FLUTICASONE FUROATE, UMECLIDINIUM BROMIDE AND VILANTEROL TRIFENATATE 100; 62.5; 25 UG/1; UG/1; UG/1
POWDER RESPIRATORY (INHALATION)
Qty: 60 EACH | Refills: 0 | OUTPATIENT
Start: 2022-06-22

## 2022-06-24 RX ORDER — FLUTICASONE FUROATE, UMECLIDINIUM BROMIDE AND VILANTEROL TRIFENATATE 100; 62.5; 25 UG/1; UG/1; UG/1
POWDER RESPIRATORY (INHALATION)
Qty: 60 EACH | Refills: 0 | Status: SHIPPED | OUTPATIENT
Start: 2022-06-24 | End: 2022-07-21 | Stop reason: SDUPTHER

## 2022-07-02 DIAGNOSIS — F32.A ANXIETY AND DEPRESSION: ICD-10-CM

## 2022-07-02 DIAGNOSIS — F41.9 ANXIETY AND DEPRESSION: ICD-10-CM

## 2022-07-03 RX ORDER — CITALOPRAM 40 MG/1
TABLET ORAL
Qty: 90 TABLET | Refills: 0 | Status: SHIPPED | OUTPATIENT
Start: 2022-07-03

## 2022-07-21 RX ORDER — FLUTICASONE FUROATE, UMECLIDINIUM BROMIDE AND VILANTEROL TRIFENATATE 100; 62.5; 25 UG/1; UG/1; UG/1
1 POWDER RESPIRATORY (INHALATION) DAILY
Qty: 60 EACH | Refills: 5 | Status: SHIPPED | OUTPATIENT
Start: 2022-07-21

## 2022-07-21 NOTE — TELEPHONE ENCOUNTER
Last OV 4/26/2022  Next OV Visit date not found      Requested Prescriptions     Pending Prescriptions Disp Refills    fluticasone-umeclidin-vilant (TRELEGY ELLIPTA) 100-62.5-25 MCG/INH AEPB 60 each 0

## 2022-07-22 ENCOUNTER — TELEMEDICINE (OUTPATIENT)
Dept: FAMILY MEDICINE CLINIC | Age: 67
End: 2022-07-22
Payer: MEDICARE

## 2022-07-22 DIAGNOSIS — R26.9 GAIT ABNORMALITY: Primary | ICD-10-CM

## 2022-07-22 DIAGNOSIS — C34.90 SQUAMOUS CELL CARCINOMA OF LUNG, UNSPECIFIED LATERALITY (HCC): ICD-10-CM

## 2022-07-22 DIAGNOSIS — R53.1 GENERAL WEAKNESS: ICD-10-CM

## 2022-07-22 PROCEDURE — 99214 OFFICE O/P EST MOD 30 MIN: CPT | Performed by: NURSE PRACTITIONER

## 2022-07-22 PROCEDURE — 1123F ACP DISCUSS/DSCN MKR DOCD: CPT | Performed by: NURSE PRACTITIONER

## 2022-07-22 ASSESSMENT — ENCOUNTER SYMPTOMS
RESPIRATORY NEGATIVE: 1
GASTROINTESTINAL NEGATIVE: 1
EYES NEGATIVE: 1

## 2022-07-22 NOTE — PROGRESS NOTES
[address] [place] [address]     Phone:  [phone]  Fax:  [phone]      2022    TELEHEALTH EVALUATION -- Audio/Visual (During NMKQW-05 public health emergency)    HPI:    Chief Complaint   Patient presents with    Gait Problem         Adwoa Hoover (:  1955) has requested an audio/video evaluation for the following concern(s): This is a VV for issues with her gait. She is having weaker episodes. Patient is requesting a rolling walker to help with transportation. Patient is getting trial treatments for lung cancer as well. She did develop a rash from one of the recent treatments. The patient reports developing shingles. Patient is also having some medical THC as well. Review of Systems   Constitutional:  Positive for fatigue. HENT: Negative. Eyes: Negative. Respiratory: Negative. Cardiovascular: Negative. Gastrointestinal: Negative. Endocrine: Negative. Genitourinary: Negative. Musculoskeletal: Negative. Skin: Negative. Neurological:  Positive for dizziness and weakness. Hematological: Negative. Psychiatric/Behavioral: Negative. Prior to Visit Medications    Medication Sig Taking? Authorizing Provider   Misc. Devices (WALKER) MISC 1 each by Does not apply route daily Upright Walker Yes ROBER Rodriguez   Misc. Devices (ROLLER [place]) MISC 1 each by Does not apply route daily Yes ROBER Rodriguez   fluticasone-umeclidin-vilant (TRELEGY ELLIPTA) 100-62.5-25 MCG/INH AEPB Inhale 1 puff into the lungs in the morning. ROBER Rodriguez   citalopram (CELEXA) 40 MG tablet Take 1 tablet by mouth once daily  NO FURTHER REFILLS UNTIL SEEN IN OFFICE. ROBER Mcdonald   HYDROcodone-acetaminophen (NORCO)  MG per tablet Take 1 tablet by mouth every 8 hours as needed.   Historical Provider, MD   atorvastatin (LIPITOR) 20 MG tablet Take 20 mg by mouth daily  Historical Provider, MD   ferrous sulfate 324 (80 Fe) MG EC tablet Take 324 mg by mouth three times a week  Historical Provider, MD   methylphenidate (RITALIN) 5 MG tablet Take 5 mg by mouth 2 times daily.   Historical Provider, MD   Calcium Carbonate-Vit D-Min (GNP CALCIUM 1200) 4798-9359 MG-UNIT CHEW Take by mouth daily  Historical Provider, MD   Multiple Vitamin (MULTIVITAMIN ADULT PO) Take 1 tablet by mouth daily  Historical Provider, MD   Sennosides-Docusate Sodium (SENNA S PO) Take 50 mg by mouth in the morning and at bedtime  Historical Provider, MD   EQ ALLERGY RELIEF, CETIRIZINE, 10 MG tablet Take 1 tablet by mouth once daily  ROBER Jimenez   predniSONE (DELTASONE) 10 MG tablet Take 1 tablet by mouth once daily  Elvia Korbel, APRN   busPIRone (BUSPAR) 5 MG tablet Take 1 tablet by mouth twice daily as needed for anxiety  Patient taking differently: 15 mg 2 times daily   Elvia Korbel, APRN   omeprazole (PRILOSEC) 20 MG delayed release capsule Take 1 capsule by mouth Daily  ROBER Jimenez   albuterol sulfate HFA (VENTOLIN HFA) 108 (90 Base) MCG/ACT inhaler Inhale 2 puffs into the lungs 4 times daily as needed for Wheezing  ROBER Jimenez   fluticasone (FLONASE) 50 MCG/ACT nasal spray 1 spray by Nasal route daily  ROBER Jimenez       Social History     Tobacco Use    Smoking status: Former     Packs/day: 1.00     Years: 20.00     Pack years: 20.00     Types: Cigarettes     Quit date: 10/3/2014     Years since quittin.8    Smokeless tobacco: Never   Vaping Use    Vaping Use: Never used   Substance Use Topics    Alcohol use: No    Drug use: No        Allergies   Allergen Reactions    Amoxicillin Other (See Comments)   ,   Past Medical History:   Diagnosis Date    Allergic rhinitis     Anxiety     COPD (chronic obstructive pulmonary disease) ([facility])     GERD (gastroesophageal reflux disease)     Lung cancer (HCC)     RA (rheumatoid arthritis) ([facility] [address]    ,   Past Surgical History:   Procedure Laterality Date    BREAST SURGERY Bilateral     Dr Contreras HYSTERECTOMY (CERVIX STATUS UNKNOWN)      MAMMO IMPLANT DIGITAL DIAG BI Bilateral     SKIN BIOPSY      2 mole removed by mary    , History reviewed. No pertinent family history. PHYSICAL EXAMINATION:  [ INSTRUCTIONS:  \"[x]\" Indicates a positive item  \"[]\" Indicates a negative item  -- DELETE ALL ITEMS NOT EXAMINED]  Vital Signs: (As obtained by patient/caregiver at home)        Constitutional: [x] Appears well-developed and well-nourished [x] No apparent distress      [] Abnormal   Mental status  [x] Alert and awake  [x] Oriented to person/place/time [x]Able to follow commands        Eyes:  EOM    [x]  Normal  [] Abnormal-  Sclera  [x]  Normal  [] Abnormal -         Discharge []  None visible  [] Abnormal -    HENT:   [x] Normocephalic, atraumatic. [] Abnormal   [x] Mouth/Throat: Mucous membranes are moist.     External Ears [x] Normal  [] Abnormal-    Neck: [x] No visualized mass     Pulmonary/Chest: [x] Respiratory effort normal.  [x] No visualized signs of difficulty breathing or respiratory distress        [] Abnormal      Musculoskeletal:   [] Normal gait with no signs of ataxia         [] Normal range of motion of neck        [x] Abnormal - generalized weakness      Neurological:        [x] No Facial Asymmetry (Cranial nerve 7 motor function) (limited exam to video visit)          [] No gaze palsy        [] Abnormal         Skin:        [x] No significant exanthematous lesions or discoloration noted on facial skin         [] Abnormal            Psychiatric:       [x] Normal Affect [] Abnormal        [] No Hallucinations    Other pertinent observable physical exam findings:-    Due to this being a TeleHealth encounter, evaluation of the following organ systems is limited: Vitals/Constitutional/EENT/Resp/CV/GI//MS/Neuro/Skin/Heme-Lymph-Imm. ASSESSMENT/PLAN:  1. Gait abnormality    - Misc. Devices (WALKER) MISC; 1 each by Does not apply route daily Upright Walker  Dispense: 1 each;  Refill: 0  - Misc. Devices (ROLLER WALKER) MISC; 1 each by Does not apply route daily  Dispense: 1 each; Refill: 0    2. General weakness    - Misc. Devices (WALKER) MISC; 1 each by Does not apply route daily Upright Walker  Dispense: 1 each; Refill: 0  - Misc. Devices (ROLLER WALKER) MISC; 1 each by Does not apply route daily  Dispense: 1 each; Refill: 0    3. Squamous cell carcinoma of lung, unspecified laterality ([facility] [address]      Sending prescription to patient for upright walker. She can try to get rolling walker as well if INS will not cover upright walker. Continue treatment with [place] with lung cancer. Return in about 3 months (around 10/22/2022) for Follow up chronic conditions. An  electronic signature was used to authenticate this note. --ROBER Villegas on 7/22/2022 at 3:38 PM        Pursuant to the emergency declaration under the [facility] waiver authority and the [employer] and [employer] Act, this Virtual  Visit was conducted, with patient's consent, to reduce the patient's risk of exposure to COVID-19 and provide continuity of care for an established patient. Services were provided through a video synchronous discussion virtually to substitute for in-person clinic visit.

## 2022-08-12 DIAGNOSIS — R53.1 GENERAL WEAKNESS: ICD-10-CM

## 2022-08-12 DIAGNOSIS — R26.9 GAIT ABNORMALITY: Primary | ICD-10-CM

## 2022-09-06 DIAGNOSIS — J44.9 STAGE 3 SEVERE COPD BY GOLD CLASSIFICATION (HCC): ICD-10-CM

## 2022-09-07 RX ORDER — ALBUTEROL SULFATE 90 UG/1
2 AEROSOL, METERED RESPIRATORY (INHALATION) 4 TIMES DAILY PRN
Qty: 1 EACH | Refills: 5 | Status: SHIPPED | OUTPATIENT
Start: 2022-09-07

## 2022-09-28 ENCOUNTER — TRANSCRIBE ORDERS (OUTPATIENT)
Dept: ADMINISTRATIVE | Facility: HOSPITAL | Age: 67
End: 2022-09-28

## 2022-09-28 DIAGNOSIS — C34.11 MALIGNANT NEOPLASM OF RIGHT UPPER LOBE OF LUNG: Primary | ICD-10-CM

## 2022-10-18 ENCOUNTER — TELEMEDICINE (OUTPATIENT)
Dept: FAMILY MEDICINE CLINIC | Age: 67
End: 2022-10-18
Payer: MEDICARE

## 2022-10-18 DIAGNOSIS — M81.0 AGE-RELATED OSTEOPOROSIS WITHOUT CURRENT PATHOLOGICAL FRACTURE: ICD-10-CM

## 2022-10-18 DIAGNOSIS — F41.9 ANXIETY AND DEPRESSION: ICD-10-CM

## 2022-10-18 DIAGNOSIS — F32.A ANXIETY AND DEPRESSION: ICD-10-CM

## 2022-10-18 DIAGNOSIS — E55.9 VITAMIN D DEFICIENCY: ICD-10-CM

## 2022-10-18 DIAGNOSIS — C34.90 SQUAMOUS CELL CARCINOMA OF LUNG, UNSPECIFIED LATERALITY (HCC): Primary | ICD-10-CM

## 2022-10-18 DIAGNOSIS — R73.09 ELEVATED GLUCOSE: ICD-10-CM

## 2022-10-18 DIAGNOSIS — R53.83 OTHER FATIGUE: ICD-10-CM

## 2022-10-18 PROCEDURE — 99443 PR PHYS/QHP TELEPHONE EVALUATION 21-30 MIN: CPT | Performed by: NURSE PRACTITIONER

## 2022-10-18 RX ORDER — METHYLPREDNISOLONE 4 MG/1
TABLET ORAL
Qty: 1 KIT | Refills: 0 | Status: SHIPPED | OUTPATIENT
Start: 2022-10-18

## 2022-10-18 RX ORDER — DRONABINOL 2.5 MG/1
2.5 CAPSULE ORAL
COMMUNITY

## 2022-10-18 RX ORDER — BUSPIRONE HYDROCHLORIDE 10 MG/1
10 TABLET ORAL DAILY
Qty: 30 TABLET | Refills: 0 | Status: SHIPPED | OUTPATIENT
Start: 2022-10-18 | End: 2022-11-17

## 2022-10-18 ASSESSMENT — ENCOUNTER SYMPTOMS
RESPIRATORY NEGATIVE: 1
EYES NEGATIVE: 1
GASTROINTESTINAL NEGATIVE: 1

## 2022-10-18 NOTE — PROGRESS NOTES
[address] [address]            Phone:  [phone]  Fax:  [phone]    Diamond Myrick is a 69 y.o. female evaluated via telephone on 10/18/2022. Consent:    She and/or health care decision maker is aware that that she may receive a bill for this telephone service, depending on her insurance coverage, and has provided verbal consent to proceed: Yes      HPI  Chief Complaint   Patient presents with    Follow-up     Chronic conditions    Fatigue       I communicated with the patient and/or health care decision maker about chronic conditions including lung cancer, osteoporosis, and anxiety/depression. Patient is also complaining of worsening fatigue. She reports stopping the trial down in [place] on September 1st.  She was seeing Dr Johnnie Kelly, but has not seen him in some time. Dr Shari Soria is who she is seeing in [place]. She is now taking some time off. December 19 she is having CT and will determine if she is ready for chemo. Review of Systems   Constitutional:  Positive for fatigue. HENT: Negative. Eyes: Negative. Respiratory: Negative. Cardiovascular: Negative. Gastrointestinal: Negative. Endocrine: Negative. Genitourinary: Negative. Musculoskeletal: Negative. Skin: Negative. Neurological:  Positive for weakness. Hematological: Negative. Psychiatric/Behavioral: Negative. PLAN    Details of this discussion including any medical advice provided:     1. Squamous cell carcinoma of lung, unspecified laterality ([facility] [address]  Keep follow up with oncology. Would recommend starting chemo that oncology recommends.     - CBC with Auto Differential; Future  - Comprehensive Metabolic Panel; Future  - Vitamin D 25 Hydroxy; Future  - Vitamin B12; Future  - TSH with Reflex to FT4; Future  - Magnesium; Future  - Hemoglobin A1C; Future    2. Age-related osteoporosis without current pathological fracture  stable    3.  Anxiety and depression  stable    4. Other fatigue  Checking multiple labs due to increased fatigue and general check    - CBC with Auto Differential; Future  - Comprehensive Metabolic Panel; Future  - Vitamin D 25 Hydroxy; Future  - Vitamin B12; Future  - TSH with Reflex to FT4; Future  - Magnesium; Future  - Hemoglobin A1C; Future    5. Vitamin D deficiency    - Vitamin D 25 Hydroxy; Future    6. Elevated glucose    - Hemoglobin A1C; Future       Discussed COVID booster and flu vaccine. She is to have both of these the next few weeks. I affirm this is a Patient Initiated Episode with an Established Patient who has not had a related appointment within my department in the past 7 days or scheduled within the next 24 hours. Total Time: minutes: 21-30 minutes      Note: not billable if this call serves to triage the patient into an appointment for the relevant concern      ROBER Jin         Pursuant to the emergency declaration under the [facility] waiver authority and the [employer] Act, this Virtual  Visit was conducted, with patient's consent, to reduce the patient's risk of exposure to COVID-19 and provide continuity of care for an established patient. Services were provided through a telephone discussion  to substitute for in-person clinic visit. Parties involved during telephone call include myself, ROBER Jin and patient listed.

## 2022-10-24 DIAGNOSIS — R53.83 OTHER FATIGUE: ICD-10-CM

## 2022-10-24 DIAGNOSIS — C34.90 SQUAMOUS CELL CARCINOMA OF LUNG, UNSPECIFIED LATERALITY (HCC): ICD-10-CM

## 2022-10-24 DIAGNOSIS — E55.9 VITAMIN D DEFICIENCY: ICD-10-CM

## 2022-10-24 LAB
ALBUMIN SERPL-MCNC: 4.5 G/DL (ref 3.5–5.2)
ALP BLD-CCNC: 42 U/L (ref 35–104)
ALT SERPL-CCNC: 11 U/L (ref 5–33)
ANION GAP SERPL CALCULATED.3IONS-SCNC: 12 MMOL/L (ref 7–19)
AST SERPL-CCNC: 13 U/L (ref 5–32)
BASOPHILS ABSOLUTE: 0 K/UL (ref 0–0.2)
BASOPHILS RELATIVE PERCENT: 0.1 % (ref 0–1)
BILIRUB SERPL-MCNC: <0.2 MG/DL (ref 0.2–1.2)
BUN BLDV-MCNC: 16 MG/DL (ref 8–23)
CALCIUM SERPL-MCNC: 10.6 MG/DL (ref 8.8–10.2)
CHLORIDE BLD-SCNC: 104 MMOL/L (ref 98–111)
CO2: 25 MMOL/L (ref 22–29)
CREAT SERPL-MCNC: 1.1 MG/DL (ref 0.5–0.9)
EOSINOPHILS ABSOLUTE: 0 K/UL (ref 0–0.6)
EOSINOPHILS RELATIVE PERCENT: 0.1 % (ref 0–5)
GFR SERPL CREATININE-BSD FRML MDRD: 54 ML/MIN/{1.73_M2}
GLUCOSE BLD-MCNC: 152 MG/DL (ref 74–109)
HBA1C MFR BLD: 5.4 % (ref 4–6)
HCT VFR BLD CALC: 39.7 % (ref 37–47)
HEMOGLOBIN: 11.5 G/DL (ref 12–16)
IMMATURE GRANULOCYTES #: 0 K/UL
LYMPHOCYTES ABSOLUTE: 0.5 K/UL (ref 1.1–4.5)
LYMPHOCYTES RELATIVE PERCENT: 4.8 % (ref 20–40)
MAGNESIUM: 1.9 MG/DL (ref 1.6–2.4)
MCH RBC QN AUTO: 25.8 PG (ref 27–31)
MCHC RBC AUTO-ENTMCNC: 29 G/DL (ref 33–37)
MCV RBC AUTO: 89.2 FL (ref 81–99)
MONOCYTES ABSOLUTE: 0.4 K/UL (ref 0–0.9)
MONOCYTES RELATIVE PERCENT: 4.1 % (ref 0–10)
NEUTROPHILS ABSOLUTE: 9.6 K/UL (ref 1.5–7.5)
NEUTROPHILS RELATIVE PERCENT: 90.6 % (ref 50–65)
PDW BLD-RTO: 16.3 % (ref 11.5–14.5)
PLATELET # BLD: 362 K/UL (ref 130–400)
PMV BLD AUTO: 10 FL (ref 9.4–12.3)
POTASSIUM SERPL-SCNC: 4.8 MMOL/L (ref 3.5–5)
RBC # BLD: 4.45 M/UL (ref 4.2–5.4)
SODIUM BLD-SCNC: 141 MMOL/L (ref 136–145)
TOTAL PROTEIN: 6.6 G/DL (ref 6.6–8.7)
TSH REFLEX FT4: 0.99 UIU/ML (ref 0.35–5.5)
VITAMIN B-12: 822 PG/ML (ref 211–946)
VITAMIN D 25-HYDROXY: 52 NG/ML
WBC # BLD: 10.6 K/UL (ref 4.8–10.8)

## 2022-11-17 DIAGNOSIS — M81.0 AGE-RELATED OSTEOPOROSIS WITHOUT CURRENT PATHOLOGICAL FRACTURE: Primary | ICD-10-CM

## 2022-11-17 RX ORDER — SODIUM CHLORIDE 9 MG/ML
INJECTION, SOLUTION INTRAVENOUS CONTINUOUS
OUTPATIENT
Start: 2022-12-07

## 2022-11-17 RX ORDER — ACETAMINOPHEN 325 MG/1
650 TABLET ORAL
OUTPATIENT
Start: 2022-12-07

## 2022-11-17 RX ORDER — ONDANSETRON 2 MG/ML
8 INJECTION INTRAMUSCULAR; INTRAVENOUS
OUTPATIENT
Start: 2022-12-07

## 2022-11-17 RX ORDER — ALBUTEROL SULFATE 90 UG/1
4 AEROSOL, METERED RESPIRATORY (INHALATION) PRN
OUTPATIENT
Start: 2022-12-07

## 2022-11-17 RX ORDER — DIPHENHYDRAMINE HYDROCHLORIDE 50 MG/ML
50 INJECTION INTRAMUSCULAR; INTRAVENOUS
OUTPATIENT
Start: 2022-12-07

## 2022-11-17 RX ORDER — EPINEPHRINE 1 MG/ML
0.3 INJECTION, SOLUTION, CONCENTRATE INTRAVENOUS PRN
OUTPATIENT
Start: 2022-12-07

## 2022-11-17 NOTE — PROGRESS NOTES
Therapy plan for Prolia 60mg, sub-q injection to be given once every six months, per ROBER Li, telephone with readback. Patient to be scheduled once prior authorization is obtained.   Electronically signed by Kenneth Rosales RN on 11/17/2022 at 11:59 AM

## 2022-11-30 DIAGNOSIS — J30.2 SEASONAL ALLERGIES: ICD-10-CM

## 2022-12-02 ENCOUNTER — PATIENT MESSAGE (OUTPATIENT)
Dept: FAMILY MEDICINE CLINIC | Age: 67
End: 2022-12-02

## 2022-12-02 RX ORDER — PREDNISONE 10 MG/1
TABLET ORAL
Qty: 30 TABLET | Refills: 0 | Status: SHIPPED | OUTPATIENT
Start: 2022-12-02

## 2022-12-02 RX ORDER — METHYLPREDNISOLONE 4 MG/1
TABLET ORAL
Qty: 1 KIT | Refills: 0 | OUTPATIENT
Start: 2022-12-02

## 2022-12-02 RX ORDER — CETIRIZINE HYDROCHLORIDE 10 MG/1
TABLET ORAL
Qty: 30 TABLET | Refills: 0 | Status: SHIPPED | OUTPATIENT
Start: 2022-12-02

## 2022-12-05 DIAGNOSIS — J30.2 SEASONAL ALLERGIES: ICD-10-CM

## 2022-12-05 NOTE — TELEPHONE ENCOUNTER
From: Gordon Skiff  To: Afshin Gallardo  Sent: 12/2/2022 9:48 AM CST  Subject: Rafaela Evangelinajose juan I accidentally requested a refill for Medrol pk. That was an accident. I meant to ask refill of my Prednisone. Just delete that medrol request. I was wondering how much Calcium & Vitamin D I should be taken daily while on Prolia. It says to take them. Thank you & Bhavana Marshall to you & all your office workers.    Dave Last

## 2022-12-06 DIAGNOSIS — J30.2 SEASONAL ALLERGIES: ICD-10-CM

## 2022-12-06 RX ORDER — CETIRIZINE HYDROCHLORIDE 10 MG/1
TABLET ORAL
Qty: 30 TABLET | Refills: 0 | OUTPATIENT
Start: 2022-12-06

## 2022-12-06 RX ORDER — PREDNISONE 10 MG/1
TABLET ORAL
Qty: 30 TABLET | Refills: 0 | OUTPATIENT
Start: 2022-12-06

## 2022-12-07 RX ORDER — CETIRIZINE HYDROCHLORIDE 10 MG/1
TABLET ORAL
Qty: 30 TABLET | Refills: 5 | Status: SHIPPED | OUTPATIENT
Start: 2022-12-07

## 2022-12-08 ENCOUNTER — HOSPITAL ENCOUNTER (OUTPATIENT)
Dept: INFUSION THERAPY | Age: 67
Setting detail: INFUSION SERIES
Discharge: HOME | End: 2022-12-08
Payer: MEDICARE

## 2022-12-08 VITALS
RESPIRATION RATE: 18 BRPM | HEART RATE: 94 BPM | SYSTOLIC BLOOD PRESSURE: 135 MMHG | TEMPERATURE: 98.2 F | OXYGEN SATURATION: 98 % | DIASTOLIC BLOOD PRESSURE: 80 MMHG

## 2022-12-08 DIAGNOSIS — M81.0 AGE-RELATED OSTEOPOROSIS WITHOUT CURRENT PATHOLOGICAL FRACTURE: Primary | ICD-10-CM

## 2022-12-08 PROCEDURE — 96372 THER/PROPH/DIAG INJ SC/IM: CPT

## 2022-12-08 PROCEDURE — 6360000002 HC RX W HCPCS: Performed by: NURSE PRACTITIONER

## 2022-12-08 RX ORDER — SODIUM CHLORIDE 9 MG/ML
INJECTION, SOLUTION INTRAVENOUS CONTINUOUS
OUTPATIENT
Start: 2023-06-04

## 2022-12-08 RX ORDER — ACETAMINOPHEN 325 MG/1
650 TABLET ORAL
OUTPATIENT
Start: 2023-06-04

## 2022-12-08 RX ORDER — ALBUTEROL SULFATE 90 UG/1
4 AEROSOL, METERED RESPIRATORY (INHALATION) PRN
OUTPATIENT
Start: 2023-06-04

## 2022-12-08 RX ORDER — ONDANSETRON 2 MG/ML
8 INJECTION INTRAMUSCULAR; INTRAVENOUS
OUTPATIENT
Start: 2023-06-04

## 2022-12-08 RX ORDER — DIPHENHYDRAMINE HYDROCHLORIDE 50 MG/ML
50 INJECTION INTRAMUSCULAR; INTRAVENOUS
OUTPATIENT
Start: 2023-06-04

## 2022-12-08 RX ORDER — EPINEPHRINE 1 MG/ML
0.3 INJECTION, SOLUTION, CONCENTRATE INTRAVENOUS PRN
OUTPATIENT
Start: 2023-06-04

## 2022-12-08 RX ADMIN — DENOSUMAB 60 MG: 60 INJECTION SUBCUTANEOUS at 15:14

## 2022-12-08 NOTE — DISCHARGE INSTRUCTIONS
denosumab (Prolia)  Pronunciation:  jasen [place] mab  Brand:  Prolia  What is the most important information I should know about Prolia? This medication guide provides information about the Prolia brand of denosumab. Reeda Mouse is another brand of denosumab used to prevent bone fractures and other skeletal conditions in people with tumors that have spread to the bone. Prolia can cause many serious side effects. Call your doctor at once if you have a fever, chills, pain or burning when you urinate, severe stomach pain, cough, shortness of breath, skin problems, numbness or tingling, severe or unusual pain, or skin problems. Do not use if you are pregnant. Use effective birth control while using Prolia, and for at least 5 months after you stop. What is denosumab (Prolia)? Denosumab is a monoclonal antibody. Monoclonal antibodies are made to target and destroy only certain cells in the body. This may help to protect healthy cells from damage. The Prolia brand of denosumab is used to treat osteoporosis or bone loss in men and women who have a high risk of bone fracture. Prolia is sometimes used in people whose bone fracture is caused by certain medicines or cancer treatments. This medication guide provides information about the Prolia brand of denosumab. Reeda Mouse is another brand of denosumab used to prevent bone fractures and other skeletal conditions in people with tumors that have spread to the bone. Denosumab may also be used for purposes not listed in this medication guide. What should I discuss with my healthcare provider before receiving Prolia? You should not receive Prolia if you are allergic to denosumab, or if you have:  low levels of calcium in your blood (hypocalcemia); or  if you are pregnant. While you are using Prolia, you should not receive Xgeva, another brand of denosumab.   Tell your doctor if you have ever had:  kidney disease (or if you are on dialysis);  a weak immune system (caused by disease or by using certain medicines);  hypoparathyroidism (decreased functioning of the parathyroid glands);  thyroid surgery;  any condition that makes it hard for your body to absorb nutrients from food (malabsorption);  a latex allergy;  if you are scheduled for a dental procedure; or  if you cannot take daily calcium and vitamin D. Denosumab may cause bone loss (osteonecrosis) in the jaw. Symptoms include jaw pain or numbness, red or swollen gums, loose teeth, gum infection, or slow healing after dental work. The risk of osteonecrosis is highest in people with cancer, blood cell disorders, pre-existing dental problems, or people treated with steroids, chemotherapy, or radiation. Ask your doctor about your own risk. You may need to have a negative pregnancy test before starting this treatment. Do not use Prolia if you are pregnant. It could harm the unborn baby or cause birth defects. Use effective birth control to prevent pregnancy while you are using this medicine and for at least 5 months after your last dose. Tell your doctor right away if you become pregnant. You should not breastfeed while using denosumab. How is Prolia given? Denosumab is injected under the skin of your stomach, upper thigh, or upper arm. A healthcare provider will give you this injection. Prolia is usually given once every 6 months. Your doctor may have you take extra calcium and vitamin D while you are being treated with denosumab. Take only the amount of calcium and vitamin D that your doctor has prescribed. If you need to have any dental work (especially surgery), tell the dentist ahead of time that you are receiving denosumab. Pay special attention to your dental hygiene. Brush and floss your teeth regularly while receiving this medication. You may need to have a dental exam before you begin treatment with Prolia. Follow your doctor's instructions. Your risk of bone fractures can increase when you stop using Prolia.  Do not stop using this medicine without first talking to your doctor. If you keep this medicine at home, store it in the original carton in a refrigerator. Protect from light and do not freeze. Do not shake the prefilled syringe. You may take the carton out of the refrigerator and allow it to reach room temperature before the injection is given. After you have taken Prolia out of the refrigerator, you may keep it at room temperature for up to 14 days. Store in the original container away from heat and light. Each prefilled syringe is for one use only. Throw it away after one use, even if there is still medicine left inside. Use a needle and syringe only once and then place them in a puncture-proof \"sharps\" container. Follow state or local laws about how to dispose of this container. Keep it out of the reach of children and pets. Do not share this medicine with another person, even if they have the same symptoms you have. What happens if I miss a dose? Call your doctor for instructions if you miss a dose or miss an appointment for your Prolia injection. You should receive your missed injection as soon as possible. What happens if I overdose? Seek emergency medical attention or call the Poison Help line at 1-998.181.4221. What should I avoid while receiving Prolia? Follow your doctor's instructions about any restrictions on food, beverages, or activity. What are the possible side effects of Prolia? Get emergency medical help if you have signs of an allergic reaction: hives, itching, rash; difficult breathing, feeling light-headed; swelling of your face, lips, tongue, or throat.   Call your doctor at once if you have:  new or unusual pain in your thigh, hip, or groin;  severe pain in your joints, muscles, or bones;  skin problems such as dryness, peeling, redness, itching, blisters, bumps, oozing, or crusting; or  low calcium level --muscle spasms or contractions, numbness or tingly feeling (around your mouth, or in your fingers and toes). Serious infections may occur during treatment with Prolia. Call your doctor right away if you have signs of infection such as:  fever, chills, night sweats;  swelling, pain, tenderness, warmth, or redness anywhere on your body;  pain or burning when you urinate;  increased or urgent need to urinate;  severe stomach pain; or  cough, wheezing, feeling short of breath. Common side effects may include:  bladder infection (painful or difficult urination);  lung infection (cough, shortness of breath);  headache;  back pain, muscle pain, joint pain;  increased blood pressure;  cold symptoms such as stuffy nose, sneezing, sore throat;  high cholesterol; or  pain in your arms or legs. This is not a complete list of side effects and others may occur. Call your doctor for medical advice about side effects. You may report side effects to FDA at 1-838-FDA-2547. What other drugs will affect Prolia? Other drugs may affect Prolia, including prescription and over-the-counter medicines, vitamins, and herbal products. Tell your doctor about all your current medicines and any medicine you start or stop using. Where can I get more information? Your doctor or pharmacist can provide more information about denosumab (Prolia). Remember, keep this and all other medicines out of the reach of children, never share your medicines with others, and use this medication only for the indication prescribed. Every effort has been made to ensure that the information provided by [address] is accurate, up-to-date, and complete, but no guarantee is made to that effect. Drug information contained herein may be time sensitive. [facility] information has been compiled for use by healthcare practitioners and consumers in the [place] and therefore [employer] does not warrant that uses outside of the [place] are appropriate, unless specifically indicated otherwise.  [facility]'s drug information does not endorse drugs, diagnose patients or recommend therapy. [facility]'s drug information is an informational resource designed to assist licensed healthcare practitioners in caring for their patients and/or to serve consumers viewing this service as a supplement to, and not a substitute for, the expertise, skill, knowledge and judgment of healthcare practitioners. The absence of a warning for a given drug or drug combination in no way should be construed to indicate that the drug or drug combination is safe, effective or appropriate for any given patient. [facility] does not assume any responsibility for any aspect of healthcare administered with the aid of information [facility] provides. The information contained herein is not intended to cover all possible uses, directions, precautions, warnings, drug interactions, allergic reactions, or adverse effects. If you have questions about the drugs you are taking, check with your doctor, nurse or pharmacist.  Copyright 2718-9753 [address]: 12.01. Revision date: 2/18/2020. Care instructions adapted under license by [facility] ([facility]). If you have questions about a medical condition or this instruction, always ask your healthcare professional. [address] [address] any warranty or liability for your use of this information.

## 2022-12-12 ENCOUNTER — TELEPHONE (OUTPATIENT)
Dept: INFUSION THERAPY | Age: 67
End: 2022-12-12

## 2022-12-12 NOTE — TELEPHONE ENCOUNTER
Introduced myself to James Franklyn today. I went over her insurance benefits for her Prolia injections. I informed her that she has a $295 copay for outpatient procedures. I also informed her that foundations open periodically that can help cover the cost of her injection. She was agreeable with me applying on her behalf once one open. I am also going to mail her a copy of the [facility] assistance application and my contact information if she has any billing or financial concerns.     Dennis Sherwood, [address] [facility]  [phone]

## 2022-12-16 DIAGNOSIS — R07.89 CHEST DISCOMFORT: Primary | ICD-10-CM

## 2022-12-19 ENCOUNTER — APPOINTMENT (OUTPATIENT)
Dept: CT IMAGING | Facility: HOSPITAL | Age: 67
End: 2022-12-19

## 2022-12-19 ENCOUNTER — HOSPITAL ENCOUNTER (OUTPATIENT)
Dept: CT IMAGING | Facility: HOSPITAL | Age: 67
Discharge: HOME | End: 2022-12-19
Admitting: INTERNAL MEDICINE

## 2022-12-19 DIAGNOSIS — C34.11 MALIGNANT NEOPLASM OF RIGHT UPPER LOBE OF LUNG: ICD-10-CM

## 2022-12-19 LAB — CREAT BLDA-MCNC: 1.2 MG/DL (ref 0.6–1.3)

## 2022-12-19 PROCEDURE — 25010000002 IOPAMIDOL 61 % SOLUTION: Performed by: INTERNAL MEDICINE

## 2022-12-19 PROCEDURE — 71260 CT THORAX DX C+: CPT

## 2022-12-19 PROCEDURE — 82565 ASSAY OF CREATININE: CPT

## 2022-12-19 RX ADMIN — IOPAMIDOL 100 ML: 612 INJECTION, SOLUTION INTRAVENOUS at 14:28

## 2023-01-05 ENCOUNTER — TRANSCRIBE ORDERS (OUTPATIENT)
Dept: ADMINISTRATIVE | Facility: HOSPITAL | Age: 68
End: 2023-01-05
Payer: MEDICARE

## 2023-01-05 DIAGNOSIS — C34.11 MALIGNANT NEOPLASM OF UPPER LOBE OF RIGHT LUNG: Primary | ICD-10-CM

## 2023-02-09 ENCOUNTER — OFFICE VISIT (OUTPATIENT)
Dept: PULMONOLOGY | Facility: CLINIC | Age: 68
End: 2023-02-09
Payer: MEDICARE

## 2023-02-09 VITALS
SYSTOLIC BLOOD PRESSURE: 146 MMHG | HEIGHT: 66 IN | OXYGEN SATURATION: 97 % | BODY MASS INDEX: 27.48 KG/M2 | WEIGHT: 171 LBS | DIASTOLIC BLOOD PRESSURE: 98 MMHG | HEART RATE: 100 BPM

## 2023-02-09 DIAGNOSIS — Z92.3 HISTORY OF RADIATION THERAPY: ICD-10-CM

## 2023-02-09 DIAGNOSIS — C34.81 MALIGNANT NEOPLASM OF OVERLAPPING SITES OF RIGHT LUNG: ICD-10-CM

## 2023-02-09 DIAGNOSIS — J44.9 STAGE 3 SEVERE COPD BY GOLD CLASSIFICATION: Primary | ICD-10-CM

## 2023-02-09 DIAGNOSIS — J43.9 PULMONARY EMPHYSEMA, UNSPECIFIED EMPHYSEMA TYPE: ICD-10-CM

## 2023-02-09 PROCEDURE — 94010 BREATHING CAPACITY TEST: CPT | Performed by: INTERNAL MEDICINE

## 2023-02-09 PROCEDURE — 99214 OFFICE O/P EST MOD 30 MIN: CPT | Performed by: INTERNAL MEDICINE

## 2023-02-09 RX ORDER — AMOXICILLIN 250 MG
CAPSULE ORAL
COMMUNITY

## 2023-02-09 RX ORDER — CITALOPRAM 40 MG/1
1 TABLET ORAL DAILY
COMMUNITY
Start: 2023-01-06

## 2023-02-09 NOTE — PROCEDURES
Walking Oximetry  Performed by: Marcia Ortega CMA  Authorized by: Edmond Tobar MD     Rest room air SAT %:  98  Exercise room air SAT %:  91

## 2023-02-09 NOTE — PROCEDURES
Pulmonary Function Test  Performed by: Yadiel Thomas CMA  Authorized by: Edmond Tobar MD      Pre Drug % Predicted    FVC: 64%   FEV1: 41%   FEF 25-75%: 18%   FEV1/FVC: 50.15%    Interpretation   Spirometry   Spirometry shows severe obstruction. There is reduced midflow suggesting small airway/airflow obstruction.   Review of FVL curve   Patient's effort is normal.     Electronically signed by Edmond Tobar MD, 2/9/2023, 22:18 CST

## 2023-02-09 NOTE — PROGRESS NOTES
Background:  Pt w hx lung cancer, radiation pneumonitis, severe copd    Chief Complaint  COPD    Subjective    History of Present Illness     Damaris Hu is here for follow up with [facility].  History of Present Illness  I last saw her a year ago when she was starting in a clinical trial.  That was not helpful so she enrolled in a new trial In June, got shingles and neuropathy after that, and then ultimately quit the other trial due to slow growth of tumor. She has been taking a break for a few months, and a new scan in December was fairly stable, another scan planned now in March.  She is contemplating chemotherapy after that scan.  She has had some increased dyspnea over the past few months.     Tobacco Use: Medium Risk   • Smoking Tobacco Use: Former   • Smokeless Tobacco Use: Never   • Passive Exposure: Not on file      Current Outpatient Medications   Medication Instructions   • albuterol sulfate  (90 Base) MCG/ACT inhaler INHALE 2 PUFFS BY MOUTH 4 TIMES DAILY AS NEEDED FOR WHEEZING   • busPIRone (BUSPAR) 15 mg, Oral, Daily, 15 in AM and 30 at PM   • cetirizine (ZYRTEC) 10 mg, Oral, Daily   • citalopram (CeleXA) 40 MG tablet 1 tablet, Oral, Daily   • diazePAM (VALIUM) 2 MG tablet TAKE 1 TABLET BY MOUTH EVERY 8 HOURS AS NEEDED FOR ANXIETY UP TO 10 DAYS   • fluticasone (FLONASE) 50 MCG/ACT nasal spray 1 spray, Each Nare, Daily PRN   • GNP Calcium 1200 1 tablet/day, Oral, Daily   • HYDROcodone-acetaminophen (NORCO)  MG per tablet 1 tablet, Oral, Every 8 Hours PRN, for pain   • multivitamin with minerals tablet tablet 1 tablet, Oral, Daily   • ondansetron (ZOFRAN) 8 MG tablet TAKE 1 TABLET BY MOUTH EVERY 8 HOURS AS NEEDED FOR NAUSEA FOR VOMITING   • predniSONE (DELTASONE) 15 mg, Oral, Daily   • Prolia 60 MG/ML solution prefilled syringe syringe No dose, route, or frequency recorded.   • sennosides-docusate (PERICOLACE) 8.6-50 MG per tablet No dose,  "route, or frequency recorded.   • Trelegy Ellipta 100-62.5-25 MCG/INH inhaler 1 puff, Inhalation, Daily      Objective     Vital Signs:   /98   Pulse 100   Ht 167.6 cm (66\")   Wt 77.6 kg (171 lb)   SpO2 97% Comment: YOMAIRA  BMI 27.60 kg/m²   Physical Exam  Constitutional:       General: She is not in acute distress.     Appearance: She is well-developed. She is not ill-appearing or toxic-appearing.   HENT:      Head: Atraumatic.   Eyes:      General: No scleral icterus.     Conjunctiva/sclera: Conjunctivae normal.   Cardiovascular:      Rate and Rhythm: Normal rate and regular rhythm.      Heart sounds: S1 normal and S2 normal.   Pulmonary:      Effort: Pulmonary effort is normal.      Breath sounds: Normal breath sounds.   Abdominal:      General: There is no distension.   Musculoskeletal:         General: No deformity.      Cervical back: Neck supple.   Skin:     Coloration: Skin is not pale.      Findings: No rash.   Neurological:      Mental Status: She is alert.        Result Review  Data Reviewed:  CT Chest With Contrast Diagnostic (12/19/2022 14:28)  1. Increasing metastatic lung disease.  2. Increasing soft tissue density posterior to the right upper lobe  bronchus could be an enlarged lymph node or a centrally located  neoplasm. This measures 2 x 3.2 cm and previously measured 1.7 x 3.1 cm.  3. There is a new 1 cm lesion in the left hepatic lobe of the liver  worrisome for metastatic disease.  4. Small hiatal hernia. Fatty infiltration of the liver.  5. Thoracic spine compression deformities with prior kyphoplasty at T5  and T6.  6. Atheromatous disease of the thoracic aorta and coronary arteries.  Cardiomegaly. Ascending thoracic aorta dilated at 4.1 cm. Small  pericardial effusion.         PFT Values        Some values may be hidden. Unless noted otherwise, only the newest values recorded on each date are displayed.         Old Values PFT Results 2/9/23   No data to display.      Pre Drug PFT " Results 2/9/23   FVC 64   FEV1 41   FEF 25-75% 18   FEV1/FVC 50.15      Post Drug PFT Results 2/9/23   No data to display.      Other Tests PFT Results 2/9/23   No data to display.                      Assessment and Plan    Diagnoses and all orders for this visit:    1. Stage 3 severe COPD by GOLD classification ([facility]) (Primary)  -     Walking Oximetry; Future  -     Pulmonary Function Test; Future  -     Pulmonary Function Test  -     Walking Oximetry    2. History of radiation therapy    3. Pulmonary emphysema, unspecified emphysema type (HCC)    4. Malignant neoplasm of overlapping sites of right lung (HCC)    will substitute stiolto for 2 weeks instead of trelegy for severe copd, severe obstruction noted on PFT today due to emphysema  Continue med oncology tx, s/p radiation and multiple trials.  Films do not suggest radiation pneumonitis  Exercise saturation ok, home oxygen not needed  We demonstrated proper techique for respimat use  We reviewed ct images.  My interpretation: multiple masses and nodules, largest in RLL, no airway compromise    Follow Up   No follow-ups on file.  Patient was given instructions and counseling regarding her condition or for health maintenance advice. Please see specific information pulled into the AVS if appropriate.    Electronically signed by Edmond Tobar MD, 2/9/2023, 22:17 CST

## 2023-03-17 ENCOUNTER — HOSPITAL ENCOUNTER (OUTPATIENT)
Dept: CT IMAGING | Facility: HOSPITAL | Age: 68
Discharge: HOME | End: 2023-03-17
Admitting: INTERNAL MEDICINE
Payer: MEDICARE

## 2023-03-17 ENCOUNTER — APPOINTMENT (OUTPATIENT)
Dept: CT IMAGING | Facility: HOSPITAL | Age: 68
End: 2023-03-17
Payer: MEDICARE

## 2023-03-17 DIAGNOSIS — C34.11 MALIGNANT NEOPLASM OF UPPER LOBE OF RIGHT LUNG: ICD-10-CM

## 2023-03-17 LAB — CREAT BLDA-MCNC: 1.4 MG/DL (ref 0.6–1.3)

## 2023-03-17 PROCEDURE — 82565 ASSAY OF CREATININE: CPT

## 2023-03-17 PROCEDURE — 71260 CT THORAX DX C+: CPT

## 2023-03-17 PROCEDURE — 25510000001 IOPAMIDOL 61 % SOLUTION: Performed by: INTERNAL MEDICINE

## 2023-03-17 RX ADMIN — IOPAMIDOL 100 ML: 612 INJECTION, SOLUTION INTRAVENOUS at 10:54

## 2023-04-04 ENCOUNTER — TELEPHONE (OUTPATIENT)
Dept: FAMILY MEDICINE CLINIC | Age: 68
End: 2023-04-04

## 2023-04-04 DIAGNOSIS — M54.2 NECK PAIN: Primary | ICD-10-CM

## 2023-04-04 DIAGNOSIS — M54.6 ACUTE MIDLINE THORACIC BACK PAIN: ICD-10-CM

## 2023-04-04 DIAGNOSIS — Z91.81 STATUS POST FALL: ICD-10-CM

## 2023-04-04 DIAGNOSIS — R07.81 RIB PAIN: ICD-10-CM

## 2023-04-04 NOTE — TELEPHONE ENCOUNTER
Patients sister called wanting to speak to [facility] or her nurse. I informed her that they are seeing patients at this time and I could take a message. She did not want to speak to me about what was going on so I informed her I would has [facility] or her nurse give her a call back. Kyle Barajas [phone]  She asked please DO NOT call Alexander Otto, she is very very sick. Will send to [facility] and [employer].

## 2023-04-05 NOTE — TELEPHONE ENCOUNTER
Spoke with Shannon Armstrong patient sister. Patient did fall and is hurting more. She is wanting to have xrays of area of pain. They are wanting to discuss palliative care. Will put on for telehelath visit on Tuesday.   [phone]

## 2023-04-07 ENCOUNTER — HOSPITAL ENCOUNTER (OUTPATIENT)
Dept: GENERAL RADIOLOGY | Age: 68
Discharge: HOME | End: 2023-04-07
Payer: MEDICARE

## 2023-04-07 DIAGNOSIS — M54.6 ACUTE MIDLINE THORACIC BACK PAIN: ICD-10-CM

## 2023-04-07 DIAGNOSIS — R07.81 RIB PAIN: ICD-10-CM

## 2023-04-07 DIAGNOSIS — Z91.81 STATUS POST FALL: ICD-10-CM

## 2023-04-07 DIAGNOSIS — R07.89 CHEST DISCOMFORT: ICD-10-CM

## 2023-04-07 DIAGNOSIS — M54.2 NECK PAIN: ICD-10-CM

## 2023-04-07 PROCEDURE — 71100 X-RAY EXAM RIBS UNI 2 VIEWS: CPT

## 2023-04-07 PROCEDURE — 71046 X-RAY EXAM CHEST 2 VIEWS: CPT

## 2023-04-07 PROCEDURE — 72072 X-RAY EXAM THORAC SPINE 3VWS: CPT

## 2023-04-07 PROCEDURE — 72040 X-RAY EXAM NECK SPINE 2-3 VW: CPT

## 2023-04-18 DIAGNOSIS — C34.90 SQUAMOUS CELL CARCINOMA OF LUNG, UNSPECIFIED LATERALITY (HCC): Primary | ICD-10-CM

## 2023-04-19 NOTE — PROGRESS NOTES
Initial evaluation by Dr Darlyn Laguerre Radiation Therapy  who recommended radiation therapy to the right lung with anticipated dose of 4122-6614 cGy  12/5/19 - 1/27/20 6600 cGy radiation therapy in 33 fractions to right lung  12/9/21 - 1/27/20 Weekly Taxol 45 mg/m2, Carbo AUC 2  2/10/20 [facility] labs: CEA 3.71  2/17/20 - 5/12/20 Maintenance Durvalumab  3/15/20-CTA chest [facility]): showed near complete resolution of the perihilar right lung mass. Chronic lung changes with bilateral upper lobe infiltrate. Upper lobe pneumonia is the main concern though some of this may be related to radiation therapy fibrosis. No pulmonary embolism. 5/7/20 [facility] labs: CEA 5.81  6/16/20 CT chest [facility]): Near complete resolution of the perihilar right lung mass. Chronic lung changes with bilateral upper lobe infiltrate. Upper lobe pneumonia is the main concern though some of this may be related to radiation therapy fibrosis. No pulmonary embolism. 10/14/20 CT chest [facility]): As previously noted there is consolidation with volume loss at the juncture of the posterior segment of the right upper lobe and superior segment of the right lower lobe. There is associated nodular thickening associated with the major fissure. Overall this shows interval improvement and I would favor this to represent post therapy change. Previously noted consolidation within the superior segment of the right lower lobe also shows interval improvement with improved aeration of the superior segment. No new lung lesions are demonstrated. No evidence of enlarged mediastinal or axillary lymphadenopathy. A small pericardial effusion is present showing slight interval increase. Coronary calcifications are present. New fractures at the T6 and T7 level with a relatively severe fracture at the T7 level and a mild gibbus deformity. These vertebral bodies are relatively sclerotic but that may simply be related to the compression.  Pathologic fractures are difficult to entirely exclude

## 2023-04-20 ENCOUNTER — OFFICE VISIT (OUTPATIENT)
Dept: HEMATOLOGY | Age: 68
End: 2023-04-20
Payer: MEDICARE

## 2023-04-20 ENCOUNTER — HOSPITAL ENCOUNTER (OUTPATIENT)
Dept: INFUSION THERAPY | Age: 68
Discharge: HOME | End: 2023-04-20
Payer: MEDICARE

## 2023-04-20 VITALS
DIASTOLIC BLOOD PRESSURE: 86 MMHG | BODY MASS INDEX: 27.51 KG/M2 | WEIGHT: 171.2 LBS | TEMPERATURE: 98.9 F | OXYGEN SATURATION: 95 % | HEART RATE: 91 BPM | SYSTOLIC BLOOD PRESSURE: 132 MMHG | HEIGHT: 66 IN

## 2023-04-20 DIAGNOSIS — C79.51 LUNG CANCER METASTATIC TO BONE (HCC): ICD-10-CM

## 2023-04-20 DIAGNOSIS — Z91.89 AT RISK FOR RESPIRATORY DEPRESSION DUE TO OPIOID: ICD-10-CM

## 2023-04-20 DIAGNOSIS — C34.90 SQUAMOUS CELL CARCINOMA OF LUNG, UNSPECIFIED LATERALITY (HCC): ICD-10-CM

## 2023-04-20 DIAGNOSIS — G89.3 CANCER RELATED PAIN: ICD-10-CM

## 2023-04-20 DIAGNOSIS — Z71.89 CARE PLAN DISCUSSED WITH PATIENT: ICD-10-CM

## 2023-04-20 DIAGNOSIS — Z78.9 POOR PROGNOSIS: ICD-10-CM

## 2023-04-20 DIAGNOSIS — C34.90 LUNG CANCER METASTATIC TO BONE (HCC): ICD-10-CM

## 2023-04-20 DIAGNOSIS — Z51.5 PALLIATIVE CARE PATIENT: ICD-10-CM

## 2023-04-20 DIAGNOSIS — C34.91 SQUAMOUS CELL CARCINOMA OF RIGHT LUNG (HCC): Primary | ICD-10-CM

## 2023-04-20 DIAGNOSIS — R69 LIFE THREATENING MEDICAL ILLNESS: ICD-10-CM

## 2023-04-20 LAB
BASOPHILS # BLD: 0.06 K/UL (ref 0.01–0.08)
BASOPHILS NFR BLD: 0.5 % (ref 0.1–1.2)
EOSINOPHIL # BLD: 0.08 K/UL (ref 0.04–0.54)
EOSINOPHIL NFR BLD: 0.7 % (ref 0.7–7)
ERYTHROCYTE [DISTWIDTH] IN BLOOD BY AUTOMATED COUNT: 15.7 % (ref 11.7–14.4)
HCT VFR BLD AUTO: 42.1 % (ref 34.1–44.9)
HGB BLD-MCNC: 11.7 G/DL (ref 11.2–15.7)
LYMPHOCYTES # BLD: 0.51 K/UL (ref 1.18–3.74)
LYMPHOCYTES NFR BLD: 4.2 % (ref 19.3–53.1)
MCH RBC QN AUTO: 27.3 PG (ref 25.6–32.2)
MCHC RBC AUTO-ENTMCNC: 27.8 G/DL (ref 32.3–35.5)
MCV RBC AUTO: 98.4 FL (ref 79.4–94.8)
MONOCYTES # BLD: 0.62 K/UL (ref 0.24–0.82)
MONOCYTES NFR BLD: 5.1 % (ref 4.7–12.5)
NEUTROPHILS # BLD: 10.71 K/UL (ref 1.56–6.13)
NEUTS SEG NFR BLD: 88.9 % (ref 34–71.1)
PLATELET # BLD AUTO: 304 K/UL (ref 182–369)
PMV BLD AUTO: 9.5 FL (ref 7.4–10.4)
RBC # BLD AUTO: 4.28 M/UL (ref 3.93–5.22)
WBC # BLD AUTO: 12.05 K/UL (ref 3.98–10.04)

## 2023-04-20 PROCEDURE — 99212 OFFICE O/P EST SF 10 MIN: CPT

## 2023-04-20 PROCEDURE — 36415 COLL VENOUS BLD VENIPUNCTURE: CPT

## 2023-04-20 PROCEDURE — 99205 OFFICE O/P NEW HI 60 MIN: CPT | Performed by: INTERNAL MEDICINE

## 2023-04-20 PROCEDURE — 85025 COMPLETE CBC W/AUTO DIFF WBC: CPT

## 2023-04-20 PROCEDURE — 1123F ACP DISCUSS/DSCN MKR DOCD: CPT | Performed by: INTERNAL MEDICINE

## 2023-04-20 RX ORDER — SODIUM CHLORIDE 9 MG/ML
25 INJECTION, SOLUTION INTRAVENOUS PRN
OUTPATIENT
Start: 2023-04-20

## 2023-04-20 RX ORDER — SODIUM CHLORIDE 0.9 % (FLUSH) 0.9 %
5-40 SYRINGE (ML) INJECTION PRN
OUTPATIENT
Start: 2023-04-20

## 2023-04-20 RX ORDER — HEPARIN SODIUM (PORCINE) LOCK FLUSH IV SOLN 100 UNIT/ML 100 UNIT/ML
500 SOLUTION INTRAVENOUS PRN
OUTPATIENT
Start: 2023-04-20

## 2023-04-20 RX ORDER — NALOXONE HYDROCHLORIDE 4 MG/.1ML
1 SPRAY NASAL PRN
Qty: 1 EACH | Refills: 3 | Status: SHIPPED | OUTPATIENT
Start: 2023-04-20

## 2023-04-20 RX ORDER — MORPHINE SULFATE 15 MG/1
15 TABLET, FILM COATED, EXTENDED RELEASE ORAL 2 TIMES DAILY
Qty: 60 TABLET | Refills: 0 | Status: SHIPPED | OUTPATIENT
Start: 2023-04-20 | End: 2023-05-20

## 2023-04-20 ASSESSMENT — PROMIS GLOBAL HEALTH SCALE
IN THE PAST 7 DAYS, HOW OFTEN HAVE YOU BEEN BOTHERED BY EMOTIONAL PROBLEMS, SUCH AS FEELING ANXIOUS, DEPRESSED, OR IRRITABLE [ON A SCALE FROM 1 (NEVER) TO 5 (ALWAYS)]?: 3
SUM OF RESPONSES TO QUESTIONS 2, 4, 5, & 10: 11
SUM OF RESPONSES TO QUESTIONS 3, 6, 7, & 8: 12
TO WHAT EXTENT ARE YOU ABLE TO CARRY OUT YOUR EVERYDAY PHYSICAL ACTIVITIES SUCH AS WALKING, CLIMBING STAIRS, CARRYING GROCERIES, OR MOVING A CHAIR [ON A SCALE OF 1 (NOT AT ALL) TO 5 (COMPLETELY)]?: 2
IN GENERAL, WOULD YOU SAY YOUR QUALITY OF LIFE IS...[ON A SCALE OF 1 (POOR) TO 5 (EXCELLENT)]: 2
IN THE PAST 7 DAYS, HOW WOULD YOU RATE YOUR FATIGUE ON AVERAGE [ON A SCALE FROM 1 (NONE) TO 5 (VERY SEVERE)]?: 3
IN GENERAL, WOULD YOU SAY YOUR HEALTH IS...[ON A SCALE OF 1 (POOR) TO 5 (EXCELLENT)]: 2
IN GENERAL, HOW WOULD YOU RATE YOUR SATISFACTION WITH YOUR SOCIAL ACTIVITIES AND RELATIONSHIPS [ON A SCALE OF 1 (POOR) TO 5 (EXCELLENT)]?: 3
IN THE PAST 7 DAYS, HOW WOULD YOU RATE YOUR PAIN ON AVERAGE [ON A SCALE FROM 0 (NO PAIN) TO 10 (WORST IMAGINABLE PAIN)]?: 5
IN GENERAL, HOW WOULD YOU RATE YOUR PHYSICAL HEALTH [ON A SCALE OF 1 (POOR) TO 5 (EXCELLENT)]?: 2
IN GENERAL, PLEASE RATE HOW WELL YOU CARRY OUT YOUR USUAL SOCIAL ACTIVITIES (INCLUDES ACTIVITIES AT HOME, AT WORK, AND IN YOUR COMMUNITY, AND RESPONSIBILITIES AS A PARENT, CHILD, SPOUSE, EMPLOYEE, FRIEND, ETC) [ON A SCALE OF 1 (POOR) TO 5 (EXCELLENT)]?: 2
IN GENERAL, HOW WOULD YOU RATE YOUR MENTAL HEALTH, INCLUDING YOUR MOOD AND YOUR ABILITY TO THINK [ON A SCALE OF 1 (POOR) TO 5 (EXCELLENT)]?: 3

## 2023-04-21 DIAGNOSIS — C34.91 SQUAMOUS CELL CARCINOMA OF RIGHT LUNG (HCC): ICD-10-CM

## 2023-04-21 DIAGNOSIS — C34.91 NSCLC OF RIGHT LUNG (HCC): ICD-10-CM

## 2023-04-26 NOTE — PROGRESS NOTES
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 She was previously seen by [facility] oncology and also [place] [facility]. She was initially diagnosed with stage III non-small cell lung cancer, squamous cell subtype status post chemoradiation with weekly carboplatin Taxol followed by durvalumab x3 cycles. Subsequently, she had disease recurrence with bony metastasis and lung metastasis. She was seen at [place], [place] and participate in clinical trial with [facility] and experimental medication started December 2021. Unfortunate, she had disease progression May 2022. She was screened for a second clinical trial but did not proceed. She has had several other complications. She presents today for further evaluation. She had recent bone metastasis to T5, T6 and T7 and underwent kyphoplasty with Dr. Wes Salazar. 10/28/19 CT head w/o [facility]): No acute intracranial disease. 10/28/19-CT chest with contrast: Primary lung mass centered in the right upper lobe and abuts the posterior mediastinal pleura (5.4 x 5.3 x 4.5 cm). This also partially encases the right bronchus intermedius and right upper lobe bronchus. This is consistent with pulmonary malignancy. Surgical sampling recommended with bronchoscopy.

## 2023-04-27 ENCOUNTER — TELEMEDICINE (OUTPATIENT)
Dept: HEMATOLOGY | Age: 68
End: 2023-04-27
Payer: MEDICARE

## 2023-04-27 DIAGNOSIS — Z51.5 PALLIATIVE CARE PATIENT: ICD-10-CM

## 2023-04-27 DIAGNOSIS — R69 LIFE THREATENING MEDICAL ILLNESS: ICD-10-CM

## 2023-04-27 DIAGNOSIS — C34.91 SQUAMOUS CELL CARCINOMA OF RIGHT LUNG (HCC): Primary | ICD-10-CM

## 2023-04-27 DIAGNOSIS — G89.3 CANCER RELATED PAIN: ICD-10-CM

## 2023-04-27 DIAGNOSIS — C79.51 LUNG CANCER METASTATIC TO BONE (HCC): ICD-10-CM

## 2023-04-27 DIAGNOSIS — C34.90 LUNG CANCER METASTATIC TO BONE (HCC): ICD-10-CM

## 2023-04-27 DIAGNOSIS — C34.90 SQUAMOUS CELL CARCINOMA OF LUNG, UNSPECIFIED LATERALITY (HCC): ICD-10-CM

## 2023-04-27 DIAGNOSIS — Z71.89 CARE PLAN DISCUSSED WITH PATIENT: ICD-10-CM

## 2023-04-27 DIAGNOSIS — Z78.9 POOR PROGNOSIS: ICD-10-CM

## 2023-04-27 PROCEDURE — 99443 PR PHYS/QHP TELEPHONE EVALUATION 21-30 MIN: CPT | Performed by: INTERNAL MEDICINE

## 2023-04-28 ENCOUNTER — TELEPHONE (OUTPATIENT)
Dept: HEMATOLOGY | Age: 68
End: 2023-04-28

## 2023-04-28 ENCOUNTER — OFFICE VISIT (OUTPATIENT)
Dept: PALLATIVE CARE | Age: 68
End: 2023-04-28
Payer: MEDICARE

## 2023-04-28 DIAGNOSIS — R63.0 POOR APPETITE: ICD-10-CM

## 2023-04-28 DIAGNOSIS — C34.90 NON-SMALL CELL LUNG CANCER METASTATIC TO BONE (HCC): ICD-10-CM

## 2023-04-28 DIAGNOSIS — R68.89 DECREASED STRENGTH, ENDURANCE, AND MOBILITY: ICD-10-CM

## 2023-04-28 DIAGNOSIS — Z51.5 ENCOUNTER FOR PALLIATIVE CARE: ICD-10-CM

## 2023-04-28 DIAGNOSIS — C79.51 NON-SMALL CELL LUNG CANCER METASTATIC TO BONE (HCC): ICD-10-CM

## 2023-04-28 DIAGNOSIS — C78.7 NON-SMALL CELL LUNG CANCER METASTATIC TO LIVER (HCC): ICD-10-CM

## 2023-04-28 DIAGNOSIS — L29.9 ITCHING: ICD-10-CM

## 2023-04-28 DIAGNOSIS — C34.90 NON-SMALL CELL LUNG CANCER METASTATIC TO LIVER (HCC): ICD-10-CM

## 2023-04-28 DIAGNOSIS — Z74.09 DECREASED STRENGTH, ENDURANCE, AND MOBILITY: ICD-10-CM

## 2023-04-28 DIAGNOSIS — R63.0 POOR APPETITE: Primary | ICD-10-CM

## 2023-04-28 DIAGNOSIS — G89.3 CANCER RELATED PAIN: ICD-10-CM

## 2023-04-28 DIAGNOSIS — R53.1 DECREASED STRENGTH, ENDURANCE, AND MOBILITY: ICD-10-CM

## 2023-04-28 LAB
ALBUMIN SERPL-MCNC: 4.4 G/DL (ref 3.5–5.2)
ALP SERPL-CCNC: 59 U/L (ref 35–104)
ALT SERPL-CCNC: 12 U/L (ref 5–33)
ANION GAP SERPL CALCULATED.3IONS-SCNC: 14 MMOL/L (ref 7–19)
AST SERPL-CCNC: 17 U/L (ref 5–32)
BILIRUB SERPL-MCNC: <0.2 MG/DL (ref 0.2–1.2)
BUN SERPL-MCNC: 19 MG/DL (ref 8–23)
CALCIUM SERPL-MCNC: 9.9 MG/DL (ref 8.8–10.2)
CHLORIDE SERPL-SCNC: 103 MMOL/L (ref 98–111)
CO2 SERPL-SCNC: 22 MMOL/L (ref 22–29)
CREAT SERPL-MCNC: 1.1 MG/DL (ref 0.5–0.9)
GLUCOSE SERPL-MCNC: 102 MG/DL (ref 74–109)
POTASSIUM SERPL-SCNC: 4.7 MMOL/L (ref 3.5–5)
PROT SERPL-MCNC: 6.6 G/DL (ref 6.6–8.7)
SODIUM SERPL-SCNC: 139 MMOL/L (ref 136–145)

## 2023-04-28 PROCEDURE — 99350 HOME/RES VST EST HIGH MDM 60: CPT | Performed by: NURSE PRACTITIONER

## 2023-04-28 PROCEDURE — 1123F ACP DISCUSS/DSCN MKR DOCD: CPT | Performed by: NURSE PRACTITIONER

## 2023-04-28 NOTE — TELEPHONE ENCOUNTER
Marcy Daniel to let her know that we are scheduling her first treatment on Monday 5/1 and she let me know that she has decided to not have treatment.

## 2023-05-01 VITALS
TEMPERATURE: 96.8 F | SYSTOLIC BLOOD PRESSURE: 114 MMHG | DIASTOLIC BLOOD PRESSURE: 90 MMHG | HEART RATE: 85 BPM | OXYGEN SATURATION: 96 %

## 2023-05-01 RX ORDER — BUSPIRONE HYDROCHLORIDE 30 MG/1
TABLET ORAL
COMMUNITY
Start: 2023-04-19

## 2023-05-01 RX ORDER — DIAZEPAM 2 MG/1
TABLET ORAL
COMMUNITY
Start: 2022-08-25

## 2023-05-04 ENCOUNTER — TELEPHONE (OUTPATIENT)
Dept: PALLATIVE CARE | Age: 68
End: 2023-05-04

## 2023-05-04 DIAGNOSIS — G89.3 CANCER RELATED PAIN: Primary | ICD-10-CM

## 2023-05-04 RX ORDER — HYDROCODONE BITARTRATE AND ACETAMINOPHEN 10; 325 MG/1; MG/1
1 TABLET ORAL 4 TIMES DAILY PRN
Qty: 120 TABLET | Refills: 0 | Status: CANCELLED | OUTPATIENT
Start: 2023-05-04 | End: 2023-06-03

## 2023-05-04 RX ORDER — HYDROCODONE BITARTRATE AND ACETAMINOPHEN 10; 325 MG/1; MG/1
1 TABLET ORAL EVERY 6 HOURS PRN
Qty: 120 TABLET | Refills: 0 | Status: SHIPPED | OUTPATIENT
Start: 2023-05-04 | End: 2023-06-03

## 2023-05-04 NOTE — TELEPHONE ENCOUNTER
Dr. Jaylin Euceda - Please refill [place] 10/325 1 tab four times a day prn pain  - to [address] It was previously prescribed by [facility] but she will not longer be seeing them. I spoke with Kelly Lutz 5/3/2023. She has better pain control MS Contin was increased to 15mg three times a day. She is now requiring Norco 10/325 for breakthrough pain up to four times daily compared to up to six times daily previously. She will need a refill soon. She has a hospice information visit scheduled for 5/16/2023 but is not yet followed by hospice. She also reported allergy symptoms with ear fullness, drainage and will start taking Flonase which she has at home.

## 2023-05-08 ENCOUNTER — TELEPHONE (OUTPATIENT)
Dept: HEMATOLOGY | Age: 68
End: 2023-05-08

## 2023-05-08 NOTE — TELEPHONE ENCOUNTER
STELLA PorterW called patient to assess needs and offer support. Patient did not answer this call and this SW left a voice message.

## 2023-05-09 ENCOUNTER — TELEMEDICINE (OUTPATIENT)
Dept: FAMILY MEDICINE CLINIC | Age: 68
End: 2023-05-09
Payer: MEDICARE

## 2023-05-09 DIAGNOSIS — Z71.89 COUNSELING REGARDING END OF LIFE DECISION MAKING: ICD-10-CM

## 2023-05-09 DIAGNOSIS — C34.90 SQUAMOUS CELL CARCINOMA OF LUNG, UNSPECIFIED LATERALITY (HCC): Primary | ICD-10-CM

## 2023-05-09 DIAGNOSIS — F41.9 ANXIETY AND DEPRESSION: ICD-10-CM

## 2023-05-09 DIAGNOSIS — Z51.5 PALLIATIVE CARE PATIENT: ICD-10-CM

## 2023-05-09 DIAGNOSIS — G89.3 CHRONIC PAIN DUE TO NEOPLASM: ICD-10-CM

## 2023-05-09 DIAGNOSIS — F32.A ANXIETY AND DEPRESSION: ICD-10-CM

## 2023-05-09 PROCEDURE — 99497 ADVNCD CARE PLAN 30 MIN: CPT | Performed by: NURSE PRACTITIONER

## 2023-05-09 PROCEDURE — 99214 OFFICE O/P EST MOD 30 MIN: CPT | Performed by: NURSE PRACTITIONER

## 2023-05-09 PROCEDURE — 1123F ACP DISCUSS/DSCN MKR DOCD: CPT | Performed by: NURSE PRACTITIONER

## 2023-05-09 RX ORDER — PREDNISONE 20 MG/1
20 TABLET ORAL DAILY
Qty: 5 TABLET | Refills: 0 | Status: SHIPPED | OUTPATIENT
Start: 2023-05-09 | End: 2023-05-14

## 2023-05-09 RX ORDER — PREDNISONE 10 MG/1
10 TABLET ORAL DAILY
COMMUNITY
End: 2023-05-09 | Stop reason: SDUPTHER

## 2023-05-09 RX ORDER — PREDNISONE 10 MG/1
10 TABLET ORAL DAILY
Qty: 45 TABLET | Refills: 2 | Status: SHIPPED | OUTPATIENT
Start: 2023-05-09

## 2023-05-09 RX ORDER — FLUTICASONE FUROATE, UMECLIDINIUM BROMIDE AND VILANTEROL TRIFENATATE 100; 62.5; 25 UG/1; UG/1; UG/1
1 POWDER RESPIRATORY (INHALATION) DAILY
COMMUNITY

## 2023-05-09 ASSESSMENT — ENCOUNTER SYMPTOMS
WHEEZING: 1
SHORTNESS OF BREATH: 1

## 2023-05-09 NOTE — PROGRESS NOTES
[address] [place] [address]     Phone:  [phone]  Fax:  [phone]      2023    TELEHEALTH EVALUATION -- Audio/Visual (During SWYPQ- public health emergency)    HPI:    Chief Complaint   Patient presents with    Follow-up Chronic Condition         Guilherme Salas (:  1955) has requested an audio/video evaluation for the following concern(s):  Patient presents virtually today for routine follow up of chronic conditions. She does not have any new complaints today but does need prednisone refilled. Last visit we did try Breztri and she reports this helped for a few weeks then symptoms worsened causing wheezing again. She did restart the Trelegy. She is using the albuterol inhaler 4 times. Patient is having a lot of dizziness and a drainage type cough too. Morphine was 2 per day and then she increased the dose up to 3 per day. She is still taking Norco 4 times per day as well. Patient is very emotional right now too. Review of Systems   Constitutional: Negative. HENT: Negative. Eyes: Negative. Respiratory:  Positive for shortness of breath and wheezing. Cardiovascular: Negative. Gastrointestinal: Negative. Endocrine: Negative. Genitourinary: Negative. Musculoskeletal:  Positive for gait problem. Skin: Negative. Hematological: Negative. Psychiatric/Behavioral:  The patient is nervous/anxious. Prior to Visit Medications    Medication Sig Taking?  Authorizing Provider   fluticasone-umeclidin-vilant (TRELEGY ELLIPTA) 100-62.5-25 MCG/ACT AEPB inhaler Inhale 1 puff into the lungs daily Yes Historical Provider, MD   predniSONE (DELTASONE) 10 MG tablet Take 1 tablet by mouth daily 1.5 tablets in the morning Yes Lemrachel Ethan, APRN   predniSONE (DELTASONE) 20 MG tablet Take 1 tablet by mouth daily for 5 days Yes ROBER Sauer   HYDROcodone-acetaminophen (NORCO)  MG per tablet Take 1 tablet by mouth every 6 hours

## 2023-05-10 ASSESSMENT — ENCOUNTER SYMPTOMS
EYES NEGATIVE: 1
GASTROINTESTINAL NEGATIVE: 1

## 2023-05-11 ENCOUNTER — PATIENT MESSAGE (OUTPATIENT)
Dept: FAMILY MEDICINE CLINIC | Age: 68
End: 2023-05-11

## 2023-05-12 ENCOUNTER — TELEMEDICINE (OUTPATIENT)
Dept: HEMATOLOGY | Age: 68
End: 2023-05-12

## 2023-05-12 DIAGNOSIS — C34.91 NSCLC OF RIGHT LUNG (HCC): Primary | ICD-10-CM

## 2023-05-12 DIAGNOSIS — C34.91 SQUAMOUS CELL CARCINOMA OF RIGHT LUNG (HCC): ICD-10-CM

## 2023-05-12 DIAGNOSIS — G89.3 CANCER RELATED PAIN: ICD-10-CM

## 2023-05-12 DIAGNOSIS — G89.3 CANCER-RELATED PAIN: ICD-10-CM

## 2023-05-12 DIAGNOSIS — Z71.89 CARE PLAN DISCUSSED WITH PATIENT: ICD-10-CM

## 2023-05-12 DIAGNOSIS — J44.9 STAGE 3 SEVERE COPD BY GOLD CLASSIFICATION (HCC): ICD-10-CM

## 2023-05-12 DIAGNOSIS — M06.09 RHEUMATOID ARTHRITIS OF MULTIPLE SITES WITH NEGATIVE RHEUMATOID FACTOR (HCC): ICD-10-CM

## 2023-05-12 RX ORDER — DOXYCYCLINE HYCLATE 100 MG
100 TABLET ORAL 2 TIMES DAILY
Qty: 20 TABLET | Refills: 0 | Status: SHIPPED | OUTPATIENT
Start: 2023-05-12 | End: 2023-05-22

## 2023-05-12 RX ORDER — MORPHINE SULFATE 15 MG/1
15 TABLET, FILM COATED, EXTENDED RELEASE ORAL 3 TIMES DAILY
Qty: 90 TABLET | Refills: 0 | Status: SHIPPED | OUTPATIENT
Start: 2023-05-12 | End: 2023-06-11

## 2023-05-12 NOTE — TELEPHONE ENCOUNTER
From: Altaf Baker  To: Ange Chamorro  Sent: 5/11/2023 8:35 PM CDT  Subject: Antibiotic     Sarah the xtra prednisone was effecting my sleep. Not taking it tomorrow. So I think I will need an antibiotic for a sinus infection called in.  Thanks

## 2023-05-26 ENCOUNTER — OFFICE VISIT (OUTPATIENT)
Dept: PALLATIVE CARE | Age: 68
End: 2023-05-26
Payer: MEDICARE

## 2023-05-26 DIAGNOSIS — R68.89 DECREASED STRENGTH, ENDURANCE, AND MOBILITY: ICD-10-CM

## 2023-05-26 DIAGNOSIS — C34.90 NON-SMALL CELL LUNG CANCER METASTATIC TO BONE (HCC): ICD-10-CM

## 2023-05-26 DIAGNOSIS — Z74.09 DECREASED STRENGTH, ENDURANCE, AND MOBILITY: ICD-10-CM

## 2023-05-26 DIAGNOSIS — R53.1 DECREASED STRENGTH, ENDURANCE, AND MOBILITY: ICD-10-CM

## 2023-05-26 DIAGNOSIS — G89.3 CANCER RELATED PAIN: Primary | ICD-10-CM

## 2023-05-26 DIAGNOSIS — Z51.5 ENCOUNTER FOR PALLIATIVE CARE: ICD-10-CM

## 2023-05-26 DIAGNOSIS — R06.09 EXERTIONAL DYSPNEA: ICD-10-CM

## 2023-05-26 DIAGNOSIS — C79.51 NON-SMALL CELL LUNG CANCER METASTATIC TO BONE (HCC): ICD-10-CM

## 2023-05-26 PROCEDURE — 1123F ACP DISCUSS/DSCN MKR DOCD: CPT | Performed by: NURSE PRACTITIONER

## 2023-05-26 PROCEDURE — 99349 HOME/RES VST EST MOD MDM 40: CPT | Performed by: NURSE PRACTITIONER

## 2023-05-29 VITALS
HEART RATE: 87 BPM | OXYGEN SATURATION: 96 % | RESPIRATION RATE: 18 BRPM | DIASTOLIC BLOOD PRESSURE: 70 MMHG | SYSTOLIC BLOOD PRESSURE: 108 MMHG | TEMPERATURE: 96.8 F

## 2023-06-07 ENCOUNTER — TELEMEDICINE (OUTPATIENT)
Dept: FAMILY MEDICINE CLINIC | Age: 68
End: 2023-06-07
Payer: MEDICARE

## 2023-06-07 DIAGNOSIS — F41.9 ANXIETY AND DEPRESSION: ICD-10-CM

## 2023-06-07 DIAGNOSIS — F32.A ANXIETY AND DEPRESSION: ICD-10-CM

## 2023-06-07 DIAGNOSIS — C34.90 SQUAMOUS CELL CARCINOMA OF LUNG, UNSPECIFIED LATERALITY (HCC): Primary | ICD-10-CM

## 2023-06-07 DIAGNOSIS — G89.3 CHRONIC PAIN DUE TO NEOPLASM: ICD-10-CM

## 2023-06-07 PROCEDURE — 99214 OFFICE O/P EST MOD 30 MIN: CPT | Performed by: NURSE PRACTITIONER

## 2023-06-07 PROCEDURE — 1123F ACP DISCUSS/DSCN MKR DOCD: CPT | Performed by: NURSE PRACTITIONER

## 2023-06-07 ASSESSMENT — ENCOUNTER SYMPTOMS
EYES NEGATIVE: 1
GASTROINTESTINAL NEGATIVE: 1
RESPIRATORY NEGATIVE: 1

## 2023-06-07 NOTE — PROGRESS NOTES
MG/0.1ML LIQD nasal spray 1 spray by Nasal route as needed for Opioid Reversal  Carmen Velazquez MD   cetirizine (ZYRTEC) 10 MG tablet Take 1 tablet by mouth once daily  ROBER Garcia   dronabinol (MARINOL) 2.5 MG capsule Take 1 capsule by mouth 2 times daily (before meals). Historical Provider, MD   albuterol sulfate HFA (VENTOLIN HFA) 108 (90 Base) MCG/ACT inhaler Inhale 2 puffs into the lungs 4 times daily as needed for Wheezing  ROBER Garcia   Misc. Devices (WALKER) MISC 1 each by Does not apply route daily Upright ROBER Childs   Misc. Devices (ROLLER [place]) MISC 1 each by Does not apply route daily  ROBER Garcia   citalopram (CELEXA) 40 MG tablet Take 1 tablet by mouth once daily  NO FURTHER REFILLS UNTIL SEEN IN OFFICE.   ROBER Rawls   Sennosides-Docusate Sodium (SENNA S PO) Take 50 mg by mouth in the morning and at bedtime  Historical Provider, MD       Social History     Tobacco Use    Smoking status: Former     Packs/day: 1.00     Years: 20.00     Pack years: 20.00     Types: Cigarettes     Quit date: 10/3/2014     Years since quittin.6    Smokeless tobacco: Never   Vaping Use    Vaping Use: Never used   Substance Use Topics    Alcohol use: No    Drug use: No        Allergies   Allergen Reactions    Amoxicillin Other (See Comments)   ,   Past Medical History:   Diagnosis Date    Allergic rhinitis     Anxiety     COPD (chronic obstructive pulmonary disease) (HCC)     GERD (gastroesophageal reflux disease)     Lung cancer (HCC)     RA (rheumatoid arthritis) ([facility] [address]    ,   Past Surgical History:   Procedure Laterality Date    BREAST SURGERY Bilateral     Dr King Bryan (CERVIX STATUS UNKNOWN)      MAMMO IMPLANT DIGITAL DIAG BI Bilateral     SKIN BIOPSY      2 mole removed by mary    ,   Family History   Problem Relation Age of Onset    Cancer Mother         Lung    Breast Cancer Maternal Aunt        PHYSICAL EXAMINATION:  [ INSTRUCTIONS:  \"[x]\"

## 2023-06-08 NOTE — PROGRESS NOTES
ANXIETY      diazePAM (VALIUM) 2 MG tablet       naloxone (NARCAN) 4 MG/0.1ML LIQD nasal spray 1 spray by Nasal route as needed for Opioid Reversal 1 each 3    cetirizine (ZYRTEC) 10 MG tablet Take 1 tablet by mouth once daily 30 tablet 5    dronabinol (MARINOL) 2.5 MG capsule Take 1 capsule by mouth 2 times daily (before meals). albuterol sulfate HFA (VENTOLIN HFA) 108 (90 Base) MCG/ACT inhaler Inhale 2 puffs into the lungs 4 times daily as needed for Wheezing 1 each 5    Misc. Devices (WALKER) MISC 1 each by Does not apply route daily Upright Walker 1 each 0    Misc. Devices (ROLLER WALKER) MISC 1 each by Does not apply route daily 1 each 0    citalopram (CELEXA) 40 MG tablet Take 1 tablet by mouth once daily  NO FURTHER REFILLS UNTIL SEEN IN OFFICE. 90 tablet 0    Sennosides-Docusate Sodium (SENNA S PO) Take 50 mg by mouth in the morning and at bedtime       No current facility-administered medications for this visit. Allergies:    Allergies   Allergen Reactions    Amoxicillin Other (See Comments)         Subjective   REVIEW OF SYSTEMS:   CONSTITUTIONAL: no fever, no night sweats, fatigue;  HEENT: no blurring of vision, no double vision, no hearing difficulty, no tinnitus, no ulceration, no dysplasia, no epistaxis;   LUNGS: no cough, no hemoptysis, no wheeze,  no shortness of breath;  CARDIOVASCULAR: no palpitation, no chest pain, no shortness of breath;  GI: no abdominal pain, no nausea, no vomiting, no diarrhea, constipation;  MAGDA: no dysuria, no hematuria, no frequency or urgency, no nephrolithiasis;  MUSCULOSKELETAL: no joint pain, no swelling, no stiffness;  ENDOCRINE: no polyuria, no polydipsia, no cold or heat intolerance;  HEMATOLOGY: no easy bruising or bleeding, no history of clotting disorder;  DERMATOLOGY: no skin rash, no eczema, no pruritus;  PSYCHIATRY: no depression, no anxiety, no panic attacks, no suicidal ideation, no homicidal ideation;  NEUROLOGY: drowsiness,no syncope, no seizures,

## 2023-06-09 ENCOUNTER — TELEMEDICINE (OUTPATIENT)
Dept: HEMATOLOGY | Age: 68
End: 2023-06-09

## 2023-06-09 DIAGNOSIS — Z51.5 PALLIATIVE CARE PATIENT: ICD-10-CM

## 2023-06-09 DIAGNOSIS — Z71.89 CARE PLAN DISCUSSED WITH PATIENT: ICD-10-CM

## 2023-06-09 DIAGNOSIS — R53.82 CHRONIC FATIGUE: ICD-10-CM

## 2023-06-09 DIAGNOSIS — C34.91 NSCLC OF RIGHT LUNG (HCC): Primary | ICD-10-CM

## 2023-06-09 DIAGNOSIS — G89.3 CANCER-RELATED PAIN: ICD-10-CM

## 2023-06-09 DIAGNOSIS — R40.0 DROWSINESS: ICD-10-CM

## 2023-06-09 RX ORDER — HYDROCODONE BITARTRATE AND ACETAMINOPHEN 10; 325 MG/1; MG/1
TABLET ORAL
COMMUNITY
End: 2023-06-09 | Stop reason: SDUPTHER

## 2023-06-09 RX ORDER — HYDROCODONE BITARTRATE AND ACETAMINOPHEN 10; 325 MG/1; MG/1
1 TABLET ORAL EVERY 6 HOURS PRN
Qty: 120 TABLET | Refills: 0 | Status: SHIPPED | OUTPATIENT
Start: 2023-06-09 | End: 2023-07-09

## 2023-06-12 ENCOUNTER — OFFICE VISIT (OUTPATIENT)
Dept: PALLATIVE CARE | Age: 68
End: 2023-06-12
Payer: MEDICARE

## 2023-06-12 DIAGNOSIS — C79.51 NON-SMALL CELL LUNG CANCER METASTATIC TO BONE (HCC): ICD-10-CM

## 2023-06-12 DIAGNOSIS — F32.A ANXIETY AND DEPRESSION: ICD-10-CM

## 2023-06-12 DIAGNOSIS — R53.0 NEOPLASTIC (MALIGNANT) RELATED FATIGUE: Primary | ICD-10-CM

## 2023-06-12 DIAGNOSIS — Z51.5 ENCOUNTER FOR PALLIATIVE CARE: ICD-10-CM

## 2023-06-12 DIAGNOSIS — C34.90 NON-SMALL CELL LUNG CANCER METASTATIC TO BONE (HCC): ICD-10-CM

## 2023-06-12 DIAGNOSIS — R06.09 EXERTIONAL DYSPNEA: ICD-10-CM

## 2023-06-12 DIAGNOSIS — F41.9 ANXIETY AND DEPRESSION: ICD-10-CM

## 2023-06-12 DIAGNOSIS — K59.03 CONSTIPATION DUE TO PAIN MEDICATION: ICD-10-CM

## 2023-06-12 DIAGNOSIS — G89.3 CANCER RELATED PAIN: ICD-10-CM

## 2023-06-12 PROCEDURE — 99350 HOME/RES VST EST HIGH MDM 60: CPT | Performed by: NURSE PRACTITIONER

## 2023-06-12 PROCEDURE — 1123F ACP DISCUSS/DSCN MKR DOCD: CPT | Performed by: NURSE PRACTITIONER

## 2023-06-15 NOTE — TELEPHONE ENCOUNTER
This is being taken care of. There is a my chart message about this.   [employer] Counseling:  I discussed with the patient the risks of mycophenolate mofetil including but not limited to infection/immunosuppression, GI upset, hypokalemia, hypercholesterolemia, bone marrow suppression, lymphoproliferative disorders, malignancy, GI ulceration/bleed/perforation, colitis, interstitial lung disease, kidney failure, progressive multifocal leukoencephalopathy, and birth defects.  The patient understands that monitoring is required including a baseline creatinine and regular CBC testing. In addition, patient must alert us immediately if symptoms of infection or other concerning signs are noted.

## 2023-06-18 VITALS
TEMPERATURE: 96.9 F | SYSTOLIC BLOOD PRESSURE: 133 MMHG | HEART RATE: 81 BPM | OXYGEN SATURATION: 97 % | DIASTOLIC BLOOD PRESSURE: 84 MMHG

## 2023-06-19 ENCOUNTER — PATIENT MESSAGE (OUTPATIENT)
Dept: FAMILY MEDICINE CLINIC | Age: 68
End: 2023-06-19

## 2023-06-19 RX ORDER — METHYLPHENIDATE HYDROCHLORIDE 5 MG/1
5 TABLET ORAL 2 TIMES DAILY PRN
COMMUNITY

## 2023-06-19 RX ORDER — BUSPIRONE HYDROCHLORIDE 30 MG/1
TABLET ORAL
Qty: 180 TABLET | Refills: 2 | Status: SHIPPED | OUTPATIENT
Start: 2023-06-19

## 2023-06-20 NOTE — TELEPHONE ENCOUNTER
From: Vignesh Lopez  To: Eva Moses  Sent: 6/19/2023 12:08 PM CDT  Subject: Buspirone refill    Sarah the last refill on this was thru [facility] in [place]. I will need you to start filling this. It shows : take 1 tablet twice daily for anxiety. It was 30mg tab. Bishnu Freshwater been taking 1 Am & 1 pm. Total 60 a day. She sent qty 61. But if I could get more then I wouldnt have to refill so much. Buspirone 30MG. Thank you.

## 2023-06-26 ENCOUNTER — PATIENT MESSAGE (OUTPATIENT)
Dept: FAMILY MEDICINE CLINIC | Age: 68
End: 2023-06-26

## 2023-06-26 DIAGNOSIS — F41.9 ANXIETY AND DEPRESSION: ICD-10-CM

## 2023-06-26 DIAGNOSIS — F32.A ANXIETY AND DEPRESSION: ICD-10-CM

## 2023-06-27 RX ORDER — CITALOPRAM 40 MG/1
TABLET ORAL
Qty: 90 TABLET | Refills: 3 | Status: SHIPPED | OUTPATIENT
Start: 2023-06-27

## 2023-07-03 ENCOUNTER — TELEPHONE (OUTPATIENT)
Dept: PALLATIVE CARE | Age: 68
End: 2023-07-03

## 2023-07-03 NOTE — TELEPHONE ENCOUNTER
Maria Esther Inman, the patients sister, called and wanted to give an update to the recent changes they have made in the patients care. She can be reached at [phone].

## 2023-07-05 ENCOUNTER — TELEPHONE (OUTPATIENT)
Dept: PALLATIVE CARE | Age: 68
End: 2023-07-05

## 2023-07-05 DIAGNOSIS — R53.82 CHRONIC FATIGUE: Primary | ICD-10-CM

## 2023-07-05 DIAGNOSIS — G89.3 CANCER RELATED PAIN: ICD-10-CM

## 2023-07-05 DIAGNOSIS — G89.3 CANCER-RELATED PAIN: ICD-10-CM

## 2023-07-05 RX ORDER — MORPHINE SULFATE 15 MG/1
15 TABLET, FILM COATED, EXTENDED RELEASE ORAL EVERY 12 HOURS
Qty: 60 TABLET | Refills: 0 | Status: SHIPPED | OUTPATIENT
Start: 2023-07-05 | End: 2023-08-04

## 2023-07-05 RX ORDER — METHYLPHENIDATE HYDROCHLORIDE 10 MG/1
10 CAPSULE, EXTENDED RELEASE ORAL DAILY
Qty: 30 CAPSULE | Refills: 0 | Status: SHIPPED | OUTPATIENT
Start: 2023-07-05 | End: 2023-07-06

## 2023-07-05 NOTE — TELEPHONE ENCOUNTER
Dr. Rachel Gross spoke with Jayne Mccarthy 7/3/2023. NEOPLASM RELATED FATIGUE  Palliative Med at [facility] previously prescribed methylphenidate (Ritalin) 5mg one tablet twice daily as needed. She started taking it over the last few weeks and it has improved her energy and quality of life. She would like to try a long acting version if you agree. Methylphenidate ER 10mg po daily 30 min prior to breakfast    CANCER RELATED PAIN  She also needs a refill for MS Contin (recently prescribed by you 5/12/2023- it has fallen off of her current list since it was prescribed more than 30 days ago)   She had tried backing off to once daily, but now realizes she has to take it twice daily to keep pain controlled.    She will run out of her current supply 7/7/2023    MS Contin 15mg twice daily    [facility]    Thank you,   ROBER Rivas

## 2023-07-06 ENCOUNTER — TELEPHONE (OUTPATIENT)
Dept: PALLATIVE CARE | Age: 68
End: 2023-07-06

## 2023-07-06 DIAGNOSIS — G93.32 CHRONIC FATIGUE SYNDROME: Primary | ICD-10-CM

## 2023-07-06 RX ORDER — METHYLPHENIDATE HYDROCHLORIDE 5 MG/1
5 TABLET ORAL 2 TIMES DAILY
Qty: 60 TABLET | Refills: 0 | Status: SHIPPED | OUTPATIENT
Start: 2023-07-06 | End: 2023-08-05

## 2023-07-06 NOTE — TELEPHONE ENCOUNTER
Cris Garza, the patients sister, called and wanted to make sure the refills of the patients morphine and Ritalin were sent the pharmacy. They have been sent and she is plans to pick them up today. I can be reached at [phone] if she has any other concerns.

## 2023-07-10 ENCOUNTER — TELEPHONE (OUTPATIENT)
Dept: PALLATIVE CARE | Age: 68
End: 2023-07-10

## 2023-07-10 DIAGNOSIS — J30.2 SEASONAL ALLERGIES: ICD-10-CM

## 2023-07-10 NOTE — TELEPHONE ENCOUNTER
Yeyo Lo, the patients sister called, requesting refills of her pain medications and she is also has a cough. ROBER Eden notified of the patients cough. Follow-up visit moved to 7/12 @ 3:00pm. Message sent to Dr. Tadeo Benz regarding Lortab refill.

## 2023-07-11 DIAGNOSIS — G89.3 CANCER-RELATED PAIN: ICD-10-CM

## 2023-07-11 DIAGNOSIS — C34.91 NSCLC OF RIGHT LUNG (HCC): ICD-10-CM

## 2023-07-11 RX ORDER — HYDROCODONE BITARTRATE AND ACETAMINOPHEN 10; 325 MG/1; MG/1
1 TABLET ORAL EVERY 6 HOURS PRN
Qty: 120 TABLET | Refills: 0 | Status: SHIPPED | OUTPATIENT
Start: 2023-07-11 | End: 2023-08-10

## 2023-07-11 RX ORDER — CETIRIZINE HYDROCHLORIDE 10 MG/1
TABLET ORAL
Qty: 30 TABLET | Refills: 5 | Status: SHIPPED | OUTPATIENT
Start: 2023-07-11

## 2023-07-12 ENCOUNTER — OFFICE VISIT (OUTPATIENT)
Dept: PALLATIVE CARE | Age: 68
End: 2023-07-12
Payer: MEDICARE

## 2023-07-12 DIAGNOSIS — F41.9 ANXIETY AND DEPRESSION: ICD-10-CM

## 2023-07-12 DIAGNOSIS — C34.90 NON-SMALL CELL LUNG CANCER METASTATIC TO BONE (HCC): ICD-10-CM

## 2023-07-12 DIAGNOSIS — G89.3 CANCER RELATED PAIN: ICD-10-CM

## 2023-07-12 DIAGNOSIS — Z51.5 ENCOUNTER FOR PALLIATIVE CARE: ICD-10-CM

## 2023-07-12 DIAGNOSIS — K59.03 CONSTIPATION DUE TO PAIN MEDICATION: ICD-10-CM

## 2023-07-12 DIAGNOSIS — C79.51 NON-SMALL CELL LUNG CANCER METASTATIC TO BONE (HCC): ICD-10-CM

## 2023-07-12 DIAGNOSIS — R05.9 COUGH IN ADULT: Primary | ICD-10-CM

## 2023-07-12 DIAGNOSIS — R53.0 NEOPLASTIC (MALIGNANT) RELATED FATIGUE: ICD-10-CM

## 2023-07-12 DIAGNOSIS — F32.A ANXIETY AND DEPRESSION: ICD-10-CM

## 2023-07-12 PROCEDURE — 99349 HOME/RES VST EST MOD MDM 40: CPT | Performed by: NURSE PRACTITIONER

## 2023-07-12 PROCEDURE — 1123F ACP DISCUSS/DSCN MKR DOCD: CPT | Performed by: NURSE PRACTITIONER

## 2023-07-13 RX ORDER — BENZONATATE 200 MG/1
200 CAPSULE ORAL 3 TIMES DAILY PRN
Qty: 30 CAPSULE | Refills: 1 | Status: SHIPPED | OUTPATIENT
Start: 2023-07-13

## 2023-07-17 VITALS
HEART RATE: 66 BPM | RESPIRATION RATE: 18 BRPM | SYSTOLIC BLOOD PRESSURE: 138 MMHG | DIASTOLIC BLOOD PRESSURE: 78 MMHG | OXYGEN SATURATION: 97 % | TEMPERATURE: 96.6 F

## 2023-07-19 ENCOUNTER — TELEMEDICINE (OUTPATIENT)
Dept: FAMILY MEDICINE CLINIC | Age: 68
End: 2023-07-19
Payer: MEDICARE

## 2023-07-19 DIAGNOSIS — F41.9 ANXIETY: Primary | ICD-10-CM

## 2023-07-19 DIAGNOSIS — C34.90 SQUAMOUS CELL CARCINOMA OF LUNG, UNSPECIFIED LATERALITY (HCC): ICD-10-CM

## 2023-07-19 DIAGNOSIS — G89.3 CHRONIC PAIN DUE TO NEOPLASM: ICD-10-CM

## 2023-07-19 PROCEDURE — 99214 OFFICE O/P EST MOD 30 MIN: CPT | Performed by: NURSE PRACTITIONER

## 2023-07-19 PROCEDURE — 1123F ACP DISCUSS/DSCN MKR DOCD: CPT | Performed by: NURSE PRACTITIONER

## 2023-07-19 RX ORDER — DIAZEPAM 2 MG/1
2 TABLET ORAL DAILY PRN
Qty: 30 TABLET | Refills: 0 | Status: SHIPPED | OUTPATIENT
Start: 2023-07-19 | End: 2023-08-18

## 2023-07-19 ASSESSMENT — ENCOUNTER SYMPTOMS
GASTROINTESTINAL NEGATIVE: 1
SHORTNESS OF BREATH: 1
COUGH: 1
BACK PAIN: 1
EYES NEGATIVE: 1

## 2023-07-19 ASSESSMENT — PATIENT HEALTH QUESTIONNAIRE - PHQ9
SUM OF ALL RESPONSES TO PHQ QUESTIONS 1-9: 8
SUM OF ALL RESPONSES TO PHQ9 QUESTIONS 1 & 2: 2
3. TROUBLE FALLING OR STAYING ASLEEP: 3
6. FEELING BAD ABOUT YOURSELF - OR THAT YOU ARE A FAILURE OR HAVE LET YOURSELF OR YOUR FAMILY DOWN: 0
5. POOR APPETITE OR OVEREATING: 0
SUM OF ALL RESPONSES TO PHQ QUESTIONS 1-9: 8
4. FEELING TIRED OR HAVING LITTLE ENERGY: 3
1. LITTLE INTEREST OR PLEASURE IN DOING THINGS: 1
8. MOVING OR SPEAKING SO SLOWLY THAT OTHER PEOPLE COULD HAVE NOTICED. OR THE OPPOSITE, BEING SO FIGETY OR RESTLESS THAT YOU HAVE BEEN MOVING AROUND A LOT MORE THAN USUAL: 0
7. TROUBLE CONCENTRATING ON THINGS, SUCH AS READING THE NEWSPAPER OR WATCHING TELEVISION: 0
SUM OF ALL RESPONSES TO PHQ QUESTIONS 1-9: 8
9. THOUGHTS THAT YOU WOULD BE BETTER OFF DEAD, OR OF HURTING YOURSELF: 0
10. IF YOU CHECKED OFF ANY PROBLEMS, HOW DIFFICULT HAVE THESE PROBLEMS MADE IT FOR YOU TO DO YOUR WORK, TAKE CARE OF THINGS AT HOME, OR GET ALONG WITH OTHER PEOPLE: 0
2. FEELING DOWN, DEPRESSED OR HOPELESS: 1
SUM OF ALL RESPONSES TO PHQ QUESTIONS 1-9: 8

## 2023-07-19 NOTE — PROGRESS NOTES
Amount: 4 tablets Yes Rudolph Martinez MD   methylphenidate (RITALIN) 5 MG tablet Take 1 tablet by mouth 2 times daily for 30 days. Max Daily Amount: 10 mg  Patient taking differently: Take 1 tablet by mouth as needed. Yes Kip Walker MD   morphine (MS CONTIN) 15 MG extended release tablet Take 1 tablet by mouth in the morning and 1 tablet in the evening. Do all this for 30 days. Max Daily Amount: 30 mg. Yes Kip Walker MD   citalopram (CELEXA) 40 MG tablet Take 1 tablet by mouth once daily Yes ROBER Martínez   busPIRone (BUSPAR) 30 MG tablet TAKE 1 TABLET BY MOUTH TWICE DAILY FOR ANXIETY Yes ROBER Martínez   fluticasone-umeclidin-vilant (TRELEGY ELLIPTA) 100-62.5-25 MCG/ACT AEPB inhaler Inhale 1 puff into the lungs daily Yes Historical Provider, MD   predniSONE (DELTASONE) 10 MG tablet Take 1 tablet by mouth daily 1.5 tablets in the morning Yes ROBER Martínez   naloxone (NARCAN) 4 MG/0.1ML LIQD nasal spray 1 spray by Nasal route as needed for Opioid Reversal Yes Kip Walker MD   albuterol sulfate HFA (VENTOLIN HFA) 108 (90 Base) MCG/ACT inhaler Inhale 2 puffs into the lungs 4 times daily as needed for Wheezing Yes ROBER Martínez   Misc. Devices (WALKER) MISC 1 each by Does not apply route daily Upright Walker Yes ROBER Martínez   Misc.  Devices (ROLLER [place]) MISC 1 each by Does not apply route daily Yes ROBER Martínez   Sennosides-Docusate Sodium (SENNA S PO) Take 50 mg by mouth in the morning and at bedtime Yes Historical Provider, MD       Social History     Tobacco Use    Smoking status: Former     Packs/day: 1.00     Years: 20.00     Pack years: 20.00     Types: Cigarettes     Quit date: 10/3/2014     Years since quittin.7    Smokeless tobacco: Never   Vaping Use    Vaping Use: Never used   Substance Use Topics    Alcohol use: No    Drug use: No        Allergies   Allergen Reactions    Amoxicillin Other (See Comments)   ,   Past Medical History:

## 2023-07-21 ENCOUNTER — TELEMEDICINE (OUTPATIENT)
Dept: HEMATOLOGY | Age: 68
End: 2023-07-21
Payer: MEDICARE

## 2023-07-21 DIAGNOSIS — G89.3 CANCER-RELATED PAIN: Primary | ICD-10-CM

## 2023-07-21 DIAGNOSIS — C34.91 NSCLC OF RIGHT LUNG (HCC): ICD-10-CM

## 2023-07-21 DIAGNOSIS — G89.3 CANCER RELATED PAIN: ICD-10-CM

## 2023-07-21 PROCEDURE — 99442 PR PHYS/QHP TELEPHONE EVALUATION 11-20 MIN: CPT | Performed by: INTERNAL MEDICINE

## 2023-07-21 RX ORDER — OXYCODONE HCL 10 MG/1
10 TABLET, FILM COATED, EXTENDED RELEASE ORAL EVERY 12 HOURS
Qty: 60 EACH | Refills: 0 | Status: CANCELLED | OUTPATIENT
Start: 2023-07-21 | End: 2023-08-20

## 2023-07-21 RX ORDER — OXYCODONE HCL 10 MG/1
10 TABLET, FILM COATED, EXTENDED RELEASE ORAL EVERY 12 HOURS
Qty: 60 EACH | Refills: 0 | Status: SHIPPED | OUTPATIENT
Start: 2023-07-21 | End: 2023-07-21 | Stop reason: SDUPTHER

## 2023-07-21 RX ORDER — OXYCODONE HCL 10 MG/1
10 TABLET, FILM COATED, EXTENDED RELEASE ORAL EVERY 12 HOURS
Qty: 60 EACH | Refills: 0 | Status: SHIPPED | OUTPATIENT
Start: 2023-07-21 | End: 2023-08-20

## 2023-07-26 ENCOUNTER — TELEPHONE (OUTPATIENT)
Dept: FAMILY MEDICINE CLINIC | Age: 68
End: 2023-07-26

## 2023-07-26 RX ORDER — FLUTICASONE FUROATE, UMECLIDINIUM BROMIDE AND VILANTEROL TRIFENATATE 100; 62.5; 25 UG/1; UG/1; UG/1
1 POWDER RESPIRATORY (INHALATION) DAILY
Qty: 3 EACH | Refills: 5 | Status: SHIPPED | OUTPATIENT
Start: 2023-07-26

## 2023-07-26 NOTE — TELEPHONE ENCOUNTER
Pts sister called, wants to know if there is somewhere she can get a free narcan. She said it is $50 at the pharmacy.

## 2023-07-26 NOTE — TELEPHONE ENCOUNTER
Pts sister called in stating that they are trying to get a refill on pts Trelegy. She needs this rx faxed with a fax cover sheet from our office. The fax cover sheet needs her name and  as well as her pt id # which is [id number]. This needs to go to [facility] [address] [facility]. Fax # [phone]    Will send to Rosita to let her know.

## 2023-07-31 ENCOUNTER — TELEPHONE (OUTPATIENT)
Dept: PALLATIVE CARE | Age: 68
End: 2023-07-31

## 2023-07-31 DIAGNOSIS — G89.3 CANCER-RELATED PAIN: ICD-10-CM

## 2023-07-31 DIAGNOSIS — C34.91 NSCLC OF RIGHT LUNG (HCC): ICD-10-CM

## 2023-07-31 RX ORDER — FLUTICASONE FUROATE, UMECLIDINIUM BROMIDE AND VILANTEROL TRIFENATATE 100; 62.5; 25 UG/1; UG/1; UG/1
POWDER RESPIRATORY (INHALATION)
Qty: 60 EACH | Refills: 0 | OUTPATIENT
Start: 2023-07-31

## 2023-07-31 NOTE — TELEPHONE ENCOUNTER
I spoke to patient and her sister Linda Posada the afternoon of Friday 7/28/2023. Her pain was not better controlled after switching from Morphine ER to Oxycontin 10mg bid. She was still having to take Norco 10mg 4-5 times daily for breakthrough pain. . They were asking if Oxycontin can be increased. Dr. Alexey Martinez, Please consider increasing Oxycontin to 20mg bid.

## 2023-08-02 DIAGNOSIS — J44.9 CHRONIC OBSTRUCTIVE PULMONARY DISEASE, UNSPECIFIED COPD TYPE (HCC): Primary | ICD-10-CM

## 2023-08-02 RX ORDER — FLUTICASONE FUROATE, UMECLIDINIUM BROMIDE AND VILANTEROL TRIFENATATE 100; 62.5; 25 UG/1; UG/1; UG/1
1 POWDER RESPIRATORY (INHALATION) DAILY
Qty: 3 EACH | Refills: 5 | Status: SHIPPED | OUTPATIENT
Start: 2023-08-02 | End: 2023-08-02 | Stop reason: SDUPTHER

## 2023-08-02 RX ORDER — FLUTICASONE FUROATE, UMECLIDINIUM BROMIDE AND VILANTEROL TRIFENATATE 100; 62.5; 25 UG/1; UG/1; UG/1
1 POWDER RESPIRATORY (INHALATION) DAILY
Qty: 3 EACH | Refills: 5 | OUTPATIENT
Start: 2023-08-02

## 2023-08-02 RX ORDER — FLUTICASONE FUROATE, UMECLIDINIUM BROMIDE AND VILANTEROL TRIFENATATE 100; 62.5; 25 UG/1; UG/1; UG/1
1 POWDER RESPIRATORY (INHALATION) DAILY
Qty: 3 EACH | Refills: 5 | Status: SHIPPED | OUTPATIENT
Start: 2023-08-02

## 2023-08-02 RX ORDER — FLUTICASONE FUROATE, UMECLIDINIUM BROMIDE AND VILANTEROL TRIFENATATE 100; 62.5; 25 UG/1; UG/1; UG/1
POWDER RESPIRATORY (INHALATION)
Qty: 60 EACH | Refills: 0 | OUTPATIENT
Start: 2023-08-02

## 2023-08-02 NOTE — TELEPHONE ENCOUNTER
Received fax from pharmacy requesting refill on pts medication(s). Pt was last seen in office on 7/19/2023  and has a follow up scheduled for 8/30/2023. Will send request to  Familia Chen  for patient.      Requested Prescriptions     Pending Prescriptions Disp Refills    fluticasone-umeclidin-vilant (TRELEGY ELLIPTA) 100-62.5-25 MCG/ACT AEPB inhaler 3 each 5     Sig: Inhale 1 puff into the lungs daily

## 2023-08-02 NOTE — TELEPHONE ENCOUNTER
Pt called stating that [facility] does not have her rx for Trelegy. After reviewing pts chart, I see that it says this rx printed and wasn't sent to the pharmacy. Will resend this to the pharmacy for pt.      Requested Prescriptions     Signed Prescriptions Disp Refills    fluticasone-umeclidin-vilant (TRELEGY ELLIPTA) 100-62.5-25 MCG/ACT AEPB inhaler 3 each 5     Sig: Inhale 1 puff into the lungs daily     Authorizing Provider: Zoey Feliciano     Ordering User: Hilda Farrar
Yes

## 2023-08-03 RX ORDER — FLUTICASONE FUROATE, UMECLIDINIUM BROMIDE AND VILANTEROL TRIFENATATE 100; 62.5; 25 UG/1; UG/1; UG/1
POWDER RESPIRATORY (INHALATION)
Qty: 60 EACH | Refills: 0 | OUTPATIENT
Start: 2023-08-03

## 2023-08-10 ENCOUNTER — TELEPHONE (OUTPATIENT)
Dept: PALLATIVE CARE | Age: 68
End: 2023-08-10

## 2023-08-10 ENCOUNTER — TELEPHONE (OUTPATIENT)
Dept: FAMILY MEDICINE CLINIC | Age: 68
End: 2023-08-10

## 2023-08-10 DIAGNOSIS — C34.91 NSCLC OF RIGHT LUNG (HCC): ICD-10-CM

## 2023-08-10 DIAGNOSIS — G89.3 CANCER-RELATED PAIN: Primary | ICD-10-CM

## 2023-08-10 RX ORDER — PREDNISONE 10 MG/1
TABLET ORAL
Qty: 45 TABLET | Refills: 0 | Status: SHIPPED | OUTPATIENT
Start: 2023-08-10 | End: 2023-08-11 | Stop reason: SDUPTHER

## 2023-08-10 RX ORDER — OXYCODONE HCL 20 MG/1
20 TABLET, FILM COATED, EXTENDED RELEASE ORAL EVERY 12 HOURS
Qty: 60 TABLET | Refills: 0 | Status: SHIPPED | OUTPATIENT
Start: 2023-08-10 | End: 2023-08-11 | Stop reason: SDUPTHER

## 2023-08-10 NOTE — TELEPHONE ENCOUNTER
Addendum to 7/31/2023 note:  I communicated with Bobby Gomes RN who spoke to Dr. Fatou Kemp and  he said okay to increase Oxycontin to 20mg bid.

## 2023-08-10 NOTE — TELEPHONE ENCOUNTER
Received a call from John regarding pts prednisone prescription. She said it has 2 different sets of instructions. She would like this corrected and resent. Will send to provider to correct and resend.

## 2023-08-11 DIAGNOSIS — C34.91 NSCLC OF RIGHT LUNG (HCC): ICD-10-CM

## 2023-08-11 DIAGNOSIS — G89.3 CANCER-RELATED PAIN: ICD-10-CM

## 2023-08-11 RX ORDER — OXYCODONE HCL 20 MG/1
20 TABLET, FILM COATED, EXTENDED RELEASE ORAL EVERY 12 HOURS
Qty: 60 TABLET | Refills: 0 | Status: SHIPPED | OUTPATIENT
Start: 2023-08-11 | End: 2023-09-10

## 2023-08-11 RX ORDER — PREDNISONE 10 MG/1
TABLET ORAL
Qty: 45 TABLET | Refills: 1 | Status: SHIPPED | OUTPATIENT
Start: 2023-08-11

## 2023-08-14 DIAGNOSIS — G89.3 CANCER-RELATED PAIN: ICD-10-CM

## 2023-08-14 DIAGNOSIS — C34.91 NSCLC OF RIGHT LUNG (HCC): ICD-10-CM

## 2023-08-14 RX ORDER — HYDROCODONE BITARTRATE AND ACETAMINOPHEN 10; 325 MG/1; MG/1
1 TABLET ORAL EVERY 6 HOURS PRN
Qty: 120 TABLET | Refills: 0 | Status: SHIPPED | OUTPATIENT
Start: 2023-08-14 | End: 2023-09-13

## 2023-08-17 ENCOUNTER — OFFICE VISIT (OUTPATIENT)
Dept: PALLATIVE CARE | Age: 68
End: 2023-08-17
Payer: MEDICARE

## 2023-08-17 DIAGNOSIS — Z51.5 ENCOUNTER FOR PALLIATIVE CARE: ICD-10-CM

## 2023-08-17 DIAGNOSIS — F41.9 ANXIETY AND DEPRESSION: ICD-10-CM

## 2023-08-17 DIAGNOSIS — C34.90 NON-SMALL CELL LUNG CANCER METASTATIC TO BONE (HCC): ICD-10-CM

## 2023-08-17 DIAGNOSIS — R06.09 EXERTIONAL DYSPNEA: ICD-10-CM

## 2023-08-17 DIAGNOSIS — G89.3 CANCER RELATED PAIN: Primary | ICD-10-CM

## 2023-08-17 DIAGNOSIS — C79.51 NON-SMALL CELL LUNG CANCER METASTATIC TO BONE (HCC): ICD-10-CM

## 2023-08-17 DIAGNOSIS — K59.03 CONSTIPATION DUE TO PAIN MEDICATION: ICD-10-CM

## 2023-08-17 DIAGNOSIS — F32.A ANXIETY AND DEPRESSION: ICD-10-CM

## 2023-08-17 DIAGNOSIS — R53.0 NEOPLASTIC (MALIGNANT) RELATED FATIGUE: ICD-10-CM

## 2023-08-17 PROCEDURE — 1123F ACP DISCUSS/DSCN MKR DOCD: CPT | Performed by: NURSE PRACTITIONER

## 2023-08-17 PROCEDURE — 99350 HOME/RES VST EST HIGH MDM 60: CPT | Performed by: NURSE PRACTITIONER

## 2023-08-21 VITALS
OXYGEN SATURATION: 95 % | HEART RATE: 88 BPM | RESPIRATION RATE: 18 BRPM | SYSTOLIC BLOOD PRESSURE: 110 MMHG | DIASTOLIC BLOOD PRESSURE: 60 MMHG | TEMPERATURE: 96.9 F

## 2023-08-23 ENCOUNTER — OFFICE VISIT (OUTPATIENT)
Dept: PALLATIVE CARE | Age: 68
End: 2023-08-23

## 2023-08-23 DIAGNOSIS — C79.51 NON-SMALL CELL LUNG CANCER METASTATIC TO BONE (HCC): ICD-10-CM

## 2023-08-23 DIAGNOSIS — F41.9 ANXIETY AND DEPRESSION: ICD-10-CM

## 2023-08-23 DIAGNOSIS — J44.9 STAGE 3 SEVERE COPD BY GOLD CLASSIFICATION (HCC): ICD-10-CM

## 2023-08-23 DIAGNOSIS — C34.90 NON-SMALL CELL LUNG CANCER METASTATIC TO BONE (HCC): ICD-10-CM

## 2023-08-23 DIAGNOSIS — F32.A ANXIETY AND DEPRESSION: ICD-10-CM

## 2023-08-23 DIAGNOSIS — R06.09 EXERTIONAL DYSPNEA: ICD-10-CM

## 2023-08-23 DIAGNOSIS — Z51.5 ENCOUNTER FOR PALLIATIVE CARE: ICD-10-CM

## 2023-08-23 DIAGNOSIS — R53.0 NEOPLASTIC (MALIGNANT) RELATED FATIGUE: ICD-10-CM

## 2023-08-23 DIAGNOSIS — G89.3 CANCER RELATED PAIN: Primary | ICD-10-CM

## 2023-08-24 ENCOUNTER — TELEPHONE (OUTPATIENT)
Dept: INFUSION THERAPY | Age: 68
End: 2023-08-24

## 2023-08-24 NOTE — TELEPHONE ENCOUNTER
Late entry for 8/23/2023    Patients sister , Bhavana Rain, contacted clinic and left voicemail informing us that Ge Dao was having \"complications\" and requested return phone call to discuss this. Returned phone call to Bhavana Rain who tells me patient is saying that she \"feels high\", dizzy, nauseous, light headed, is sleeping a lot, and has labored breathing. She reports this began about 4 weeks ago when Oxycodone ER was increased to 20 mg q12 hours, but has been getting worse. I asked Bhavana Rain if the patient is oriented and appropriate and she tells me that one night patient grabbed the tv remote thinking it was a salt shaker, but corrected herself and said that she \"feels drunk\". Bhavnaa Rain tells me the oxycodone ER is only helping the patient for about 1-2 hours and that the norco helps her more than anything. Bhavana Rain states the patient seems over medicated. Instructed that patient should go to ER for evaluation. Bhavana Rain tells me the patient refuses to go to ER due to long wait times and because so many tests will be done. Jacob Young RN with Dr. Gayathri Almonte is made aware and states she will contact ROBER Johnson to see if she is able to go to patient to assess her. I contacted Bhavana Rain again and informed her of this and also instructed her to contact [facility]lyn [facility] office to request home visit. Bhavana Rain verbalized understanding.

## 2023-08-24 NOTE — PROGRESS NOTES
palliative care  I have recommended she proceed with hospice and we discussed services again today. She declines for now. She has previously informational call from hospice. Follow up with with Dr. Aakash Coats next week as scheduled  I'll see her in 1-2 weeks for follow up or sooner if needed. CHIEF COMPLAINT:     Chief Complaint   Patient presents with    Other     \"I've been feeling like I'm overmedicated\"     CLINICAL SUMMARY:          Cindy Jin is a 77 y.o. female with PMH of COPD, GERD, lung cancer, anxiety     Brief oncology history:   She was diagnosed with stage III non-small cell lung cancer, squamous cell subtype in 2019. She underwent chemoradiation with weekly carboplatin and Taxol followed by durvalumab. She then developed disease recurrence with bone and lung metastasis confirmed with left wedge resection in 9/20/2021 She sought another opinion in [place] and subsequently underwent treatment in a clinical trial in 2021 with Antoinette England along with another drug. In May 2022 she had further disease progression. She was screened for another clinical trial but did not proceed. She has required kyphoplasty to T5, T6, T7. She recently began seeing Dr. Aakash Coats who has referred her to supportive care. HPI AND VISIT SUMMARY:   I saw Cindy Jin in Her home. Her Sister Edy Bagley was present and participated in the visit. Upon arrival patient was found to be awake, alert, oriented in no acute distress. She was sitting in a chair in the living room. She complains of having extreme lightheadedness and feeling faint at times. She is also had some nausea. Moving her head certain ways seems to make this little worse. As she has had some nausea and has taken Zofran. Sometimes when she leans forward she feels like she is going to pass out or vomit. She has had episodes of increased shortness of breath. She also has occasional episodes of seeing shooting lights when she moves her head around.

## 2023-08-25 VITALS
RESPIRATION RATE: 20 BRPM | HEART RATE: 90 BPM | DIASTOLIC BLOOD PRESSURE: 70 MMHG | SYSTOLIC BLOOD PRESSURE: 120 MMHG | OXYGEN SATURATION: 95 %

## 2023-08-25 RX ORDER — ALBUTEROL SULFATE 2.5 MG/3ML
2.5 SOLUTION RESPIRATORY (INHALATION) EVERY 4 HOURS PRN
Qty: 120 EACH | Refills: 0 | Status: SHIPPED | OUTPATIENT
Start: 2023-08-25

## 2023-08-30 ENCOUNTER — TELEMEDICINE (OUTPATIENT)
Dept: FAMILY MEDICINE CLINIC | Age: 68
End: 2023-08-30
Payer: MEDICARE

## 2023-08-30 DIAGNOSIS — J44.9 CHRONIC OBSTRUCTIVE PULMONARY DISEASE, UNSPECIFIED COPD TYPE (HCC): ICD-10-CM

## 2023-08-30 DIAGNOSIS — F41.9 ANXIETY: ICD-10-CM

## 2023-08-30 DIAGNOSIS — C34.90 SQUAMOUS CELL CARCINOMA OF LUNG, UNSPECIFIED LATERALITY (HCC): Primary | ICD-10-CM

## 2023-08-30 PROCEDURE — 1123F ACP DISCUSS/DSCN MKR DOCD: CPT | Performed by: NURSE PRACTITIONER

## 2023-08-30 PROCEDURE — 99214 OFFICE O/P EST MOD 30 MIN: CPT | Performed by: NURSE PRACTITIONER

## 2023-08-30 RX ORDER — DIAZEPAM 2 MG/1
2 TABLET ORAL DAILY PRN
Qty: 30 TABLET | Refills: 0 | Status: SHIPPED | OUTPATIENT
Start: 2023-08-30 | End: 2023-09-29

## 2023-08-30 ASSESSMENT — ENCOUNTER SYMPTOMS
SHORTNESS OF BREATH: 1
EYES NEGATIVE: 1
GASTROINTESTINAL NEGATIVE: 1

## 2023-08-30 NOTE — PROGRESS NOTES
Jose Winter, was evaluated through a synchronous (real-time) audio-video encounter. The patient (or guardian if applicable) is aware that this is a billable service, which includes applicable co-pays. This Virtual Visit was conducted with patient's (and/or legal guardian's) consent. Patient identification was verified, and a caregiver was present when appropriate. The patient was located at Home: [address] [place] [phone]  Provider was located at [facility] (Appt Dept): [address],  [address] [facility]      Jose Winter (:  1955) is a Established patient, presenting virtually for evaluation of the following:    Assessment & Plan   Below is the assessment and plan developed based on review of pertinent history, physical exam, labs, studies, and medications. 1. Squamous cell carcinoma of lung, unspecified laterality ([address])  2. Chronic obstructive pulmonary disease, unspecified COPD type ([address])  3. Anxiety  -     diazePAM (VALIUM) 2 MG tablet; Take 1 tablet by mouth daily as needed for Anxiety for up to 30 days. Max Daily Amount: 2 mg, Disp-30 tablet, R-0Normal    Return in about 7 weeks (around 10/18/2023) for [facility] med refill. Subjective   HPI  Patient presents virtually today for routine follow up of chronic conditions including lung cancer and chronic pain from this, along with anxiety. She states that she is only taking 1 oxycodone daily. She has started taking buspar 30mg in AM and QHS and this has controlled her anxiety more. She is taking 1/2 valium in the morning as well. She does report having episode of light headedness after standing and some nausea with it. Stopping her second dose of pain medication has helped these symptoms. Review of Systems   Constitutional: Negative. HENT: Negative. Eyes: Negative. Respiratory:  Positive for shortness of breath (chronic). Cardiovascular: Negative. Gastrointestinal: Negative. Endocrine: Negative.

## 2023-09-12 DIAGNOSIS — J44.9 STAGE 3 SEVERE COPD BY GOLD CLASSIFICATION (HCC): ICD-10-CM

## 2023-09-12 DIAGNOSIS — C34.91 NSCLC OF RIGHT LUNG (HCC): ICD-10-CM

## 2023-09-12 DIAGNOSIS — G89.3 CANCER-RELATED PAIN: ICD-10-CM

## 2023-09-12 RX ORDER — HYDROCODONE BITARTRATE AND ACETAMINOPHEN 10; 325 MG/1; MG/1
1 TABLET ORAL EVERY 4 HOURS PRN
Qty: 180 TABLET | Refills: 0 | Status: SHIPPED | OUTPATIENT
Start: 2023-09-12 | End: 2023-10-12

## 2023-09-13 RX ORDER — ALBUTEROL SULFATE 90 UG/1
AEROSOL, METERED RESPIRATORY (INHALATION)
Qty: 9 G | Refills: 0 | Status: SHIPPED | OUTPATIENT
Start: 2023-09-13

## 2023-09-14 NOTE — PROGRESS NOTES
mouth daily as needed for Anxiety for up to 30 days. Max Daily Amount: 2 mg 30 tablet 0    albuterol (PROVENTIL) (2.5 MG/3ML) 0.083% nebulizer solution Take 3 mLs by nebulization every 4 hours as needed for Shortness of Breath 120 each 0    predniSONE (DELTASONE) 10 MG tablet TAKE 1 & 1/2 (ONE & ONE-HALF) TABLETS IN THE MORNING 45 tablet 1    fluticasone-umeclidin-vilant (TRELEGY ELLIPTA) 100-62.5-25 MCG/ACT AEPB inhaler Inhale 1 puff into the lungs daily 3 each 5    benzonatate (TESSALON) 200 MG capsule Take 1 capsule by mouth 3 times daily as needed for Cough 30 capsule 1    cetirizine (ZYRTEC) 10 MG tablet Take 1 tablet by mouth once daily 30 tablet 5    citalopram (CELEXA) 40 MG tablet Take 1 tablet by mouth once daily 90 tablet 3    busPIRone (BUSPAR) 30 MG tablet TAKE 1 TABLET BY MOUTH TWICE DAILY FOR ANXIETY 180 tablet 2    naloxone (NARCAN) 4 MG/0.1ML LIQD nasal spray 1 spray by Nasal route as needed for Opioid Reversal 1 each 3    Misc. Devices (WALKER) MISC 1 each by Does not apply route daily Upright Walker 1 each 0    Misc. Devices (ROLLER WALKER) MISC 1 each by Does not apply route daily 1 each 0    Sennosides-Docusate Sodium (SENNA S PO) Take 50 mg by mouth in the morning and at bedtime       No current facility-administered medications for this visit. Allergies:    Allergies   Allergen Reactions    Amoxicillin Other (See Comments)         Subjective     REVIEW OF SYSTEMS:   CONSTITUTIONAL: no fever, no night sweats, fatigue;  HEENT: no blurring of vision, no double vision, no hearing difficulty, no tinnitus, no ulceration, no dysplasia, no epistaxis;   LUNGS: no cough, no hemoptysis, no wheeze,  no shortness of breath;  CARDIOVASCULAR: no palpitation, no chest pain, no shortness of breath;  GI: no abdominal pain, no nausea, no vomiting, no diarrhea, no constipation;  MAGDA: no dysuria, no hematuria, no frequency or urgency, no nephrolithiasis;  MUSCULOSKELETAL: no joint pain, no swelling, no

## 2023-09-15 ENCOUNTER — TELEMEDICINE (OUTPATIENT)
Dept: HEMATOLOGY | Age: 68
End: 2023-09-15

## 2023-09-15 DIAGNOSIS — C34.91 SQUAMOUS CELL CARCINOMA OF RIGHT LUNG (HCC): ICD-10-CM

## 2023-09-15 DIAGNOSIS — C34.91 NSCLC OF RIGHT LUNG (HCC): Primary | ICD-10-CM

## 2023-09-15 DIAGNOSIS — G89.3 CANCER RELATED PAIN: ICD-10-CM

## 2023-09-15 RX ORDER — OXYCODONE HCL 10 MG/1
10 TABLET, FILM COATED, EXTENDED RELEASE ORAL EVERY 12 HOURS
COMMUNITY
End: 2023-09-15 | Stop reason: SDUPTHER

## 2023-09-15 RX ORDER — OXYCODONE HCL 10 MG/1
10 TABLET, FILM COATED, EXTENDED RELEASE ORAL EVERY 12 HOURS
Qty: 60 EACH | Refills: 0 | Status: SHIPPED | OUTPATIENT
Start: 2023-09-15 | End: 2023-10-15

## 2023-09-18 DIAGNOSIS — G89.3 CANCER RELATED PAIN: ICD-10-CM

## 2023-09-18 DIAGNOSIS — C34.91 NSCLC OF RIGHT LUNG (HCC): ICD-10-CM

## 2023-09-18 DIAGNOSIS — C34.91 SQUAMOUS CELL CARCINOMA OF RIGHT LUNG (HCC): ICD-10-CM

## 2023-09-18 RX ORDER — OXYCODONE HCL 10 MG/1
10 TABLET, FILM COATED, EXTENDED RELEASE ORAL EVERY 12 HOURS
Qty: 60 EACH | Refills: 0 | Status: SHIPPED | OUTPATIENT
Start: 2023-09-18 | End: 2023-10-18

## 2023-09-19 ENCOUNTER — CLINICAL DOCUMENTATION (OUTPATIENT)
Dept: INFUSION THERAPY | Age: 68
End: 2023-09-19

## 2023-09-19 ENCOUNTER — TELEPHONE (OUTPATIENT)
Dept: PALLATIVE CARE | Age: 68
End: 2023-09-19

## 2023-09-19 DIAGNOSIS — C34.91 NSCLC OF RIGHT LUNG (HCC): Primary | ICD-10-CM

## 2023-09-19 DIAGNOSIS — R53.81 PHYSICAL DECONDITIONING: ICD-10-CM

## 2023-09-19 DIAGNOSIS — Z78.9 POOR PROGNOSIS: ICD-10-CM

## 2023-09-19 NOTE — PROGRESS NOTES
Family called stating they want Hospice referral. Discussed with Dr Phyllis Richards who agrees. Referral created.

## 2023-09-19 NOTE — PROGRESS NOTES
Patient she is to call reporting the patient had increase pain. Hospice has been called but they have not yet seen the patient. I have recommended to: Increase long acting OxyContin 20 mg q 8 hours. Continue Norco every 4 hours. They were advised to come to the ER if this regimen does not work. They have Narcan available.

## 2023-09-20 NOTE — TELEPHONE ENCOUNTER
I called and spoke with Trevon Vidales patient's sister. Patient and her daughter were with her and listening to the call. They report pain had been doing okay until the last few days when it suddenly became much worse. She has had right flank pain. She is currently being treated for UTI and feels this is improving. She has a few days of antibiotic left. Dr. Astrid Sales has given her new instructions for her pain medication tonight. She was feeling a little better at the time of my call and was feeling like eating. She has been requiring more assistance and needing to be pushed to the bathroom on her Rollator. She's had more shortness of breath with exertion and her O2 sat drops but improves quickly with rest.      They have been in contact with hospice. I have encouraged an admission visit where she can meet hospice staff in person and obtain information to make a decision. Likely needs oxygen and wheelchair or transport chair which hospice will provide if she elects to admit to hospice. Otherwise I will see her for follow visit and order needed equipment.

## 2023-09-22 ENCOUNTER — CLINICAL DOCUMENTATION (OUTPATIENT)
Dept: PALLATIVE CARE | Age: 68
End: 2023-09-22

## 2023-09-22 DIAGNOSIS — C34.91 NSCLC OF RIGHT LUNG (HCC): ICD-10-CM

## 2023-09-22 DIAGNOSIS — R53.1 DECREASED STRENGTH, ENDURANCE, AND MOBILITY: Primary | ICD-10-CM

## 2023-09-22 DIAGNOSIS — R68.89 DECREASED STRENGTH, ENDURANCE, AND MOBILITY: Primary | ICD-10-CM

## 2023-09-22 DIAGNOSIS — Z74.09 DECREASED STRENGTH, ENDURANCE, AND MOBILITY: Primary | ICD-10-CM

## 2023-09-22 DIAGNOSIS — Z91.81 AT RISK FOR FALLS: ICD-10-CM

## 2023-09-25 NOTE — PROGRESS NOTES
I spoke to Misael Johnson on 9/22/2023. I wanted to speak with her prior to the weekend to check and see how she was doing. They were still working on adjusting her pain medication. The increased dosage felt to be too much so they backed off a little. Overall she seemed a little better. She continues to be very weak. She is at risk for falls. They are having to pressure on her rollator to get to the bathroom. She fatigues very easily. She would likely benefit from a wheelchair. Family requested transport chair. Ning Briggs requires the assistance of a lightweight wheelchair to successfully complete daily living tasks such as: toileting, bathing, dressing, and grooming; or any other daily living task in the home. A lightweight wheelchair is necessary due to the patient's impaired ambulation and limited mobility. The patient would not be able to resolve these daily living tasks using a cane or walker. Patient does not have the strength or stamina to support pill in a standard wheelchair. She does have family who is willing and able to  assist her with use of a transport chair. The patient cannot self-propel in a standard wheelchair.       Electronically signed by ROBER Dolan CNP on 9/25/2023 at 10:37 AM

## 2023-09-26 ENCOUNTER — TELEPHONE (OUTPATIENT)
Dept: FAMILY MEDICINE CLINIC | Age: 68
End: 2023-09-26

## 2023-09-26 NOTE — TELEPHONE ENCOUNTER
----- Message from [address] sent at 9/26/2023 11:01 AM CDT -----  Subject: Message to Provider    QUESTIONS  Information for Provider? Sania Springer with [facility] called to let office know   patient had a PROLIA 60mg injection at [facility] patient can have   that done at alternate facilities and her insure would be in network. [address]. [facility] or   [facility]. Please call Member services  ---------------------------------------------------------------------------  --------------  [address]  [phone]; Do not leave any message, patient will call back for answer  ---------------------------------------------------------------------------  --------------  SCRIPT ANSWERS  Relationship to Patient? Covered Entity  Covered Entity Type? Health Insurance? Representative Name?  Hudson Smalls

## 2023-09-28 ENCOUNTER — OFFICE VISIT (OUTPATIENT)
Dept: PALLATIVE CARE | Age: 68
End: 2023-09-28
Payer: MEDICARE

## 2023-09-28 DIAGNOSIS — R53.1 DECREASED STRENGTH, ENDURANCE, AND MOBILITY: Primary | ICD-10-CM

## 2023-09-28 DIAGNOSIS — F32.A ANXIETY AND DEPRESSION: ICD-10-CM

## 2023-09-28 DIAGNOSIS — Z51.5 ENCOUNTER FOR PALLIATIVE CARE: ICD-10-CM

## 2023-09-28 DIAGNOSIS — R53.0 NEOPLASTIC (MALIGNANT) RELATED FATIGUE: ICD-10-CM

## 2023-09-28 DIAGNOSIS — C34.90 NON-SMALL CELL LUNG CANCER METASTATIC TO BONE (HCC): ICD-10-CM

## 2023-09-28 DIAGNOSIS — C79.51 NON-SMALL CELL LUNG CANCER METASTATIC TO BONE (HCC): ICD-10-CM

## 2023-09-28 DIAGNOSIS — Z74.09 DECREASED STRENGTH, ENDURANCE, AND MOBILITY: Primary | ICD-10-CM

## 2023-09-28 DIAGNOSIS — K59.03 CONSTIPATION DUE TO PAIN MEDICATION: ICD-10-CM

## 2023-09-28 DIAGNOSIS — G89.3 CANCER RELATED PAIN: ICD-10-CM

## 2023-09-28 DIAGNOSIS — R68.89 DECREASED STRENGTH, ENDURANCE, AND MOBILITY: Primary | ICD-10-CM

## 2023-09-28 DIAGNOSIS — F41.9 ANXIETY AND DEPRESSION: ICD-10-CM

## 2023-09-28 PROCEDURE — 1123F ACP DISCUSS/DSCN MKR DOCD: CPT | Performed by: NURSE PRACTITIONER

## 2023-09-28 PROCEDURE — 99349 HOME/RES VST EST MOD MDM 40: CPT | Performed by: NURSE PRACTITIONER

## 2023-09-28 NOTE — PROGRESS NOTES
Supportive Care/Community Based Palliative Care  Follow Up Note        Patient Name:  Lyudmila Record Number:  [id number]  YOB: 1955    Date of Visit: 9/28/2023  Location of Visit:  Home    Patient Care Team:  ROBER Seymour as PCP - General (Nurse Practitioner Family)  ROBER Seymour as PCP - Empaneled Provider  Neo Neely DO as Consulting Physician (Neurosurgery)  ROBER Monzon - CNP as Advanced Practice Nurse (Nurse Practitioner)  Dr. Susana Mtata-pulmonology    History obtained from:  patient, family member - sister Bentley Rawls, electronic medical record    PALLIATIVE DIAGNOSES AND ORDERS/RECOMMENDATIONS/PLAN:     Decreased strength, endurance, and mobility  Neoplastic (malignant) related fatigue  Worsening. She is requiring more assistance and family is pushing her on her Rollator to the bathroom and back  Transport chair ordered. She will likely need continued in home support and assistance. Anxiety and depression  A little better controlled overall. Currently taking Buspar 30mg bid, Valium 1/2 of 2mg tablet twice a day, Celexa  Anxiety is worse when dyspnea is worse. Slept on her back with head elevated last nigh and did not wake up short of breath or as anxious as she usually does. Cancer related pain  Currently taking Oxycontin 20mg once a day. Continues Norco 10mg up to six times a day. Pain not controlled but she is reluctant to take more Oxycontin. She does not feel the long acting meds work well for her pain but they do make her feel overmedicated. Try adding additional 500mg Tylenol three times a day  Could consider Fentanyl patch to replace Oxycontin. Hospice has been recommended.     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

## 2023-10-02 VITALS
SYSTOLIC BLOOD PRESSURE: 110 MMHG | OXYGEN SATURATION: 95 % | RESPIRATION RATE: 20 BRPM | HEART RATE: 83 BPM | TEMPERATURE: 97.4 F | DIASTOLIC BLOOD PRESSURE: 50 MMHG

## 2023-10-03 DIAGNOSIS — G89.3 CANCER RELATED PAIN: Primary | ICD-10-CM

## 2023-10-03 DIAGNOSIS — C34.91 NSCLC OF RIGHT LUNG (HCC): ICD-10-CM

## 2023-10-03 RX ORDER — FENTANYL 12.5 UG/1
1 PATCH TRANSDERMAL
Qty: 10 PATCH | Refills: 0 | Status: SHIPPED | OUTPATIENT
Start: 2023-10-03 | End: 2023-11-02

## 2023-10-09 ENCOUNTER — TELEPHONE (OUTPATIENT)
Dept: HEMATOLOGY | Age: 68
End: 2023-10-09

## 2023-10-09 DIAGNOSIS — G89.3 CANCER-RELATED PAIN: ICD-10-CM

## 2023-10-09 DIAGNOSIS — C34.91 NSCLC OF RIGHT LUNG (HCC): ICD-10-CM

## 2023-10-09 RX ORDER — HYDROCODONE BITARTRATE AND ACETAMINOPHEN 10; 325 MG/1; MG/1
1 TABLET ORAL EVERY 4 HOURS PRN
Qty: 180 TABLET | Refills: 0 | Status: SHIPPED | OUTPATIENT
Start: 2023-10-11 | End: 2023-11-10

## 2023-10-09 NOTE — TELEPHONE ENCOUNTER
Clinic received message from sister Roe Cohen regarding patient's Fentanyl patch being removed by patient. Trevon Vidales has stated that she has applied Tegaderm to cover patch. She has also stated that she was not able to reapply patch taken off, but has applied new patches. She has expressed the concern of not having enough patches until next refill. I have explained to her that there is a possibility that insurance would not cover to script to be filled earlier then the due date. Nils[facility]ilson Sobeida has expressed that they will pay if needed. Trevon Vidales and patient has verbalized understanding.

## 2023-10-11 ENCOUNTER — TELEPHONE (OUTPATIENT)
Dept: FAMILY MEDICINE CLINIC | Age: 68
End: 2023-10-11

## 2023-10-11 RX ORDER — CEFDINIR 300 MG/1
300 CAPSULE ORAL 2 TIMES DAILY
Qty: 20 CAPSULE | Refills: 0 | Status: SHIPPED | OUTPATIENT
Start: 2023-10-11 | End: 2023-10-21

## 2023-10-11 NOTE — TELEPHONE ENCOUNTER
Patient called in regarding UTI, she has finished her antibiotic, did an at home UTI test which came back positive and she is still symptomatic. Daughter is requesting another antibiotic be sent in. Patient's daughter also would like to discuss patient's Valium dosage, she is having to use the Valium more since starting the Fentanyl patch. Daughter wants to know if she can increase to 1mg 4 times daily due to patient being unable to cut 2mg in half. I did move patient's scheduled appointment for her med refill from 10/18/23 to 10/13/2023 incase you wanted to send in new script during an appointment and daughter is aware of this. Daughter asked to be called with recommendations Linda Posada) at [phone].

## 2023-10-12 DIAGNOSIS — C34.91 NSCLC OF RIGHT LUNG (HCC): Primary | ICD-10-CM

## 2023-10-12 DIAGNOSIS — J44.9 STAGE 3 SEVERE COPD BY GOLD CLASSIFICATION (HCC): ICD-10-CM

## 2023-10-12 DIAGNOSIS — G89.3 CANCER-RELATED PAIN: ICD-10-CM

## 2023-10-12 DIAGNOSIS — J30.2 SEASONAL ALLERGIES: ICD-10-CM

## 2023-10-12 RX ORDER — CETIRIZINE HYDROCHLORIDE 10 MG/1
TABLET ORAL
Qty: 90 TABLET | Refills: 0 | Status: SHIPPED | OUTPATIENT
Start: 2023-10-12

## 2023-10-12 RX ORDER — PREDNISONE 10 MG/1
TABLET ORAL
Qty: 45 TABLET | Refills: 0 | Status: SHIPPED | OUTPATIENT
Start: 2023-10-12

## 2023-10-12 RX ORDER — FENTANYL 25 UG/1
1 PATCH TRANSDERMAL
Qty: 10 PATCH | Refills: 0 | Status: SHIPPED | OUTPATIENT
Start: 2023-10-12 | End: 2023-11-11

## 2023-10-12 RX ORDER — ALBUTEROL SULFATE 90 UG/1
AEROSOL, METERED RESPIRATORY (INHALATION)
Qty: 9 G | Refills: 0 | Status: SHIPPED | OUTPATIENT
Start: 2023-10-12

## 2023-10-13 ENCOUNTER — TELEMEDICINE (OUTPATIENT)
Dept: FAMILY MEDICINE CLINIC | Age: 68
End: 2023-10-13
Payer: MEDICARE

## 2023-10-13 DIAGNOSIS — C34.90 SQUAMOUS CELL CARCINOMA OF LUNG, UNSPECIFIED LATERALITY (HCC): ICD-10-CM

## 2023-10-13 DIAGNOSIS — F41.9 ANXIETY: Primary | ICD-10-CM

## 2023-10-13 DIAGNOSIS — N30.00 ACUTE CYSTITIS WITHOUT HEMATURIA: ICD-10-CM

## 2023-10-13 DIAGNOSIS — J44.9 CHRONIC OBSTRUCTIVE PULMONARY DISEASE, UNSPECIFIED COPD TYPE (HCC): ICD-10-CM

## 2023-10-13 PROCEDURE — 1123F ACP DISCUSS/DSCN MKR DOCD: CPT | Performed by: NURSE PRACTITIONER

## 2023-10-13 PROCEDURE — 99214 OFFICE O/P EST MOD 30 MIN: CPT | Performed by: NURSE PRACTITIONER

## 2023-10-13 RX ORDER — DIAZEPAM 2 MG/1
2 TABLET ORAL 2 TIMES DAILY PRN
Qty: 60 TABLET | Refills: 0 | Status: SHIPPED | OUTPATIENT
Start: 2023-10-13 | End: 2023-11-12

## 2023-10-13 ASSESSMENT — ENCOUNTER SYMPTOMS
EYES NEGATIVE: 1
RESPIRATORY NEGATIVE: 1
BACK PAIN: 1
GASTROINTESTINAL NEGATIVE: 1

## 2023-10-13 NOTE — PROGRESS NOTES
Pepe Rosales, was evaluated through a synchronous (real-time) audio-video encounter. The patient (or guardian if applicable) is aware that this is a billable service, which includes applicable co-pays. This Virtual Visit was conducted with patient's (and/or legal guardian's) consent. Patient identification was verified, and a caregiver was present when appropriate. The patient was located at Home: [address] [place] [id number]  Provider was located at [facility] (Appt Dept): [address],  [address] [facility]      Pepe Rosales (:  1955) is a Established patient, presenting virtually for evaluation of the following:    Assessment & Plan   Below is the assessment and plan developed based on review of pertinent history, physical exam, labs, studies, and medications. 1. Anxiety  -     diazePAM (VALIUM) 2 MG tablet; Take 1 tablet by mouth 2 times daily as needed for Anxiety for up to 30 days. Max Daily Amount: 4 mg, Disp-60 tablet, R-0Normal  2. Chronic obstructive pulmonary disease, unspecified COPD type ([address])  3. Squamous cell carcinoma of lung, unspecified laterality ([address])  4. Acute cystitis without hematuria    Increasing Valium up to twice a day as needed dosing. Patient can cut tablet in half and take 1/2 tablet as needed 3 or 4 times a day as well. Keep follow-up with pulmonary and oncology as discussed. Finish oral antibiotic for UTI. Return in about 4 weeks (around 11/10/2023) for [facility] med refill- 30 mins only end of day. Subjective   HPI  Patient presents today for routine follow up. No new complaints other than wanting to discuss valium. Patient is now much more anxious. She has been using 1/2 tab in AM in 1/2 tab in PM of the Valium 2 mg tablets. Patient was treated with abx for UTI earlier this week. I did send this medication in and it has helped. Review of Systems   Constitutional: Negative. HENT: Negative. Eyes: Negative. Respiratory: Negative.

## 2023-10-20 ENCOUNTER — TELEPHONE (OUTPATIENT)
Dept: HEMATOLOGY | Age: 68
End: 2023-10-20

## 2023-10-20 NOTE — TELEPHONE ENCOUNTER
Patient's sister called with concerns of increased pain and pain medication not currently helping. [facility] has recommended for patient to Emergency Department for uncontrolled pain. He has also recommended for patient to transition to Hospice. Sister has verbalized understanding and will proceed to Emergency Department with patient.

## 2023-10-26 RX ORDER — PREDNISONE 10 MG/1
TABLET ORAL
Qty: 45 TABLET | Refills: 0 | OUTPATIENT
Start: 2023-10-26

## 2025-05-17 NOTE — TELEPHONE ENCOUNTER
Damaris called and reported that on Saturday she began to have the symptoms of pneumonitis again. Sharp pains in her chest and shortness of breath. She had some left over dexamethasone from Dr. Dudley so has been taking it. She is asking if Dr. Russell can prescribe Prednisone. I discussed with Dr. Winters and he is concerned that she could have real pnemonia and advises she go see her PCP to be evaluated. Damaris verbalized understanding.    Fall Risk; Allergy;

## (undated) DEVICE — GLV SURG BIOGEL LTX PF 6 1/2

## (undated) DEVICE — TOTAL TRAY, 16FR 10ML SIL FOLEY, URN: Brand: MEDLINE

## (undated) DEVICE — COVER,MAYO STAND,STERILE: Brand: MEDLINE

## (undated) DEVICE — GLV SURG TRIUMPH MICRO PF LTX 8.5 STRL

## (undated) DEVICE — GLV SURG BIOGEL LTX PF 8

## (undated) DEVICE — PK TURNOVER RM ADV

## (undated) DEVICE — ADHS LIQ MASTISOL 2/3ML

## (undated) DEVICE — PAD MINOR UNIVERSAL: Brand: MEDLINE INDUSTRIES, INC.

## (undated) DEVICE — WOUND RETRACTOR AND PROTECTOR: Brand: ALEXIS O WOUND PROTECTOR-RETRACTOR

## (undated) DEVICE — SUT MNCRYL 4/0 PS2 27IN UD MCP426H

## (undated) DEVICE — CVR HNDL LIGHT RIGID

## (undated) DEVICE — SYR SLP TP 10ML DISP

## (undated) DEVICE — SUT PROLN 4/0 RB1 D/A 36IN 8557H

## (undated) DEVICE — THE CHANNEL CLEANING BRUSH IS A NYLON FLEXI BRUSH ATTACHED TO A FLEXIBLE PLASTIC SHEATH DESIGNED TO SAFELY REMOVE DEBRIS FROM FLEXIBLE ENDOSCOPES.

## (undated) DEVICE — ELECTRD BLD EDGE/INSUL1P 2.4X5.1MM STRL

## (undated) DEVICE — CVR UNIV C/ARM

## (undated) DEVICE — VAGINAL PREP TRAY: Brand: MEDLINE INDUSTRIES, INC.

## (undated) DEVICE — 3M™ STERI-STRIP™ REINFORCED ADHESIVE SKIN CLOSURES, R1547, 1/2 IN X 4 IN (12 MM X 100 MM), 6 STRIPS/ENVELOPE: Brand: 3M™ STERI-STRIP™

## (undated) DEVICE — DRSNG SURESITE WNDW 4X4.5

## (undated) DEVICE — SUT SILK 0 SUTUPAK TIES 24IN SA76G

## (undated) DEVICE — DRP SURG UTIL W/TPE 2/LAYR 15X26IN DISP

## (undated) DEVICE — SUT SILK 2/0 FS BLK 18IN 685G

## (undated) DEVICE — ENDOGATOR AUXILIARY WATER JET CONNECTOR: Brand: ENDOGATOR

## (undated) DEVICE — CONTAINER,SPECIMEN,OR STERILE,4OZ: Brand: MEDLINE

## (undated) DEVICE — 24 FR STRAIGHT – SOFT PVC CATHETER: Brand: PVC THORACIC CATHETERS

## (undated) DEVICE — ECHELON FLEX POWERED PLUS ARTICULATING ENDOSCOPIC LINEAR CUTTER , 60MM: Brand: ECHELON FLEX

## (undated) DEVICE — SUT SILK 3/0 SUTUPAK TIES 24IN SA74H

## (undated) DEVICE — SNAR POLYP SENSATION MICRO OVL 13 240X40

## (undated) DEVICE — CLTH CLENS READYCLEANSE PERI CARE PK/5

## (undated) DEVICE — BONE TAMP KIT KEX152NB AF E2 15/2: Brand: KYPHON EXPRESS II KYPHOPAK TRAY

## (undated) DEVICE — TBG SMPL FLTR LINE NASL 02/C02 A/ BX/100

## (undated) DEVICE — TRY PREP SCRB VAG PVP

## (undated) DEVICE — ELECTRD BLD EZ CLN MOD 6.5IN

## (undated) DEVICE — UTILITY MARKER W/MED LABELS: Brand: MEDLINE

## (undated) DEVICE — Device: Brand: DEFENDO AIR/WATER/SUCTION AND BIOPSY VALVE

## (undated) DEVICE — KT ANTI FOG W/FLD AND SPNG

## (undated) DEVICE — SINGLE-USE POLYPECTOMY SNARE: Brand: CAPTIVATOR II

## (undated) DEVICE — SPONGE,LAP,12"X12",XR,ST,5/PK,40PK/CS: Brand: MEDLINE

## (undated) DEVICE — YANKAUER,BULB TIP WITH VENT: Brand: ARGYLE

## (undated) DEVICE — BONE TAMP KIT KEX152EB FF E2 15/2 OI: Brand: KYPHON EXPRESS II KYPHOPAK TRAY

## (undated) DEVICE — SPK10277 JACKSON/PRO-AXIS KIT: Brand: SPK10277 JACKSON/PRO-AXIS KIT

## (undated) DEVICE — SUT VIC 2/0 UR6 27IN VCP602H

## (undated) DEVICE — INTENDED FOR TISSUE SEPARATION, AND OTHER PROCEDURES THAT REQUIRE A SHARP SURGICAL BLADE TO PUNCTURE OR CUT.: Brand: BARD-PARKER ® STAINLESS STEEL BLADES

## (undated) DEVICE — BONE BIOPSY DEVICE F07A TAPERED SIZE 2: Brand: MEDTRONIC REUSABLE INSTRUMENTS

## (undated) DEVICE — CUFF,BP,DISP,1 TUBE,ADULT,HP: Brand: MEDLINE

## (undated) DEVICE — OASIS DRAIN, SINGLE, INLINE & ATS COMPATIBLE: Brand: OASIS

## (undated) DEVICE — SUT PROLN 2/0 SH 36IN 8523H

## (undated) DEVICE — TOWEL,OR,DSP,ST,BLUE,STD,4/PK,20PK/CS: Brand: MEDLINE

## (undated) DEVICE — SUT SILK 2/0 SUTUPAK TIES 24IN SA75H

## (undated) DEVICE — ANTIBACTERIAL UNDYED BRAIDED (POLYGLACTIN 910), SYNTHETIC ABSORBABLE SUTURE: Brand: COATED VICRYL

## (undated) DEVICE — PK KYPHOPLASTY 30

## (undated) DEVICE — TRAP,MUCUS SPECIMEN, 80CC: Brand: MEDLINE

## (undated) DEVICE — SPNG GZ STRL 2S 4X4 12PLY

## (undated) DEVICE — DISSCT SECTO 1PC 1P/U 5MMX35CM STRL

## (undated) DEVICE — DRSNG TELFA PAD NONADH STR 1S 3X8IN

## (undated) DEVICE — MASK,OXYGEN,MED CONC,ADLT,7' TUB, UC: Brand: PENDING

## (undated) DEVICE — DRSNG SURESITE WNDW 2.38X2.75

## (undated) DEVICE — SPNG DISSCT CHRRY 3/8IN STRL PK/5

## (undated) DEVICE — GLV SURG BIOGEL M LTX PF 7 1/2

## (undated) DEVICE — THE ECHELON FLEX POWERED PLUS ARTICULATING ENDOSCOPIC LINEAR CUTTERS ARE STERILE, SINGLE PATIENT USE INSTRUMENTS THAT SIMULTANEOUSLYCUT AND STAPLE TISSUE. THERE ARE SIX STAGGERED ROWS OF STAPLES, THREE ON EITHER SIDE OF THE CUT LINE. THE ECHELON FLEX 45 POWERED PLUSINSTRUMENTS HAVE A STAPLE LINE THAT IS APPROXIMATELY 45 MM LONG AND A CUT LINE THAT IS APPROXIMATELY 42 MM LONG. THE SHAFT CAN ROTATE FREELYIN BOTH DIRECTIONS AND AN ARTICULATION MECHANISM ENABLES THE DISTAL PORTION OF THE SHAFT TO PIVOT TO FACILITATE LATERAL ACCESS TO THE OPERATIVESITE.THE INSTRUMENTS ARE PACKAGED WITH A PRIMARY LITHIUM BATTERY PACK THAT MUST BE INSTALLED PRIOR TO USE. THERE ARE SPECIFIC REQUIREMENTS FORDISPOSING OF THE BATTERY PACK. REFER TO THE BATTERY PACK DISPOSAL SECTION.THE INSTRUMENTS ARE PACKAGED WITHOUT A RELOAD AND MUST BE LOADED PRIOR TO USE. A STAPLE RETAINING CAP ON THE RELOAD PROTECTS THE STAPLE LEGPOINTS DURING SHIPPING AND TRANSPORTATION. THE INSTRUMENTS’ LOCK-OUT FEATURE IS DESIGNED TO PREVENT A USED OR IMPROPERLY INSTALLED RELOADFROM BEING REFIRED OR AN INSTRUMENT FROM BEING FIRED WITHOUT A RELOAD.: Brand: ECHELON FLEX

## (undated) DEVICE — TBG INSUFFLATION LUER LOCK: Brand: MEDLINE INDUSTRIES, INC.

## (undated) DEVICE — CONN FLX BREATHE CIRCT

## (undated) DEVICE — ENDOPATH XCEL BLADELESS TROCARS WITH STABILITY SLEEVES: Brand: ENDOPATH XCEL

## (undated) DEVICE — SUT SILK 4/0 SUTUPAK TIES 24IN SA73H

## (undated) DEVICE — SENSR O2 OXIMAX FNGR A/ 18IN NONSTR

## (undated) DEVICE — SPNG GZ 2S 2X2 8PLY STRL PK/2

## (undated) DEVICE — APPL CHLORAPREP HI/LITE 26ML ORNG